# Patient Record
Sex: MALE | Race: WHITE | NOT HISPANIC OR LATINO | Employment: FULL TIME | URBAN - METROPOLITAN AREA
[De-identification: names, ages, dates, MRNs, and addresses within clinical notes are randomized per-mention and may not be internally consistent; named-entity substitution may affect disease eponyms.]

---

## 2020-06-04 ENCOUNTER — TELEPHONE (OUTPATIENT)
Dept: SURGERY | Facility: CLINIC | Age: 60
End: 2020-06-04

## 2020-06-05 ENCOUNTER — OFFICE VISIT (OUTPATIENT)
Dept: SURGERY | Facility: CLINIC | Age: 60
End: 2020-06-05
Payer: COMMERCIAL

## 2020-06-05 VITALS
HEIGHT: 70 IN | WEIGHT: 206.38 LBS | SYSTOLIC BLOOD PRESSURE: 117 MMHG | DIASTOLIC BLOOD PRESSURE: 61 MMHG | BODY MASS INDEX: 29.54 KG/M2 | HEART RATE: 86 BPM

## 2020-06-05 DIAGNOSIS — K64.8 INTERNAL HEMORRHOIDS WITH COMPLICATION: Primary | ICD-10-CM

## 2020-06-05 PROCEDURE — 3008F PR BODY MASS INDEX (BMI) DOCUMENTED: ICD-10-PCS | Mod: CPTII,S$GLB,, | Performed by: COLON & RECTAL SURGERY

## 2020-06-05 PROCEDURE — 99203 OFFICE O/P NEW LOW 30 MIN: CPT | Mod: 25,S$GLB,, | Performed by: COLON & RECTAL SURGERY

## 2020-06-05 PROCEDURE — 3008F BODY MASS INDEX DOCD: CPT | Mod: CPTII,S$GLB,, | Performed by: COLON & RECTAL SURGERY

## 2020-06-05 PROCEDURE — 99203 PR OFFICE/OUTPT VISIT, NEW, LEVL III, 30-44 MIN: ICD-10-PCS | Mod: 25,S$GLB,, | Performed by: COLON & RECTAL SURGERY

## 2020-06-05 PROCEDURE — 46221 LIGATION OF HEMORRHOID(S): CPT | Mod: S$GLB,,, | Performed by: COLON & RECTAL SURGERY

## 2020-06-05 PROCEDURE — 99999 PR PBB SHADOW E&M-EST. PATIENT-LVL III: CPT | Mod: PBBFAC,,, | Performed by: COLON & RECTAL SURGERY

## 2020-06-05 PROCEDURE — 46221 PR HEMORRHOIDECTOMY INTERNAL RUBBER BAND LIGATIONS: ICD-10-PCS | Mod: S$GLB,,, | Performed by: COLON & RECTAL SURGERY

## 2020-06-05 PROCEDURE — 99999 PR PBB SHADOW E&M-EST. PATIENT-LVL III: ICD-10-PCS | Mod: PBBFAC,,, | Performed by: COLON & RECTAL SURGERY

## 2020-06-05 RX ORDER — LISINOPRIL 40 MG/1
TABLET ORAL
COMMUNITY
End: 2020-12-03 | Stop reason: ALTCHOICE

## 2020-06-05 RX ORDER — ATORVASTATIN CALCIUM 20 MG/1
TABLET, FILM COATED ORAL
COMMUNITY
Start: 2020-05-09 | End: 2020-12-03 | Stop reason: SDUPTHER

## 2020-06-05 RX ORDER — AMLODIPINE BESYLATE 5 MG/1
TABLET ORAL
COMMUNITY
Start: 2020-05-13 | End: 2020-12-03 | Stop reason: SDUPTHER

## 2020-06-05 NOTE — PATIENT INSTRUCTIONS
[facility]  DIET RECOMMENDATIONS    Fruits:  Use all fruits and juices liberally; fresh, cooked, dried or canned. Eat fruit raw and with skins when possible. Have at least four servings of fruit daily including a citrus fruit and a stewed dried fruit. Hard seeds of fruits (berries, figs, Grapes, mangoes, tomatoes) etc. may be removed.    Vegetables: Use all vegetables liberally. Green leafy vegetables, such as cabbage, spinach, lettuce, broccoli, and other greens are particularly good.    Potato: As desired. Serve baked frequently and eat the skin. Other starchy foods such as rice, macaroni, etc., may be occasionally substituted. Chew popcorn well and do not eat hard kernels.    Meat, Fish, Poultry: One or two servings daily.    Eggs: One daily if you are not on a low cholesterol diet.    Milk: Include at least one-half pint daily. Whole milk or skimmed may be used.     Cereals: Use whole grain breads and cereals such as bran, bran flakes, grape nut flakes, puffed wheat, oatmeal, Wheaties, etc., as much as possible. Refined breaks and cereals are not restricted; however, they do not contain the bulk necessary for this diet.     Sugars, Sweets: Use very moderately. Depend on fruit as dessert.    Fats: Use butter or margarine as desired. Oil or dressing on salads as desired. Fried foods may be used in moderation. Nuts may be eaten if you chew them well and may be ground or finely chopped for cooking.   Sample Menu                                                                                 Breakfast                          Lunch  Orange juice, 4 ounces                                                Vegetable soup                    Stewed fruit                                                                 Fresh fruit plate with cottage cheese  Shredded wheat                                                           Whole wheat toast  Scrambled eggs                                                            Butter  Whole wheat toast                                                       Coffee or tea  Dinner                                                                         Bedtime  Large glass tomato juice                                             1 glass milk  Roast beef                                                                   stewed fruit  Baked potato with skin  Buttered spinach  Raw vegetable salad  Baked apple with skin   Coffee or tea      [facility]  High Fiber Diet    15 grams of fiber per day is recommended  Fiber cereal = 5 grams (Raisin Bran, Shredded Wheat, Grape Nuts)  Konsyl 1 teaspoon = 6 grams  Metamucil 1 tablespoon = 3 grams  Citrucel 1 tablespoon = 2 grams  Fiber Choice = 3-4 per day    This diet furnishes adequate amounts of all the essential nutrients needed by the body and a very liberal fiber or roughage content. Roughage is indigestible fiber found in fruits, vegetables and whole grain cereals. It provides bulk to the large intestine and, accompanied by an adequate fluid intake, is a stimulant to elimination. Regular eating and elimination habits are vital to good health.     How to Increase Your Fiber Intake    1. Drink at least 4-5 glasses of liquids per day or the fiber can be constipating rather then stimulating to your gut.  2. Boil and bake potatoes in their skin. Eat the skin, too.  3. Include fresh fruits and raw vegetables in your daily diet. Raw fruits and vegetables have more useful fiber than those that have been peeled, cooked, pureed, or otherwise processed.  4. Eat a wide variety of fibrous foods in reasonable amounts. Increase fiber intake slowly especially if you have been on a low-fiber diet.  5. Eat more legumes-peas, beans, soybeans.  6. For snacks, try dried fruit, whole wheat and rye crackers.  7. Avoid instant-cook hot cereals. Use the longer cooking cereals.  8. Use bran whenever possible. Sprinkle it on top of cereal, mix it into  mashed potatoes or hamburger meat, or use it in combination dishes such as meat loaf.   9. Substitute whole grain, whole wheat and bran products for white flour products.  10. Eat slowly and chew your food thoroughly.

## 2020-07-23 ENCOUNTER — TELEPHONE (OUTPATIENT)
Dept: SURGERY | Facility: CLINIC | Age: 60
End: 2020-07-23

## 2020-07-30 ENCOUNTER — OFFICE VISIT (OUTPATIENT)
Dept: SURGERY | Facility: CLINIC | Age: 60
End: 2020-07-30
Payer: COMMERCIAL

## 2020-07-30 VITALS
DIASTOLIC BLOOD PRESSURE: 85 MMHG | BODY MASS INDEX: 29.97 KG/M2 | SYSTOLIC BLOOD PRESSURE: 130 MMHG | HEIGHT: 70 IN | WEIGHT: 209.38 LBS | HEART RATE: 80 BPM

## 2020-07-30 DIAGNOSIS — K64.8 INTERNAL HEMORRHOIDS WITH COMPLICATION: Primary | ICD-10-CM

## 2020-07-30 PROCEDURE — 99999 PR PBB SHADOW E&M-EST. PATIENT-LVL III: CPT | Mod: PBBFAC,,, | Performed by: COLON & RECTAL SURGERY

## 2020-07-30 PROCEDURE — 46221 LIGATION OF HEMORRHOID(S): CPT | Mod: S$GLB,,, | Performed by: COLON & RECTAL SURGERY

## 2020-07-30 PROCEDURE — 99213 OFFICE O/P EST LOW 20 MIN: CPT | Mod: 25,S$GLB,, | Performed by: COLON & RECTAL SURGERY

## 2020-07-30 PROCEDURE — 3008F PR BODY MASS INDEX (BMI) DOCUMENTED: ICD-10-PCS | Mod: CPTII,S$GLB,, | Performed by: COLON & RECTAL SURGERY

## 2020-07-30 PROCEDURE — 99999 PR PBB SHADOW E&M-EST. PATIENT-LVL III: ICD-10-PCS | Mod: PBBFAC,,, | Performed by: COLON & RECTAL SURGERY

## 2020-07-30 PROCEDURE — 99213 PR OFFICE/OUTPT VISIT, EST, LEVL III, 20-29 MIN: ICD-10-PCS | Mod: 25,S$GLB,, | Performed by: COLON & RECTAL SURGERY

## 2020-07-30 PROCEDURE — 46221 PR HEMORRHOIDECTOMY INTERNAL RUBBER BAND LIGATIONS: ICD-10-PCS | Mod: S$GLB,,, | Performed by: COLON & RECTAL SURGERY

## 2020-07-30 PROCEDURE — 3008F BODY MASS INDEX DOCD: CPT | Mod: CPTII,S$GLB,, | Performed by: COLON & RECTAL SURGERY

## 2020-07-30 RX ORDER — ATORVASTATIN CALCIUM 20 MG/1
TABLET, FILM COATED ORAL
COMMUNITY
Start: 2015-05-05 | End: 2020-12-09

## 2020-07-30 RX ORDER — AMLODIPINE BESYLATE 5 MG/1
TABLET ORAL
COMMUNITY
Start: 2015-05-05 | End: 2020-12-03

## 2020-07-30 NOTE — PROGRESS NOTES
See Dr. Hair note from this visit. I have personally taken the history and examined this patient and agree with the resident's note as stated above.

## 2020-07-31 NOTE — PROGRESS NOTES
Colorectal Surgery Clinic Note - Established patient    SUBJECTIVE:     Chief Complaint: follow-up hemorrhoid RBL    History of Present Illness:  Patient is a 60 y.o. male presents in follow-up 6 weeks s/p RBL of LL and RPL internal hemorrhoids.  He states that after the procedure he is about 60% better in terms of prolapse and discomfort.  However, he continues to have frequent bleeding of red blood on the toilet paper.  He has not been taking any fiber supplement.    Review of patient's allergies indicates:  No Known Allergies    History reviewed. No pertinent past medical history.  History reviewed. No pertinent surgical history.  History reviewed. No pertinent family history.  Social History     Tobacco Use    Smoking status: Former Smoker    Smokeless tobacco: Former User   Substance Use Topics    Alcohol use: Yes     Frequency: Monthly or less    Drug use: Not on file          OBJECTIVE:     Vital Signs (Most Recent)  Pulse: 80 (07/30/20 1044)  BP: 130/85 (07/30/20 1044)    Physical Exam   Constitutional: He is oriented to person, place, and time and well-developed, well-nourished, and in no distress.   HENT:   Head: Normocephalic.   Cardiovascular: Normal rate.   Pulmonary/Chest: Effort normal.   Neurological: He is alert and oriented to person, place, and time.   Skin: Skin is warm and dry.     Anorectal:  External exam with prominence of left lateral external hemorrhoids  DELVIN: normal tone, no masses, no blood  Anoscopy: left lateral grade 2 internal hemorrhoid and rubber band ligation performed.    ASSESSMENT/PLAN:     There are no diagnoses linked to this encounter.    Post Rubber Band Ligation    1. Pt gave verbal consent for rubber band ligation, which was performed on the Left Lateral hemorrhoid(s), pt tolerated Well tolerated by patient..    2. Pt verbalized understanding to increase fiber to 25-50gms/day, drink plenty of water and to have 4-5 sitz baths daily, and that in 3-5 days may have minimal  bleeding.    3. Pt will f/u via virtual visit in 6 weeks.    4. Understands to seek immediate treatment for fever, bleeding, pelvic pain, or difficulty urinating.    Orlando Hair MD  Colon and Rectal Surgery Fellow

## 2020-09-11 ENCOUNTER — OFFICE VISIT (OUTPATIENT)
Dept: SURGERY | Facility: CLINIC | Age: 60
End: 2020-09-11
Payer: COMMERCIAL

## 2020-09-11 DIAGNOSIS — K64.8 INTERNAL HEMORRHOIDS WITH COMPLICATION: Primary | ICD-10-CM

## 2020-09-11 PROCEDURE — 99212 OFFICE O/P EST SF 10 MIN: CPT | Mod: 95,,, | Performed by: COLON & RECTAL SURGERY

## 2020-09-11 PROCEDURE — 99212 PR OFFICE/OUTPT VISIT, EST, LEVL II, 10-19 MIN: ICD-10-PCS | Mod: 95,,, | Performed by: COLON & RECTAL SURGERY

## 2020-09-11 NOTE — PROGRESS NOTES
Established Patient - Audio Only Telehealth Visit     The patient location is:  [place]  The chief complaint leading to consultation is:  Hemorrhoids  Visit type: Virtual visit with audio only (telephone)  Total time spent with patient:  10 min       The reason for the audio only service rather than synchronous audio and video virtual visit was related to technical difficulties or patient preference/necessity.     Each patient to whom I provide medical services by telemedicine is:  (1) informed of the relationship between the physician and patient and the respective role of any other health care provider with respect to management of the patient; and (2) notified that they may decline to receive medical services by telemedicine and may withdraw from such care at any time. Patient verbally consented to receive this service via voice-only telephone call.       HPI:  Has been banded twice and although he has noted improvement since the 1st banding has not really seen much improvement since the 2nd banding.  Still has some bleeding and discomfort with bowel movements.  Doing well with taking fiber but not noticing a marked difference.  Some hard stools.     Assessment and plan:  A suggested we try a stool softener and then if no improvement over the next 2-3 weeks he will need to see me back in the office to assess for either ongoing rubber-band ligation or consideration of surgery.                        This service was not originating from a related E/M service provided within the previous 7 days nor will  to an E/M service or procedure within the next 24 hours or my soonest available appointment.  Prevailing standard of care was able to be met in this audio-only visit.

## 2020-09-24 ENCOUNTER — TELEPHONE (OUTPATIENT)
Dept: ORTHOPEDICS | Facility: CLINIC | Age: 60
End: 2020-09-24

## 2020-09-24 DIAGNOSIS — M50.30 DDD (DEGENERATIVE DISC DISEASE), CERVICAL: Primary | ICD-10-CM

## 2020-10-30 ENCOUNTER — TELEPHONE (OUTPATIENT)
Dept: ORTHOPEDICS | Facility: CLINIC | Age: 60
End: 2020-10-30
Payer: COMMERCIAL

## 2020-10-30 NOTE — TELEPHONE ENCOUNTER
----- Message from Leslie Lauren sent at 10/29/2020  5:48 PM CDT -----  Contact: [phone]  Pt calling to speak with someone regarding his upcoming appointment. The pt stated that he already had x-rays of the location the appointment was set up for. Please call the pt regarding the concerns.

## 2020-10-30 NOTE — TELEPHONE ENCOUNTER
Left message for patient advising that if he has an external xray that he can bring to his appointment , then he wont need to do the xray and we can cancel it out.

## 2020-11-04 ENCOUNTER — OFFICE VISIT (OUTPATIENT)
Dept: ORTHOPEDICS | Facility: CLINIC | Age: 60
End: 2020-11-04
Payer: COMMERCIAL

## 2020-11-04 ENCOUNTER — HOSPITAL ENCOUNTER (OUTPATIENT)
Dept: RADIOLOGY | Facility: HOSPITAL | Age: 60
Discharge: HOME | End: 2020-11-04
Attending: PHYSICIAN ASSISTANT
Payer: COMMERCIAL

## 2020-11-04 VITALS — BODY MASS INDEX: 28.7 KG/M2 | WEIGHT: 200 LBS

## 2020-11-04 DIAGNOSIS — M50.30 DDD (DEGENERATIVE DISC DISEASE), CERVICAL: ICD-10-CM

## 2020-11-04 DIAGNOSIS — M54.12 RADICULOPATHY, CERVICAL REGION: ICD-10-CM

## 2020-11-04 DIAGNOSIS — M50.30 DDD (DEGENERATIVE DISC DISEASE), CERVICAL: Primary | ICD-10-CM

## 2020-11-04 DIAGNOSIS — M47.892 OTHER SPONDYLOSIS, CERVICAL REGION: ICD-10-CM

## 2020-11-04 PROCEDURE — 72050 XR CERVICAL SPINE AP LAT WITH FLEX EXTEN: ICD-10-PCS | Mod: 26,,, | Performed by: RADIOLOGY

## 2020-11-04 PROCEDURE — 3008F PR BODY MASS INDEX (BMI) DOCUMENTED: ICD-10-PCS | Mod: CPTII,S$GLB,, | Performed by: ORTHOPAEDIC SURGERY

## 2020-11-04 PROCEDURE — 99204 OFFICE O/P NEW MOD 45 MIN: CPT | Mod: S$GLB,,, | Performed by: ORTHOPAEDIC SURGERY

## 2020-11-04 PROCEDURE — 99204 PR OFFICE/OUTPT VISIT, NEW, LEVL IV, 45-59 MIN: ICD-10-PCS | Mod: S$GLB,,, | Performed by: ORTHOPAEDIC SURGERY

## 2020-11-04 PROCEDURE — 72050 X-RAY EXAM NECK SPINE 4/5VWS: CPT | Mod: 26,,, | Performed by: RADIOLOGY

## 2020-11-04 PROCEDURE — 99999 PR PBB SHADOW E&M-EST. PATIENT-LVL III: ICD-10-PCS | Mod: PBBFAC,,, | Performed by: ORTHOPAEDIC SURGERY

## 2020-11-04 PROCEDURE — 3008F BODY MASS INDEX DOCD: CPT | Mod: CPTII,S$GLB,, | Performed by: ORTHOPAEDIC SURGERY

## 2020-11-04 PROCEDURE — 72050 X-RAY EXAM NECK SPINE 4/5VWS: CPT | Mod: TC

## 2020-11-04 PROCEDURE — 99999 PR PBB SHADOW E&M-EST. PATIENT-LVL III: CPT | Mod: PBBFAC,,, | Performed by: ORTHOPAEDIC SURGERY

## 2020-11-04 NOTE — PROGRESS NOTES
DATE: 11/4/2020  PATIENT: Hipolito Laws    Attending Physician: Gerardo Goldman M.D.    CHIEF COMPLAINT: neck pain    HISTORY:  Hipolito Laws is a 60 y.o. male who is here for initial evaluation of neck and right hand paresthesias.  (Neck - 2, The pain has been present for 16-18 years. The patient describes the pain as sharp and severe. The pain is worse with activity and improved by rest. There is right upper extremity associated numbness and tingling. There is no subjective weakness. Prior treatments have included Naprosyn, chiropracter, but no improvement. No injections and no PT specifically. Patient works full time, electrical substation work. Patient denies tobacco use, occasional alcohol    The Patient denies myelopathic symptoms such as handwriting changes or difficulty with buttons/coins/keys. Denies perineal paresthesias, bowel/bladder dysfunction.    PAST MEDICAL/SURGICAL HISTORY:  History reviewed. No pertinent past medical history.  History reviewed. No pertinent surgical history.    Current Medications:   Current Outpatient Medications:     amLODIPine (NORVASC) 5 MG tablet, , Disp: , Rfl:     amLODIPine (NORVASC) 5 MG tablet, , Disp: , Rfl:     atorvastatin (LIPITOR) 20 MG tablet, , Disp: , Rfl:     atorvastatin (LIPITOR) 20 MG tablet, , Disp: , Rfl:     lisinopriL (PRINIVIL,ZESTRIL) 40 MG tablet, , Disp: , Rfl:     Social History:   Social History     Socioeconomic History    Marital status:      Spouse name: Not on file    Number of children: Not on file    Years of education: Not on file    Highest education level: Not on file   Occupational History    Not on file   Social Needs    Financial resource strain: Not on file    Food insecurity     Worry: Not on file     Inability: Not on file    Transportation needs     Medical: Not on file     Non-medical: Not on file   Tobacco Use    Smoking status: Former Smoker    Smokeless tobacco: Former User   Substance and Sexual  Activity    Alcohol use: Yes     Frequency: Monthly or less    Drug use: Not on file    Sexual activity: Not on file   Lifestyle    Physical activity     Days per week: Not on file     Minutes per session: Not on file    Stress: Not on file   Relationships    Social connections     Talks on phone: Not on file     Gets together: Not on file     Attends Yarsani service: Not on file     Active member of club or organization: Not on file     Attends meetings of clubs or organizations: Not on file     Relationship status: Not on file   Other Topics Concern    Not on file   Social History Narrative    Not on file       REVIEW OF SYSTEMS:  Constitution: Negative. Negative for chills, fever and night sweats.   Cardiovascular: Negative for chest pain and syncope.   Respiratory: Negative for cough and shortness of breath.   Gastrointestinal: See HPI. Negative for nausea/vomiting. Negative for abdominal pain.  Genitourinary: See HPI. Negative for discoloration or dysuria.  Skin: Negative for dry skin, itching and rash.   Hematologic/Lymphatic:  Negative for bleeding/clotting disorders.   Musculoskeletal: Negative for falls and muscle weakness.   Neurological: See HPI.  No history of seizures.  No history of cranial surgery or shunts.  Endocrine: Negative for polydipsia, polyphagia and polyuria.   Allergic/Immunologic: Negative for hives and persistent infections.  Psychiatric/Behavioral: Negative for depression and insomnia.         EXAM:  Wt 90.7 kg (200 lb)   BMI 28.70 kg/m²     General: The patient is a WNWD 60 y.o. male in no apparent distress, the patient is orientatied to person, place and time.  Psych: Normal mood and affect  HEENT: Vision grossly intact, hearing intact to the spoken word.  Lungs: Respirations unlabored.  Gait: Normal station and gait, no difficulty with toe or heel walk.   Skin: Cervical skin negative for rashes, lesions, hairy patches and surgical scars.  Range of motion: Cervical range of  motion is acceptable. There is no tenderness to palpation.  Spinal Balance: Global saggital and coronal spinal balance acceptable, no significant for scoliosis and kyphosis.  Musculoskeletal: No pain with the range of motion of the bilateral shoulders and elbows. Normal bulk and contour of the bilateral hands.  Vascular: Bilateral hands warm and well perfused, Radial pulses 2+ bilaterally.  Neurological: Normal strength and tone in all major motor groups in the bilateral upper and lower extremities. Normal sensation to light touch in the C5-T1 and L2-S1 dermatomes bilaterally.  Deep tendon reflexes symmetric in the bilateral upper and lower extremities.  Negative Inverted Radial Reflex and Lagunas's bilaterally. Negative Babinski bilaterally.     IMAGING:   Today I personally reviewed AP, Lat and Flex/Ex  upright C-spine films that demonstrate C5-6 spondylosis      Body mass index is 28.7 kg/m².  No results found for: HGBA1C    ASSESSMENT/PLAN:  DDD (degenerative disc disease), cervical    Other spondylosis, cervical region  -     MRI Cervical Spine Without Contrast; Future; Expected date: 11/04/2020    Radiculopathy, cervical region  -     MRI Cervical Spine Without Contrast; Future; Expected date: 11/04/2020      No follow-ups on file.    Hipolito PARKS Eusebia is a 60 y.o. male with cervical spondylosis  -MRI  -[facility] MRI

## 2020-11-20 ENCOUNTER — PATIENT MESSAGE (OUTPATIENT)
Dept: ORTHOPEDICS | Facility: CLINIC | Age: 60
End: 2020-11-20

## 2020-11-24 ENCOUNTER — TELEPHONE (OUTPATIENT)
Dept: ORTHOPEDICS | Facility: CLINIC | Age: 60
End: 2020-11-24
Payer: COMMERCIAL

## 2020-11-24 ENCOUNTER — TELEPHONE (OUTPATIENT)
Dept: ORTHOPEDICS | Facility: CLINIC | Age: 60
End: 2020-11-24

## 2020-11-24 NOTE — TELEPHONE ENCOUNTER
Left message for pt letting him know that the peer to peer has been done for his MRI and it approved. Auth # is [id number]. I asked that he call me once his MRI is done and I will get his follow up set up.

## 2020-11-24 NOTE — TELEPHONE ENCOUNTER
Completed peer to peer with Randolph Cody at [facility], [place], cervical MRI has been approved. Prior authorization number [id number] good through January 8, 2021.

## 2020-11-27 ENCOUNTER — TELEPHONE (OUTPATIENT)
Dept: ORTHOPEDICS | Facility: CLINIC | Age: 60
End: 2020-11-27

## 2020-11-27 NOTE — TELEPHONE ENCOUNTER
----- Message from Tia Callaway sent at 11/27/2020 10:03 AM CST -----  Pt would like to receive a call back regarding his mri. Please contact the pt to advise    Contact info [phone]

## 2020-11-27 NOTE — TELEPHONE ENCOUNTER
I returned the patient call he did not answer. Left a message to call me back when he get my message.

## 2020-12-03 ENCOUNTER — IMMUNIZATION (OUTPATIENT)
Dept: PHARMACY | Facility: CLINIC | Age: 60
End: 2020-12-03

## 2020-12-03 ENCOUNTER — LAB VISIT (OUTPATIENT)
Dept: LAB | Facility: HOSPITAL | Age: 60
End: 2020-12-03
Attending: INTERNAL MEDICINE
Payer: COMMERCIAL

## 2020-12-03 ENCOUNTER — PATIENT MESSAGE (OUTPATIENT)
Dept: PHARMACY | Facility: CLINIC | Age: 60
End: 2020-12-03

## 2020-12-03 ENCOUNTER — IMMUNIZATION (OUTPATIENT)
Dept: PHARMACY | Facility: CLINIC | Age: 60
End: 2020-12-03
Payer: COMMERCIAL

## 2020-12-03 ENCOUNTER — OFFICE VISIT (OUTPATIENT)
Dept: INTERNAL MEDICINE | Facility: CLINIC | Age: 60
End: 2020-12-03
Payer: COMMERCIAL

## 2020-12-03 VITALS
BODY MASS INDEX: 30.02 KG/M2 | SYSTOLIC BLOOD PRESSURE: 134 MMHG | DIASTOLIC BLOOD PRESSURE: 98 MMHG | HEART RATE: 86 BPM | HEIGHT: 70 IN | WEIGHT: 209.69 LBS | TEMPERATURE: 99 F | OXYGEN SATURATION: 98 % | RESPIRATION RATE: 18 BRPM

## 2020-12-03 DIAGNOSIS — I10 ESSENTIAL HYPERTENSION, BENIGN: ICD-10-CM

## 2020-12-03 DIAGNOSIS — M15.9 OSTEOARTHRITIS OF MULTIPLE JOINTS, UNSPECIFIED OSTEOARTHRITIS TYPE: ICD-10-CM

## 2020-12-03 DIAGNOSIS — Z12.5 PROSTATE CANCER SCREENING: ICD-10-CM

## 2020-12-03 DIAGNOSIS — Z00.00 ANNUAL PHYSICAL EXAM: ICD-10-CM

## 2020-12-03 DIAGNOSIS — Z12.2 ENCOUNTER FOR SCREENING FOR MALIGNANT NEOPLASM OF RESPIRATORY ORGANS: ICD-10-CM

## 2020-12-03 DIAGNOSIS — Z00.00 ANNUAL PHYSICAL EXAM: Primary | ICD-10-CM

## 2020-12-03 DIAGNOSIS — E78.5 HYPERLIPIDEMIA, UNSPECIFIED HYPERLIPIDEMIA TYPE: ICD-10-CM

## 2020-12-03 DIAGNOSIS — M54.12 CERVICAL RADICULITIS: ICD-10-CM

## 2020-12-03 LAB
ALBUMIN SERPL BCP-MCNC: 3.9 G/DL (ref 3.5–5.2)
ALP SERPL-CCNC: 69 U/L (ref 55–135)
ALT SERPL W/O P-5'-P-CCNC: 19 U/L (ref 10–44)
ANION GAP SERPL CALC-SCNC: 7 MMOL/L (ref 8–16)
AST SERPL-CCNC: 16 U/L (ref 10–40)
BASOPHILS # BLD AUTO: 0.09 K/UL (ref 0–0.2)
BASOPHILS NFR BLD: 0.9 % (ref 0–1.9)
BILIRUB SERPL-MCNC: 0.4 MG/DL (ref 0.1–1)
BUN SERPL-MCNC: 27 MG/DL (ref 6–20)
CALCIUM SERPL-MCNC: 9.6 MG/DL (ref 8.7–10.5)
CHLORIDE SERPL-SCNC: 102 MMOL/L (ref 95–110)
CHOLEST SERPL-MCNC: 161 MG/DL (ref 120–199)
CHOLEST/HDLC SERPL: 3.4 {RATIO} (ref 2–5)
CO2 SERPL-SCNC: 32 MMOL/L (ref 23–29)
COMPLEXED PSA SERPL-MCNC: 0.31 NG/ML (ref 0–4)
CREAT SERPL-MCNC: 1 MG/DL (ref 0.5–1.4)
DIFFERENTIAL METHOD: ABNORMAL
EOSINOPHIL # BLD AUTO: 1.5 K/UL (ref 0–0.5)
EOSINOPHIL NFR BLD: 14.8 % (ref 0–8)
ERYTHROCYTE [DISTWIDTH] IN BLOOD BY AUTOMATED COUNT: 12.5 % (ref 11.5–14.5)
EST. GFR  (AFRICAN AMERICAN): >60 ML/MIN/1.73 M^2
EST. GFR  (NON AFRICAN AMERICAN): >60 ML/MIN/1.73 M^2
ESTIMATED AVG GLUCOSE: 103 MG/DL (ref 68–131)
GLUCOSE SERPL-MCNC: 99 MG/DL (ref 70–110)
HBA1C MFR BLD HPLC: 5.2 % (ref 4–5.6)
HCT VFR BLD AUTO: 47 % (ref 40–54)
HDLC SERPL-MCNC: 47 MG/DL (ref 40–75)
HDLC SERPL: 29.2 % (ref 20–50)
HGB BLD-MCNC: 14.8 G/DL (ref 14–18)
IMM GRANULOCYTES # BLD AUTO: 0.03 K/UL (ref 0–0.04)
IMM GRANULOCYTES NFR BLD AUTO: 0.3 % (ref 0–0.5)
LDLC SERPL CALC-MCNC: 92 MG/DL (ref 63–159)
LYMPHOCYTES # BLD AUTO: 2 K/UL (ref 1–4.8)
LYMPHOCYTES NFR BLD: 19.7 % (ref 18–48)
MCH RBC QN AUTO: 30.5 PG (ref 27–31)
MCHC RBC AUTO-ENTMCNC: 31.5 G/DL (ref 32–36)
MCV RBC AUTO: 97 FL (ref 82–98)
MONOCYTES # BLD AUTO: 0.8 K/UL (ref 0.3–1)
MONOCYTES NFR BLD: 7.9 % (ref 4–15)
NEUTROPHILS # BLD AUTO: 5.6 K/UL (ref 1.8–7.7)
NEUTROPHILS NFR BLD: 56.4 % (ref 38–73)
NONHDLC SERPL-MCNC: 114 MG/DL
NRBC BLD-RTO: 0 /100 WBC
PLATELET # BLD AUTO: 326 K/UL (ref 150–350)
PMV BLD AUTO: 9.8 FL (ref 9.2–12.9)
POTASSIUM SERPL-SCNC: 4.4 MMOL/L (ref 3.5–5.1)
PROT SERPL-MCNC: 7.4 G/DL (ref 6–8.4)
RBC # BLD AUTO: 4.86 M/UL (ref 4.6–6.2)
SODIUM SERPL-SCNC: 141 MMOL/L (ref 136–145)
TRIGL SERPL-MCNC: 110 MG/DL (ref 30–150)
WBC # BLD AUTO: 9.89 K/UL (ref 3.9–12.7)

## 2020-12-03 PROCEDURE — 99386 PR PREVENTIVE VISIT,NEW,40-64: ICD-10-PCS | Mod: S$GLB,,, | Performed by: INTERNAL MEDICINE

## 2020-12-03 PROCEDURE — 3008F PR BODY MASS INDEX (BMI) DOCUMENTED: ICD-10-PCS | Mod: CPTII,S$GLB,, | Performed by: INTERNAL MEDICINE

## 2020-12-03 PROCEDURE — 99999 PR PBB SHADOW E&M-EST. PATIENT-LVL IV: ICD-10-PCS | Mod: PBBFAC,,, | Performed by: INTERNAL MEDICINE

## 2020-12-03 PROCEDURE — 80053 COMPREHEN METABOLIC PANEL: CPT

## 2020-12-03 PROCEDURE — 1125F PR PAIN SEVERITY QUANTIFIED, PAIN PRESENT: ICD-10-PCS | Mod: S$GLB,,, | Performed by: INTERNAL MEDICINE

## 2020-12-03 PROCEDURE — 3008F BODY MASS INDEX DOCD: CPT | Mod: CPTII,S$GLB,, | Performed by: INTERNAL MEDICINE

## 2020-12-03 PROCEDURE — 85025 COMPLETE CBC W/AUTO DIFF WBC: CPT

## 2020-12-03 PROCEDURE — 99213 PR OFFICE/OUTPT VISIT, EST, LEVL III, 20-29 MIN: ICD-10-PCS | Mod: 25,S$GLB,, | Performed by: INTERNAL MEDICINE

## 2020-12-03 PROCEDURE — 99999 PR PBB SHADOW E&M-EST. PATIENT-LVL IV: CPT | Mod: PBBFAC,,, | Performed by: INTERNAL MEDICINE

## 2020-12-03 PROCEDURE — 99386 PREV VISIT NEW AGE 40-64: CPT | Mod: S$GLB,,, | Performed by: INTERNAL MEDICINE

## 2020-12-03 PROCEDURE — 84153 ASSAY OF PSA TOTAL: CPT

## 2020-12-03 PROCEDURE — 83036 HEMOGLOBIN GLYCOSYLATED A1C: CPT

## 2020-12-03 PROCEDURE — 36415 COLL VENOUS BLD VENIPUNCTURE: CPT

## 2020-12-03 PROCEDURE — 80061 LIPID PANEL: CPT

## 2020-12-03 PROCEDURE — 1125F AMNT PAIN NOTED PAIN PRSNT: CPT | Mod: S$GLB,,, | Performed by: INTERNAL MEDICINE

## 2020-12-03 PROCEDURE — 99213 OFFICE O/P EST LOW 20 MIN: CPT | Mod: 25,S$GLB,, | Performed by: INTERNAL MEDICINE

## 2020-12-03 RX ORDER — CYCLOBENZAPRINE HCL 10 MG
TABLET ORAL
COMMUNITY
Start: 2020-10-28 | End: 2020-12-03 | Stop reason: SDUPTHER

## 2020-12-03 RX ORDER — LOSARTAN POTASSIUM 50 MG/1
50 TABLET ORAL DAILY
Qty: 90 TABLET | Refills: 3 | Status: SHIPPED | OUTPATIENT
Start: 2020-12-03 | End: 2021-12-06

## 2020-12-03 RX ORDER — CYCLOBENZAPRINE HCL 10 MG
TABLET ORAL
COMMUNITY
Start: 2020-10-21 | End: 2021-03-01 | Stop reason: SDUPTHER

## 2020-12-03 RX ORDER — DICLOFENAC SODIUM 50 MG/1
50 TABLET, DELAYED RELEASE ORAL 2 TIMES DAILY PRN
Qty: 180 TABLET | Refills: 0 | Status: SHIPPED | OUTPATIENT
Start: 2020-12-03 | End: 2021-03-02

## 2020-12-03 NOTE — PROGRESS NOTES
CHIEF COMPLAINT     Chief Complaint   Patient presents with    Annual Exam       HPI     Hipolito Laws is a 60 y.o. male here today for annual exam    HTN  Did not take meds for the past week.  doesn't check BP at home.  Does not think is hypertension despite being prescribed 2 medications.  RF: NSAID use, high salt diet    Arthritis  Neck, Low back, knees, hips,  Previously taking NSAIDs which were helpful.  Has been out of.  Reports he has been taking over-the-counter ibuprofen and type acetaminophen.  Reports having pain in shoulders wrist elbows knees and hips.  Attributes to career manual labor.  Reports that symptoms have been worse since he has been out of NSAID.    Cervical radiculitis  Being followed by spine clinic at [facility]  Recently had MRI  Reports chronic daily neck pain with radiation of pain into arm.    Personally Reviewed Patient's Medical, surgical, family and social hx. Changes updated in [facility].  Care Team updated in Epic    Review of Systems:  Review of Systems   Constitutional: Negative for activity change and unexpected weight change.   HENT: Negative for hearing loss, rhinorrhea and trouble swallowing.    Eyes: Negative for discharge and visual disturbance.   Respiratory: Negative for chest tightness and wheezing.    Cardiovascular: Negative for chest pain and palpitations.   Gastrointestinal: Negative for blood in stool, constipation, diarrhea and vomiting.   Endocrine: Negative for polydipsia and polyuria.   Genitourinary: Negative for difficulty urinating, hematuria and urgency.   Musculoskeletal: Positive for arthralgias and neck pain. Negative for joint swelling.   Neurological: Negative for weakness and headaches.   Psychiatric/Behavioral: Negative for confusion and dysphoric mood.       Health Maintenance:   Reviewed with patient  Due for the following:  Tdap  Shingrix 1 today    PHYSICAL EXAM     BP (!) 134/98 (BP Location: Left arm, Patient Position: Sitting, BP Method: Large  "(Manual))   Pulse 86   Temp 98.5 °F (36.9 °C) (Oral)   Resp 18   Ht 5' 10" (1.778 m)   Wt 95.1 kg (209 lb 10.5 oz)   SpO2 98%   BMI 30.08 kg/m²     Gen: Well Appearing, NAD how these  HEENT: PERR, EOMI  Neck: FROM, no thyromegaly, no cervical adenopathy  CVD: RRR, no M/R/G  Pulm: Normal work of breathing, CTAB, no wheezing  Abd:  Soft, NT, ND non TTP, no mass  MSK: no LE edema  Neuro: A&Ox3, gait normal, speech normal  Mood; Mood normal, behavior normal, thought process linear       LABS     Labs reviewed; ordered today  ASSESSMENT     1. Annual physical exam  CBC Auto Differential    Comprehensive Metabolic Panel    Hemoglobin A1C    Lipid Panel   2. Essential hypertension, benign  losartan (COZAAR) 50 MG tablet   3. Hyperlipidemia, unspecified hyperlipidemia type  Comprehensive Metabolic Panel   4. Cervical radiculitis     5. Prostate cancer screening  PSA, Screening   6. Encounter for screening for malignant neoplasm of respiratory organs  CT Chest Lung Screening Low Dose   7. Osteoarthritis of multiple joints, unspecified osteoarthritis type  diclofenac (VOLTAREN) 50 MG EC tablet           Plan     Hpiolito Laws is a 60 y.o. male with hypertension, arthritis  1. Annual physical exam  Updated problem list, medical history, care team and discussed HM.     - CBC Auto Differential; Future  - Comprehensive Metabolic Panel; Future  - Hemoglobin A1C; Future  - Lipid Panel; Future    2. Essential hypertension, benign  Informed patient he does have high blood pressure  Will start losartan 50 mg daily since he was not taking medications due to erectile side effects    3. Hyperlipidemia, unspecified hyperlipidemia type  Continue atorvastatin 20  Recheck lipids today  - Comprehensive Metabolic Panel; Future    4. Cervical radiculitis  Follow-up with spine surgery    5. Prostate cancer screening  - PSA, Screening; Future    6. Encounter for screening for malignant neoplasm of respiratory organs  Greater than 30 " pack-year history quit 10 years ago age 60  - CT Chest Lung Screening Low Dose; Future    7. Osteoarthritis  Recommend a 1000 mg of Tylenol every 8 hr as needed  Will give prescription for diclofenac twice daily for pain not relieved with Tylenol  Stressed importance of weight loss and strengthening stabilizer muscles.    Fabio Rosas MD

## 2020-12-04 ENCOUNTER — PATIENT MESSAGE (OUTPATIENT)
Dept: ORTHOPEDICS | Facility: CLINIC | Age: 60
End: 2020-12-04

## 2020-12-07 ENCOUNTER — HOSPITAL ENCOUNTER (OUTPATIENT)
Dept: RADIOLOGY | Facility: HOSPITAL | Age: 60
Discharge: HOME | End: 2020-12-07
Attending: INTERNAL MEDICINE
Payer: COMMERCIAL

## 2020-12-07 DIAGNOSIS — Z12.2 ENCOUNTER FOR SCREENING FOR MALIGNANT NEOPLASM OF RESPIRATORY ORGANS: ICD-10-CM

## 2020-12-07 PROCEDURE — G0297 LDCT FOR LUNG CA SCREEN: HCPCS | Mod: 26,,, | Performed by: RADIOLOGY

## 2020-12-07 PROCEDURE — G0297 LDCT FOR LUNG CA SCREEN: HCPCS | Mod: TC

## 2020-12-07 PROCEDURE — G0297 CT CHEST LUNG SCREENING LOW DOSE: ICD-10-PCS | Mod: 26,,, | Performed by: RADIOLOGY

## 2020-12-09 ENCOUNTER — OFFICE VISIT (OUTPATIENT)
Dept: ORTHOPEDICS | Facility: CLINIC | Age: 60
End: 2020-12-09
Payer: COMMERCIAL

## 2020-12-09 ENCOUNTER — TELEPHONE (OUTPATIENT)
Dept: INTERNAL MEDICINE | Facility: CLINIC | Age: 60
End: 2020-12-09

## 2020-12-09 DIAGNOSIS — I25.10 CORONARY ARTERY CALCIFICATION SEEN ON CT SCAN: Primary | ICD-10-CM

## 2020-12-09 DIAGNOSIS — M54.12 CERVICAL RADICULOPATHY: Primary | ICD-10-CM

## 2020-12-09 PROCEDURE — 99214 PR OFFICE/OUTPT VISIT, EST, LEVL IV, 30-39 MIN: ICD-10-PCS | Mod: S$GLB,,, | Performed by: ORTHOPAEDIC SURGERY

## 2020-12-09 PROCEDURE — 99999 PR PBB SHADOW E&M-EST. PATIENT-LVL II: CPT | Mod: PBBFAC,,, | Performed by: ORTHOPAEDIC SURGERY

## 2020-12-09 PROCEDURE — 99999 PR PBB SHADOW E&M-EST. PATIENT-LVL II: ICD-10-PCS | Mod: PBBFAC,,, | Performed by: ORTHOPAEDIC SURGERY

## 2020-12-09 PROCEDURE — 1125F AMNT PAIN NOTED PAIN PRSNT: CPT | Mod: S$GLB,,, | Performed by: ORTHOPAEDIC SURGERY

## 2020-12-09 PROCEDURE — 1125F PR PAIN SEVERITY QUANTIFIED, PAIN PRESENT: ICD-10-PCS | Mod: S$GLB,,, | Performed by: ORTHOPAEDIC SURGERY

## 2020-12-09 PROCEDURE — 99214 OFFICE O/P EST MOD 30 MIN: CPT | Mod: S$GLB,,, | Performed by: ORTHOPAEDIC SURGERY

## 2020-12-09 RX ORDER — ATORVASTATIN CALCIUM 80 MG/1
80 TABLET, FILM COATED ORAL DAILY
Qty: 90 TABLET | Refills: 3 | Status: SHIPPED | OUTPATIENT
Start: 2020-12-09 | End: 2020-12-09

## 2020-12-09 RX ORDER — ATORVASTATIN CALCIUM 40 MG/1
40 TABLET, FILM COATED ORAL DAILY
Qty: 90 TABLET | Refills: 3 | Status: SHIPPED | OUTPATIENT
Start: 2020-12-09 | End: 2022-01-04 | Stop reason: SDUPTHER

## 2020-12-09 NOTE — PROGRESS NOTES
DATE: 12/9/2020  PATIENT: Hipoltio Laws    Attending Physician: Gerardo Goldman M.D.    CHIEF COMPLAINT: neck pain    HISTORY:  Hipolito Laws is a 60 y.o. male who is here for initial evaluation of neck and right hand paresthesias.  (Neck - 2, The pain has been present for 16-18 years. The patient describes the pain as sharp and severe. The pain is worse with activity and improved by rest. There is right upper extremity associated numbness and tingling. There is no subjective weakness. Prior treatments have included Naprosyn, chiropracter, but no improvement. No injections and no PT specifically. Patient works full time, electrical substation work. Patient denies tobacco use, occasional alcohol    The Patient denies myelopathic symptoms such as handwriting changes or difficulty with buttons/coins/keys. Denies perineal paresthesias, bowel/bladder dysfunction.    Interval history 12/9/20: patient returns for fu of MRI. He continues to have pain in the left jaw and numbness in the right hand and fingers. Denies myelopathic symptoms.   PAST MEDICAL/SURGICAL HISTORY:  History reviewed. No pertinent past medical history.  Past Surgical History:   Procedure Laterality Date    ARTHROSCOPIC REPAIR OF ROTATOR CUFF OF SHOULDER  1992       Current Medications:   Current Outpatient Medications:     atorvastatin (LIPITOR) 40 MG tablet, Take 1 tablet (40 mg total) by mouth once daily., Disp: 90 tablet, Rfl: 3    cyclobenzaprine (FLEXERIL) 10 MG tablet, TK 1 T PO BID FOR 14 DAYS, Disp: , Rfl:     diclofenac (VOLTAREN) 50 MG EC tablet, Take 1 tablet (50 mg total) by mouth 2 (two) times daily as needed., Disp: 180 tablet, Rfl: 0    losartan (COZAAR) 50 MG tablet, Take 1 tablet (50 mg total) by mouth once daily., Disp: 90 tablet, Rfl: 3    Social History:   Social History     Socioeconomic History    Marital status:      Spouse name: Not on file    Number of children: Not on file    Years of education: Not on  file    Highest education level: Not on file   Occupational History    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   Social Needs    Financial resource strain: Not on file    Food insecurity     Worry: Not on file     Inability: Not on file    Transportation needs     Medical: Not on file     Non-medical: Not on file   Tobacco Use    Smoking status: Former Smoker     Packs/day: 1.50     Years: 30.00     Pack years: 45.00     Quit date: 12/3/2011     Years since quittin.0    Smokeless tobacco: Former User   Substance and Sexual Activity    Alcohol use: Yes     Frequency: Monthly or less    Drug use: Not on file    Sexual activity: Yes   Lifestyle    Physical activity     Days per week: Not on file     Minutes per session: Not on file    Stress: Not on file   Relationships    Social connections     Talks on phone: Not on file     Gets together: Not on file     Attends Baptism service: Not on file     Active member of club or organization: Not on file     Attends meetings of clubs or organizations: Not on file     Relationship status: Not on file   Other Topics Concern    Not on file   Social History Narrative    Not on file       REVIEW OF SYSTEMS:  Constitution: Negative. Negative for chills, fever and night sweats.   Cardiovascular: Negative for chest pain and syncope.   Respiratory: Negative for cough and shortness of breath.   Gastrointestinal: See HPI. Negative for nausea/vomiting. Negative for abdominal pain.  Genitourinary: See HPI. Negative for discoloration or dysuria.  Skin: Negative for dry skin, itching and rash.   Hematologic/Lymphatic:  Negative for bleeding/clotting disorders.   Musculoskeletal: Negative for falls and muscle weakness.   Neurological: See HPI.  No history of seizures.  No history of cranial surgery or shunts.  Endocrine: Negative for polydipsia, polyphagia and polyuria.   Allergic/Immunologic: Negative for hives and persistent infections.  Psychiatric/Behavioral: Negative for  depression and insomnia.         EXAM:  There were no vitals taken for this visit.    General: The patient is a WNWD 60 y.o. male in no apparent distress, the patient is orientatied to person, place and time.  Psych: Normal mood and affect  HEENT: Vision grossly intact, hearing intact to the spoken word.  Lungs: Respirations unlabored.  Gait: Normal station and gait, no difficulty with toe or heel walk.   Skin: Cervical skin negative for rashes, lesions, hairy patches and surgical scars.  Range of motion: Cervical range of motion is acceptable. There is no tenderness to palpation.  Spinal Balance: Global saggital and coronal spinal balance acceptable, no significant for scoliosis and kyphosis.  Musculoskeletal: No pain with the range of motion of the bilateral shoulders and elbows. Normal bulk and contour of the bilateral hands.  Vascular: Bilateral hands warm and well perfused, Radial pulses 2+ bilaterally.  Neurological: Normal strength and tone in all major motor groups in the bilateral upper and lower extremities. Normal sensation to light touch in the C5-T1 and L2-S1 dermatomes bilaterally.  Deep tendon reflexes symmetric in the bilateral upper and lower extremities.  Negative Inverted Radial Reflex and Lagunas's bilaterally. Negative Babinski bilaterally.     IMAGING:   Today I personally reviewed AP, Lat and Flex/Ex  upright C-spine films that demonstrate C5-6 spondylosis    MRI with mild stenosis C5-6 from outside facility  There is no height or weight on file to calculate BMI.  Hemoglobin A1C   Date Value Ref Range Status   12/03/2020 5.2 4.0 - 5.6 % Final     Comment:     ADA Screening Guidelines:  5.7-6.4%  Consistent with prediabetes  >or=6.5%  Consistent with diabetes  High levels of fetal hemoglobin interfere with the HbA1C  assay. Heterozygous hemoglobin variants (HbS, HgC, etc)do  not significantly interfere with this assay.   However, presence of multiple variants may affect accuracy.          ASSESSMENT/PLAN:  There are no diagnoses linked to this encounter.  No follow-ups on file.    Hipolito Oroscodeau is a 60 y.o. male with cervical spondylosis and stenosis C5-6  -DIGNA C5-6  -FU after DIGNA  -Continue conservative management.

## 2020-12-09 NOTE — TELEPHONE ENCOUNTER
----- Message from Adelina Winkler sent at 12/9/2020  8:46 AM CST -----  Regarding: clarification  Contact: David Roca [phone]  Pharmacy is calling to clarify an RX.  RX name:  Atorvastatin  What do they need to clarify:  two Rx were sent; 40mg and 80 mg  Comments:

## 2020-12-11 ENCOUNTER — PATIENT MESSAGE (OUTPATIENT)
Dept: PAIN MEDICINE | Facility: OTHER | Age: 60
End: 2020-12-11

## 2020-12-11 ENCOUNTER — TELEPHONE (OUTPATIENT)
Dept: PAIN MEDICINE | Facility: OTHER | Age: 60
End: 2020-12-11

## 2020-12-11 DIAGNOSIS — M54.12 CERVICAL RADICULOPATHY: Primary | ICD-10-CM

## 2020-12-17 ENCOUNTER — HOSPITAL ENCOUNTER (OUTPATIENT)
Facility: OTHER | Age: 60
Discharge: HOME | End: 2020-12-17
Attending: ANESTHESIOLOGY | Admitting: ANESTHESIOLOGY
Payer: COMMERCIAL

## 2020-12-17 VITALS
HEIGHT: 70 IN | OXYGEN SATURATION: 95 % | SYSTOLIC BLOOD PRESSURE: 168 MMHG | DIASTOLIC BLOOD PRESSURE: 94 MMHG | BODY MASS INDEX: 29.92 KG/M2 | HEART RATE: 86 BPM | TEMPERATURE: 98 F | RESPIRATION RATE: 16 BRPM | WEIGHT: 209 LBS

## 2020-12-17 DIAGNOSIS — G89.29 CHRONIC PAIN: ICD-10-CM

## 2020-12-17 DIAGNOSIS — M54.12 CERVICAL RADICULOPATHY: Primary | ICD-10-CM

## 2020-12-17 DIAGNOSIS — M50.30 DDD (DEGENERATIVE DISC DISEASE), CERVICAL: ICD-10-CM

## 2020-12-17 PROCEDURE — 64479 NJX AA&/STRD TFRM EPI C/T 1: CPT | Mod: RT,,, | Performed by: ANESTHESIOLOGY

## 2020-12-17 PROCEDURE — 64479 PR INJECT ANES/STEROID FORAMEN CERV/THORACIC W IMG GUIDE ,1 LEVEL: ICD-10-PCS | Mod: RT,,, | Performed by: ANESTHESIOLOGY

## 2020-12-17 PROCEDURE — 63600175 PHARM REV CODE 636 W HCPCS: Performed by: ANESTHESIOLOGY

## 2020-12-17 PROCEDURE — 64479 NJX AA&/STRD TFRM EPI C/T 1: CPT | Mod: RT | Performed by: ANESTHESIOLOGY

## 2020-12-17 PROCEDURE — 25500020 PHARM REV CODE 255: Performed by: ANESTHESIOLOGY

## 2020-12-17 PROCEDURE — 25000003 PHARM REV CODE 250: Performed by: ANESTHESIOLOGY

## 2020-12-17 RX ORDER — LIDOCAINE HYDROCHLORIDE 10 MG/ML
INJECTION INFILTRATION; PERINEURAL
Status: DISCONTINUED | OUTPATIENT
Start: 2020-12-17 | End: 2020-12-17 | Stop reason: HOSPADM

## 2020-12-17 RX ORDER — DEXAMETHASONE SODIUM PHOSPHATE 100 MG/10ML
INJECTION INTRAMUSCULAR; INTRAVENOUS
Status: DISCONTINUED | OUTPATIENT
Start: 2020-12-17 | End: 2020-12-17 | Stop reason: HOSPADM

## 2020-12-17 RX ORDER — LIDOCAINE HYDROCHLORIDE 10 MG/ML
INJECTION, SOLUTION EPIDURAL; INFILTRATION; INTRACAUDAL; PERINEURAL
Status: DISCONTINUED | OUTPATIENT
Start: 2020-12-17 | End: 2020-12-17 | Stop reason: HOSPADM

## 2020-12-17 RX ORDER — SODIUM CHLORIDE 9 MG/ML
INJECTION, SOLUTION INTRAVENOUS CONTINUOUS
Status: DISCONTINUED | OUTPATIENT
Start: 2020-12-17 | End: 2020-12-17 | Stop reason: HOSPADM

## 2020-12-17 RX ORDER — ALPRAZOLAM 0.5 MG/1
1 TABLET ORAL ONCE
Status: COMPLETED | OUTPATIENT
Start: 2020-12-17 | End: 2020-12-17

## 2020-12-17 RX ADMIN — ALPRAZOLAM 1 MG: 0.5 TABLET ORAL at 09:12

## 2020-12-17 NOTE — DISCHARGE SUMMARY
Discharge Note  Short Stay      SUMMARY     Admit Date: 12/17/2020    Attending Physician: Yunier Lawton      Discharge Physician: Yunier Lawton      Discharge Date: 12/17/2020 10:38 AM    Procedure(s) (LRB):  CERVICAL C5/6 DIGNA TRANSFORAMINAL  DIRECT REFERRAL (Right)    Final Diagnosis: Cervical radiculopathy [M54.12]    Disposition: Home or self care    Patient Instructions:   Current Discharge Medication List      CONTINUE these medications which have NOT CHANGED    Details   atorvastatin (LIPITOR) 40 MG tablet Take 1 tablet (40 mg total) by mouth once daily.  Qty: 90 tablet, Refills: 3    Associated Diagnoses: Coronary artery calcification seen on CT scan      cyclobenzaprine (FLEXERIL) 10 MG tablet TK 1 T PO BID FOR 14 DAYS      diclofenac (VOLTAREN) 50 MG EC tablet Take 1 tablet (50 mg total) by mouth 2 (two) times daily as needed.  Qty: 180 tablet, Refills: 0    Associated Diagnoses: Osteoarthritis of multiple joints, unspecified osteoarthritis type      losartan (COZAAR) 50 MG tablet Take 1 tablet (50 mg total) by mouth once daily.  Qty: 90 tablet, Refills: 3    Comments: .  Associated Diagnoses: Essential hypertension, benign                 Discharge Diagnosis: Cervical radiculopathy [M54.12]  Condition on Discharge: Stable with no complications to procedure   Diet on Discharge: Same as before.  Activity: as per instruction sheet.  Discharge to: Home with a responsible adult.  Follow up: 2-4 weeks       Please call my office or pager at [phone] if experienced any weakness or loss of sensation, fever > 101.5, pain uncontrolled with oral medications, persistent nausea/vomiting/or diarrhea, redness or drainage from the incisions, or any other worrisome concerns. If physician on call was not reached or could not communicate with our office for any reason please go to the nearest emergency department

## 2020-12-17 NOTE — OP NOTE
1. Cervical Transforaminal Epidural Steroid Injection  Time-out taken to identify patient and procedure side prior to starting the procedure.   I attest that I have reviewed the patient's home medications prior to the procedure and no contraindication have been identified. I  re-evaluated the patient after the patient was positioned for the procedure in the procedure room immediately before the procedural time-out. The vital signs are current and represent the current state of the patient which has not significantly changed since the preprocedure assessment.                                                                Date of Service: 12/17/2020    PCP: Fabio Rosas MD    Referring Physician:    PROCEDURE:  Right C5/C6 cervical transforaminal epidural steroid injection under fluoroscopy    REASON FOR PROCEDURE: right Cervical radiculopathy [M54.12].  1. Cervical radiculopathy    2. DDD (degenerative disc disease), cervical    3. Chronic pain      POSTPROCEDURE DIAGNOSIS:   Cervical radiculopathy [M54.12]    1. Cervical radiculopathy    2. DDD (degenerative disc disease), cervical    3. Chronic pain           PHYSICIAN: Yunier Lawton MD  ASSISTANTS:   Lang Vargas MD Pain Fellow      MEDICATIONS INJECTED: Preservative-free dexamethasone 5mg and 0.5ml of Xylocaine-MPF 1%  SEDATION MEDICATIONS: None  ESTIMATED BLOOD LOSS:  None.  COMPLICATION:  None.    TECHNIQUE:   Laying in the left oblique position, the patient was prepped and draped in the usual sterile fashion using ChloraPrep and fenestrated drape.  The area to be injected was determined under fluoroscopic guidance.  A 26-gauge 3.5 inch spinal needed was introduced towards the desired pedicle of all levels as stated above.  When the tip of the needle reached the periosteum it was worked anteriorly along the facet joint line.  Negative pressure applied to make sure no blood is seen in the hub.  Omnipaque was injected and digital subtraction was applied to make  sure that there was no vascular runoff whatsoever.  It was also injected to confirm placement in the appropriate area.  The medication was then injected slowly.  The patient tolerated the procedure well.    PAIN BEFORE THE PROCEDURE:  3-4/10.    PAIN AFTER THE PROCEDURE:  3-4/10.    The patient was monitored after the procedure.  Patient was given post procedure and discharge instructions to follow at home.  We will see the patient back in two weeks or the patient may call to inform of status. The patient was discharged in a stable condition

## 2020-12-17 NOTE — DISCHARGE INSTRUCTIONS

## 2020-12-17 NOTE — H&P
"HPI  Patient presenting for Procedure(s) (LRB):  CERVICAL C5/6 DIGNA DIRECT REFERRAL (N/A)     Patient on Anti-coagulation No    No health changes since previous encounter    History reviewed. No pertinent past medical history.  Past Surgical History:   Procedure Laterality Date    ARTHROSCOPIC REPAIR OF ROTATOR CUFF OF SHOULDER  1992     Review of patient's allergies indicates:  No Known Allergies   Current Facility-Administered Medications   Medication    0.9%  NaCl infusion       PMHx, PSHx, Allergies, Medications reviewed in epic    ROS negative except pain complaints in HPI    OBJECTIVE:    BP (!) 160/91 (BP Location: Right arm, Patient Position: Sitting)   Pulse 91   Temp 98.4 °F (36.9 °C) (Oral)   Resp 19   Ht 5' 10" (1.778 m)   Wt 94.8 kg (209 lb)   SpO2 97%   BMI 29.99 kg/m²     PHYSICAL EXAMINATION:    GENERAL: Well appearing, in no acute distress, alert and oriented x3.  PSYCH:  Mood and affect appropriate.  SKIN: Skin color, texture, turgor normal, no rashes or lesions which will impact the procedure.  CV: RRR with palpation of the radial artery.  PULM: No evidence of respiratory difficulty, symmetric chest rise. NEURO: Cranial nerves grossly intact.    Plan:    Proceed with procedure as planned Procedure(s) (LRB):  CERVICAL C5/6 DIGNA DIRECT REFERRAL (N/A)    Lang Vargas  12/17/2020            "

## 2020-12-18 ENCOUNTER — PATIENT MESSAGE (OUTPATIENT)
Dept: ORTHOPEDICS | Facility: CLINIC | Age: 60
End: 2020-12-18

## 2021-02-03 ENCOUNTER — OFFICE VISIT (OUTPATIENT)
Dept: INTERNAL MEDICINE | Facility: CLINIC | Age: 61
End: 2021-02-03
Payer: COMMERCIAL

## 2021-02-03 VITALS
HEART RATE: 69 BPM | DIASTOLIC BLOOD PRESSURE: 78 MMHG | WEIGHT: 211 LBS | RESPIRATION RATE: 18 BRPM | HEIGHT: 70 IN | BODY MASS INDEX: 30.21 KG/M2 | OXYGEN SATURATION: 99 % | SYSTOLIC BLOOD PRESSURE: 122 MMHG | TEMPERATURE: 98 F

## 2021-02-03 DIAGNOSIS — I25.10 CORONARY ARTERY CALCIFICATION SEEN ON CT SCAN: ICD-10-CM

## 2021-02-03 DIAGNOSIS — E78.5 HYPERLIPIDEMIA, UNSPECIFIED HYPERLIPIDEMIA TYPE: ICD-10-CM

## 2021-02-03 DIAGNOSIS — K42.9 UMBILICAL HERNIA WITHOUT OBSTRUCTION AND WITHOUT GANGRENE: Primary | ICD-10-CM

## 2021-02-03 DIAGNOSIS — I10 HYPERTENSION, ESSENTIAL: ICD-10-CM

## 2021-02-03 PROCEDURE — 3078F PR MOST RECENT DIASTOLIC BLOOD PRESSURE < 80 MM HG: ICD-10-PCS | Mod: CPTII,S$GLB,, | Performed by: PHYSICIAN ASSISTANT

## 2021-02-03 PROCEDURE — 3074F PR MOST RECENT SYSTOLIC BLOOD PRESSURE < 130 MM HG: ICD-10-PCS | Mod: CPTII,S$GLB,, | Performed by: PHYSICIAN ASSISTANT

## 2021-02-03 PROCEDURE — 99999 PR PBB SHADOW E&M-EST. PATIENT-LVL V: CPT | Mod: PBBFAC,,, | Performed by: PHYSICIAN ASSISTANT

## 2021-02-03 PROCEDURE — 3008F PR BODY MASS INDEX (BMI) DOCUMENTED: ICD-10-PCS | Mod: CPTII,S$GLB,, | Performed by: PHYSICIAN ASSISTANT

## 2021-02-03 PROCEDURE — 99214 OFFICE O/P EST MOD 30 MIN: CPT | Mod: S$GLB,,, | Performed by: PHYSICIAN ASSISTANT

## 2021-02-03 PROCEDURE — 99214 PR OFFICE/OUTPT VISIT, EST, LEVL IV, 30-39 MIN: ICD-10-PCS | Mod: S$GLB,,, | Performed by: PHYSICIAN ASSISTANT

## 2021-02-03 PROCEDURE — 99999 PR PBB SHADOW E&M-EST. PATIENT-LVL V: ICD-10-PCS | Mod: PBBFAC,,, | Performed by: PHYSICIAN ASSISTANT

## 2021-02-03 PROCEDURE — 3078F DIAST BP <80 MM HG: CPT | Mod: CPTII,S$GLB,, | Performed by: PHYSICIAN ASSISTANT

## 2021-02-03 PROCEDURE — 3008F BODY MASS INDEX DOCD: CPT | Mod: CPTII,S$GLB,, | Performed by: PHYSICIAN ASSISTANT

## 2021-02-03 PROCEDURE — 1126F PR PAIN SEVERITY QUANTIFIED, NO PAIN PRESENT: ICD-10-PCS | Mod: S$GLB,,, | Performed by: PHYSICIAN ASSISTANT

## 2021-02-03 PROCEDURE — 3074F SYST BP LT 130 MM HG: CPT | Mod: CPTII,S$GLB,, | Performed by: PHYSICIAN ASSISTANT

## 2021-02-03 PROCEDURE — 1126F AMNT PAIN NOTED NONE PRSNT: CPT | Mod: S$GLB,,, | Performed by: PHYSICIAN ASSISTANT

## 2021-02-03 RX ORDER — ASPIRIN 81 MG/1
81 TABLET ORAL DAILY
COMMUNITY
End: 2022-08-01

## 2021-02-23 ENCOUNTER — IMMUNIZATION (OUTPATIENT)
Dept: PHARMACY | Facility: CLINIC | Age: 61
End: 2021-02-23
Payer: COMMERCIAL

## 2021-02-23 ENCOUNTER — HOSPITAL ENCOUNTER (OUTPATIENT)
Dept: RADIOLOGY | Facility: HOSPITAL | Age: 61
Discharge: HOME | End: 2021-02-23
Attending: PHYSICIAN ASSISTANT
Payer: COMMERCIAL

## 2021-02-23 DIAGNOSIS — K42.9 UMBILICAL HERNIA WITHOUT OBSTRUCTION AND WITHOUT GANGRENE: ICD-10-CM

## 2021-02-23 PROCEDURE — 76705 ECHO EXAM OF ABDOMEN: CPT | Mod: TC

## 2021-02-23 PROCEDURE — 76705 ECHO EXAM OF ABDOMEN: CPT | Mod: 26,,, | Performed by: RADIOLOGY

## 2021-02-23 PROCEDURE — 76705 US ABDOMEN LIMITED: ICD-10-PCS | Mod: 26,,, | Performed by: RADIOLOGY

## 2021-03-01 ENCOUNTER — OFFICE VISIT (OUTPATIENT)
Dept: INTERNAL MEDICINE | Facility: CLINIC | Age: 61
End: 2021-03-01
Payer: COMMERCIAL

## 2021-03-01 ENCOUNTER — PATIENT MESSAGE (OUTPATIENT)
Dept: PAIN MEDICINE | Facility: CLINIC | Age: 61
End: 2021-03-01

## 2021-03-01 VITALS
WEIGHT: 206.69 LBS | BODY MASS INDEX: 29.59 KG/M2 | HEIGHT: 70 IN | SYSTOLIC BLOOD PRESSURE: 110 MMHG | DIASTOLIC BLOOD PRESSURE: 70 MMHG | HEART RATE: 80 BPM

## 2021-03-01 DIAGNOSIS — M15.9 OSTEOARTHRITIS OF MULTIPLE JOINTS, UNSPECIFIED OSTEOARTHRITIS TYPE: ICD-10-CM

## 2021-03-01 DIAGNOSIS — Z82.49 FAMILY HISTORY OF PREMATURE CORONARY HEART DISEASE: Primary | ICD-10-CM

## 2021-03-01 DIAGNOSIS — G47.9 SLEEP DISTURBANCE: ICD-10-CM

## 2021-03-01 DIAGNOSIS — E78.5 HYPERLIPIDEMIA, UNSPECIFIED HYPERLIPIDEMIA TYPE: ICD-10-CM

## 2021-03-01 DIAGNOSIS — M54.9 BACK PAIN, UNSPECIFIED BACK LOCATION, UNSPECIFIED BACK PAIN LATERALITY, UNSPECIFIED CHRONICITY: ICD-10-CM

## 2021-03-01 DIAGNOSIS — I10 HYPERTENSION, ESSENTIAL: ICD-10-CM

## 2021-03-01 PROCEDURE — 1126F PR PAIN SEVERITY QUANTIFIED, NO PAIN PRESENT: ICD-10-PCS | Mod: S$GLB,,, | Performed by: PHYSICIAN ASSISTANT

## 2021-03-01 PROCEDURE — 99215 PR OFFICE/OUTPT VISIT, EST, LEVL V, 40-54 MIN: ICD-10-PCS | Mod: S$GLB,,, | Performed by: PHYSICIAN ASSISTANT

## 2021-03-01 PROCEDURE — 1126F AMNT PAIN NOTED NONE PRSNT: CPT | Mod: S$GLB,,, | Performed by: PHYSICIAN ASSISTANT

## 2021-03-01 PROCEDURE — 99999 PR PBB SHADOW E&M-EST. PATIENT-LVL III: ICD-10-PCS | Mod: PBBFAC,,, | Performed by: PHYSICIAN ASSISTANT

## 2021-03-01 PROCEDURE — 3078F DIAST BP <80 MM HG: CPT | Mod: CPTII,S$GLB,, | Performed by: PHYSICIAN ASSISTANT

## 2021-03-01 PROCEDURE — 3008F BODY MASS INDEX DOCD: CPT | Mod: CPTII,S$GLB,, | Performed by: PHYSICIAN ASSISTANT

## 2021-03-01 PROCEDURE — 3074F SYST BP LT 130 MM HG: CPT | Mod: CPTII,S$GLB,, | Performed by: PHYSICIAN ASSISTANT

## 2021-03-01 PROCEDURE — 99215 OFFICE O/P EST HI 40 MIN: CPT | Mod: S$GLB,,, | Performed by: PHYSICIAN ASSISTANT

## 2021-03-01 PROCEDURE — 3078F PR MOST RECENT DIASTOLIC BLOOD PRESSURE < 80 MM HG: ICD-10-PCS | Mod: CPTII,S$GLB,, | Performed by: PHYSICIAN ASSISTANT

## 2021-03-01 PROCEDURE — 99999 PR PBB SHADOW E&M-EST. PATIENT-LVL III: CPT | Mod: PBBFAC,,, | Performed by: PHYSICIAN ASSISTANT

## 2021-03-01 PROCEDURE — 3074F PR MOST RECENT SYSTOLIC BLOOD PRESSURE < 130 MM HG: ICD-10-PCS | Mod: CPTII,S$GLB,, | Performed by: PHYSICIAN ASSISTANT

## 2021-03-01 PROCEDURE — 3008F PR BODY MASS INDEX (BMI) DOCUMENTED: ICD-10-PCS | Mod: CPTII,S$GLB,, | Performed by: PHYSICIAN ASSISTANT

## 2021-03-01 RX ORDER — TRAZODONE HYDROCHLORIDE 50 MG/1
25-50 TABLET ORAL NIGHTLY PRN
Qty: 30 TABLET | Refills: 11 | Status: SHIPPED | OUTPATIENT
Start: 2021-03-01 | End: 2022-01-04 | Stop reason: SDUPTHER

## 2021-03-01 RX ORDER — CYCLOBENZAPRINE HCL 10 MG
TABLET ORAL
Qty: 14 TABLET | Refills: 0 | Status: SHIPPED | OUTPATIENT
Start: 2021-03-01 | End: 2022-01-04 | Stop reason: SDUPTHER

## 2021-03-02 ENCOUNTER — IMMUNIZATION (OUTPATIENT)
Dept: PHARMACY | Facility: CLINIC | Age: 61
End: 2021-03-02
Payer: COMMERCIAL

## 2021-03-02 DIAGNOSIS — Z23 NEED FOR VACCINATION: Primary | ICD-10-CM

## 2021-03-02 RX ORDER — DICLOFENAC SODIUM 50 MG/1
TABLET, DELAYED RELEASE ORAL
Qty: 180 TABLET | Refills: 0 | Status: SHIPPED | OUTPATIENT
Start: 2021-03-02 | End: 2021-11-17 | Stop reason: SDUPTHER

## 2021-03-03 ENCOUNTER — PATIENT MESSAGE (OUTPATIENT)
Dept: INTERNAL MEDICINE | Facility: CLINIC | Age: 61
End: 2021-03-03

## 2021-03-09 ENCOUNTER — OFFICE VISIT (OUTPATIENT)
Dept: CARDIOLOGY | Facility: CLINIC | Age: 61
End: 2021-03-09
Payer: COMMERCIAL

## 2021-03-09 VITALS
DIASTOLIC BLOOD PRESSURE: 57 MMHG | HEART RATE: 92 BPM | WEIGHT: 206.81 LBS | SYSTOLIC BLOOD PRESSURE: 99 MMHG | BODY MASS INDEX: 29.61 KG/M2 | HEIGHT: 70 IN

## 2021-03-09 DIAGNOSIS — E78.5 HYPERLIPIDEMIA, UNSPECIFIED HYPERLIPIDEMIA TYPE: ICD-10-CM

## 2021-03-09 DIAGNOSIS — I10 ESSENTIAL HYPERTENSION, BENIGN: ICD-10-CM

## 2021-03-09 DIAGNOSIS — Z82.49 FAMILY HISTORY OF PREMATURE CORONARY HEART DISEASE: ICD-10-CM

## 2021-03-09 DIAGNOSIS — I25.10 CORONARY ARTERY CALCIFICATION SEEN ON CT SCAN: Primary | ICD-10-CM

## 2021-03-09 PROCEDURE — 1126F PR PAIN SEVERITY QUANTIFIED, NO PAIN PRESENT: ICD-10-PCS | Mod: S$GLB,,, | Performed by: INTERNAL MEDICINE

## 2021-03-09 PROCEDURE — 3078F PR MOST RECENT DIASTOLIC BLOOD PRESSURE < 80 MM HG: ICD-10-PCS | Mod: CPTII,S$GLB,, | Performed by: INTERNAL MEDICINE

## 2021-03-09 PROCEDURE — 99204 OFFICE O/P NEW MOD 45 MIN: CPT | Mod: S$GLB,,, | Performed by: INTERNAL MEDICINE

## 2021-03-09 PROCEDURE — 99999 PR PBB SHADOW E&M-EST. PATIENT-LVL IV: ICD-10-PCS | Mod: PBBFAC,,, | Performed by: INTERNAL MEDICINE

## 2021-03-09 PROCEDURE — 1126F AMNT PAIN NOTED NONE PRSNT: CPT | Mod: S$GLB,,, | Performed by: INTERNAL MEDICINE

## 2021-03-09 PROCEDURE — 3008F PR BODY MASS INDEX (BMI) DOCUMENTED: ICD-10-PCS | Mod: CPTII,S$GLB,, | Performed by: INTERNAL MEDICINE

## 2021-03-09 PROCEDURE — 3074F PR MOST RECENT SYSTOLIC BLOOD PRESSURE < 130 MM HG: ICD-10-PCS | Mod: CPTII,S$GLB,, | Performed by: INTERNAL MEDICINE

## 2021-03-09 PROCEDURE — 3078F DIAST BP <80 MM HG: CPT | Mod: CPTII,S$GLB,, | Performed by: INTERNAL MEDICINE

## 2021-03-09 PROCEDURE — 99999 PR PBB SHADOW E&M-EST. PATIENT-LVL IV: CPT | Mod: PBBFAC,,, | Performed by: INTERNAL MEDICINE

## 2021-03-09 PROCEDURE — 3074F SYST BP LT 130 MM HG: CPT | Mod: CPTII,S$GLB,, | Performed by: INTERNAL MEDICINE

## 2021-03-09 PROCEDURE — 99204 PR OFFICE/OUTPT VISIT, NEW, LEVL IV, 45-59 MIN: ICD-10-PCS | Mod: S$GLB,,, | Performed by: INTERNAL MEDICINE

## 2021-03-09 PROCEDURE — 3008F BODY MASS INDEX DOCD: CPT | Mod: CPTII,S$GLB,, | Performed by: INTERNAL MEDICINE

## 2021-03-19 ENCOUNTER — OFFICE VISIT (OUTPATIENT)
Dept: SURGERY | Facility: CLINIC | Age: 61
End: 2021-03-19
Payer: COMMERCIAL

## 2021-03-19 VITALS
HEART RATE: 89 BPM | TEMPERATURE: 99 F | DIASTOLIC BLOOD PRESSURE: 78 MMHG | HEIGHT: 70 IN | WEIGHT: 208.69 LBS | SYSTOLIC BLOOD PRESSURE: 151 MMHG | BODY MASS INDEX: 29.88 KG/M2

## 2021-03-19 DIAGNOSIS — K42.9 UMBILICAL HERNIA WITHOUT OBSTRUCTION AND WITHOUT GANGRENE: ICD-10-CM

## 2021-03-19 PROCEDURE — 3008F PR BODY MASS INDEX (BMI) DOCUMENTED: ICD-10-PCS | Mod: CPTII,S$GLB,, | Performed by: STUDENT IN AN ORGANIZED HEALTH CARE EDUCATION/TRAINING PROGRAM

## 2021-03-19 PROCEDURE — 99999 PR PBB SHADOW E&M-EST. PATIENT-LVL V: CPT | Mod: PBBFAC,,, | Performed by: STUDENT IN AN ORGANIZED HEALTH CARE EDUCATION/TRAINING PROGRAM

## 2021-03-19 PROCEDURE — 3077F SYST BP >= 140 MM HG: CPT | Mod: CPTII,S$GLB,, | Performed by: STUDENT IN AN ORGANIZED HEALTH CARE EDUCATION/TRAINING PROGRAM

## 2021-03-19 PROCEDURE — 99203 OFFICE O/P NEW LOW 30 MIN: CPT | Mod: S$GLB,,, | Performed by: STUDENT IN AN ORGANIZED HEALTH CARE EDUCATION/TRAINING PROGRAM

## 2021-03-19 PROCEDURE — 1125F AMNT PAIN NOTED PAIN PRSNT: CPT | Mod: S$GLB,,, | Performed by: STUDENT IN AN ORGANIZED HEALTH CARE EDUCATION/TRAINING PROGRAM

## 2021-03-19 PROCEDURE — 1125F PR PAIN SEVERITY QUANTIFIED, PAIN PRESENT: ICD-10-PCS | Mod: S$GLB,,, | Performed by: STUDENT IN AN ORGANIZED HEALTH CARE EDUCATION/TRAINING PROGRAM

## 2021-03-19 PROCEDURE — 99203 PR OFFICE/OUTPT VISIT, NEW, LEVL III, 30-44 MIN: ICD-10-PCS | Mod: S$GLB,,, | Performed by: STUDENT IN AN ORGANIZED HEALTH CARE EDUCATION/TRAINING PROGRAM

## 2021-03-19 PROCEDURE — 3077F PR MOST RECENT SYSTOLIC BLOOD PRESSURE >= 140 MM HG: ICD-10-PCS | Mod: CPTII,S$GLB,, | Performed by: STUDENT IN AN ORGANIZED HEALTH CARE EDUCATION/TRAINING PROGRAM

## 2021-03-19 PROCEDURE — 3008F BODY MASS INDEX DOCD: CPT | Mod: CPTII,S$GLB,, | Performed by: STUDENT IN AN ORGANIZED HEALTH CARE EDUCATION/TRAINING PROGRAM

## 2021-03-19 PROCEDURE — 99999 PR PBB SHADOW E&M-EST. PATIENT-LVL V: ICD-10-PCS | Mod: PBBFAC,,, | Performed by: STUDENT IN AN ORGANIZED HEALTH CARE EDUCATION/TRAINING PROGRAM

## 2021-03-19 PROCEDURE — 3078F PR MOST RECENT DIASTOLIC BLOOD PRESSURE < 80 MM HG: ICD-10-PCS | Mod: CPTII,S$GLB,, | Performed by: STUDENT IN AN ORGANIZED HEALTH CARE EDUCATION/TRAINING PROGRAM

## 2021-03-19 PROCEDURE — 3078F DIAST BP <80 MM HG: CPT | Mod: CPTII,S$GLB,, | Performed by: STUDENT IN AN ORGANIZED HEALTH CARE EDUCATION/TRAINING PROGRAM

## 2021-03-19 RX ORDER — CEFAZOLIN SODIUM 2 G/50ML
2 SOLUTION INTRAVENOUS
Status: CANCELLED | OUTPATIENT
Start: 2021-03-19

## 2021-03-19 RX ORDER — SODIUM CHLORIDE 9 MG/ML
INJECTION, SOLUTION INTRAVENOUS CONTINUOUS
Status: CANCELLED | OUTPATIENT
Start: 2021-03-19

## 2021-03-30 ENCOUNTER — IMMUNIZATION (OUTPATIENT)
Dept: PHARMACY | Facility: CLINIC | Age: 61
End: 2021-03-30
Payer: COMMERCIAL

## 2021-03-30 DIAGNOSIS — Z23 NEED FOR VACCINATION: Primary | ICD-10-CM

## 2021-04-01 ENCOUNTER — TELEPHONE (OUTPATIENT)
Dept: SURGERY | Facility: CLINIC | Age: 61
End: 2021-04-01

## 2021-04-01 ENCOUNTER — ANESTHESIA EVENT (OUTPATIENT)
Dept: SURGERY | Facility: HOSPITAL | Age: 61
End: 2021-04-01
Payer: COMMERCIAL

## 2021-04-02 ENCOUNTER — LAB VISIT (OUTPATIENT)
Dept: INTERNAL MEDICINE | Facility: CLINIC | Age: 61
End: 2021-04-02
Payer: COMMERCIAL

## 2021-04-02 DIAGNOSIS — K42.9 UMBILICAL HERNIA WITHOUT OBSTRUCTION AND WITHOUT GANGRENE: ICD-10-CM

## 2021-04-02 LAB — SARS-COV-2 RNA RESP QL NAA+PROBE: NOT DETECTED

## 2021-04-02 PROCEDURE — U0003 INFECTIOUS AGENT DETECTION BY NUCLEIC ACID (DNA OR RNA); SEVERE ACUTE RESPIRATORY SYNDROME CORONAVIRUS 2 (SARS-COV-2) (CORONAVIRUS DISEASE [COVID-19]), AMPLIFIED PROBE TECHNIQUE, MAKING USE OF HIGH THROUGHPUT TECHNOLOGIES AS DESCRIBED BY CMS-2020-01-R: HCPCS | Performed by: STUDENT IN AN ORGANIZED HEALTH CARE EDUCATION/TRAINING PROGRAM

## 2021-04-02 PROCEDURE — U0005 INFEC AGEN DETEC AMPLI PROBE: HCPCS | Performed by: STUDENT IN AN ORGANIZED HEALTH CARE EDUCATION/TRAINING PROGRAM

## 2021-04-05 ENCOUNTER — ANESTHESIA (OUTPATIENT)
Dept: SURGERY | Facility: HOSPITAL | Age: 61
End: 2021-04-05
Payer: COMMERCIAL

## 2021-04-05 ENCOUNTER — HOSPITAL ENCOUNTER (OUTPATIENT)
Facility: HOSPITAL | Age: 61
Discharge: HOME | End: 2021-04-05
Attending: STUDENT IN AN ORGANIZED HEALTH CARE EDUCATION/TRAINING PROGRAM | Admitting: STUDENT IN AN ORGANIZED HEALTH CARE EDUCATION/TRAINING PROGRAM
Payer: COMMERCIAL

## 2021-04-05 VITALS
WEIGHT: 208.69 LBS | BODY MASS INDEX: 29.88 KG/M2 | TEMPERATURE: 98 F | SYSTOLIC BLOOD PRESSURE: 110 MMHG | RESPIRATION RATE: 9 BRPM | OXYGEN SATURATION: 94 % | HEIGHT: 70 IN | DIASTOLIC BLOOD PRESSURE: 75 MMHG | HEART RATE: 59 BPM

## 2021-04-05 DIAGNOSIS — K42.9 UMBILICAL HERNIA WITHOUT OBSTRUCTION AND WITHOUT GANGRENE: Primary | ICD-10-CM

## 2021-04-05 PROCEDURE — D9220A PRA ANESTHESIA: Mod: ,,, | Performed by: ANESTHESIOLOGY

## 2021-04-05 PROCEDURE — 36000706: Performed by: STUDENT IN AN ORGANIZED HEALTH CARE EDUCATION/TRAINING PROGRAM

## 2021-04-05 PROCEDURE — D9220A PRA ANESTHESIA: ICD-10-PCS | Mod: ,,, | Performed by: STUDENT IN AN ORGANIZED HEALTH CARE EDUCATION/TRAINING PROGRAM

## 2021-04-05 PROCEDURE — 37000008 HC ANESTHESIA 1ST 15 MINUTES: Performed by: STUDENT IN AN ORGANIZED HEALTH CARE EDUCATION/TRAINING PROGRAM

## 2021-04-05 PROCEDURE — D9220A PRA ANESTHESIA: Mod: ,,, | Performed by: STUDENT IN AN ORGANIZED HEALTH CARE EDUCATION/TRAINING PROGRAM

## 2021-04-05 PROCEDURE — 36000707: Performed by: STUDENT IN AN ORGANIZED HEALTH CARE EDUCATION/TRAINING PROGRAM

## 2021-04-05 PROCEDURE — 25000003 PHARM REV CODE 250: Performed by: STUDENT IN AN ORGANIZED HEALTH CARE EDUCATION/TRAINING PROGRAM

## 2021-04-05 PROCEDURE — 63600175 PHARM REV CODE 636 W HCPCS: Performed by: STUDENT IN AN ORGANIZED HEALTH CARE EDUCATION/TRAINING PROGRAM

## 2021-04-05 PROCEDURE — 37000009 HC ANESTHESIA EA ADD 15 MINS: Performed by: STUDENT IN AN ORGANIZED HEALTH CARE EDUCATION/TRAINING PROGRAM

## 2021-04-05 PROCEDURE — 71000015 HC POSTOP RECOV 1ST HR: Performed by: STUDENT IN AN ORGANIZED HEALTH CARE EDUCATION/TRAINING PROGRAM

## 2021-04-05 PROCEDURE — D9220A PRA ANESTHESIA: ICD-10-PCS | Mod: ,,, | Performed by: ANESTHESIOLOGY

## 2021-04-05 PROCEDURE — 49585 PR REPAIR UMBILICAL HERN,5+Y/O,REDUC: ICD-10-PCS | Mod: ,,, | Performed by: STUDENT IN AN ORGANIZED HEALTH CARE EDUCATION/TRAINING PROGRAM

## 2021-04-05 PROCEDURE — 49585 PR REPAIR UMBILICAL HERN,5+Y/O,REDUC: CPT | Mod: ,,, | Performed by: STUDENT IN AN ORGANIZED HEALTH CARE EDUCATION/TRAINING PROGRAM

## 2021-04-05 PROCEDURE — 71000044 HC DOSC ROUTINE RECOVERY FIRST HOUR: Performed by: STUDENT IN AN ORGANIZED HEALTH CARE EDUCATION/TRAINING PROGRAM

## 2021-04-05 RX ORDER — NEOSTIGMINE METHYLSULFATE 0.5 MG/ML
INJECTION, SOLUTION INTRAVENOUS
Status: DISCONTINUED | OUTPATIENT
Start: 2021-04-05 | End: 2021-04-05

## 2021-04-05 RX ORDER — ROCURONIUM BROMIDE 10 MG/ML
INJECTION, SOLUTION INTRAVENOUS
Status: DISCONTINUED | OUTPATIENT
Start: 2021-04-05 | End: 2021-04-05

## 2021-04-05 RX ORDER — BUPIVACAINE HYDROCHLORIDE 5 MG/ML
INJECTION, SOLUTION EPIDURAL; INTRACAUDAL
Status: DISCONTINUED | OUTPATIENT
Start: 2021-04-05 | End: 2021-04-05 | Stop reason: HOSPADM

## 2021-04-05 RX ORDER — KETOROLAC TROMETHAMINE 30 MG/ML
INJECTION, SOLUTION INTRAMUSCULAR; INTRAVENOUS
Status: DISCONTINUED | OUTPATIENT
Start: 2021-04-05 | End: 2021-04-05

## 2021-04-05 RX ORDER — CEFAZOLIN SODIUM 1 G/3ML
2 INJECTION, POWDER, FOR SOLUTION INTRAMUSCULAR; INTRAVENOUS
Status: COMPLETED | OUTPATIENT
Start: 2021-04-05 | End: 2021-04-05

## 2021-04-05 RX ORDER — SODIUM CHLORIDE 9 MG/ML
INJECTION, SOLUTION INTRAVENOUS CONTINUOUS
Status: DISCONTINUED | OUTPATIENT
Start: 2021-04-05 | End: 2021-04-05 | Stop reason: HOSPADM

## 2021-04-05 RX ORDER — PROPOFOL 10 MG/ML
VIAL (ML) INTRAVENOUS
Status: DISCONTINUED | OUTPATIENT
Start: 2021-04-05 | End: 2021-04-05

## 2021-04-05 RX ORDER — ONDANSETRON 2 MG/ML
4 INJECTION INTRAMUSCULAR; INTRAVENOUS ONCE AS NEEDED
Status: DISCONTINUED | OUTPATIENT
Start: 2021-04-05 | End: 2021-04-05 | Stop reason: HOSPADM

## 2021-04-05 RX ORDER — LIDOCAINE HYDROCHLORIDE 10 MG/ML
1 INJECTION, SOLUTION EPIDURAL; INFILTRATION; INTRACAUDAL; PERINEURAL ONCE
Status: COMPLETED | OUTPATIENT
Start: 2021-04-05 | End: 2021-04-05

## 2021-04-05 RX ORDER — LIDOCAINE HYDROCHLORIDE 20 MG/ML
INJECTION, SOLUTION EPIDURAL; INFILTRATION; INTRACAUDAL; PERINEURAL
Status: DISCONTINUED | OUTPATIENT
Start: 2021-04-05 | End: 2021-04-05

## 2021-04-05 RX ORDER — FENTANYL CITRATE 50 UG/ML
INJECTION, SOLUTION INTRAMUSCULAR; INTRAVENOUS
Status: DISCONTINUED | OUTPATIENT
Start: 2021-04-05 | End: 2021-04-05

## 2021-04-05 RX ORDER — HYDROCODONE BITARTRATE AND ACETAMINOPHEN 5; 325 MG/1; MG/1
1 TABLET ORAL ONCE
Status: COMPLETED | OUTPATIENT
Start: 2021-04-05 | End: 2021-04-05

## 2021-04-05 RX ORDER — HYDROCODONE BITARTRATE AND ACETAMINOPHEN 5; 325 MG/1; MG/1
1 TABLET ORAL EVERY 6 HOURS PRN
Qty: 12 TABLET | Refills: 0 | Status: SHIPPED | OUTPATIENT
Start: 2021-04-05 | End: 2021-04-07 | Stop reason: SDUPTHER

## 2021-04-05 RX ORDER — DIPHENHYDRAMINE HYDROCHLORIDE 50 MG/ML
25 INJECTION INTRAMUSCULAR; INTRAVENOUS EVERY 6 HOURS PRN
Status: DISCONTINUED | OUTPATIENT
Start: 2021-04-05 | End: 2021-04-05 | Stop reason: HOSPADM

## 2021-04-05 RX ORDER — HYDROMORPHONE HYDROCHLORIDE 1 MG/ML
0.2 INJECTION, SOLUTION INTRAMUSCULAR; INTRAVENOUS; SUBCUTANEOUS EVERY 5 MIN PRN
Status: DISCONTINUED | OUTPATIENT
Start: 2021-04-05 | End: 2021-04-05 | Stop reason: HOSPADM

## 2021-04-05 RX ORDER — FENTANYL CITRATE 50 UG/ML
25 INJECTION, SOLUTION INTRAMUSCULAR; INTRAVENOUS EVERY 5 MIN PRN
Status: DISCONTINUED | OUTPATIENT
Start: 2021-04-05 | End: 2021-04-05 | Stop reason: HOSPADM

## 2021-04-05 RX ORDER — MIDAZOLAM HYDROCHLORIDE 1 MG/ML
INJECTION, SOLUTION INTRAMUSCULAR; INTRAVENOUS
Status: DISCONTINUED | OUTPATIENT
Start: 2021-04-05 | End: 2021-04-05

## 2021-04-05 RX ORDER — ONDANSETRON 2 MG/ML
INJECTION INTRAMUSCULAR; INTRAVENOUS
Status: DISCONTINUED | OUTPATIENT
Start: 2021-04-05 | End: 2021-04-05

## 2021-04-05 RX ORDER — DEXAMETHASONE SODIUM PHOSPHATE 4 MG/ML
INJECTION, SOLUTION INTRA-ARTICULAR; INTRALESIONAL; INTRAMUSCULAR; INTRAVENOUS; SOFT TISSUE
Status: DISCONTINUED | OUTPATIENT
Start: 2021-04-05 | End: 2021-04-05

## 2021-04-05 RX ADMIN — DEXAMETHASONE SODIUM PHOSPHATE 4 MG: 4 INJECTION INTRA-ARTICULAR; INTRALESIONAL; INTRAMUSCULAR; INTRAVENOUS; SOFT TISSUE at 07:04

## 2021-04-05 RX ADMIN — SODIUM CHLORIDE: 0.9 INJECTION, SOLUTION INTRAVENOUS at 06:04

## 2021-04-05 RX ADMIN — ROCURONIUM BROMIDE 50 MG: 10 INJECTION, SOLUTION INTRAVENOUS at 07:04

## 2021-04-05 RX ADMIN — HYDROCODONE BITARTRATE AND ACETAMINOPHEN 1 TABLET: 5; 325 TABLET ORAL at 08:04

## 2021-04-05 RX ADMIN — LIDOCAINE HYDROCHLORIDE 10 MG: 10 INJECTION, SOLUTION EPIDURAL; INFILTRATION; INTRACAUDAL at 06:04

## 2021-04-05 RX ADMIN — FENTANYL CITRATE 50 MCG: 50 INJECTION INTRAMUSCULAR; INTRAVENOUS at 06:04

## 2021-04-05 RX ADMIN — FENTANYL CITRATE 50 MCG: 50 INJECTION INTRAMUSCULAR; INTRAVENOUS at 07:04

## 2021-04-05 RX ADMIN — CEFAZOLIN 2 G: 330 INJECTION, POWDER, FOR SOLUTION INTRAMUSCULAR; INTRAVENOUS at 07:04

## 2021-04-05 RX ADMIN — GLYCOPYRROLATE 0.6 MCG: 0.2 INJECTION, SOLUTION INTRAMUSCULAR; INTRAVITREAL at 07:04

## 2021-04-05 RX ADMIN — NEOSTIGMINE METHYLSULFATE 5 MG: 0.5 INJECTION INTRAVENOUS at 07:04

## 2021-04-05 RX ADMIN — ONDANSETRON 4 MG: 2 INJECTION INTRAMUSCULAR; INTRAVENOUS at 06:04

## 2021-04-05 RX ADMIN — PROPOFOL 150 MG: 10 INJECTION, EMULSION INTRAVENOUS at 07:04

## 2021-04-05 RX ADMIN — MIDAZOLAM 2 MG: 1 INJECTION INTRAMUSCULAR; INTRAVENOUS at 06:04

## 2021-04-05 RX ADMIN — LIDOCAINE HYDROCHLORIDE 40 MG: 20 INJECTION, SOLUTION EPIDURAL; INFILTRATION; INTRACAUDAL at 07:04

## 2021-04-05 RX ADMIN — KETOROLAC TROMETHAMINE 30 MG: 30 INJECTION, SOLUTION INTRAMUSCULAR; INTRAVENOUS at 07:04

## 2021-04-07 ENCOUNTER — PATIENT MESSAGE (OUTPATIENT)
Dept: SURGERY | Facility: CLINIC | Age: 61
End: 2021-04-07

## 2021-04-07 RX ORDER — HYDROCODONE BITARTRATE AND ACETAMINOPHEN 5; 325 MG/1; MG/1
1 TABLET ORAL EVERY 6 HOURS PRN
Qty: 16 TABLET | Refills: 0 | Status: SHIPPED | OUTPATIENT
Start: 2021-04-07 | End: 2021-09-15

## 2021-04-16 ENCOUNTER — OFFICE VISIT (OUTPATIENT)
Dept: SURGERY | Facility: CLINIC | Age: 61
End: 2021-04-16
Payer: COMMERCIAL

## 2021-04-16 VITALS
TEMPERATURE: 98 F | BODY MASS INDEX: 28.95 KG/M2 | OXYGEN SATURATION: 96 % | HEIGHT: 70 IN | WEIGHT: 202.25 LBS | DIASTOLIC BLOOD PRESSURE: 71 MMHG | HEART RATE: 84 BPM | SYSTOLIC BLOOD PRESSURE: 133 MMHG

## 2021-04-16 DIAGNOSIS — Z09 S/P UMBILICAL HERNIA REPAIR, FOLLOW-UP EXAM: Primary | ICD-10-CM

## 2021-04-16 PROCEDURE — 99024 POSTOP FOLLOW-UP VISIT: CPT | Mod: S$GLB,,, | Performed by: STUDENT IN AN ORGANIZED HEALTH CARE EDUCATION/TRAINING PROGRAM

## 2021-04-16 PROCEDURE — 99999 PR PBB SHADOW E&M-EST. PATIENT-LVL III: CPT | Mod: PBBFAC,,, | Performed by: STUDENT IN AN ORGANIZED HEALTH CARE EDUCATION/TRAINING PROGRAM

## 2021-04-16 PROCEDURE — 3008F PR BODY MASS INDEX (BMI) DOCUMENTED: ICD-10-PCS | Mod: CPTII,S$GLB,, | Performed by: STUDENT IN AN ORGANIZED HEALTH CARE EDUCATION/TRAINING PROGRAM

## 2021-04-16 PROCEDURE — 3008F BODY MASS INDEX DOCD: CPT | Mod: CPTII,S$GLB,, | Performed by: STUDENT IN AN ORGANIZED HEALTH CARE EDUCATION/TRAINING PROGRAM

## 2021-04-16 PROCEDURE — 1125F AMNT PAIN NOTED PAIN PRSNT: CPT | Mod: S$GLB,,, | Performed by: STUDENT IN AN ORGANIZED HEALTH CARE EDUCATION/TRAINING PROGRAM

## 2021-04-16 PROCEDURE — 99024 PR POST-OP FOLLOW-UP VISIT: ICD-10-PCS | Mod: S$GLB,,, | Performed by: STUDENT IN AN ORGANIZED HEALTH CARE EDUCATION/TRAINING PROGRAM

## 2021-04-16 PROCEDURE — 1125F PR PAIN SEVERITY QUANTIFIED, PAIN PRESENT: ICD-10-PCS | Mod: S$GLB,,, | Performed by: STUDENT IN AN ORGANIZED HEALTH CARE EDUCATION/TRAINING PROGRAM

## 2021-04-16 PROCEDURE — 99999 PR PBB SHADOW E&M-EST. PATIENT-LVL III: ICD-10-PCS | Mod: PBBFAC,,, | Performed by: STUDENT IN AN ORGANIZED HEALTH CARE EDUCATION/TRAINING PROGRAM

## 2021-04-30 ENCOUNTER — OFFICE VISIT (OUTPATIENT)
Dept: ORTHOPEDICS | Facility: CLINIC | Age: 61
End: 2021-04-30
Payer: COMMERCIAL

## 2021-04-30 ENCOUNTER — PATIENT MESSAGE (OUTPATIENT)
Dept: ORTHOPEDICS | Facility: CLINIC | Age: 61
End: 2021-04-30

## 2021-04-30 DIAGNOSIS — M48.062 SPINAL STENOSIS OF LUMBAR REGION WITH NEUROGENIC CLAUDICATION: Primary | ICD-10-CM

## 2021-04-30 PROCEDURE — 99999 PR PBB SHADOW E&M-EST. PATIENT-LVL III: CPT | Mod: PBBFAC,,, | Performed by: ORTHOPAEDIC SURGERY

## 2021-04-30 PROCEDURE — 99212 OFFICE O/P EST SF 10 MIN: CPT | Mod: S$GLB,,, | Performed by: ORTHOPAEDIC SURGERY

## 2021-04-30 PROCEDURE — 99999 PR PBB SHADOW E&M-EST. PATIENT-LVL III: ICD-10-PCS | Mod: PBBFAC,,, | Performed by: ORTHOPAEDIC SURGERY

## 2021-04-30 PROCEDURE — 1125F PR PAIN SEVERITY QUANTIFIED, PAIN PRESENT: ICD-10-PCS | Mod: S$GLB,,, | Performed by: ORTHOPAEDIC SURGERY

## 2021-04-30 PROCEDURE — 99212 PR OFFICE/OUTPT VISIT, EST, LEVL II, 10-19 MIN: ICD-10-PCS | Mod: S$GLB,,, | Performed by: ORTHOPAEDIC SURGERY

## 2021-04-30 PROCEDURE — 1125F AMNT PAIN NOTED PAIN PRSNT: CPT | Mod: S$GLB,,, | Performed by: ORTHOPAEDIC SURGERY

## 2021-05-06 ENCOUNTER — PATIENT MESSAGE (OUTPATIENT)
Dept: ORTHOPEDICS | Facility: CLINIC | Age: 61
End: 2021-05-06

## 2021-07-08 ENCOUNTER — PATIENT MESSAGE (OUTPATIENT)
Dept: SURGERY | Facility: CLINIC | Age: 61
End: 2021-07-08

## 2021-07-16 ENCOUNTER — OFFICE VISIT (OUTPATIENT)
Dept: SURGERY | Facility: CLINIC | Age: 61
End: 2021-07-16
Payer: COMMERCIAL

## 2021-07-16 VITALS
HEART RATE: 99 BPM | WEIGHT: 200.63 LBS | HEIGHT: 70 IN | BODY MASS INDEX: 28.72 KG/M2 | SYSTOLIC BLOOD PRESSURE: 150 MMHG | TEMPERATURE: 99 F | OXYGEN SATURATION: 99 % | DIASTOLIC BLOOD PRESSURE: 80 MMHG

## 2021-07-16 DIAGNOSIS — Z09 S/P UMBILICAL HERNIA REPAIR, FOLLOW-UP EXAM: Primary | ICD-10-CM

## 2021-07-16 PROCEDURE — 99212 OFFICE O/P EST SF 10 MIN: CPT | Mod: S$GLB,,, | Performed by: STUDENT IN AN ORGANIZED HEALTH CARE EDUCATION/TRAINING PROGRAM

## 2021-07-16 PROCEDURE — 99212 PR OFFICE/OUTPT VISIT, EST, LEVL II, 10-19 MIN: ICD-10-PCS | Mod: S$GLB,,, | Performed by: STUDENT IN AN ORGANIZED HEALTH CARE EDUCATION/TRAINING PROGRAM

## 2021-07-16 PROCEDURE — 1126F PR PAIN SEVERITY QUANTIFIED, NO PAIN PRESENT: ICD-10-PCS | Mod: S$GLB,,, | Performed by: STUDENT IN AN ORGANIZED HEALTH CARE EDUCATION/TRAINING PROGRAM

## 2021-07-16 PROCEDURE — 3008F PR BODY MASS INDEX (BMI) DOCUMENTED: ICD-10-PCS | Mod: CPTII,S$GLB,, | Performed by: STUDENT IN AN ORGANIZED HEALTH CARE EDUCATION/TRAINING PROGRAM

## 2021-07-16 PROCEDURE — 3008F BODY MASS INDEX DOCD: CPT | Mod: CPTII,S$GLB,, | Performed by: STUDENT IN AN ORGANIZED HEALTH CARE EDUCATION/TRAINING PROGRAM

## 2021-07-16 PROCEDURE — 99999 PR PBB SHADOW E&M-EST. PATIENT-LVL III: ICD-10-PCS | Mod: PBBFAC,,, | Performed by: STUDENT IN AN ORGANIZED HEALTH CARE EDUCATION/TRAINING PROGRAM

## 2021-07-16 PROCEDURE — 1126F AMNT PAIN NOTED NONE PRSNT: CPT | Mod: S$GLB,,, | Performed by: STUDENT IN AN ORGANIZED HEALTH CARE EDUCATION/TRAINING PROGRAM

## 2021-07-16 PROCEDURE — 99999 PR PBB SHADOW E&M-EST. PATIENT-LVL III: CPT | Mod: PBBFAC,,, | Performed by: STUDENT IN AN ORGANIZED HEALTH CARE EDUCATION/TRAINING PROGRAM

## 2021-09-15 ENCOUNTER — OFFICE VISIT (OUTPATIENT)
Dept: URGENT CARE | Facility: CLINIC | Age: 61
End: 2021-09-15
Payer: COMMERCIAL

## 2021-09-15 VITALS
OXYGEN SATURATION: 95 % | SYSTOLIC BLOOD PRESSURE: 150 MMHG | TEMPERATURE: 99 F | RESPIRATION RATE: 17 BRPM | DIASTOLIC BLOOD PRESSURE: 93 MMHG | BODY MASS INDEX: 28.63 KG/M2 | WEIGHT: 200 LBS | HEART RATE: 89 BPM | HEIGHT: 70 IN

## 2021-09-15 DIAGNOSIS — H11.32 SUBCONJUNCTIVAL HEMATOMA, LEFT: Primary | ICD-10-CM

## 2021-09-15 PROCEDURE — 1159F PR MEDICATION LIST DOCUMENTED IN MEDICAL RECORD: ICD-10-PCS | Mod: CPTII,S$GLB,, | Performed by: FAMILY MEDICINE

## 2021-09-15 PROCEDURE — 99214 OFFICE O/P EST MOD 30 MIN: CPT | Mod: S$GLB,,, | Performed by: FAMILY MEDICINE

## 2021-09-15 PROCEDURE — 1160F PR REVIEW ALL MEDS BY PRESCRIBER/CLIN PHARMACIST DOCUMENTED: ICD-10-PCS | Mod: CPTII,S$GLB,, | Performed by: FAMILY MEDICINE

## 2021-09-15 PROCEDURE — 4010F PR ACE/ARB THEARPY RXD/TAKEN: ICD-10-PCS | Mod: CPTII,S$GLB,, | Performed by: FAMILY MEDICINE

## 2021-09-15 PROCEDURE — 3077F PR MOST RECENT SYSTOLIC BLOOD PRESSURE >= 140 MM HG: ICD-10-PCS | Mod: CPTII,S$GLB,, | Performed by: FAMILY MEDICINE

## 2021-09-15 PROCEDURE — 1159F MED LIST DOCD IN RCRD: CPT | Mod: CPTII,S$GLB,, | Performed by: FAMILY MEDICINE

## 2021-09-15 PROCEDURE — 3080F DIAST BP >= 90 MM HG: CPT | Mod: CPTII,S$GLB,, | Performed by: FAMILY MEDICINE

## 2021-09-15 PROCEDURE — 3077F SYST BP >= 140 MM HG: CPT | Mod: CPTII,S$GLB,, | Performed by: FAMILY MEDICINE

## 2021-09-15 PROCEDURE — 3008F BODY MASS INDEX DOCD: CPT | Mod: CPTII,S$GLB,, | Performed by: FAMILY MEDICINE

## 2021-09-15 PROCEDURE — 1160F RVW MEDS BY RX/DR IN RCRD: CPT | Mod: CPTII,S$GLB,, | Performed by: FAMILY MEDICINE

## 2021-09-15 PROCEDURE — 99214 PR OFFICE/OUTPT VISIT, EST, LEVL IV, 30-39 MIN: ICD-10-PCS | Mod: S$GLB,,, | Performed by: FAMILY MEDICINE

## 2021-09-15 PROCEDURE — 4010F ACE/ARB THERAPY RXD/TAKEN: CPT | Mod: CPTII,S$GLB,, | Performed by: FAMILY MEDICINE

## 2021-09-15 PROCEDURE — 3080F PR MOST RECENT DIASTOLIC BLOOD PRESSURE >= 90 MM HG: ICD-10-PCS | Mod: CPTII,S$GLB,, | Performed by: FAMILY MEDICINE

## 2021-09-15 PROCEDURE — 3008F PR BODY MASS INDEX (BMI) DOCUMENTED: ICD-10-PCS | Mod: CPTII,S$GLB,, | Performed by: FAMILY MEDICINE

## 2021-09-15 RX ORDER — POLYMYXIN B SULFATE AND TRIMETHOPRIM 1; 10000 MG/ML; [USP'U]/ML
1 SOLUTION OPHTHALMIC EVERY 4 HOURS
Qty: 10 ML | Refills: 0 | Status: SHIPPED | OUTPATIENT
Start: 2021-09-15 | End: 2022-01-04

## 2021-09-30 ENCOUNTER — TELEPHONE (OUTPATIENT)
Dept: ORTHOPEDICS | Facility: CLINIC | Age: 61
End: 2021-09-30

## 2021-09-30 DIAGNOSIS — M54.12 CERVICAL RADICULITIS: Primary | ICD-10-CM

## 2021-09-30 DIAGNOSIS — M50.30 DDD (DEGENERATIVE DISC DISEASE), CERVICAL: Primary | ICD-10-CM

## 2021-10-01 ENCOUNTER — PATIENT MESSAGE (OUTPATIENT)
Dept: PAIN MEDICINE | Facility: OTHER | Age: 61
End: 2021-10-01

## 2021-10-14 ENCOUNTER — HOSPITAL ENCOUNTER (OUTPATIENT)
Facility: OTHER | Age: 61
Discharge: HOME | End: 2021-10-14
Attending: ANESTHESIOLOGY | Admitting: ANESTHESIOLOGY
Payer: COMMERCIAL

## 2021-10-14 VITALS
OXYGEN SATURATION: 96 % | RESPIRATION RATE: 16 BRPM | HEART RATE: 84 BPM | SYSTOLIC BLOOD PRESSURE: 157 MMHG | DIASTOLIC BLOOD PRESSURE: 81 MMHG | TEMPERATURE: 98 F

## 2021-10-14 DIAGNOSIS — G89.29 CHRONIC PAIN: ICD-10-CM

## 2021-10-14 DIAGNOSIS — G89.29 OTHER CHRONIC PAIN: ICD-10-CM

## 2021-10-14 DIAGNOSIS — M54.12 CERVICAL RADICULITIS: Primary | ICD-10-CM

## 2021-10-14 PROCEDURE — 64479 NJX AA&/STRD TFRM EPI C/T 1: CPT | Mod: RT,,, | Performed by: ANESTHESIOLOGY

## 2021-10-14 PROCEDURE — 25500020 PHARM REV CODE 255: Performed by: ANESTHESIOLOGY

## 2021-10-14 PROCEDURE — 63600175 PHARM REV CODE 636 W HCPCS: Performed by: ANESTHESIOLOGY

## 2021-10-14 PROCEDURE — 64479 NJX AA&/STRD TFRM EPI C/T 1: CPT | Mod: RT | Performed by: ANESTHESIOLOGY

## 2021-10-14 PROCEDURE — 64479 PR INJECT ANES/STEROID FORAMEN CERV/THORACIC W IMG GUIDE ,1 LEVEL: ICD-10-PCS | Mod: RT,,, | Performed by: ANESTHESIOLOGY

## 2021-10-14 PROCEDURE — 25000003 PHARM REV CODE 250: Performed by: ANESTHESIOLOGY

## 2021-10-14 RX ORDER — DEXAMETHASONE SODIUM PHOSPHATE 100 MG/10ML
INJECTION INTRAMUSCULAR; INTRAVENOUS
Status: DISCONTINUED | OUTPATIENT
Start: 2021-10-14 | End: 2021-10-14 | Stop reason: HOSPADM

## 2021-10-14 RX ORDER — LIDOCAINE HYDROCHLORIDE 10 MG/ML
INJECTION, SOLUTION EPIDURAL; INFILTRATION; INTRACAUDAL; PERINEURAL
Status: DISCONTINUED | OUTPATIENT
Start: 2021-10-14 | End: 2021-10-14 | Stop reason: HOSPADM

## 2021-10-14 RX ORDER — LIDOCAINE HYDROCHLORIDE 10 MG/ML
INJECTION INFILTRATION; PERINEURAL
Status: DISCONTINUED | OUTPATIENT
Start: 2021-10-14 | End: 2021-10-14 | Stop reason: HOSPADM

## 2021-10-14 RX ORDER — MIDAZOLAM HYDROCHLORIDE 1 MG/ML
INJECTION INTRAMUSCULAR; INTRAVENOUS
Status: DISCONTINUED | OUTPATIENT
Start: 2021-10-14 | End: 2021-10-14 | Stop reason: HOSPADM

## 2021-10-14 RX ORDER — ALPRAZOLAM 0.5 MG/1
0.5 TABLET ORAL
Status: DISCONTINUED | OUTPATIENT
Start: 2021-10-14 | End: 2021-10-14 | Stop reason: HOSPADM

## 2021-10-14 RX ORDER — FENTANYL CITRATE 50 UG/ML
INJECTION, SOLUTION INTRAMUSCULAR; INTRAVENOUS
Status: DISCONTINUED | OUTPATIENT
Start: 2021-10-14 | End: 2021-10-14 | Stop reason: HOSPADM

## 2021-11-12 ENCOUNTER — CLINICAL SUPPORT (OUTPATIENT)
Dept: URGENT CARE | Facility: CLINIC | Age: 61
End: 2021-11-12
Payer: COMMERCIAL

## 2021-11-12 PROCEDURE — 90471 FLU VACCINE (QUAD) GREATER THAN OR EQUAL TO 3YO PRESERVATIVE FREE IM: ICD-10-PCS | Mod: S$GLB,,, | Performed by: FAMILY MEDICINE

## 2021-11-12 PROCEDURE — 90471 IMMUNIZATION ADMIN: CPT | Mod: S$GLB,,, | Performed by: FAMILY MEDICINE

## 2021-11-12 PROCEDURE — 90686 IIV4 VACC NO PRSV 0.5 ML IM: CPT | Mod: S$GLB,,, | Performed by: FAMILY MEDICINE

## 2021-11-12 PROCEDURE — 90686 FLU VACCINE (QUAD) GREATER THAN OR EQUAL TO 3YO PRESERVATIVE FREE IM: ICD-10-PCS | Mod: S$GLB,,, | Performed by: FAMILY MEDICINE

## 2021-11-17 ENCOUNTER — HOSPITAL ENCOUNTER (OUTPATIENT)
Dept: RADIOLOGY | Facility: HOSPITAL | Age: 61
Discharge: HOME | End: 2021-11-17
Attending: ORTHOPAEDIC SURGERY
Payer: COMMERCIAL

## 2021-11-17 ENCOUNTER — OFFICE VISIT (OUTPATIENT)
Dept: ORTHOPEDICS | Facility: CLINIC | Age: 61
End: 2021-11-17
Payer: COMMERCIAL

## 2021-11-17 ENCOUNTER — PROCEDURE VISIT (OUTPATIENT)
Dept: SURGERY | Facility: CLINIC | Age: 61
End: 2021-11-17
Payer: COMMERCIAL

## 2021-11-17 VITALS
BODY MASS INDEX: 29.67 KG/M2 | TEMPERATURE: 99 F | HEIGHT: 70 IN | SYSTOLIC BLOOD PRESSURE: 161 MMHG | OXYGEN SATURATION: 97 % | WEIGHT: 207.25 LBS | HEART RATE: 95 BPM | DIASTOLIC BLOOD PRESSURE: 89 MMHG

## 2021-11-17 VITALS — WEIGHT: 208 LBS | BODY MASS INDEX: 29.78 KG/M2 | HEIGHT: 70 IN

## 2021-11-17 DIAGNOSIS — M79.5 FOREIGN BODY (FB) IN SOFT TISSUE: Primary | ICD-10-CM

## 2021-11-17 DIAGNOSIS — M50.30 DDD (DEGENERATIVE DISC DISEASE), CERVICAL: ICD-10-CM

## 2021-11-17 DIAGNOSIS — M50.30 DDD (DEGENERATIVE DISC DISEASE), CERVICAL: Primary | ICD-10-CM

## 2021-11-17 DIAGNOSIS — M15.9 OSTEOARTHRITIS OF MULTIPLE JOINTS, UNSPECIFIED OSTEOARTHRITIS TYPE: ICD-10-CM

## 2021-11-17 PROCEDURE — 10120 PR REMOVE FOREIGN BODY SIMPLE: ICD-10-PCS | Mod: S$GLB,,, | Performed by: STUDENT IN AN ORGANIZED HEALTH CARE EDUCATION/TRAINING PROGRAM

## 2021-11-17 PROCEDURE — 72050 XR CERVICAL SPINE AP LAT WITH FLEX EXTEN: ICD-10-PCS | Mod: 26,,, | Performed by: RADIOLOGY

## 2021-11-17 PROCEDURE — 99213 OFFICE O/P EST LOW 20 MIN: CPT | Mod: S$GLB,,, | Performed by: ORTHOPAEDIC SURGERY

## 2021-11-17 PROCEDURE — 4010F PR ACE/ARB THEARPY RXD/TAKEN: ICD-10-PCS | Mod: CPTII,S$GLB,, | Performed by: ORTHOPAEDIC SURGERY

## 2021-11-17 PROCEDURE — 72050 X-RAY EXAM NECK SPINE 4/5VWS: CPT | Mod: TC

## 2021-11-17 PROCEDURE — 10120 INC&RMVL FB SUBQ TISS SMPL: CPT | Mod: S$GLB,,, | Performed by: STUDENT IN AN ORGANIZED HEALTH CARE EDUCATION/TRAINING PROGRAM

## 2021-11-17 PROCEDURE — 99999 PR PBB SHADOW E&M-EST. PATIENT-LVL III: ICD-10-PCS | Mod: PBBFAC,,, | Performed by: ORTHOPAEDIC SURGERY

## 2021-11-17 PROCEDURE — 99213 PR OFFICE/OUTPT VISIT, EST, LEVL III, 20-29 MIN: ICD-10-PCS | Mod: S$GLB,,, | Performed by: ORTHOPAEDIC SURGERY

## 2021-11-17 PROCEDURE — 4010F ACE/ARB THERAPY RXD/TAKEN: CPT | Mod: CPTII,S$GLB,, | Performed by: ORTHOPAEDIC SURGERY

## 2021-11-17 PROCEDURE — 99999 PR PBB SHADOW E&M-EST. PATIENT-LVL III: CPT | Mod: PBBFAC,,, | Performed by: ORTHOPAEDIC SURGERY

## 2021-11-17 PROCEDURE — 72050 X-RAY EXAM NECK SPINE 4/5VWS: CPT | Mod: 26,,, | Performed by: RADIOLOGY

## 2021-11-17 RX ORDER — DICLOFENAC SODIUM 50 MG/1
50 TABLET, DELAYED RELEASE ORAL 2 TIMES DAILY
Qty: 180 TABLET | Refills: 0 | Status: SHIPPED | OUTPATIENT
Start: 2021-11-17 | End: 2022-02-14

## 2021-11-18 ENCOUNTER — TELEPHONE (OUTPATIENT)
Dept: ADMINISTRATIVE | Facility: OTHER | Age: 61
End: 2021-11-18
Payer: COMMERCIAL

## 2021-11-19 ENCOUNTER — PATIENT MESSAGE (OUTPATIENT)
Dept: ORTHOPEDICS | Facility: CLINIC | Age: 61
End: 2021-11-19
Payer: COMMERCIAL

## 2021-11-19 ENCOUNTER — TELEPHONE (OUTPATIENT)
Dept: SURGERY | Facility: CLINIC | Age: 61
End: 2021-11-19
Payer: COMMERCIAL

## 2021-11-19 ENCOUNTER — PATIENT MESSAGE (OUTPATIENT)
Dept: PAIN MEDICINE | Facility: OTHER | Age: 61
End: 2021-11-19
Payer: COMMERCIAL

## 2021-12-10 ENCOUNTER — PATIENT MESSAGE (OUTPATIENT)
Dept: PAIN MEDICINE | Facility: OTHER | Age: 61
End: 2021-12-10
Payer: COMMERCIAL

## 2021-12-13 ENCOUNTER — HOSPITAL ENCOUNTER (OUTPATIENT)
Facility: OTHER | Age: 61
Discharge: HOME | End: 2021-12-13
Attending: ANESTHESIOLOGY | Admitting: ANESTHESIOLOGY
Payer: COMMERCIAL

## 2021-12-13 VITALS
TEMPERATURE: 99 F | DIASTOLIC BLOOD PRESSURE: 79 MMHG | SYSTOLIC BLOOD PRESSURE: 132 MMHG | BODY MASS INDEX: 28.63 KG/M2 | HEIGHT: 70 IN | WEIGHT: 200 LBS | RESPIRATION RATE: 16 BRPM | HEART RATE: 80 BPM | OXYGEN SATURATION: 98 %

## 2021-12-13 DIAGNOSIS — M54.12 CERVICAL RADICULOPATHY: Primary | ICD-10-CM

## 2021-12-13 DIAGNOSIS — G89.29 CHRONIC PAIN: ICD-10-CM

## 2021-12-13 PROCEDURE — 62321 NJX INTERLAMINAR CRV/THRC: CPT | Performed by: ANESTHESIOLOGY

## 2021-12-13 PROCEDURE — 25000003 PHARM REV CODE 250: Performed by: ANESTHESIOLOGY

## 2021-12-13 PROCEDURE — 62321 NJX INTERLAMINAR CRV/THRC: CPT | Mod: ,,, | Performed by: ANESTHESIOLOGY

## 2021-12-13 PROCEDURE — 25500020 PHARM REV CODE 255: Performed by: ANESTHESIOLOGY

## 2021-12-13 PROCEDURE — 25000003 PHARM REV CODE 250: Performed by: STUDENT IN AN ORGANIZED HEALTH CARE EDUCATION/TRAINING PROGRAM

## 2021-12-13 PROCEDURE — 62321 PR INJ CERV/THORAC, W/GUIDANCE: ICD-10-PCS | Mod: ,,, | Performed by: ANESTHESIOLOGY

## 2021-12-13 PROCEDURE — 63600175 PHARM REV CODE 636 W HCPCS: Performed by: ANESTHESIOLOGY

## 2021-12-13 RX ORDER — LIDOCAINE HYDROCHLORIDE 10 MG/ML
INJECTION, SOLUTION EPIDURAL; INFILTRATION; INTRACAUDAL; PERINEURAL
Status: DISCONTINUED | OUTPATIENT
Start: 2021-12-13 | End: 2021-12-13 | Stop reason: HOSPADM

## 2021-12-13 RX ORDER — FENTANYL CITRATE 50 UG/ML
INJECTION, SOLUTION INTRAMUSCULAR; INTRAVENOUS
Status: DISCONTINUED | OUTPATIENT
Start: 2021-12-13 | End: 2021-12-13 | Stop reason: HOSPADM

## 2021-12-13 RX ORDER — MIDAZOLAM HYDROCHLORIDE 1 MG/ML
INJECTION INTRAMUSCULAR; INTRAVENOUS
Status: DISCONTINUED | OUTPATIENT
Start: 2021-12-13 | End: 2021-12-13 | Stop reason: HOSPADM

## 2021-12-13 RX ORDER — DEXAMETHASONE SODIUM PHOSPHATE 10 MG/ML
INJECTION INTRAMUSCULAR; INTRAVENOUS
Status: DISCONTINUED | OUTPATIENT
Start: 2021-12-13 | End: 2021-12-13 | Stop reason: HOSPADM

## 2021-12-13 RX ORDER — SODIUM CHLORIDE 9 MG/ML
INJECTION, SOLUTION INTRAVENOUS CONTINUOUS
Status: DISCONTINUED | OUTPATIENT
Start: 2021-12-13 | End: 2021-12-13 | Stop reason: HOSPADM

## 2021-12-13 RX ADMIN — SODIUM CHLORIDE: 0.9 INJECTION, SOLUTION INTRAVENOUS at 01:12

## 2021-12-20 ENCOUNTER — IMMUNIZATION (OUTPATIENT)
Dept: PHARMACY | Facility: CLINIC | Age: 61
End: 2021-12-20
Payer: COMMERCIAL

## 2021-12-20 DIAGNOSIS — Z23 NEED FOR VACCINATION: Primary | ICD-10-CM

## 2022-01-04 ENCOUNTER — OFFICE VISIT (OUTPATIENT)
Dept: INTERNAL MEDICINE | Facility: CLINIC | Age: 62
End: 2022-01-04
Payer: COMMERCIAL

## 2022-01-04 VITALS
OXYGEN SATURATION: 97 % | HEIGHT: 70 IN | BODY MASS INDEX: 30.22 KG/M2 | HEART RATE: 83 BPM | WEIGHT: 211.06 LBS | SYSTOLIC BLOOD PRESSURE: 128 MMHG | DIASTOLIC BLOOD PRESSURE: 82 MMHG

## 2022-01-04 DIAGNOSIS — G89.29 OTHER CHRONIC PAIN: ICD-10-CM

## 2022-01-04 DIAGNOSIS — Z00.00 ENCOUNTER FOR PREVENTIVE HEALTH EXAMINATION: Primary | ICD-10-CM

## 2022-01-04 DIAGNOSIS — Z12.2 SCREENING FOR LUNG CANCER: ICD-10-CM

## 2022-01-04 DIAGNOSIS — F41.9 ANXIETY AND DEPRESSION: ICD-10-CM

## 2022-01-04 DIAGNOSIS — G47.9 SLEEP DISTURBANCE: ICD-10-CM

## 2022-01-04 DIAGNOSIS — E78.5 HYPERLIPIDEMIA, UNSPECIFIED HYPERLIPIDEMIA TYPE: ICD-10-CM

## 2022-01-04 DIAGNOSIS — M54.9 BACK PAIN, UNSPECIFIED BACK LOCATION, UNSPECIFIED BACK PAIN LATERALITY, UNSPECIFIED CHRONICITY: ICD-10-CM

## 2022-01-04 DIAGNOSIS — I10 ESSENTIAL HYPERTENSION, BENIGN: ICD-10-CM

## 2022-01-04 DIAGNOSIS — I25.10 CORONARY ARTERY CALCIFICATION SEEN ON CT SCAN: ICD-10-CM

## 2022-01-04 DIAGNOSIS — F32.A ANXIETY AND DEPRESSION: ICD-10-CM

## 2022-01-04 PROCEDURE — 3079F PR MOST RECENT DIASTOLIC BLOOD PRESSURE 80-89 MM HG: ICD-10-PCS | Mod: CPTII,S$GLB,, | Performed by: PHYSICIAN ASSISTANT

## 2022-01-04 PROCEDURE — 3008F BODY MASS INDEX DOCD: CPT | Mod: CPTII,S$GLB,, | Performed by: PHYSICIAN ASSISTANT

## 2022-01-04 PROCEDURE — 3074F SYST BP LT 130 MM HG: CPT | Mod: CPTII,S$GLB,, | Performed by: PHYSICIAN ASSISTANT

## 2022-01-04 PROCEDURE — 3074F PR MOST RECENT SYSTOLIC BLOOD PRESSURE < 130 MM HG: ICD-10-PCS | Mod: CPTII,S$GLB,, | Performed by: PHYSICIAN ASSISTANT

## 2022-01-04 PROCEDURE — 3079F DIAST BP 80-89 MM HG: CPT | Mod: CPTII,S$GLB,, | Performed by: PHYSICIAN ASSISTANT

## 2022-01-04 PROCEDURE — 1159F PR MEDICATION LIST DOCUMENTED IN MEDICAL RECORD: ICD-10-PCS | Mod: CPTII,S$GLB,, | Performed by: PHYSICIAN ASSISTANT

## 2022-01-04 PROCEDURE — 1160F RVW MEDS BY RX/DR IN RCRD: CPT | Mod: CPTII,S$GLB,, | Performed by: PHYSICIAN ASSISTANT

## 2022-01-04 PROCEDURE — 1159F MED LIST DOCD IN RCRD: CPT | Mod: CPTII,S$GLB,, | Performed by: PHYSICIAN ASSISTANT

## 2022-01-04 PROCEDURE — 99999 PR PBB SHADOW E&M-EST. PATIENT-LVL IV: ICD-10-PCS | Mod: PBBFAC,,, | Performed by: PHYSICIAN ASSISTANT

## 2022-01-04 PROCEDURE — 99396 PREV VISIT EST AGE 40-64: CPT | Mod: S$GLB,,, | Performed by: PHYSICIAN ASSISTANT

## 2022-01-04 PROCEDURE — 99396 PR PREVENTIVE VISIT,EST,40-64: ICD-10-PCS | Mod: S$GLB,,, | Performed by: PHYSICIAN ASSISTANT

## 2022-01-04 PROCEDURE — 1160F PR REVIEW ALL MEDS BY PRESCRIBER/CLIN PHARMACIST DOCUMENTED: ICD-10-PCS | Mod: CPTII,S$GLB,, | Performed by: PHYSICIAN ASSISTANT

## 2022-01-04 PROCEDURE — 99999 PR PBB SHADOW E&M-EST. PATIENT-LVL IV: CPT | Mod: PBBFAC,,, | Performed by: PHYSICIAN ASSISTANT

## 2022-01-04 PROCEDURE — 3008F PR BODY MASS INDEX (BMI) DOCUMENTED: ICD-10-PCS | Mod: CPTII,S$GLB,, | Performed by: PHYSICIAN ASSISTANT

## 2022-01-04 RX ORDER — CYCLOBENZAPRINE HCL 10 MG
TABLET ORAL
Qty: 14 TABLET | Refills: 0 | Status: SHIPPED | OUTPATIENT
Start: 2022-01-04 | End: 2022-03-28 | Stop reason: ALTCHOICE

## 2022-01-04 RX ORDER — ATORVASTATIN CALCIUM 40 MG/1
40 TABLET, FILM COATED ORAL DAILY
Qty: 90 TABLET | Refills: 3 | Status: SHIPPED | OUTPATIENT
Start: 2022-01-04 | End: 2023-02-07

## 2022-01-04 RX ORDER — DULOXETIN HYDROCHLORIDE 30 MG/1
30 CAPSULE, DELAYED RELEASE ORAL DAILY
Qty: 30 CAPSULE | Refills: 3 | Status: SHIPPED | OUTPATIENT
Start: 2022-01-04 | End: 2022-03-28

## 2022-01-04 RX ORDER — TRAZODONE HYDROCHLORIDE 50 MG/1
25-50 TABLET ORAL NIGHTLY PRN
Qty: 90 TABLET | Refills: 3 | Status: SHIPPED | OUTPATIENT
Start: 2022-01-04 | End: 2022-08-02 | Stop reason: SDUPTHER

## 2022-01-04 NOTE — PROGRESS NOTES
Subjective:       Patient ID: Hipolito Laws is a 61 y.o. male.        Chief Complaint: Annual Exam    Hipolito Laws is an established patient of Fabio Rosas MD here today for follow up visit.     His wife thinks he needs a medication for anxiety/depression/mood he tells me.  He has a harder time doing things he used to be able to do easily, which he finds depressing.  It is difficult to do his job as he gets pain after standing for more than a few hours.  He is ready to retire but can't yet financially and this is all affecting his mood.  He also suffers with chronic neck pain, which has been difficult to control.      HTN - taking losartan 50 mg daily    Arthritis - neck, low back, knees hips, started on regularly scheduled tylenol OTC and rx for diclofenac for intractable pain     Cervical radiculopathy - followed by Dr. Goldman and pain clinic, s/p DIGNA 2021     Lipids - taking lipitor 40 mg daily (inc from 20 --> 40 mg 2020)     H/o 30 pack year smoking hx with last LDCT 2020 f/u 1 year     Atherosclerosis - seen on CT 2020 so aggressive risk factor modification recommended and lipitor inc from 20 --> 40 mg, aspirin 81 mg added     FHx CAD - brother with fatal MI at 50, father with first MI at 40,  at 62, mother with CHF, MGF with MI but unsure what age, had a stress test about 3 years ago outside [facility], saw cardio 3/2021 (Dr. Chi) with f/u in 1 year rec     Sleep difficulty - taking trazodone but only occ, maybe 1/week     Back pain - flares a few times yearly, takes flexeril and that resolves it, currently with some back pain, mild, no numbness/tingling/weakness/loss of bowel or bladder          Review of Systems   Constitutional: Negative for activity change, appetite change, chills, fatigue, fever and unexpected weight change.   HENT: Negative for congestion, hearing loss, rhinorrhea, sore throat and trouble swallowing.    Eyes: Negative for discharge and visual disturbance.    Respiratory: Negative for cough, chest tightness, shortness of breath and wheezing.    Cardiovascular: Negative for chest pain, palpitations and leg swelling.   Gastrointestinal: Negative for abdominal pain, blood in stool, constipation, diarrhea, nausea and vomiting.   Endocrine: Negative for polydipsia and polyuria.   Genitourinary: Negative for difficulty urinating, dysuria, frequency, hematuria and urgency.   Musculoskeletal: Positive for neck pain. Negative for arthralgias, back pain and joint swelling.   Skin: Negative for rash.   Neurological: Negative for dizziness, syncope, weakness and headaches.   Psychiatric/Behavioral: Positive for dysphoric mood. Negative for confusion, sleep disturbance and suicidal ideas. The patient is nervous/anxious.        Objective:      Physical Exam  Vitals and nursing note reviewed.   Constitutional:       Appearance: He is well-developed and well-nourished.   HENT:      Head: Normocephalic.      Right Ear: External ear normal.      Left Ear: External ear normal.      Mouth/Throat:      Mouth: Oropharynx is clear and moist.   Eyes:      Pupils: Pupils are equal, round, and reactive to light.   Cardiovascular:      Rate and Rhythm: Normal rate and regular rhythm.      Heart sounds: Normal heart sounds. No murmur heard.  No friction rub. No gallop.    Pulmonary:      Effort: Pulmonary effort is normal. No respiratory distress.      Breath sounds: Normal breath sounds.   Abdominal:      Palpations: Abdomen is soft.      Tenderness: There is no abdominal tenderness. There is no CVA tenderness.   Musculoskeletal:         General: No edema.   Skin:     General: Skin is warm and dry.   Neurological:      Mental Status: He is alert.   Psychiatric:         Mood and Affect: Mood and affect normal.         Assessment:       1. Encounter for preventive health examination    2. Screening for lung cancer    3. Sleep disturbance    4. Coronary artery calcification seen on CT scan    5.  "Back pain, unspecified back location, unspecified back pain laterality, unspecified chronicity    6. Essential hypertension, benign    7. Hyperlipidemia, unspecified hyperlipidemia type    8. Anxiety and depression    9. Other chronic pain        Plan:       Hipolito was seen today for annual exam.    Diagnoses and all orders for this visit:    Encounter for preventive health examination  -     CBC Auto Differential; Future  -     Comprehensive Metabolic Panel; Future  -     Hemoglobin A1C; Future  -     Lipid Panel; Future  -     TSH; Future  -     PSA, Screening; Future    Screening for lung cancer  -     CT Chest Lung Screening Low Dose; Future    Sleep disturbance  -     traZODone (DESYREL) 50 MG tablet; Take 0.5-1 tablets (25-50 mg total) by mouth nightly as needed for Insomnia.    Coronary artery calcification seen on CT scan  -     atorvastatin (LIPITOR) 40 MG tablet; Take 1 tablet (40 mg total) by mouth once daily.    Back pain, unspecified back location, unspecified back pain laterality, unspecified chronicity  -     cyclobenzaprine (FLEXERIL) 10 MG tablet; TK 1 T PO BID FOR 14 DAYS    Essential hypertension, benign - stable and controlled    Hyperlipidemia, unspecified hyperlipidemia type - check labs as above, continue lipitor    Anxiety and depression  -     START: DULoxetine (CYMBALTA) 30 MG capsule; Take 1 capsule (30 mg total) by mouth once daily.    Other chronic pain  -     START: DULoxetine (CYMBALTA) 30 MG capsule; Take 1 capsule (30 mg total) by mouth once daily.    Will start cymbalta as above and f/u with PCP in 6-8 weeks  Risks/benefits of medication discussed    Pt has been given instructions populated from [employer] database and has verbalized understanding of the after visit summary and information contained wherein.    Follow up with a primary care provider. May go to ER for acute shortness of breath, lightheadedness, fever, or any other emergent complaints or changes in condition.    "This note " "will be shared with the patient"    Future Appointments   Date Time Provider Department Center   1/7/2022  7:00 AM LAB, APPOINTMENT [facility] LAB IM [place] [facility]   1/14/2022  7:15 AM [facility]CT2 500 LB LIMIT [facility]   3/28/2022 11:00 AM Fabio Rosas MD [facility] IM [place]                 "

## 2022-01-04 NOTE — PATIENT INSTRUCTIONS
Patient Education       Duloxetine (doo LOX e teen)   Brand Names: US Cymbalta; Niyahma Sprinkle   Brand Names: Anika AG-Duloxetine; APO-Duloxetine; Auro-Duloxetine; Cymbalta; JAMP-Duloxetine; M-Duloxetine; Mar-Duloxetine; MINT-Duloxetine; MYLAN-Duloxetine [DSC]; NRA-Duloxetine; PMS-Duloxetine; PRIVA-Duloxetine; RAN-Duloxetine; OSMNA-Duloxetine; SANDOZ Duloxetine; TEVA-Duloxetine   Warning   · Drugs like this one have raised the chance of suicidal thoughts or actions in children and young adults. The risk may be greater in people who have had these thoughts or actions in the past. All people who take this drug need to be watched closely. Call the doctor right away if signs like low mood (depression), nervousness, restlessness, grouchiness, panic attacks, or changes in mood or actions are new or worse. Call the doctor right away if any thoughts or actions of suicide occur.    What is this drug used for?   · It is used to treat low mood (depression).  · It is used to treat anxiety.  · It is used to help painful nerve diseases and diabetic nerve problems.  · It is used to ease long-term pain problems.  · It is used to treat fibromyalgia.  · It may be given to you for other reasons. Talk with the doctor.    What do I need to tell my doctor BEFORE I take this drug?   · If you are allergic to this drug; any part of this drug; or any other drugs, foods, or substances. Tell your doctor about the allergy and what signs you had.  · If you have any of these health problems: Kidney disease or liver disease.  · If you are taking thioridazine.  · If you are taking any of these drugs: Ciprofloxacin or fluvoxamine.  · If you are taking any of these drugs: Linezolid or methylene blue.  · If you have taken certain drugs for depression or Parkinson's disease in the last 14 days. This includes isocarboxazid, phenelzine, tranylcypromine, selegiline, or rasagiline. Very high blood pressure may happen.  This is not a list of all drugs or  health problems that interact with this drug.  Tell your doctor and pharmacist about all of your drugs (prescription or OTC, natural products, vitamins) and health problems. You must check to make sure that it is safe for you to take this drug with all of your drugs and health problems. Do not start, stop, or change the dose of any drug without checking with your doctor.  What are some things I need to know or do while I take this drug?   · Tell all of your health care providers that you take this drug. This includes your doctors, nurses, pharmacists, and dentists.  · Avoid driving and doing other tasks or actions that call for you to be alert until you see how this drug affects you.  · To lower the chance of feeling dizzy or passing out, rise slowly if you have been sitting or lying down. Be careful going up and down stairs.  · Low blood pressure, falls, and passing out have happened with this drug. Falls may lead to problems like broken bones and the need to go to the hospital. The chance of falling is raised with older people. Talk with the doctor.  · If you have high blood sugar (diabetes), you will need to watch your blood sugar closely.  · High blood pressure has happened with this drug. Have your blood pressure checked as you have been told by your doctor.  · Talk with your doctor before you use alcohol, marijuana or other forms of cannabis, or prescription or OTC drugs that may slow your actions.  · This drug may raise the chance of bleeding. Sometimes, bleeding can be life-threatening. Talk with the doctor.  · A severe and sometimes deadly problem called serotonin syndrome may happen. The risk may be greater if you also take certain other drugs. Call your doctor right away if you have agitation; change in balance; confusion; hallucinations; fever; fast or abnormal heartbeat; flushing; muscle twitching or stiffness; seizures; shivering or shaking; sweating a lot; severe diarrhea, upset stomach, or throwing  up; or very bad headache.  · Some people may have a higher chance of eye problems with this drug. Your doctor may want you to have an eye exam to see if you have a higher chance of these eye problems. Call your doctor right away if you have eye pain, change in eyesight, or swelling or redness in or around the eye.  · This drug can cause low sodium levels. Very low sodium levels can be life-threatening, leading to seizures, passing out, trouble breathing, or death.  · This drug may affect certain lab tests. Tell all of your health care providers and lab workers that you take this drug.  · If you are 65 or older, use this drug with care. You could have more side effects.  · This drug may affect growth in children and teens in some cases. They may need regular growth checks. Talk with the doctor.  · This drug may cause harm to the unborn baby if you take it while you are pregnant. If you are pregnant or you get pregnant while taking this drug, call your doctor right away.  · Taking this drug in the third trimester of pregnancy may lead to some health problems in the . Talk with the doctor.  · Tell your doctor if you are breast-feeding or plan to breast-feed. This drug passes into breast milk and may harm your baby.    What are some side effects that I need to call my doctor about right away?   WARNING/CAUTION: Even though it may be rare, some people may have very bad and sometimes deadly side effects when taking a drug. Tell your doctor or get medical help right away if you have any of the following signs or symptoms that may be related to a very bad side effect:  · Signs of an allergic reaction, like rash; hives; itching; red, swollen, blistered, or peeling skin with or without fever; wheezing; tightness in the chest or throat; trouble breathing, swallowing, or talking; unusual hoarseness; or swelling of the mouth, face, lips, tongue, or throat.  · Signs of low sodium levels like headache, trouble focusing,  memory problems, feeling confused, weakness, seizures, or change in balance.  · Signs of bleeding like throwing up or coughing up blood; vomit that looks like coffee grounds; blood in the urine; black, red, or tarry stools; bleeding from the gums; abnormal vaginal bleeding; bruises without a cause or that get bigger; or bleeding you cannot stop.  · Signs of high or low blood pressure like very bad headache or dizziness, passing out, or change in eyesight.  · Seizures.  · Trouble passing urine.  · Sex problems have happened with drugs like this one. This includes lowered interest in sex, trouble having an orgasm, ejaculation problems, or trouble getting or keeping an erection. If you have any sex problems or if you have any questions, talk with your doctor.  · Liver problems have happened with this drug. Rarely, this has been deadly. Call your doctor right away if you have signs of liver problems like dark urine, feeling tired, not hungry, upset stomach or stomach pain, light-colored stools, throwing up, or yellow skin or eyes.  · A severe skin reaction (King-Srinivasan syndrome/toxic epidermal necrolysis) may happen. It can cause severe health problems that may not go away, and sometimes death. Get medical help right away if you have signs like red, swollen, blistered, or peeling skin (with or without fever); red or irritated eyes; or sores in your mouth, throat, nose, or eyes.  What are some other side effects of this drug?   All drugs may cause side effects. However, many people have no side effects or only have minor side effects. Call your doctor or get medical help if any of these side effects or any other side effects bother you or do not go away:  · Constipation, diarrhea, stomach pain, upset stomach, throwing up, or feeling less hungry.  · Headache.  · Dry mouth.  · Trouble sleeping.  · Feeling dizzy, sleepy, tired, or weak.  · Sweating a lot.  · Weight loss.  · Nose or throat irritation.  These are not all  of the side effects that may occur. If you have questions about side effects, call your doctor. Call your doctor for medical advice about side effects.  You may report side effects to your national health agency.  You may report side effects to the FDA at 1-596.141.8863. You may also report side effects at https://www.fda.gov/medwatch.  How is this drug best taken?   Use this drug as ordered by your doctor. Read all information given to you. Follow all instructions closely.  All products:   · Keep taking this drug as you have been told by your doctor or other health care provider, even if you feel well.  · Take with or without food.  · Do not stop taking this drug all of a sudden without calling your doctor. You may have a greater risk of side effects. If you need to stop this drug, you will want to slowly stop it as ordered by your doctor.  Capsules:   · Swallow whole. Do not chew, open, or crush.  Sprinkle capsule:   · Swallow whole. Do not chew or crush.  · If you cannot swallow this drug whole, you may sprinkle the contents on applesauce. If you do this, swallow the mixture right away without chewing.  · Those who have feeding tubes may use this drug. Use as you have been told. Flush the feeding tube after this drug is given.  What do I do if I miss a dose?   · Take a missed dose as soon as you think about it.  · If it is close to the time for your next dose, skip the missed dose and go back to your normal time.  · Do not take 2 doses at the same time or extra doses.    How do I store and/or throw out this drug?   · Store at room temperature in a dry place. Do not store in a bathroom.  · Keep all drugs in a safe place. Keep all drugs out of the reach of children and pets.  · Throw away unused or  drugs. Do not flush down a toilet or pour down a drain unless you are told to do so. Check with your pharmacist if you have questions about the best way to throw out drugs. There may be drug take-back programs in  your area.    General drug facts   · If your symptoms or health problems do not get better or if they become worse, call your doctor.  · Do not share your drugs with others and do not take anyone else's drugs.  · Some drugs may have another patient information leaflet. If you have any questions about this drug, please talk with your doctor, nurse, pharmacist, or other health care provider.  · This drug comes with an extra patient fact sheet called a Medication Guide. Read it with care. Read it again each time this drug is refilled. If you have any questions about this drug, please talk with the doctor, pharmacist, or other health care provider.  · If you think there has been an overdose, call your poison control center or get medical care right away. Be ready to tell or show what was taken, how much, and when it happened.    Consumer Information Use and Disclaimer   This generalized information is a limited summary of diagnosis, treatment, and/or medication information. It is not meant to be comprehensive and should be used as a tool to help the user understand and/or assess potential diagnostic and treatment options. It does NOT include all information about conditions, treatments, medications, side effects, or risks that may apply to a specific patient. It is not intended to be medical advice or a substitute for the medical advice, diagnosis, or treatment of a health care provider based on the health care provider's examination and assessment of a patient's specific and unique circumstances. Patients must speak with a health care provider for complete information about their health, medical questions, and treatment options, including any risks or benefits regarding use of medications. This information does not endorse any treatments or medications as safe, effective, or approved for treating a specific patient. Up[employer], Inc[employer] and its affiliates disclaim any warranty or liability relating to this information or the  use thereof. The use of this information is governed by the Terms of Use, available at https://www.[employer].com/en/solutions/lexicomp/about/corwin.  Last Reviewed Date   2021-08-10  Copyright   © 2021 UpTo[employer], Inc[employer] and its affiliates and/or licensors. All rights reserved.

## 2022-01-07 ENCOUNTER — LAB VISIT (OUTPATIENT)
Dept: LAB | Facility: HOSPITAL | Age: 62
End: 2022-01-07
Attending: INTERNAL MEDICINE
Payer: COMMERCIAL

## 2022-01-07 DIAGNOSIS — Z00.00 ENCOUNTER FOR PREVENTIVE HEALTH EXAMINATION: ICD-10-CM

## 2022-01-07 LAB
ALBUMIN SERPL BCP-MCNC: 4.4 G/DL (ref 3.5–5.2)
ALP SERPL-CCNC: 71 U/L (ref 55–135)
ALT SERPL W/O P-5'-P-CCNC: 32 U/L (ref 10–44)
ANION GAP SERPL CALC-SCNC: 9 MMOL/L (ref 8–16)
AST SERPL-CCNC: 19 U/L (ref 10–40)
BASOPHILS # BLD AUTO: 0.07 K/UL (ref 0–0.2)
BASOPHILS NFR BLD: 1 % (ref 0–1.9)
BILIRUB SERPL-MCNC: 0.7 MG/DL (ref 0.1–1)
BUN SERPL-MCNC: 21 MG/DL (ref 6–20)
CALCIUM SERPL-MCNC: 9.9 MG/DL (ref 8.7–10.5)
CHLORIDE SERPL-SCNC: 101 MMOL/L (ref 95–110)
CHOLEST SERPL-MCNC: 167 MG/DL (ref 120–199)
CHOLEST/HDLC SERPL: 2.9 {RATIO} (ref 2–5)
CO2 SERPL-SCNC: 31 MMOL/L (ref 23–29)
COMPLEXED PSA SERPL-MCNC: 0.36 NG/ML (ref 0–4)
CREAT SERPL-MCNC: 0.9 MG/DL (ref 0.5–1.4)
DIFFERENTIAL METHOD: ABNORMAL
EOSINOPHIL # BLD AUTO: 0.3 K/UL (ref 0–0.5)
EOSINOPHIL NFR BLD: 4.5 % (ref 0–8)
ERYTHROCYTE [DISTWIDTH] IN BLOOD BY AUTOMATED COUNT: 11.9 % (ref 11.5–14.5)
EST. GFR  (AFRICAN AMERICAN): >60 ML/MIN/1.73 M^2
EST. GFR  (NON AFRICAN AMERICAN): >60 ML/MIN/1.73 M^2
ESTIMATED AVG GLUCOSE: 105 MG/DL (ref 68–131)
GLUCOSE SERPL-MCNC: 100 MG/DL (ref 70–110)
HBA1C MFR BLD: 5.3 % (ref 4–5.6)
HCT VFR BLD AUTO: 47.2 % (ref 40–54)
HDLC SERPL-MCNC: 57 MG/DL (ref 40–75)
HDLC SERPL: 34.1 % (ref 20–50)
HGB BLD-MCNC: 15.4 G/DL (ref 14–18)
IMM GRANULOCYTES # BLD AUTO: 0.01 K/UL (ref 0–0.04)
IMM GRANULOCYTES NFR BLD AUTO: 0.1 % (ref 0–0.5)
LDLC SERPL CALC-MCNC: 81.6 MG/DL (ref 63–159)
LYMPHOCYTES # BLD AUTO: 2 K/UL (ref 1–4.8)
LYMPHOCYTES NFR BLD: 28.2 % (ref 18–48)
MCH RBC QN AUTO: 31.6 PG (ref 27–31)
MCHC RBC AUTO-ENTMCNC: 32.6 G/DL (ref 32–36)
MCV RBC AUTO: 97 FL (ref 82–98)
MONOCYTES # BLD AUTO: 0.7 K/UL (ref 0.3–1)
MONOCYTES NFR BLD: 9.5 % (ref 4–15)
NEUTROPHILS # BLD AUTO: 4 K/UL (ref 1.8–7.7)
NEUTROPHILS NFR BLD: 56.7 % (ref 38–73)
NONHDLC SERPL-MCNC: 110 MG/DL
NRBC BLD-RTO: 0 /100 WBC
PLATELET # BLD AUTO: 345 K/UL (ref 150–450)
PMV BLD AUTO: 9.2 FL (ref 9.2–12.9)
POTASSIUM SERPL-SCNC: 4.2 MMOL/L (ref 3.5–5.1)
PROT SERPL-MCNC: 8.2 G/DL (ref 6–8.4)
RBC # BLD AUTO: 4.88 M/UL (ref 4.6–6.2)
SODIUM SERPL-SCNC: 141 MMOL/L (ref 136–145)
TRIGL SERPL-MCNC: 142 MG/DL (ref 30–150)
TSH SERPL DL<=0.005 MIU/L-ACNC: 0.49 UIU/ML (ref 0.4–4)
WBC # BLD AUTO: 7.08 K/UL (ref 3.9–12.7)

## 2022-01-07 PROCEDURE — 80061 LIPID PANEL: CPT | Performed by: PHYSICIAN ASSISTANT

## 2022-01-07 PROCEDURE — 84443 ASSAY THYROID STIM HORMONE: CPT | Performed by: PHYSICIAN ASSISTANT

## 2022-01-07 PROCEDURE — 80053 COMPREHEN METABOLIC PANEL: CPT | Performed by: PHYSICIAN ASSISTANT

## 2022-01-07 PROCEDURE — 85025 COMPLETE CBC W/AUTO DIFF WBC: CPT | Performed by: PHYSICIAN ASSISTANT

## 2022-01-07 PROCEDURE — 83036 HEMOGLOBIN GLYCOSYLATED A1C: CPT | Performed by: PHYSICIAN ASSISTANT

## 2022-01-07 PROCEDURE — 36415 COLL VENOUS BLD VENIPUNCTURE: CPT | Performed by: PHYSICIAN ASSISTANT

## 2022-01-07 PROCEDURE — 84153 ASSAY OF PSA TOTAL: CPT | Performed by: PHYSICIAN ASSISTANT

## 2022-01-11 ENCOUNTER — PATIENT MESSAGE (OUTPATIENT)
Dept: INFECTIOUS DISEASES | Facility: CLINIC | Age: 62
End: 2022-01-11
Payer: COMMERCIAL

## 2022-01-12 ENCOUNTER — RESEARCH ENCOUNTER (OUTPATIENT)
Dept: RESEARCH | Facility: HOSPITAL | Age: 62
End: 2022-01-12
Payer: COMMERCIAL

## 2022-01-12 ENCOUNTER — PATIENT MESSAGE (OUTPATIENT)
Dept: INFECTIOUS DISEASES | Facility: CLINIC | Age: 62
End: 2022-01-12
Payer: COMMERCIAL

## 2022-01-12 NOTE — PROGRESS NOTES
Hipolito Laws was called today regarding his participation in (IRB #[id number] PI: Jania).   The Verbal Informed Consent was read and discussed by the consenter. The following was discussed:   Types of specimens to be collected   All medical information released to researchers will be stripped of identifiers and no patient information will be given to anyone outside of this research project.    Participating in a research study is not the same as getting regular medical care and will not improve the patient's health. The purpose of a research study is to gather information.  Being in this study does not interfere with your regular medical care.   The patient does not have to participate in this study. If they do not join, their care at [facility] will not be affected.  The person granting permission was provided adequate time to ask questions regarding the scope and purpose of the study.  Permission was obtained by telephone.   The above statements were read by the person obtaining permission to the person granting permission and witnessed by Duane Shah. The witness information was documented on the verbal consent form as well.  This Verbal Informed Consent process was conducted prior to initiation of any study procedures.

## 2022-01-14 ENCOUNTER — HOSPITAL ENCOUNTER (OUTPATIENT)
Dept: RADIOLOGY | Facility: HOSPITAL | Age: 62
Discharge: HOME | End: 2022-01-14
Attending: PHYSICIAN ASSISTANT
Payer: COMMERCIAL

## 2022-01-14 DIAGNOSIS — Z12.2 SCREENING FOR LUNG CANCER: ICD-10-CM

## 2022-01-14 PROCEDURE — 71271 CT THORAX LUNG CANCER SCR C-: CPT | Mod: TC

## 2022-01-14 PROCEDURE — 71271 CT THORAX LUNG CANCER SCR C-: CPT | Mod: 26,,, | Performed by: RADIOLOGY

## 2022-01-14 PROCEDURE — 71271 CT CHEST LUNG SCREENING LOW DOSE: ICD-10-PCS | Mod: 26,,, | Performed by: RADIOLOGY

## 2022-01-24 ENCOUNTER — PATIENT MESSAGE (OUTPATIENT)
Dept: ORTHOPEDICS | Facility: CLINIC | Age: 62
End: 2022-01-24
Payer: COMMERCIAL

## 2022-01-25 ENCOUNTER — PATIENT MESSAGE (OUTPATIENT)
Dept: ORTHOPEDICS | Facility: CLINIC | Age: 62
End: 2022-01-25
Payer: COMMERCIAL

## 2022-01-26 ENCOUNTER — TELEPHONE (OUTPATIENT)
Dept: ORTHOPEDICS | Facility: CLINIC | Age: 62
End: 2022-01-26
Payer: COMMERCIAL

## 2022-01-26 ENCOUNTER — PATIENT MESSAGE (OUTPATIENT)
Dept: ORTHOPEDICS | Facility: CLINIC | Age: 62
End: 2022-01-26
Payer: COMMERCIAL

## 2022-03-28 ENCOUNTER — HOSPITAL ENCOUNTER (OUTPATIENT)
Dept: RADIOLOGY | Facility: HOSPITAL | Age: 62
Discharge: HOME | End: 2022-03-28
Attending: INTERNAL MEDICINE
Payer: COMMERCIAL

## 2022-03-28 ENCOUNTER — OFFICE VISIT (OUTPATIENT)
Dept: INTERNAL MEDICINE | Facility: CLINIC | Age: 62
End: 2022-03-28
Payer: COMMERCIAL

## 2022-03-28 VITALS
DIASTOLIC BLOOD PRESSURE: 80 MMHG | SYSTOLIC BLOOD PRESSURE: 142 MMHG | HEIGHT: 70 IN | OXYGEN SATURATION: 100 % | BODY MASS INDEX: 28.92 KG/M2 | HEART RATE: 99 BPM | WEIGHT: 202 LBS

## 2022-03-28 DIAGNOSIS — M25.572 CHRONIC PAIN OF LEFT ANKLE: ICD-10-CM

## 2022-03-28 DIAGNOSIS — F32.A ANXIETY AND DEPRESSION: Primary | ICD-10-CM

## 2022-03-28 DIAGNOSIS — G47.00 INSOMNIA, UNSPECIFIED TYPE: ICD-10-CM

## 2022-03-28 DIAGNOSIS — G89.29 CHRONIC PAIN OF LEFT ANKLE: ICD-10-CM

## 2022-03-28 DIAGNOSIS — F41.9 ANXIETY AND DEPRESSION: Primary | ICD-10-CM

## 2022-03-28 DIAGNOSIS — M54.12 CERVICAL RADICULITIS: ICD-10-CM

## 2022-03-28 PROCEDURE — 99214 PR OFFICE/OUTPT VISIT, EST, LEVL IV, 30-39 MIN: ICD-10-PCS | Mod: S$GLB,,, | Performed by: INTERNAL MEDICINE

## 2022-03-28 PROCEDURE — 3008F BODY MASS INDEX DOCD: CPT | Mod: CPTII,S$GLB,, | Performed by: INTERNAL MEDICINE

## 2022-03-28 PROCEDURE — 3077F SYST BP >= 140 MM HG: CPT | Mod: CPTII,S$GLB,, | Performed by: INTERNAL MEDICINE

## 2022-03-28 PROCEDURE — 99214 OFFICE O/P EST MOD 30 MIN: CPT | Mod: S$GLB,,, | Performed by: INTERNAL MEDICINE

## 2022-03-28 PROCEDURE — 3044F PR MOST RECENT HEMOGLOBIN A1C LEVEL <7.0%: ICD-10-PCS | Mod: CPTII,S$GLB,, | Performed by: INTERNAL MEDICINE

## 2022-03-28 PROCEDURE — 1160F PR REVIEW ALL MEDS BY PRESCRIBER/CLIN PHARMACIST DOCUMENTED: ICD-10-PCS | Mod: CPTII,S$GLB,, | Performed by: INTERNAL MEDICINE

## 2022-03-28 PROCEDURE — 99999 PR PBB SHADOW E&M-EST. PATIENT-LVL IV: ICD-10-PCS | Mod: PBBFAC,,, | Performed by: INTERNAL MEDICINE

## 2022-03-28 PROCEDURE — 73610 X-RAY EXAM OF ANKLE: CPT | Mod: TC,LT

## 2022-03-28 PROCEDURE — 3008F PR BODY MASS INDEX (BMI) DOCUMENTED: ICD-10-PCS | Mod: CPTII,S$GLB,, | Performed by: INTERNAL MEDICINE

## 2022-03-28 PROCEDURE — 4010F ACE/ARB THERAPY RXD/TAKEN: CPT | Mod: CPTII,S$GLB,, | Performed by: INTERNAL MEDICINE

## 2022-03-28 PROCEDURE — 73610 XR ANKLE COMPLETE 3 VIEW LEFT: ICD-10-PCS | Mod: 26,LT,, | Performed by: RADIOLOGY

## 2022-03-28 PROCEDURE — 1159F PR MEDICATION LIST DOCUMENTED IN MEDICAL RECORD: ICD-10-PCS | Mod: CPTII,S$GLB,, | Performed by: INTERNAL MEDICINE

## 2022-03-28 PROCEDURE — 4010F PR ACE/ARB THEARPY RXD/TAKEN: ICD-10-PCS | Mod: CPTII,S$GLB,, | Performed by: INTERNAL MEDICINE

## 2022-03-28 PROCEDURE — 3044F HG A1C LEVEL LT 7.0%: CPT | Mod: CPTII,S$GLB,, | Performed by: INTERNAL MEDICINE

## 2022-03-28 PROCEDURE — 3079F PR MOST RECENT DIASTOLIC BLOOD PRESSURE 80-89 MM HG: ICD-10-PCS | Mod: CPTII,S$GLB,, | Performed by: INTERNAL MEDICINE

## 2022-03-28 PROCEDURE — 73610 X-RAY EXAM OF ANKLE: CPT | Mod: 26,LT,, | Performed by: RADIOLOGY

## 2022-03-28 PROCEDURE — 1160F RVW MEDS BY RX/DR IN RCRD: CPT | Mod: CPTII,S$GLB,, | Performed by: INTERNAL MEDICINE

## 2022-03-28 PROCEDURE — 3079F DIAST BP 80-89 MM HG: CPT | Mod: CPTII,S$GLB,, | Performed by: INTERNAL MEDICINE

## 2022-03-28 PROCEDURE — 3077F PR MOST RECENT SYSTOLIC BLOOD PRESSURE >= 140 MM HG: ICD-10-PCS | Mod: CPTII,S$GLB,, | Performed by: INTERNAL MEDICINE

## 2022-03-28 PROCEDURE — 99999 PR PBB SHADOW E&M-EST. PATIENT-LVL IV: CPT | Mod: PBBFAC,,, | Performed by: INTERNAL MEDICINE

## 2022-03-28 PROCEDURE — 1159F MED LIST DOCD IN RCRD: CPT | Mod: CPTII,S$GLB,, | Performed by: INTERNAL MEDICINE

## 2022-03-28 RX ORDER — DULOXETIN HYDROCHLORIDE 60 MG/1
60 CAPSULE, DELAYED RELEASE ORAL DAILY
Qty: 90 CAPSULE | Refills: 3 | Status: SHIPPED | OUTPATIENT
Start: 2022-03-28 | End: 2023-10-17

## 2022-03-28 NOTE — PROGRESS NOTES
"    CHIEF COMPLAINT     Chief Complaint   Patient presents with    Follow-up     Pt did not fast---seen another dr since his appointment was far out.       HPI     Hipolito Laws is a 62 y.o. male hypertension hyperlipidemia, cervical radiculitis here today for  a week follow-up.  Was seen by PA approximately weeks ago start duloxetine 30 mg daily.  Reports that this is help with his mood even a little bit with his neck pain.  Reports he is tolerating the medication.    Having issues with left ankle.  Reports history of multiple fractures with initial 1 approximately 40 years ago.  Reports that when it gets swollen bothersome it is hard for him to ambulate.    Trouble sleeping.  Was tried on trazodone 50 mg unclear if it was helpful.    Personally Reviewed Patient's Medical, surgical, family and social hx. Changes updated in [facility].  Care Team updated in Epic    Review of Systems:  Review of Systems   Musculoskeletal: Positive for arthralgias (Foot ankle pain), back pain and neck pain.   Psychiatric/Behavioral: Positive for sleep disturbance.       Health Maintenance:   Reviewed with patient  Due for the following:      PHYSICAL EXAM     BP (!) 142/80 (BP Location: Right arm)   Pulse 99   Ht 5' 10" (1.778 m)   Wt 91.6 kg (202 lb)   SpO2 100%   BMI 28.98 kg/m²     Gen: Well Appearing, NAD  HEENT: PERR, EOMI  Neck: FROM, no thyromegaly, no cervical adenopathy  CVD: RRR, no M/R/G  Pulm: Normal work of breathing, CTAB, no wheezing  Abd:  Soft, NT, ND non TTP, no mass  MSK: no LE edema  Neuro: A&Ox3, gait normal, speech normal  Mood; Mood normal, behavior normal, thought process linear       LABS     Labs reviewed;   Lab Results   Component Value Date    WBC 7.08 01/07/2022    HGB 15.4 01/07/2022    HCT 47.2 01/07/2022    MCV 97 01/07/2022     01/07/2022         Chemistry        Component Value Date/Time     01/07/2022 0722    K 4.2 01/07/2022 0722     01/07/2022 0722    CO2 31 (H) 01/07/2022 " 0722    BUN 21 (H) 01/07/2022 0722    CREATININE 0.9 01/07/2022 0722     01/07/2022 0722        Component Value Date/Time    CALCIUM 9.9 01/07/2022 0722    ALKPHOS 71 01/07/2022 0722    AST 19 01/07/2022 0722    ALT 32 01/07/2022 0722    BILITOT 0.7 01/07/2022 0722    ESTGFRAFRICA >60.0 01/07/2022 0722    EGFRNONAA >60.0 01/07/2022 0722            ASSESSMENT     1. Anxiety and depression  DULoxetine (CYMBALTA) 60 MG capsule   2. Cervical radiculitis     3. Chronic pain of left ankle  X-Ray Ankle Complete 3 View Left    Ambulatory referral/consult to Orthopedics   4. Insomnia, unspecified type             Plan     Hipolito Laws is a 62 y.o. male with hypertension hyperlipidemia, cervical radiculitis  1. Anxiety and depression  Good therapeutic response to duloxetine.  Think 30 mg dose optimal for mood control.  Going to try increased to 60 mg daily since he reports some improvement some of his cervical radiculopathy symptoms.  - DULoxetine (CYMBALTA) 60 MG capsule; Take 1 capsule (60 mg total) by mouth once daily.  Dispense: 90 capsule; Refill: 3    2. Cervical radiculitis  Continue work with back and spine.  Will try higher dose of duloxetine to see if he gets additional benefit    3. Chronic pain of left ankle  Get x-ray and will refer to foot and ankle  - X-Ray Ankle Complete 3 View Left; Future  - Ambulatory referral/consult to Orthopedics; Future    4. Insomnia, unspecified type  Recommend increasing trazodone to 100 mg daily    Follow-up in 6 month    Fabio Rosas MD

## 2022-05-02 ENCOUNTER — PATIENT MESSAGE (OUTPATIENT)
Dept: INTERNAL MEDICINE | Facility: CLINIC | Age: 62
End: 2022-05-02
Payer: COMMERCIAL

## 2022-05-12 ENCOUNTER — PATIENT OUTREACH (OUTPATIENT)
Dept: ADMINISTRATIVE | Facility: OTHER | Age: 62
End: 2022-05-12
Payer: COMMERCIAL

## 2022-05-12 ENCOUNTER — PATIENT MESSAGE (OUTPATIENT)
Dept: ORTHOPEDICS | Facility: CLINIC | Age: 62
End: 2022-05-12
Payer: COMMERCIAL

## 2022-05-13 ENCOUNTER — OFFICE VISIT (OUTPATIENT)
Dept: ORTHOPEDICS | Facility: CLINIC | Age: 62
End: 2022-05-13
Payer: COMMERCIAL

## 2022-05-13 VITALS — HEIGHT: 70 IN | WEIGHT: 202 LBS | BODY MASS INDEX: 28.92 KG/M2

## 2022-05-13 DIAGNOSIS — G89.29 CHRONIC PAIN OF LEFT ANKLE: ICD-10-CM

## 2022-05-13 DIAGNOSIS — M19.072 ARTHROSIS OF LEFT ANKLE: ICD-10-CM

## 2022-05-13 DIAGNOSIS — M25.572 CHRONIC PAIN OF LEFT ANKLE: ICD-10-CM

## 2022-05-13 PROCEDURE — 99999 PR PBB SHADOW E&M-EST. PATIENT-LVL IV: ICD-10-PCS | Mod: PBBFAC,,, | Performed by: ORTHOPAEDIC SURGERY

## 2022-05-13 PROCEDURE — 99999 PR PBB SHADOW E&M-EST. PATIENT-LVL IV: CPT | Mod: PBBFAC,,, | Performed by: ORTHOPAEDIC SURGERY

## 2022-05-13 PROCEDURE — 3008F BODY MASS INDEX DOCD: CPT | Mod: CPTII,S$GLB,, | Performed by: ORTHOPAEDIC SURGERY

## 2022-05-13 PROCEDURE — 3044F HG A1C LEVEL LT 7.0%: CPT | Mod: CPTII,S$GLB,, | Performed by: ORTHOPAEDIC SURGERY

## 2022-05-13 PROCEDURE — 3008F PR BODY MASS INDEX (BMI) DOCUMENTED: ICD-10-PCS | Mod: CPTII,S$GLB,, | Performed by: ORTHOPAEDIC SURGERY

## 2022-05-13 PROCEDURE — 1159F MED LIST DOCD IN RCRD: CPT | Mod: CPTII,S$GLB,, | Performed by: ORTHOPAEDIC SURGERY

## 2022-05-13 PROCEDURE — 4010F ACE/ARB THERAPY RXD/TAKEN: CPT | Mod: CPTII,S$GLB,, | Performed by: ORTHOPAEDIC SURGERY

## 2022-05-13 PROCEDURE — 1159F PR MEDICATION LIST DOCUMENTED IN MEDICAL RECORD: ICD-10-PCS | Mod: CPTII,S$GLB,, | Performed by: ORTHOPAEDIC SURGERY

## 2022-05-13 PROCEDURE — 4010F PR ACE/ARB THEARPY RXD/TAKEN: ICD-10-PCS | Mod: CPTII,S$GLB,, | Performed by: ORTHOPAEDIC SURGERY

## 2022-05-13 PROCEDURE — 99213 OFFICE O/P EST LOW 20 MIN: CPT | Mod: S$GLB,,, | Performed by: ORTHOPAEDIC SURGERY

## 2022-05-13 PROCEDURE — 3044F PR MOST RECENT HEMOGLOBIN A1C LEVEL <7.0%: ICD-10-PCS | Mod: CPTII,S$GLB,, | Performed by: ORTHOPAEDIC SURGERY

## 2022-05-13 PROCEDURE — 99213 PR OFFICE/OUTPT VISIT, EST, LEVL III, 20-29 MIN: ICD-10-PCS | Mod: S$GLB,,, | Performed by: ORTHOPAEDIC SURGERY

## 2022-05-13 NOTE — PROGRESS NOTES
Subjective:      Patient ID: Hipolito Laws is a 62 y.o. male.    Chief Complaint:  Left ankle pain    HPI:  This is a 62-year-old male who comes in for evaluation for chronic left ankle pain.  He reports when he was 28 years old he sustained a fracture of his left ankle after an inversion injury and around the time of Nichole 17 years ago he re-injured his ankle and reports that he broke it again.  He has not had any surgery on his left ankle and was treated with conservative measures after the 2 injuries that he reports.  He states over the last 6-7 years he has had progressive pain of his left ankle.  He reports that the focus of his pain is on the lateral aspect pointing around the distal fibula, but he also reports by the end of the day his whole ankle is painful.  He does not report significant morning pain or stiffness.  He does not report any instability.    History reviewed. No pertinent past medical history.    Current Outpatient Medications:     aspirin (ECOTRIN) 81 MG EC tablet, Take 81 mg by mouth once daily., Disp: , Rfl:     atorvastatin (LIPITOR) 40 MG tablet, Take 1 tablet (40 mg total) by mouth once daily., Disp: 90 tablet, Rfl: 3    diclofenac (VOLTAREN) 50 MG EC tablet, TAKE 1 TABLET(50 MG) BY MOUTH TWICE DAILY, Disp: 180 tablet, Rfl: 0    DULoxetine (CYMBALTA) 60 MG capsule, Take 1 capsule (60 mg total) by mouth once daily., Disp: 90 capsule, Rfl: 3    losartan (COZAAR) 50 MG tablet, TAKE 1 TABLET(50 MG) BY MOUTH EVERY DAY, Disp: 90 tablet, Rfl: 3    traZODone (DESYREL) 50 MG tablet, Take 0.5-1 tablets (25-50 mg total) by mouth nightly as needed for Insomnia., Disp: 90 tablet, Rfl: 3  Review of patient's allergies indicates:  No Known Allergies    There were no vitals taken for this visit.    ROS:  Negative for chest pain, shortness of breath, fevers, or unexplained weight loss.      Objective:    Ortho Exam       This is a well-developed well-nourished male who walks in with a normal  gait.  On standing inspection he has plantigrade alignment of his feet.  There is no obvious asymmetry between his right and left ankles.  On sitting exam he has active functional motion of his ankle and subtalar joint.  He does report some lateral-sided pain with motion, especially inversion.  He is tender over the anterior distal fibula and the lateral ankle joint space.  He has good function of all of the tendons about his ankle including the peroneal tendons.  There is no instability of the ankle on anterior drawer exam.  There is no medial-sided tenderness at this time.  He is neurovascularly intact.      Assessment:     Imaging:  I reviewed an x-ray that was performed on 03/28/2022.  The x-ray reveals a moderately sized ossicle at the distal tip of the fibula that may represent a chronic avulsion injury.  There are mild degenerative changes.        1. Chronic pain of left ankle  Ambulatory referral/consult to Orthopedics    MRI Ankle Without Contrast Left   2. Arthrosis of left ankle  MRI Ankle Without Contrast Left           Plan:          Recommendation:  I suspect that he may have some pain due to the fragment of bone that is probably representative of previous avulsion injury.  He may also have some underlying arthritis of the ankle.  I would like to obtain an MRI to further evaluate the ankle before making any recommendations.  I discussed the possibility of excising the ossicle but I could not guarantee this is going to completely relieve his pain.

## 2022-05-13 NOTE — PROGRESS NOTES
Requested updates within Care Everywhere.  Patient's chart was reviewed for overdue MALLORY topics.  Health maintenance:updated  Immunizations:reconciled   Legacy:   Media:  Orders placed:  Tasked appts:  Labs Linked:  Upcoming appt:

## 2022-05-19 ENCOUNTER — TELEPHONE (OUTPATIENT)
Dept: ADMINISTRATIVE | Facility: OTHER | Age: 62
End: 2022-05-19
Payer: COMMERCIAL

## 2022-05-19 DIAGNOSIS — M54.12 CERVICAL RADICULOPATHY: Primary | ICD-10-CM

## 2022-05-19 NOTE — PROGRESS NOTES
Spoke to pt over the phone. He had 100% relief from cervical DIGNA on 12/13/21 for 4 months. Now he continues to have neck pain with radiculopathy. Since his last visit with Dr. Goldman on 11/17/21 he has continued home exercises provided by Dr. Goldman. It is the [employer] cervical exercise program. Exercises include walking erectly with neutral head position, supine neutral head position, supine retraction, sitting or standing neck retraction, posture training, forward/backward/sideward isometric strengthening, prone head lifts, supine head lifts, scapular retraction, and neck rotation. Pt completes each exercise twice daily with worsening of pain. Pain is 8/10. Given failure of conservative rx, will repeat cervical DIGNA. Pt will fu 2 weeks after.

## 2022-05-23 DIAGNOSIS — M54.12 CERVICAL RADICULOPATHY: Primary | ICD-10-CM

## 2022-05-27 ENCOUNTER — HOSPITAL ENCOUNTER (OUTPATIENT)
Dept: RADIOLOGY | Facility: HOSPITAL | Age: 62
Discharge: HOME | End: 2022-05-27
Attending: ORTHOPAEDIC SURGERY
Payer: COMMERCIAL

## 2022-05-27 DIAGNOSIS — G89.29 CHRONIC PAIN OF LEFT ANKLE: ICD-10-CM

## 2022-05-27 DIAGNOSIS — M25.572 CHRONIC PAIN OF LEFT ANKLE: ICD-10-CM

## 2022-05-27 DIAGNOSIS — M19.072 ARTHROSIS OF LEFT ANKLE: ICD-10-CM

## 2022-05-27 PROCEDURE — 73721 MRI JNT OF LWR EXTRE W/O DYE: CPT | Mod: 26,LT,, | Performed by: RADIOLOGY

## 2022-05-27 PROCEDURE — 73721 MRI JNT OF LWR EXTRE W/O DYE: CPT | Mod: TC,LT

## 2022-05-27 PROCEDURE — 73721 MRI ANKLE WITHOUT CONTRAST LEFT: ICD-10-PCS | Mod: 26,LT,, | Performed by: RADIOLOGY

## 2022-05-30 ENCOUNTER — PATIENT MESSAGE (OUTPATIENT)
Dept: ORTHOPEDICS | Facility: CLINIC | Age: 62
End: 2022-05-30

## 2022-05-30 ENCOUNTER — TELEPHONE (OUTPATIENT)
Dept: ORTHOPEDICS | Facility: CLINIC | Age: 62
End: 2022-05-30
Payer: COMMERCIAL

## 2022-05-30 ENCOUNTER — OFFICE VISIT (OUTPATIENT)
Dept: ORTHOPEDICS | Facility: CLINIC | Age: 62
End: 2022-05-30
Payer: COMMERCIAL

## 2022-05-30 DIAGNOSIS — M89.9 OSTEOCHONDRAL LESION OF TALAR DOME: ICD-10-CM

## 2022-05-30 DIAGNOSIS — G89.29 CHRONIC PAIN OF LEFT ANKLE: ICD-10-CM

## 2022-05-30 DIAGNOSIS — S82.832K: ICD-10-CM

## 2022-05-30 DIAGNOSIS — M94.9 OSTEOCHONDRAL LESION OF TALAR DOME: ICD-10-CM

## 2022-05-30 DIAGNOSIS — M25.572 CHRONIC PAIN OF LEFT ANKLE: ICD-10-CM

## 2022-05-30 DIAGNOSIS — M19.072 ARTHROSIS OF LEFT ANKLE: Primary | ICD-10-CM

## 2022-05-30 PROCEDURE — 1160F RVW MEDS BY RX/DR IN RCRD: CPT | Mod: CPTII,95,, | Performed by: ORTHOPAEDIC SURGERY

## 2022-05-30 PROCEDURE — 4010F PR ACE/ARB THEARPY RXD/TAKEN: ICD-10-PCS | Mod: CPTII,95,, | Performed by: ORTHOPAEDIC SURGERY

## 2022-05-30 PROCEDURE — 99212 PR OFFICE/OUTPT VISIT, EST, LEVL II, 10-19 MIN: ICD-10-PCS | Mod: 95,,, | Performed by: ORTHOPAEDIC SURGERY

## 2022-05-30 PROCEDURE — 99212 OFFICE O/P EST SF 10 MIN: CPT | Mod: 95,,, | Performed by: ORTHOPAEDIC SURGERY

## 2022-05-30 PROCEDURE — 1159F MED LIST DOCD IN RCRD: CPT | Mod: CPTII,95,, | Performed by: ORTHOPAEDIC SURGERY

## 2022-05-30 PROCEDURE — 3044F HG A1C LEVEL LT 7.0%: CPT | Mod: CPTII,95,, | Performed by: ORTHOPAEDIC SURGERY

## 2022-05-30 PROCEDURE — 3044F PR MOST RECENT HEMOGLOBIN A1C LEVEL <7.0%: ICD-10-PCS | Mod: CPTII,95,, | Performed by: ORTHOPAEDIC SURGERY

## 2022-05-30 PROCEDURE — 4010F ACE/ARB THERAPY RXD/TAKEN: CPT | Mod: CPTII,95,, | Performed by: ORTHOPAEDIC SURGERY

## 2022-05-30 PROCEDURE — 1160F PR REVIEW ALL MEDS BY PRESCRIBER/CLIN PHARMACIST DOCUMENTED: ICD-10-PCS | Mod: CPTII,95,, | Performed by: ORTHOPAEDIC SURGERY

## 2022-05-30 PROCEDURE — 1159F PR MEDICATION LIST DOCUMENTED IN MEDICAL RECORD: ICD-10-PCS | Mod: CPTII,95,, | Performed by: ORTHOPAEDIC SURGERY

## 2022-05-30 NOTE — TELEPHONE ENCOUNTER
I sent pt a message through the portal notifying him that  will be given him a call shortly.          ----- Message from Ester Santillan sent at 5/30/2022 10:33 AM CDT -----  Regarding: advice  Contact: @[phone]  Pt is Returning a call from  [facility], Please call

## 2022-05-30 NOTE — PROGRESS NOTES
Established Patient - Audio Only Telehealth Visit     The patient location is:  Home   The chief complaint leading to consultation is:  MRI results  Visit type: Virtual visit with audio only (telephone)  Total time spent with patient:  10 minutes       The reason for the audio only service rather than synchronous audio and video virtual visit was related to technical difficulties or patient preference/necessity.     Each patient to whom I provide medical services by telemedicine is:  (1) informed of the relationship between the physician and patient and the respective role of any other health care provider with respect to management of the patient; and (2) notified that they may decline to receive medical services by telemedicine and may withdraw from such care at any time. Patient verbally consented to receive this service via voice-only telephone call.       HPI:  This is a 62-year-old male who I saw on 05/13/2022 for chronic left ankle pain.  He reported that over 30 years ago he sustained a fracture of his left ankle after an inversion injury any re-injured his ankle again about 17 years ago.  He reported no previous surgeries on his ankle and was treated with conservative measures.  He reports that over the last 6-7 years he has had some progressive pain mainly in the lateral aspect of his left ankle around the distal fibula.  He reports diffuse ankle pain at the end of the day with associated swelling.  On my initial examination he had functional motion of his ankle and subtalar joint with some reported lateral-sided pain with inversion of the ankle and hindfoot.  He had some tenderness over the distal fibula.  He had normal function and strength of all the tendons about his ankle.  X-rays reveal some mild degenerative changes and a moderately-sized ossicle at the distal tip of the fibula that I thought could be a source of his symptoms.  I ordered an MRI to further evaluate the ankle.    MRI results:  MRI of  the left ankle performed 05/27/2022 reveals a healed fracture of the lateral malleolus with a persistent ossicle just below the fibula.  Some chronic changes of the deltoid ligament were noted along with a small osteochondral lesion of the medial talar dome.  The peroneus brevis is also noted to be flattened suggesting a chronic longitudinal split tear but there is no significant fluid around the tendon.     Assessment and plan:    1. Arthrosis of left ankle     2. 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 Osteochondral lesion of talar dome     4. Chronic pain of left ankle       Recommendation:  I reviewed the films and the MRI and there is a little bit of fluid around the persistent ossicle at the distal tip of the fibula.  There also is some fluid in the ankle joint along the associated small osteochondral lesion.  This gentleman has had chronic symptoms and I think he would be a candidate for arthroscopic evaluation of the left ankle as well as excision of the ossicle from the distal end of the fibula.  He will return for his preoperative H&P consent.                        This service was not originating from a related E/M service provided within the previous 7 days nor will  to an E/M service or procedure within the next 24 hours or my soonest available appointment.  Prevailing standard of care was able to be met in this audio-only visit.

## 2022-05-31 DIAGNOSIS — F41.9 ANXIETY AND DEPRESSION: ICD-10-CM

## 2022-05-31 DIAGNOSIS — F32.A ANXIETY AND DEPRESSION: ICD-10-CM

## 2022-05-31 DIAGNOSIS — G89.29 OTHER CHRONIC PAIN: ICD-10-CM

## 2022-05-31 RX ORDER — DULOXETIN HYDROCHLORIDE 30 MG/1
CAPSULE, DELAYED RELEASE ORAL
Qty: 30 CAPSULE | Refills: 3 | Status: SHIPPED | OUTPATIENT
Start: 2022-05-31 | End: 2022-08-01

## 2022-06-02 ENCOUNTER — HOSPITAL ENCOUNTER (OUTPATIENT)
Facility: OTHER | Age: 62
Discharge: HOME | End: 2022-06-02
Attending: ANESTHESIOLOGY | Admitting: ANESTHESIOLOGY
Payer: COMMERCIAL

## 2022-06-02 VITALS
HEART RATE: 90 BPM | SYSTOLIC BLOOD PRESSURE: 164 MMHG | BODY MASS INDEX: 28.63 KG/M2 | WEIGHT: 200 LBS | OXYGEN SATURATION: 95 % | TEMPERATURE: 98 F | DIASTOLIC BLOOD PRESSURE: 94 MMHG | HEIGHT: 70 IN | RESPIRATION RATE: 12 BRPM

## 2022-06-02 DIAGNOSIS — M50.30 DDD (DEGENERATIVE DISC DISEASE), CERVICAL: Primary | ICD-10-CM

## 2022-06-02 DIAGNOSIS — M54.12 CERVICAL RADICULOPATHY: ICD-10-CM

## 2022-06-02 DIAGNOSIS — G89.29 CHRONIC PAIN: ICD-10-CM

## 2022-06-02 PROCEDURE — 62321 NJX INTERLAMINAR CRV/THRC: CPT | Mod: ,,, | Performed by: ANESTHESIOLOGY

## 2022-06-02 PROCEDURE — 62321 NJX INTERLAMINAR CRV/THRC: CPT | Performed by: ANESTHESIOLOGY

## 2022-06-02 PROCEDURE — 25500020 PHARM REV CODE 255: Performed by: ANESTHESIOLOGY

## 2022-06-02 PROCEDURE — 62321 PR INJ CERV/THORAC, W/GUIDANCE: ICD-10-PCS | Mod: ,,, | Performed by: ANESTHESIOLOGY

## 2022-06-02 PROCEDURE — 63600175 PHARM REV CODE 636 W HCPCS: Performed by: ANESTHESIOLOGY

## 2022-06-02 PROCEDURE — 25000003 PHARM REV CODE 250: Performed by: ANESTHESIOLOGY

## 2022-06-02 RX ORDER — LIDOCAINE HYDROCHLORIDE 10 MG/ML
INJECTION, SOLUTION EPIDURAL; INFILTRATION; INTRACAUDAL; PERINEURAL
Status: DISCONTINUED | OUTPATIENT
Start: 2022-06-02 | End: 2022-06-02 | Stop reason: HOSPADM

## 2022-06-02 RX ORDER — SODIUM CHLORIDE 9 MG/ML
500 INJECTION, SOLUTION INTRAVENOUS CONTINUOUS
Status: DISCONTINUED | OUTPATIENT
Start: 2022-06-02 | End: 2022-06-02 | Stop reason: HOSPADM

## 2022-06-02 RX ORDER — LIDOCAINE HYDROCHLORIDE 20 MG/ML
INJECTION, SOLUTION INFILTRATION; PERINEURAL
Status: DISCONTINUED | OUTPATIENT
Start: 2022-06-02 | End: 2022-06-02 | Stop reason: HOSPADM

## 2022-06-02 RX ORDER — DEXAMETHASONE SODIUM PHOSPHATE 10 MG/ML
INJECTION INTRAMUSCULAR; INTRAVENOUS
Status: DISCONTINUED | OUTPATIENT
Start: 2022-06-02 | End: 2022-06-02 | Stop reason: HOSPADM

## 2022-06-02 NOTE — DISCHARGE SUMMARY
Discharge Note  Short Stay      SUMMARY     Admit Date: 6/2/2022    Attending Physician: Yunier Lawton      Discharge Physician: Yunier Lawton      Discharge Date: 6/2/2022 11:42 AM    Procedure(s) (LRB):  INJECTION, STEROID, EPIDURAL, CERVICAL C7/T1 CONTRAST DIRECT REF (N/A)    Final Diagnosis: Cervical radiculopathy [M54.12]    Disposition: Home or self care    Patient Instructions:   Current Discharge Medication List      CONTINUE these medications which have NOT CHANGED    Details   aspirin (ECOTRIN) 81 MG EC tablet Take 81 mg by mouth once daily.      atorvastatin (LIPITOR) 40 MG tablet Take 1 tablet (40 mg total) by mouth once daily.  Qty: 90 tablet, Refills: 3    Associated Diagnoses: Coronary artery calcification seen on CT scan      diclofenac (VOLTAREN) 50 MG EC tablet TAKE 1 TABLET(50 MG) BY MOUTH TWICE DAILY  Qty: 180 tablet, Refills: 0    Associated Diagnoses: Osteoarthritis of multiple joints, unspecified osteoarthritis type      !! DULoxetine (CYMBALTA) 30 MG capsule TAKE 1 CAPSULE(30 MG) BY MOUTH EVERY DAY  Qty: 30 capsule, Refills: 3    Associated Diagnoses: Anxiety and depression; Other chronic pain      !! DULoxetine (CYMBALTA) 60 MG capsule Take 1 capsule (60 mg total) by mouth once daily.  Qty: 90 capsule, Refills: 3    Associated Diagnoses: Anxiety and depression      losartan (COZAAR) 50 MG tablet TAKE 1 TABLET(50 MG) BY MOUTH EVERY DAY  Qty: 90 tablet, Refills: 3    Associated Diagnoses: Essential hypertension, benign      traZODone (DESYREL) 50 MG tablet Take 0.5-1 tablets (25-50 mg total) by mouth nightly as needed for Insomnia.  Qty: 90 tablet, Refills: 3    Associated Diagnoses: Sleep disturbance       !! - Potential duplicate medications found. Please discuss with provider.              Discharge Diagnosis: Cervical radiculopathy [M54.12]  Condition on Discharge: Stable with no complications to procedure   Diet on Discharge: Same as before.  Activity: as per instruction sheet.  Discharge to:  Home with a responsible adult.  Follow up: 2-4 weeks       Please call my office or pager at [phone] if experienced any weakness or loss of sensation, fever > 101.5, pain uncontrolled with oral medications, persistent nausea/vomiting/or diarrhea, redness or drainage from the incisions, or any other worrisome concerns. If physician on call was not reached or could not communicate with our office for any reason please go to the nearest emergency department

## 2022-06-02 NOTE — DISCHARGE INSTRUCTIONS

## 2022-06-02 NOTE — OP NOTE
Cervical Interlaminar Epidural Steroid Injection under Fluoroscopic Guidance    The procedure, risks, benefits, and options were discussed with the patient. There are no contraindications to the procedure. The patent expressed understanding and agreed to the procedure. Informed written consent was obtained prior to the start of the procedure and can be found in the patient's chart.     PATIENT NAME: Hipolito Laws   MRN: [id number]     DATE OF PROCEDURE: 06/02/2022    PROCEDURE: Cervical Interlaminar Epidural Steroid Injection C7/T1 under Fluoroscopic Guidance    PRE-OP DIAGNOSIS: Cervical radiculopathy [M54.12] Cervical radiculopathy [M54.12]    POST-OP DIAGNOSIS: Same    PHYSICIAN: Yunier Lawton MD     ASSISTANTS: None    MEDICATIONS INJECTED: Preservative-free Decadron 10mg with 1cc of Lidocaine 1% MPF and preservative free normal saline    LOCAL ANESTHETIC INJECTED: Xylocaine 2%     SEDATION:   Versed 2mg and Fentanyl 25mcg                                                                                                                                                                                     Conscious sedation ordered by [email or web address]. Patient re-evaluation prior to administration of conscious sedation. No changes noted in patient's status from initial evaluation. The patient's vital signs were monitored by RN and patient remained hemodynamically stable throughout the procedure.    Event Time In   Sedation Start 1139   Sedation End 1141       ESTIMATED BLOOD LOSS: None    COMPLICATIONS: None    TECHNIQUE: Time-out was performed to identify the patient and procedure to be performed. With the patient laying in a prone position, the surgical area was prepped and draped in the usual sterile fashion using ChloraPrep and a fenestrated drape. The level was determined under fluoroscopy guidance. Skin anesthesia was achieved by injecting Lidocaine 2% over the injection site.  The interlaminar space was then approached with a  20 gauge, 3.5 inch Tuohy needle that was introduced under fluoroscopic guidance with AP, lateral and/or contralateral oblique imaging. Once the Ligamentum flavum was encountered loss of resistance to saline was used to enter the epidural space. With positive loss of resistance and negative aspiration for CSF or Blood, contrast dye  Omnipaque (240mg/mL) was injected to confirm placement and there was no vascular runoff. Then 2 mL of the medication mixture listed above was then injected slowly. Displacement of the radio opaque contrast after injection of the medication confirmed that the medication went into the area of the epidural space. The needles were removed, and bleeding was nil. A sterile dressing was applied. No specimens collected. The patient tolerated the procedure well.       The patient was monitored after the procedure in the recovery area. They were given post-procedure and discharge instructions to follow at home. The patient was discharged in a stable condition.      Yunier Lawton MD

## 2022-06-02 NOTE — H&P
HPI  Patient presenting for Procedure(s) (LRB):  Cervical Interlaminar Epidural Steroid Injection C7/T1 under Fluoroscopic Guidance     Patient on Anti-coagulation No     No health changes since previous encounter     History reviewed. No pertinent past medical history.    Past Surgical History:   Procedure Laterality Date    ARTHROSCOPIC REPAIR OF ROTATOR CUFF OF SHOULDER  1992    EPIDURAL STEROID INJECTION N/A 12/13/2021    Procedure: INJECTION, STEROID, EPIDURAL, CERVICAL DIRECT REF/ NEED CONSENT;  Surgeon: Yunier Lawton MD;  Location: [facility] PAIN MGT;  Service: Pain Management;  Laterality: N/A;    EPIDURAL STEROID INJECTION INTO CERVICAL SPINE Right 12/17/2020    Procedure: CERVICAL C5/6 DIGNA TRANSFORAMINAL  DIRECT REFERRAL;  Surgeon: Yunier Lawton MD;  Location: [facility] PAIN [facility]GT;  Service: Pain Management;  Laterality: Right;  NEEDS CONSENT    TRANSFORAMINAL EPIDURAL INJECTION OF STEROID Right 10/14/2021    Procedure: INJECTION, STEROID, EPIDURAL, TRANSFORAMINAL APPROACH, C5-C6 DIRECT REF/NEED CONSNET;  Surgeon: Yunier Lawton MD;  Location: [facility] PAIN MGT;  Service: Pain Management;  Laterality: Right;    UMBILICAL HERNIA REPAIR N/A 4/5/2021    Procedure: REPAIR, HERNIA, UMBILICAL, AGE 5 YEARS OR OLDER,;  Surgeon: Tim Reese MD;  Location: [facility] OR [address] [place] [address];  Service: General;  Laterality: N/A;       Review of patient's allergies indicates:  No Known Allergies       Current Facility-Administered Medications   Medication    ALPRAZolam tablet 0.5 mg    dexamethasone injection    fentaNYL injection    iohexoL (OMNIPAQUE 300) injection    LIDOcaine (PF) 10 mg/ml (1%) injection    LIDOcaine HCL 10 mg/ml (1%) injection    midazolam (VERSED) 1 mg/mL injection         PMHx, PSHx, Allergies, Medications reviewed in epic     ROS negative except pain complaints in HPI     OBJECTIVE:     /68 (BP Location: Right arm, Patient Position: Lying)   Pulse 78   Temp 98.2 °F (36.8 °C) (Oral)   Resp 16   SpO2  98%      PHYSICAL EXAMINATION:     GENERAL: Well appearing, in no acute distress, alert and oriented x3.  PSYCH:  Mood and affect appropriate.  SKIN: Skin color, texture, turgor normal, no rashes or lesions which will impact the procedure.  CV: RRR with palpation of the radial artery.  PULM: No evidence of respiratory difficulty, symmetric chest rise. Clear to auscultation.  NEURO: Cranial nerves grossly intact.     Plan:     Proceed with procedure as planned Procedure(s) (LRB):  Cervical Interlaminar Epidural Steroid Injection C7/T1 under Fluoroscopic Guidance

## 2022-06-14 ENCOUNTER — PATIENT MESSAGE (OUTPATIENT)
Dept: ORTHOPEDICS | Facility: CLINIC | Age: 62
End: 2022-06-14
Payer: COMMERCIAL

## 2022-06-17 ENCOUNTER — TELEPHONE (OUTPATIENT)
Dept: INTERNAL MEDICINE | Facility: CLINIC | Age: 62
End: 2022-06-17
Payer: COMMERCIAL

## 2022-06-17 ENCOUNTER — PATIENT MESSAGE (OUTPATIENT)
Dept: ADMINISTRATIVE | Facility: HOSPITAL | Age: 62
End: 2022-06-17
Payer: COMMERCIAL

## 2022-07-25 ENCOUNTER — PATIENT MESSAGE (OUTPATIENT)
Dept: ORTHOPEDICS | Facility: CLINIC | Age: 62
End: 2022-07-25
Payer: COMMERCIAL

## 2022-07-26 ENCOUNTER — PATIENT MESSAGE (OUTPATIENT)
Dept: ADMINISTRATIVE | Facility: OTHER | Age: 62
End: 2022-07-26
Payer: COMMERCIAL

## 2022-07-26 DIAGNOSIS — M19.072 ARTHROSIS OF LEFT ANKLE: Primary | ICD-10-CM

## 2022-07-26 DIAGNOSIS — S82.832K: ICD-10-CM

## 2022-07-26 DIAGNOSIS — M89.9 OSTEOCHONDRAL LESION OF TALAR DOME: ICD-10-CM

## 2022-07-26 DIAGNOSIS — M94.9 OSTEOCHONDRAL LESION OF TALAR DOME: ICD-10-CM

## 2022-07-26 RX ORDER — CEFAZOLIN SODIUM 2 G/50ML
2 SOLUTION INTRAVENOUS
Status: CANCELLED | OUTPATIENT
Start: 2022-07-26

## 2022-07-29 ENCOUNTER — ANESTHESIA EVENT (OUTPATIENT)
Dept: SURGERY | Facility: HOSPITAL | Age: 62
End: 2022-07-29
Payer: COMMERCIAL

## 2022-07-29 NOTE — ANESTHESIA PREPROCEDURE EVALUATION
07/29/2022  Hipolito Laws is a 62 y.o., male.    Chart review complete. Patient's medical history reviewed.  OK to proceed at [facility].  Nanda Hernandez MD      Pre-op Assessment    I have reviewed the Patient Summary Reports.     I have reviewed the Nursing Notes.    I have reviewed the Medications.     Review of Systems    Patient Active Problem List   Diagnosis    Essential hypertension, benign    HLD (hyperlipidemia)    Cervical radiculitis    Coronary artery calcification seen on CT scan    Chronic pain    Umbilical hernia without obstruction and without gangrene         Physical Exam  General: Well nourished    Airway:  Mallampati: III / II  Mouth Opening: Normal  TM Distance: Normal  Tongue: Normal  Neck ROM: Normal ROM        Anesthesia Plan  Type of Anesthesia, risks & benefits discussed:    Anesthesia Type: Gen ETT, Gen Supraglottic Airway, Gen Natural Airway, MAC, Regional  Intra-op Monitoring Plan: Standard ASA Monitors  Induction:  IV  Informed Consent: Informed consent signed with the Patient and all parties understand the risks and agree with anesthesia plan.  All questions answered.   ASA Score: 2    Ready For Surgery From Anesthesia Perspective.     .

## 2022-08-01 ENCOUNTER — OFFICE VISIT (OUTPATIENT)
Dept: ORTHOPEDICS | Facility: CLINIC | Age: 62
End: 2022-08-01
Payer: COMMERCIAL

## 2022-08-01 VITALS
BODY MASS INDEX: 27.92 KG/M2 | DIASTOLIC BLOOD PRESSURE: 91 MMHG | HEART RATE: 88 BPM | SYSTOLIC BLOOD PRESSURE: 152 MMHG | WEIGHT: 195 LBS | HEIGHT: 70 IN

## 2022-08-01 DIAGNOSIS — M94.9 OSTEOCHONDRAL LESION OF TALAR DOME: ICD-10-CM

## 2022-08-01 DIAGNOSIS — M89.9 OSTEOCHONDRAL LESION OF TALAR DOME: ICD-10-CM

## 2022-08-01 DIAGNOSIS — M19.072 ARTHROSIS OF LEFT ANKLE: Primary | ICD-10-CM

## 2022-08-01 PROCEDURE — 3044F PR MOST RECENT HEMOGLOBIN A1C LEVEL <7.0%: ICD-10-PCS | Mod: CPTII,S$GLB,, | Performed by: PHYSICIAN ASSISTANT

## 2022-08-01 PROCEDURE — 4010F PR ACE/ARB THEARPY RXD/TAKEN: ICD-10-PCS | Mod: CPTII,S$GLB,, | Performed by: PHYSICIAN ASSISTANT

## 2022-08-01 PROCEDURE — 1159F MED LIST DOCD IN RCRD: CPT | Mod: CPTII,S$GLB,, | Performed by: PHYSICIAN ASSISTANT

## 2022-08-01 PROCEDURE — 1159F PR MEDICATION LIST DOCUMENTED IN MEDICAL RECORD: ICD-10-PCS | Mod: CPTII,S$GLB,, | Performed by: PHYSICIAN ASSISTANT

## 2022-08-01 PROCEDURE — 99999 PR PBB SHADOW E&M-EST. PATIENT-LVL III: ICD-10-PCS | Mod: PBBFAC,,, | Performed by: PHYSICIAN ASSISTANT

## 2022-08-01 PROCEDURE — 3077F PR MOST RECENT SYSTOLIC BLOOD PRESSURE >= 140 MM HG: ICD-10-PCS | Mod: CPTII,S$GLB,, | Performed by: PHYSICIAN ASSISTANT

## 2022-08-01 PROCEDURE — 3008F BODY MASS INDEX DOCD: CPT | Mod: CPTII,S$GLB,, | Performed by: PHYSICIAN ASSISTANT

## 2022-08-01 PROCEDURE — 99499 NO LOS: ICD-10-PCS | Mod: S$GLB,,, | Performed by: PHYSICIAN ASSISTANT

## 2022-08-01 PROCEDURE — 99499 UNLISTED E&M SERVICE: CPT | Mod: S$GLB,,, | Performed by: PHYSICIAN ASSISTANT

## 2022-08-01 PROCEDURE — 3044F HG A1C LEVEL LT 7.0%: CPT | Mod: CPTII,S$GLB,, | Performed by: PHYSICIAN ASSISTANT

## 2022-08-01 PROCEDURE — 3080F PR MOST RECENT DIASTOLIC BLOOD PRESSURE >= 90 MM HG: ICD-10-PCS | Mod: CPTII,S$GLB,, | Performed by: PHYSICIAN ASSISTANT

## 2022-08-01 PROCEDURE — 4010F ACE/ARB THERAPY RXD/TAKEN: CPT | Mod: CPTII,S$GLB,, | Performed by: PHYSICIAN ASSISTANT

## 2022-08-01 PROCEDURE — 3008F PR BODY MASS INDEX (BMI) DOCUMENTED: ICD-10-PCS | Mod: CPTII,S$GLB,, | Performed by: PHYSICIAN ASSISTANT

## 2022-08-01 PROCEDURE — 99999 PR PBB SHADOW E&M-EST. PATIENT-LVL III: CPT | Mod: PBBFAC,,, | Performed by: PHYSICIAN ASSISTANT

## 2022-08-01 PROCEDURE — 3077F SYST BP >= 140 MM HG: CPT | Mod: CPTII,S$GLB,, | Performed by: PHYSICIAN ASSISTANT

## 2022-08-01 PROCEDURE — 3080F DIAST BP >= 90 MM HG: CPT | Mod: CPTII,S$GLB,, | Performed by: PHYSICIAN ASSISTANT

## 2022-08-01 NOTE — PROGRESS NOTES
Hipolito is a 62 y.o. male here today for a pre-operative visit in preparation for a   ARTHROSCOPY, ANKLE, WITH DEBRIDEMENT Left Choice        EXCISION, LESION, FIBULA Nonunion avulsion fragment distal fibula Left     to be performed by Hilda  On 08/04/22.  he was last seen and treated in the clinic on 05/20/22 .  he has since seen their primary care for optimization of the chronic health concerns.  There has been no significant change in their past medical or orthopedic status since the previous visit.  No fever, chills, malaise or unexplained weight change.     Allergies, medications, past medical history and past surgical history were reviewed with the patient.     Seen by myself and    Physical examination was performed.     Consents were signed.   Patient has walker and will bring with them the day of surgery. They have been counseled on proper use of the assistive device.     -Discussed COVID19 with the patient, they are aware of our current policies and procedures, were given the option of delaying surgery, and they elect to proceed. Covid testing to be done 48 hours prior to surgery.      Pre, monica, and post operative procedure and expectations were discussed.  Questions were answered. The patient has been educated and is ready to proceed with surgery.  Approximately 30 minutes was spent discussing surgical outcomes, plans, procedures, pre, monica, and post operative expectations and care. The risks, benefits and alternatives to surgery were discussed with the patient at great length.  These include bleeding, infection, vessel/nerve damage, pain, numbness, tingling, complex regional pain syndrome, hardware/surgical failure, need for further surgery, malunion, nonunion, DVT, PE, arthritis and death. He also understands that the risks of surgery may be greater for some patients due to health or lifestyle issues, such as a current condition or a history of heart disease, obesity, clotting disorders,  recurrent infections, smoking, sedentary lifestyle, or noncompliance with medications, therapy, or followup. The degree of the increased risk is hard to estimate with any degree of precision.  Patient states an understanding and wishes to proceed with surgery.   All questions were answered.  No guarantees were implied or stated.  Informed consent was obtained  The patient will contact us if the have any questions, concerns, and changes in their medical condition prior to surgery.

## 2022-08-02 ENCOUNTER — PATIENT MESSAGE (OUTPATIENT)
Dept: ORTHOPEDICS | Facility: CLINIC | Age: 62
End: 2022-08-02
Payer: COMMERCIAL

## 2022-08-02 RX ORDER — GABAPENTIN 100 MG/1
100 CAPSULE ORAL 3 TIMES DAILY
Qty: 30 CAPSULE | Refills: 0 | Status: SHIPPED | OUTPATIENT
Start: 2022-08-02 | End: 2022-09-08

## 2022-08-02 RX ORDER — ACETAMINOPHEN 500 MG
1000 TABLET ORAL EVERY 8 HOURS
Qty: 180 TABLET | Refills: 0 | Status: SHIPPED | OUTPATIENT
Start: 2022-08-02 | End: 2022-09-03

## 2022-08-02 RX ORDER — CELECOXIB 100 MG/1
100 CAPSULE ORAL 2 TIMES DAILY
Qty: 60 CAPSULE | Refills: 0 | Status: SHIPPED | OUTPATIENT
Start: 2022-08-02 | End: 2022-09-03

## 2022-08-02 RX ORDER — TRAMADOL HYDROCHLORIDE 50 MG/1
50 TABLET ORAL
Qty: 42 TABLET | Refills: 0 | Status: SHIPPED | OUTPATIENT
Start: 2022-08-02 | End: 2022-08-14

## 2022-08-02 NOTE — H&P (VIEW-ONLY)
Subjective:      Patient ID: Hipolito Laws is a 62 y.o. male.    Chief Complaint: No chief complaint on file.    Hipolito is a 62 y.o. male here today for a pre-operative visit in preparation for a   ARTHROSCOPY, ANKLE, WITH DEBRIDEMENT Left Choice        EXCISION, LESION, FIBULA Nonunion avulsion fragment distal fibula Left     to be performed by   On 08/04/22.  he was last seen and treated in the clinic on 05/20/22 .  he has since seen their primary care for optimization of the chronic health concerns.  There has been no significant change in their past medical or orthopedic status since the previous visit.  No fever, chills, malaise or unexplained weight change.     History reviewed. No pertinent past medical history.  Past Surgical History:   Procedure Laterality Date    ARTHROSCOPIC REPAIR OF ROTATOR CUFF OF SHOULDER  1992    EPIDURAL STEROID INJECTION N/A 12/13/2021    Procedure: INJECTION, STEROID, EPIDURAL, CERVICAL DIRECT REF/ NEED CONSENT;  Surgeon: Yunier Lawton MD;  Location: [facility] PAIN MGT;  Service: Pain Management;  Laterality: N/A;    EPIDURAL STEROID INJECTION N/A 6/2/2022    Procedure: INJECTION, STEROID, EPIDURAL, CERVICAL C7/T1 CONTRAST DIRECT REF;  Surgeon: Yunier Lawton MD;  Location: [facility] PAIN MGT;  Service: Pain Management;  Laterality: N/A;    EPIDURAL STEROID INJECTION INTO CERVICAL SPINE Right 12/17/2020    Procedure: CERVICAL C5/6 DIGNA TRANSFORAMINAL  DIRECT REFERRAL;  Surgeon: Yunier Lawton MD;  Location: [facility] PAIN [facility]GT;  Service: Pain Management;  Laterality: Right;  NEEDS CONSENT    TRANSFORAMINAL EPIDURAL INJECTION OF STEROID Right 10/14/2021    Procedure: INJECTION, STEROID, EPIDURAL, TRANSFORAMINAL APPROACH, C5-C6 DIRECT REF/NEED CONSNET;  Surgeon: Yunier Lawton MD;  Location: [facility] PAIN MGT;  Service: Pain Management;  Laterality: Right;    UMBILICAL HERNIA REPAIR N/A 4/5/2021    Procedure: REPAIR, HERNIA, UMBILICAL, AGE 5 YEARS OR OLDER,;  Surgeon: Tim Reese MD;  " Location: [facility] OR [address] [place] [address];  Service: General;  Laterality: N/A;     Social History     Occupational History    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 90 tablet 3    DULoxetine (CYMBALTA) 60 MG capsule Take 1 capsule (60 mg total) by mouth once daily. 90 capsule 3    losartan (COZAAR) 50 MG tablet TAKE 1 TABLET(50 MG) BY MOUTH EVERY DAY 90 tablet 3    traZODone (DESYREL) 50 MG tablet Take 0.5-1 tablets (25-50 mg total) by mouth nightly as needed for Insomnia. 90 tablet 3     No current facility-administered medications on file prior to visit.     Review of patient's allergies indicates:  No Known Allergies      Objective:      Vitals:    08/01/22 1035   BP: (!) 152/91   BP Location: Left arm   Patient Position: Sitting   BP Method: Large (Automatic)   Pulse: 88   Weight: 88.5 kg (195 lb)   Height: 5' 10" (1.778 m)     Ortho Exam     Gen: WD, WN in NAD   HEENT: NC/AT   Heart: RR   Resp: unlabored breathing   Skin: intact, no lesions pertinent to the surgery site    Assessment:       1. Arthrosis of left ankle    2. Osteochondral lesion of talar dome          Plan:       Surgery per       "

## 2022-08-02 NOTE — H&P
Subjective:      Patient ID: Hipolito Laws is a 62 y.o. male.    Chief Complaint: No chief complaint on file.    Hipolito is a 62 y.o. male here today for a pre-operative visit in preparation for a   ARTHROSCOPY, ANKLE, WITH DEBRIDEMENT Left Choice        EXCISION, LESION, FIBULA Nonunion avulsion fragment distal fibula Left     to be performed by   On 08/04/22.  he was last seen and treated in the clinic on 05/20/22 .  he has since seen their primary care for optimization of the chronic health concerns.  There has been no significant change in their past medical or orthopedic status since the previous visit.  No fever, chills, malaise or unexplained weight change.     History reviewed. No pertinent past medical history.  Past Surgical History:   Procedure Laterality Date    ARTHROSCOPIC REPAIR OF ROTATOR CUFF OF SHOULDER  1992    EPIDURAL STEROID INJECTION N/A 12/13/2021    Procedure: INJECTION, STEROID, EPIDURAL, CERVICAL DIRECT REF/ NEED CONSENT;  Surgeon: Yunier Lawton MD;  Location: [facility] PAIN MGT;  Service: Pain Management;  Laterality: N/A;    EPIDURAL STEROID INJECTION N/A 6/2/2022    Procedure: INJECTION, STEROID, EPIDURAL, CERVICAL C7/T1 CONTRAST DIRECT REF;  Surgeon: Yunier Lawton MD;  Location: [facility] PAIN MGT;  Service: Pain Management;  Laterality: N/A;    EPIDURAL STEROID INJECTION INTO CERVICAL SPINE Right 12/17/2020    Procedure: CERVICAL C5/6 DIGNA TRANSFORAMINAL  DIRECT REFERRAL;  Surgeon: Yunier Lwaton MD;  Location: [facility] PAIN [facility]GT;  Service: Pain Management;  Laterality: Right;  NEEDS CONSENT    TRANSFORAMINAL EPIDURAL INJECTION OF STEROID Right 10/14/2021    Procedure: INJECTION, STEROID, EPIDURAL, TRANSFORAMINAL APPROACH, C5-C6 DIRECT REF/NEED CONSNET;  Surgeon: Yunier Lawton MD;  Location: [facility] PAIN MGT;  Service: Pain Management;  Laterality: Right;    UMBILICAL HERNIA REPAIR N/A 4/5/2021    Procedure: REPAIR, HERNIA, UMBILICAL, AGE 5 YEARS OR OLDER,;  Surgeon: Tim Reese MD;  " Location: [facility] OR [address] [place] [address];  Service: General;  Laterality: N/A;     Social History     Occupational History    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 90 tablet 3    DULoxetine (CYMBALTA) 60 MG capsule Take 1 capsule (60 mg total) by mouth once daily. 90 capsule 3    losartan (COZAAR) 50 MG tablet TAKE 1 TABLET(50 MG) BY MOUTH EVERY DAY 90 tablet 3    traZODone (DESYREL) 50 MG tablet Take 0.5-1 tablets (25-50 mg total) by mouth nightly as needed for Insomnia. 90 tablet 3     No current facility-administered medications on file prior to visit.     Review of patient's allergies indicates:  No Known Allergies      Objective:      Vitals:    08/01/22 1035   BP: (!) 152/91   BP Location: Left arm   Patient Position: Sitting   BP Method: Large (Automatic)   Pulse: 88   Weight: 88.5 kg (195 lb)   Height: 5' 10" (1.778 m)     Ortho Exam     Gen: WD, WN in NAD   HEENT: NC/AT   Heart: RR   Resp: unlabored breathing   Skin: intact, no lesions pertinent to the surgery site    Assessment:       1. Arthrosis of left ankle    2. Osteochondral lesion of talar dome          Plan:       Surgery per       "

## 2022-08-03 ENCOUNTER — TELEPHONE (OUTPATIENT)
Dept: ORTHOPEDICS | Facility: CLINIC | Age: 62
End: 2022-08-03
Payer: COMMERCIAL

## 2022-08-03 NOTE — TELEPHONE ENCOUNTER
I spoke with pt,confirmed surgery arrival time of 8:30am [facility],nothing to eat or drink after midnight. Pt,verbalized understanding.

## 2022-08-04 ENCOUNTER — ANESTHESIA (OUTPATIENT)
Dept: SURGERY | Facility: HOSPITAL | Age: 62
End: 2022-08-04
Payer: COMMERCIAL

## 2022-08-04 ENCOUNTER — HOSPITAL ENCOUNTER (OUTPATIENT)
Facility: HOSPITAL | Age: 62
Discharge: HOME | End: 2022-08-04
Attending: ORTHOPAEDIC SURGERY | Admitting: ORTHOPAEDIC SURGERY
Payer: COMMERCIAL

## 2022-08-04 VITALS
BODY MASS INDEX: 27.92 KG/M2 | HEIGHT: 70 IN | HEART RATE: 63 BPM | RESPIRATION RATE: 18 BRPM | OXYGEN SATURATION: 99 % | DIASTOLIC BLOOD PRESSURE: 76 MMHG | SYSTOLIC BLOOD PRESSURE: 132 MMHG | WEIGHT: 195 LBS | TEMPERATURE: 98 F

## 2022-08-04 DIAGNOSIS — M94.9 OSTEOCHONDRAL LESION OF TALAR DOME: ICD-10-CM

## 2022-08-04 DIAGNOSIS — S82.832K: ICD-10-CM

## 2022-08-04 DIAGNOSIS — M89.9 OSTEOCHONDRAL LESION OF TALAR DOME: ICD-10-CM

## 2022-08-04 DIAGNOSIS — M19.072 ARTHROSIS OF LEFT ANKLE: Primary | ICD-10-CM

## 2022-08-04 PROCEDURE — D9220A PRA ANESTHESIA: ICD-10-PCS | Mod: ANES,,, | Performed by: ANESTHESIOLOGY

## 2022-08-04 PROCEDURE — 27641 PARTIAL REMOVAL OF FIBULA: CPT | Mod: LT,,, | Performed by: ORTHOPAEDIC SURGERY

## 2022-08-04 PROCEDURE — 27201423 OPTIME MED/SURG SUP & DEVICES STERILE SUPPLY: Performed by: ORTHOPAEDIC SURGERY

## 2022-08-04 PROCEDURE — 36000708 HC OR TIME LEV III 1ST 15 MIN: Performed by: ORTHOPAEDIC SURGERY

## 2022-08-04 PROCEDURE — 63600175 PHARM REV CODE 636 W HCPCS: Performed by: ORTHOPAEDIC SURGERY

## 2022-08-04 PROCEDURE — D9220A PRA ANESTHESIA: Mod: CRNA,,, | Performed by: NURSE ANESTHETIST, CERTIFIED REGISTERED

## 2022-08-04 PROCEDURE — 88311 DECALCIFY TISSUE: CPT | Performed by: PATHOLOGY

## 2022-08-04 PROCEDURE — 71000015 HC POSTOP RECOV 1ST HR: Performed by: ORTHOPAEDIC SURGERY

## 2022-08-04 PROCEDURE — 37000008 HC ANESTHESIA 1ST 15 MINUTES: Performed by: ORTHOPAEDIC SURGERY

## 2022-08-04 PROCEDURE — 37000009 HC ANESTHESIA EA ADD 15 MINS: Performed by: ORTHOPAEDIC SURGERY

## 2022-08-04 PROCEDURE — 29898 ANKLE ARTHROSCOPY/SURGERY: CPT | Mod: 51,LT,, | Performed by: ORTHOPAEDIC SURGERY

## 2022-08-04 PROCEDURE — 64450 NJX AA&/STRD OTHER PN/BRANCH: CPT | Mod: 59,LT,, | Performed by: ANESTHESIOLOGY

## 2022-08-04 PROCEDURE — 64447 PR NERVE BLOCK INJ, ANES/STEROID, FEMORAL, INCL IMAG GUIDANCE: ICD-10-PCS | Mod: 59,LT,, | Performed by: ANESTHESIOLOGY

## 2022-08-04 PROCEDURE — 76942 ECHO GUIDE FOR BIOPSY: CPT | Mod: 26,,, | Performed by: ANESTHESIOLOGY

## 2022-08-04 PROCEDURE — 64450 PR NERVE BLOCK INJ, ANES/STEROID, OTHER PERIPHERAL: ICD-10-PCS | Mod: 59,LT,, | Performed by: ANESTHESIOLOGY

## 2022-08-04 PROCEDURE — 64447 NJX AA&/STRD FEMORAL NRV IMG: CPT | Performed by: ANESTHESIOLOGY

## 2022-08-04 PROCEDURE — D9220A PRA ANESTHESIA: Mod: ANES,,, | Performed by: ANESTHESIOLOGY

## 2022-08-04 PROCEDURE — 36000709 HC OR TIME LEV III EA ADD 15 MIN: Performed by: ORTHOPAEDIC SURGERY

## 2022-08-04 PROCEDURE — 94761 N-INVAS EAR/PLS OXIMETRY MLT: CPT

## 2022-08-04 PROCEDURE — 63600175 PHARM REV CODE 636 W HCPCS: Performed by: NURSE ANESTHETIST, CERTIFIED REGISTERED

## 2022-08-04 PROCEDURE — 76942 ECHO GUIDE FOR BIOPSY: CPT | Performed by: ANESTHESIOLOGY

## 2022-08-04 PROCEDURE — 88305 TISSUE EXAM BY PATHOLOGIST: CPT | Performed by: PATHOLOGY

## 2022-08-04 PROCEDURE — 99900035 HC TECH TIME PER 15 MIN (STAT)

## 2022-08-04 PROCEDURE — 88305 TISSUE EXAM BY PATHOLOGIST: ICD-10-PCS | Mod: 26,,, | Performed by: PATHOLOGY

## 2022-08-04 PROCEDURE — 76942 PR U/S GUIDANCE FOR NEEDLE GUIDANCE: ICD-10-PCS | Mod: 26,,, | Performed by: ANESTHESIOLOGY

## 2022-08-04 PROCEDURE — 63600175 PHARM REV CODE 636 W HCPCS: Performed by: ANESTHESIOLOGY

## 2022-08-04 PROCEDURE — 88311 DECALCIFY TISSUE: CPT | Mod: 26,,, | Performed by: PATHOLOGY

## 2022-08-04 PROCEDURE — 25000003 PHARM REV CODE 250: Performed by: ORTHOPAEDIC SURGERY

## 2022-08-04 PROCEDURE — 25000003 PHARM REV CODE 250: Performed by: NURSE ANESTHETIST, CERTIFIED REGISTERED

## 2022-08-04 PROCEDURE — 29898 PR ANKLE SCOPE,EXTENS DEBRIDEMNT: ICD-10-PCS | Mod: 51,LT,, | Performed by: ORTHOPAEDIC SURGERY

## 2022-08-04 PROCEDURE — 88305 TISSUE EXAM BY PATHOLOGIST: CPT | Mod: 26,,, | Performed by: PATHOLOGY

## 2022-08-04 PROCEDURE — 88311 PR  DECALCIFY TISSUE: ICD-10-PCS | Mod: 26,,, | Performed by: PATHOLOGY

## 2022-08-04 PROCEDURE — 63600175 PHARM REV CODE 636 W HCPCS: Performed by: SURGERY

## 2022-08-04 PROCEDURE — 25000003 PHARM REV CODE 250: Performed by: SURGERY

## 2022-08-04 PROCEDURE — 27641 PR PARTIAL REMOVAL OF FIBULA: ICD-10-PCS | Mod: LT,,, | Performed by: ORTHOPAEDIC SURGERY

## 2022-08-04 PROCEDURE — D9220A PRA ANESTHESIA: ICD-10-PCS | Mod: CRNA,,, | Performed by: NURSE ANESTHETIST, CERTIFIED REGISTERED

## 2022-08-04 PROCEDURE — 64447 NJX AA&/STRD FEMORAL NRV IMG: CPT | Mod: 59,LT,, | Performed by: ANESTHESIOLOGY

## 2022-08-04 PROCEDURE — 71000033 HC RECOVERY, INTIAL HOUR: Performed by: ORTHOPAEDIC SURGERY

## 2022-08-04 RX ORDER — ONDANSETRON 4 MG/1
8 TABLET, ORALLY DISINTEGRATING ORAL EVERY 8 HOURS PRN
Status: DISCONTINUED | OUTPATIENT
Start: 2022-08-04 | End: 2022-08-04 | Stop reason: HOSPADM

## 2022-08-04 RX ORDER — SODIUM CHLORIDE 9 MG/ML
INJECTION, SOLUTION INTRAVENOUS CONTINUOUS
Status: DISCONTINUED | OUTPATIENT
Start: 2022-08-04 | End: 2022-08-04 | Stop reason: HOSPADM

## 2022-08-04 RX ORDER — FENTANYL CITRATE 50 UG/ML
INJECTION, SOLUTION INTRAMUSCULAR; INTRAVENOUS
Status: DISCONTINUED | OUTPATIENT
Start: 2022-08-04 | End: 2022-08-04

## 2022-08-04 RX ORDER — OXYCODONE HYDROCHLORIDE 5 MG/1
5 TABLET ORAL
Status: DISCONTINUED | OUTPATIENT
Start: 2022-08-04 | End: 2022-08-04 | Stop reason: HOSPADM

## 2022-08-04 RX ORDER — FENTANYL CITRATE 50 UG/ML
25 INJECTION, SOLUTION INTRAMUSCULAR; INTRAVENOUS EVERY 5 MIN PRN
Status: DISCONTINUED | OUTPATIENT
Start: 2022-08-04 | End: 2022-08-04 | Stop reason: HOSPADM

## 2022-08-04 RX ORDER — HALOPERIDOL 5 MG/ML
0.5 INJECTION INTRAMUSCULAR EVERY 10 MIN PRN
Status: DISCONTINUED | OUTPATIENT
Start: 2022-08-04 | End: 2022-08-04 | Stop reason: HOSPADM

## 2022-08-04 RX ORDER — SODIUM CHLORIDE 0.9 % (FLUSH) 0.9 %
10 SYRINGE (ML) INJECTION
Status: DISCONTINUED | OUTPATIENT
Start: 2022-08-04 | End: 2022-08-04 | Stop reason: HOSPADM

## 2022-08-04 RX ORDER — ROPIVACAINE HYDROCHLORIDE 5 MG/ML
INJECTION, SOLUTION EPIDURAL; INFILTRATION; PERINEURAL
Status: COMPLETED | OUTPATIENT
Start: 2022-08-04 | End: 2022-08-04

## 2022-08-04 RX ORDER — PROPOFOL 10 MG/ML
VIAL (ML) INTRAVENOUS CONTINUOUS PRN
Status: DISCONTINUED | OUTPATIENT
Start: 2022-08-04 | End: 2022-08-04

## 2022-08-04 RX ORDER — OXYCODONE HYDROCHLORIDE 5 MG/1
5 TABLET ORAL EVERY 4 HOURS PRN
Status: DISCONTINUED | OUTPATIENT
Start: 2022-08-04 | End: 2022-08-04 | Stop reason: HOSPADM

## 2022-08-04 RX ORDER — ACETAMINOPHEN 325 MG/1
650 TABLET ORAL EVERY 4 HOURS PRN
Status: DISCONTINUED | OUTPATIENT
Start: 2022-08-04 | End: 2022-08-04 | Stop reason: HOSPADM

## 2022-08-04 RX ORDER — MIDAZOLAM HYDROCHLORIDE 1 MG/ML
.5-4 INJECTION INTRAMUSCULAR; INTRAVENOUS
Status: DISCONTINUED | OUTPATIENT
Start: 2022-08-04 | End: 2022-08-04 | Stop reason: HOSPADM

## 2022-08-04 RX ORDER — MIDAZOLAM HYDROCHLORIDE 1 MG/ML
INJECTION, SOLUTION INTRAMUSCULAR; INTRAVENOUS
Status: DISCONTINUED | OUTPATIENT
Start: 2022-08-04 | End: 2022-08-04

## 2022-08-04 RX ORDER — MUPIROCIN 20 MG/G
OINTMENT TOPICAL 2 TIMES DAILY
Status: DISCONTINUED | OUTPATIENT
Start: 2022-08-04 | End: 2022-08-04 | Stop reason: HOSPADM

## 2022-08-04 RX ORDER — LIDOCAINE HYDROCHLORIDE 10 MG/ML
1 INJECTION, SOLUTION EPIDURAL; INFILTRATION; INTRACAUDAL; PERINEURAL ONCE
Status: DISCONTINUED | OUTPATIENT
Start: 2022-08-04 | End: 2022-08-04 | Stop reason: HOSPADM

## 2022-08-04 RX ORDER — KETAMINE HCL IN 0.9 % NACL 50 MG/5 ML
SYRINGE (ML) INTRAVENOUS
Status: DISCONTINUED | OUTPATIENT
Start: 2022-08-04 | End: 2022-08-04

## 2022-08-04 RX ORDER — CEFAZOLIN SODIUM 1 G/3ML
2 INJECTION, POWDER, FOR SOLUTION INTRAMUSCULAR; INTRAVENOUS
Status: COMPLETED | OUTPATIENT
Start: 2022-08-04 | End: 2022-08-04

## 2022-08-04 RX ORDER — FENTANYL CITRATE 50 UG/ML
25-200 INJECTION, SOLUTION INTRAMUSCULAR; INTRAVENOUS
Status: DISCONTINUED | OUTPATIENT
Start: 2022-08-04 | End: 2022-08-04 | Stop reason: HOSPADM

## 2022-08-04 RX ORDER — LIDOCAINE HYDROCHLORIDE 20 MG/ML
INJECTION INTRAVENOUS
Status: DISCONTINUED | OUTPATIENT
Start: 2022-08-04 | End: 2022-08-04

## 2022-08-04 RX ORDER — CELECOXIB 200 MG/1
400 CAPSULE ORAL ONCE
Status: COMPLETED | OUTPATIENT
Start: 2022-08-04 | End: 2022-08-04

## 2022-08-04 RX ORDER — ACETAMINOPHEN 500 MG
1000 TABLET ORAL
Status: COMPLETED | OUTPATIENT
Start: 2022-08-04 | End: 2022-08-04

## 2022-08-04 RX ORDER — TRIAMCINOLONE ACETONIDE 40 MG/ML
INJECTION, SUSPENSION INTRA-ARTICULAR; INTRAMUSCULAR
Status: DISCONTINUED | OUTPATIENT
Start: 2022-08-04 | End: 2022-08-04 | Stop reason: HOSPADM

## 2022-08-04 RX ORDER — OXYCODONE HYDROCHLORIDE 5 MG/1
10 TABLET ORAL EVERY 4 HOURS PRN
Status: DISCONTINUED | OUTPATIENT
Start: 2022-08-04 | End: 2022-08-04 | Stop reason: HOSPADM

## 2022-08-04 RX ORDER — METOCLOPRAMIDE 10 MG/1
10 TABLET ORAL EVERY 6 HOURS PRN
Status: DISCONTINUED | OUTPATIENT
Start: 2022-08-04 | End: 2022-08-04 | Stop reason: HOSPADM

## 2022-08-04 RX ADMIN — ROPIVACAINE HYDROCHLORIDE 15 ML: 5 INJECTION, SOLUTION EPIDURAL; INFILTRATION; PERINEURAL at 10:08

## 2022-08-04 RX ADMIN — PROPOFOL 100 MCG/KG/MIN: 10 INJECTION, EMULSION INTRAVENOUS at 11:08

## 2022-08-04 RX ADMIN — ACETAMINOPHEN 1000 MG: 500 TABLET ORAL at 09:08

## 2022-08-04 RX ADMIN — ROPIVACAINE HYDROCHLORIDE 30 ML: 5 INJECTION, SOLUTION EPIDURAL; INFILTRATION; PERINEURAL at 10:08

## 2022-08-04 RX ADMIN — CEFAZOLIN 2 G: 330 INJECTION, POWDER, FOR SOLUTION INTRAMUSCULAR; INTRAVENOUS at 11:08

## 2022-08-04 RX ADMIN — FENTANYL CITRATE 100 MCG: 50 INJECTION INTRAMUSCULAR; INTRAVENOUS at 10:08

## 2022-08-04 RX ADMIN — MIDAZOLAM HYDROCHLORIDE 2 MG: 1 INJECTION, SOLUTION INTRAMUSCULAR; INTRAVENOUS at 11:08

## 2022-08-04 RX ADMIN — SODIUM CHLORIDE: 0.9 INJECTION, SOLUTION INTRAVENOUS at 09:08

## 2022-08-04 RX ADMIN — FENTANYL CITRATE 50 MCG: 50 INJECTION, SOLUTION INTRAMUSCULAR; INTRAVENOUS at 11:08

## 2022-08-04 RX ADMIN — CELECOXIB 400 MG: 200 CAPSULE ORAL at 09:08

## 2022-08-04 RX ADMIN — MIDAZOLAM HYDROCHLORIDE 2 MG: 1 INJECTION, SOLUTION INTRAMUSCULAR; INTRAVENOUS at 10:08

## 2022-08-04 RX ADMIN — Medication 20 MG: at 11:08

## 2022-08-04 RX ADMIN — LIDOCAINE HYDROCHLORIDE 75 MG: 20 INJECTION, SOLUTION INTRAVENOUS at 11:08

## 2022-08-04 NOTE — ANESTHESIA POSTPROCEDURE EVALUATION
Anesthesia Post Evaluation    Patient: Hipolito Laws    Procedure(s) Performed: Procedure(s) (LRB):  ARTHROSCOPY, ANKLE, WITH DEBRIDEMENT (Left)  EXCISION, LESION, FIBULA Nonunion avulsion fragment distal fibula (Left)    Final Anesthesia Type: general      Patient location during evaluation: PACU  Patient participation: Yes- Able to Participate  Level of consciousness: awake and alert and oriented  Post-procedure vital signs: reviewed and stable  Pain management: adequate  Airway patency: patent    PONV status at discharge: No PONV  Anesthetic complications: no      Cardiovascular status: stable  Respiratory status: unassisted  Hydration status: euvolemic  Follow-up not needed.          Vitals Value Taken Time   /76 08/04/22 1400   Temp 36.7 °C (98 °F) 08/04/22 1400   Pulse 63 08/04/22 1400   Resp 18 08/04/22 1400   SpO2 99 % 08/04/22 1400         Event Time   Out of Recovery 14:00:00         Pain/Elias Score: Pain Rating Prior to Med Admin: 0 (8/4/2022  2:12 PM)  Elias Score: 9 (8/4/2022  2:12 PM)

## 2022-08-04 NOTE — OP NOTE
Operative report  Date of surgery:  08/04/2022    Preop diagnosis:  1. Left ankle arthrosis with possible osteochondral injury   2. Nonunion avulsion fracture distal left fibula    Postop diagnosis:  1. 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 Nonunion avulsion fracture left distal fibula    Procedure:  1. Left ankle arthroscopy with extensive debridement  2. Excision bony nonunion fragments  left distal fibula    Anesthesia:  Regional    Surgeon:  Dr. Elias  Assistant:      Estimated blood loss:  None  Complications:  None  Specimens:  Avulsion fragments from distal fibula was sent to pathology    Procedure in detail:  After regional anesthesia was administered in the preop holding area, the patient was brought to the operating room placed on the operating table in a supine position with a leg flores under the left knee.  The left leg was prepped and draped in a sterile fashion.  The left ankle was distracted using a noninvasive distraction device.  An anteromedial portal was established using a hypodermic needle just medial to the anterior tibial tendon at the level of the joint line.  An 11 blade was used to make a small skin incision.  A hemostat was used to bluntly dissect down to the joint capsule.  The blunt trocar and cannula were introduced into the joint.  The camera was introduced and there was noted to be some inflamed synovial tissue on the anterolateral aspect of the joint in addition to what appeared to be a fibrous plica type band extending from the lateral joint capsule across the front of the talus.  An anterolateral portal was established under direct visualization and is shaver was introduced to debride the synovium as well as to remove the fibrous band.  I then proceeded to inspect the rest of the joint.  There was noted to be a partial-thickness chondral lesion on the lateral shoulder of the talus which I debrided with a shaver.  The lateral gutter had significant  arthrofibrosis and this was removed with a shaver as well.  A chondral lesion was also noted on the anteromedial dome of the talus which was mention on MRI.  The lesion measured about 3 mm and I used the shaver to remove any unstable cartilage.  There was a small area of bare bone but I did not elect to perform any type of microfracture procedure the medial gutter was debrided as well.  The remainder of the joint and the joint surfaces were intact without any significant degenerative changes.  The joint was lavaged with remaining fluid.  The portal incisions were closed with 3-0 nylon.  The distraction device was removed and the leg was taken off of the knee flores.  A sterile bar drape was placed underneath the leg.  A sterile tourniquet was placed around the calf.  The left leg was exsanguinated with an Esmarch bandage and the tourniquet was elevated to 250 mmHg.  A small curved incision was made around the distal fibula anteriorly.  Incision was carried through the skin subcutaneous tissue down to the periosteum overlying the distal fibula.  I sharply elevated the periosteum and a small portion of the joint capsule to expose the distal avulsion fragment of bone.  The bone was identified was noted to be in 2 pieces.  I excised the bone and sent to pathology for inspection.  There was noted to be no other abnormalities in the operative field.  Once the excision was completed the wound was well irrigated.  The joint capsule and periosteum was closed with 3-0 Vicryl suture.  The skin incision was closed with 3-0 nylon suture.  A sterile compressive dressing was placed and the patient was returned the postop holding area in stable condition.

## 2022-08-04 NOTE — ANESTHESIA PROCEDURE NOTES
Pop    Patient location during procedure: pre-op   Block not for primary anesthetic.  Reason for block: at surgeon's request and post-op pain management   Post-op Pain Location: left ankle   Start time: 8/4/2022 10:15 AM  Timeout: 8/4/2022 10:14 AM   End time: 8/4/2022 10:20 AM    Staffing  Authorizing Provider: Osmani Arellano MD  Performing Provider: Osmani Arellano MD    Preanesthetic Checklist  Completed: patient identified, IV checked, site marked, risks and benefits discussed, surgical consent, monitors and equipment checked, pre-op evaluation and timeout performed  Peripheral Block  Patient position: supine  Prep: ChloraPrep  Patient monitoring: heart rate, cardiac monitor, continuous pulse ox, continuous capnometry and frequent blood pressure checks  Block type: popliteal  Laterality: left  Injection technique: single shot  Needle  Needle type: Stimuplex   Needle gauge: 21 G  Needle length: 4 in  Needle localization: anatomical landmarks and ultrasound guidance   -ultrasound image captured on disc.  Assessment  Injection assessment: negative aspiration, negative parasthesia and local visualized surrounding nerve  Paresthesia pain: none  Heart rate change: no  Slow fractionated injection: yes  Pain Tolerance: comfortable throughout block and no complaints  Medications:    Medications: ropivacaine (NAROPIN) injection 0.5% - Perineural   30 mL - 8/4/2022 10:18:00 AM    Additional Notes  VSS.  [facility] RN monitoring vitals throughout procedure.  Patient tolerated procedure well.

## 2022-08-04 NOTE — TRANSFER OF CARE
"Anesthesia Transfer of Care Note    Patient: Hipolito Laws    Procedure(s) Performed: Procedure(s) (LRB):  ARTHROSCOPY, ANKLE, WITH DEBRIDEMENT (Left)  EXCISION, LESION, FIBULA Nonunion avulsion fragment distal fibula (Left)    Patient location: PACU    Anesthesia Type: general    Transport from OR: Transported from OR on room air with adequate spontaneous ventilation    Post pain: adequate analgesia    Post assessment: no apparent anesthetic complications and tolerated procedure well    Post vital signs: stable    Level of consciousness: awake    Nausea/Vomiting: no nausea/vomiting    Complications: none    Transfer of care protocol was followed      Last vitals:   Visit Vitals  /74 (BP Location: Right arm, Patient Position: Lying)   Pulse 83   Temp 36.7 °C (98.1 °F) (Oral)   Resp 16   Ht 5' 10" (1.778 m)   Wt 88.5 kg (195 lb)   SpO2 100%   BMI 27.98 kg/m²     "

## 2022-08-04 NOTE — PLAN OF CARE
Patient is AAO and VSS.  Tolerating PO and states pain is tolerable.  Dressing CDI.  Patient states they are ready for d/c.  IV removed.  Catheter tip intact.  Caregiver at bedside.  Discharge instructions reviewed and copy given to the patient and caregiver.  Questions answered.  Both verbalized understanding.  Medication delivered to bedside. no DME needed. Patient has walker and scooter at home.  Patient wheeled to car by RN/PCT.

## 2022-08-04 NOTE — BRIEF OP NOTE
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 [S82.832K]    Procedure(s) (LRB):  ARTHROSCOPY, ANKLE, WITH DEBRIDEMENT (Left)  EXCISION, LESION, FIBULA Nonunion avulsion fragment distal fibula (Left)    Anesthesia: Choice    Operative Findings: See op note    Estimated Blood Loss: * No values recorded between 8/4/2022 11:56 AM and 8/4/2022  1:15 PM *         Specimens:   Specimen (24h ago, onward)             Start     Ordered    08/04/22 1253  Specimen to Pathology, Surgery Orthopedics  Once        Comments: Pre-op Diagnosis: Arthrosis of left ankle [M19.072]Osteochondral lesion of talar dome [M89.9, M94.9]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 ALEKSANDR RAY    Release to patient Immediate        08/04/22 1258                  Discharge Note    OUTCOME: Patient tolerated treatment/procedure well without complication and is now ready for discharge.    DISPOSITION: Home or Self Care    FINAL DIAGNOSIS:  Arthrosis of left ankle    FOLLOWUP: In clinic       DISCHARGE INSTRUCTIONS:    Discharge Procedure Orders   Diet  general     Sponge bath only until clinic visit     Keep surgical extremity elevated     Ice to affected area     No driving, operating heavy equipment or signing legal documents while taking pain medication     Leave dressing on - Keep it clean, dry, and intact until clinic visit     Call MD for:  temperature >100.4     Call MD for:  persistent nausea and vomiting     Call MD for:  severe uncontrolled pain     Call MD for:  difficulty breathing, headache or visual disturbances     Call MD for:  redness, tenderness, or signs of infection (pain, swelling, redness, odor or green/yellow discharge around incision site)     Call MD for:  hives     Call MD for:  persistent dizziness or light-headedness     Call MD for:  extreme fatigue     Shower on day dressing removed (No bath)     Weight bearing restrictions (specify)   Order Comments: Toe touch weight bearing to Left lower extremity

## 2022-08-04 NOTE — ANESTHESIA PROCEDURE NOTES
[facility]    Patient location during procedure: pre-op   Block not for primary anesthetic.  Reason for block: at surgeon's request and post-op pain management   Post-op Pain Location: left ankle   Start time: 8/4/2022 10:15 AM  Timeout: 8/4/2022 10:14 AM   End time: 8/4/2022 10:20 AM    Staffing  Authorizing Provider: Osmani Arellano MD  Performing Provider: Osmani Arellano MD    Preanesthetic Checklist  Completed: patient identified, IV checked, site marked, risks and benefits discussed, surgical consent, monitors and equipment checked, pre-op evaluation and timeout performed  Peripheral Block  Patient position: supine  Prep: ChloraPrep and site prepped and draped  Patient monitoring: heart rate, cardiac monitor, continuous pulse ox, continuous capnometry and frequent blood pressure checks  Block type: adductor canal  Laterality: left  Injection technique: single shot  Needle  Needle type: Stimuplex   Needle gauge: 21 G  Needle length: 4 in  Needle localization: anatomical landmarks and ultrasound guidance   -ultrasound image captured on disc.  Assessment  Injection assessment: negative aspiration, negative parasthesia and local visualized surrounding nerve  Paresthesia pain: none  Heart rate change: no  Slow fractionated injection: yes  Pain Tolerance: comfortable throughout block and no complaints  Medications:    Medications: ropivacaine (NAROPIN) injection 0.5% - Perineural   15 mL - 8/4/2022 10:19:00 AM    Additional Notes  VSS.  [facility] RN monitoring vitals throughout procedure.  Patient tolerated procedure well.

## 2022-08-11 LAB
FINAL PATHOLOGIC DIAGNOSIS: NORMAL
Lab: NORMAL

## 2022-08-12 ENCOUNTER — TELEPHONE (OUTPATIENT)
Dept: ORTHOPEDICS | Facility: CLINIC | Age: 62
End: 2022-08-12
Payer: COMMERCIAL

## 2022-08-12 NOTE — TELEPHONE ENCOUNTER
ATTEMPTED TO CONTACT Mario with [employer] she was out of the office I will call back on Monday morning.      ----- Message from Tremontana Chevalier sent at 8/12/2022  1:56 PM CDT -----  Regarding: pt advice  Contact: mario @ [phone] ext [phone]  Mario with [employer] clling to see if proc/surg with Dr. Elias on 08/04 went as expected. Pls call Mario @ [phone] ext [phone].

## 2022-08-19 ENCOUNTER — TELEPHONE (OUTPATIENT)
Dept: ORTHOPEDICS | Facility: CLINIC | Age: 62
End: 2022-08-19
Payer: COMMERCIAL

## 2022-08-19 NOTE — TELEPHONE ENCOUNTER
I spoke with Larissa confirmed sx date of 8/4/22.          ----- Message from Tremontana Chevalier sent at 8/19/2022 10:47 AM CDT -----  Regarding: pt advice  Contact: Larissa @ [phone]   Larissa with [employer] calling to verify if surgery for ankle went as planned. Pls call Larissa @ [phone], ext. [phone], pls reference the case [id number] and the surgery date and type.

## 2022-08-23 ENCOUNTER — OFFICE VISIT (OUTPATIENT)
Dept: ORTHOPEDICS | Facility: CLINIC | Age: 62
End: 2022-08-23
Payer: COMMERCIAL

## 2022-08-23 DIAGNOSIS — M19.072 ARTHROSIS OF LEFT ANKLE: ICD-10-CM

## 2022-08-23 DIAGNOSIS — G89.18 POST-OPERATIVE PAIN: Primary | ICD-10-CM

## 2022-08-23 PROCEDURE — 99024 POSTOP FOLLOW-UP VISIT: CPT | Mod: S$GLB,,, | Performed by: PHYSICIAN ASSISTANT

## 2022-08-23 PROCEDURE — 4010F PR ACE/ARB THEARPY RXD/TAKEN: ICD-10-PCS | Mod: CPTII,S$GLB,, | Performed by: PHYSICIAN ASSISTANT

## 2022-08-23 PROCEDURE — 4010F ACE/ARB THERAPY RXD/TAKEN: CPT | Mod: CPTII,S$GLB,, | Performed by: PHYSICIAN ASSISTANT

## 2022-08-23 PROCEDURE — 99999 PR PBB SHADOW E&M-EST. PATIENT-LVL I: CPT | Mod: PBBFAC,,, | Performed by: PHYSICIAN ASSISTANT

## 2022-08-23 PROCEDURE — 99024 PR POST-OP FOLLOW-UP VISIT: ICD-10-PCS | Mod: S$GLB,,, | Performed by: PHYSICIAN ASSISTANT

## 2022-08-23 PROCEDURE — 3044F HG A1C LEVEL LT 7.0%: CPT | Mod: CPTII,S$GLB,, | Performed by: PHYSICIAN ASSISTANT

## 2022-08-23 PROCEDURE — 3044F PR MOST RECENT HEMOGLOBIN A1C LEVEL <7.0%: ICD-10-PCS | Mod: CPTII,S$GLB,, | Performed by: PHYSICIAN ASSISTANT

## 2022-08-23 PROCEDURE — 99999 PR PBB SHADOW E&M-EST. PATIENT-LVL I: ICD-10-PCS | Mod: PBBFAC,,, | Performed by: PHYSICIAN ASSISTANT

## 2022-08-23 RX ORDER — HYDROCODONE BITARTRATE AND ACETAMINOPHEN 5; 325 MG/1; MG/1
1 TABLET ORAL
Qty: 42 TABLET | Refills: 0 | Status: SHIPPED | OUTPATIENT
Start: 2022-08-23 | End: 2022-08-31 | Stop reason: SDUPTHER

## 2022-08-23 RX ORDER — METHOCARBAMOL 500 MG/1
500 TABLET, FILM COATED ORAL 3 TIMES DAILY
Qty: 42 TABLET | Refills: 0 | Status: SHIPPED | OUTPATIENT
Start: 2022-08-23 | End: 2022-09-06

## 2022-08-24 DIAGNOSIS — I10 ESSENTIAL HYPERTENSION, BENIGN: ICD-10-CM

## 2022-08-24 RX ORDER — LOSARTAN POTASSIUM 50 MG/1
TABLET ORAL
Qty: 90 TABLET | Refills: 1 | Status: SHIPPED | OUTPATIENT
Start: 2022-08-24 | End: 2023-01-04 | Stop reason: SDUPTHER

## 2022-08-24 NOTE — TELEPHONE ENCOUNTER
No new care gaps identified.  [facility] Embedded Care Gaps. Reference number: [id number]. 8/24/2022   8:06:19 AM CDT

## 2022-08-24 NOTE — TELEPHONE ENCOUNTER
Refill Decision Note   Hipolito PhoenixEusebia  is requesting a refill authorization.  Brief Assessment and Rationale for Refill:  Approve     Medication Therapy Plan:       Medication Reconciliation Completed: No   Comments:     No Care Gaps recommended.     Note composed:8:27 AM 08/24/2022

## 2022-08-26 NOTE — PROGRESS NOTES
22: :   1. Left ankle arthroscopy with extensive debridement  2. Excision bony nonunion fragments  left distal fibula    Mr. Laws is here today for a post-operative visit    Interval History:  he reports that he is doing not so great.   he is at home . he is not participating in PT/OT.   Pain is not controlled.  he is  taking pain medication.  Reports needs something stronger.   he denies fever, chills, and sweats .       Physical exam:    Patient arrives to exam room: walked into clinic unassisted unaccompanied .  Patient is alone   Dressing taken down.  Incision is clean, dry and intact.  Sutures removed without difficulty.   Healing well no signs of breakdown or infection.    RADS: All pertinent images were reviewed by myself:   none done today    Assessment:  Post-op visit ( 2 weeks)    Plan:  Current care, treatment plan, precautions, activity level/ modifications, limitations, rehabilitation exercises and proposed future treatment were discussed with the patient. We discussed the need to monitor for changes in symptoms and condition and report them to the physician.  Discussed importance of compliance with all appointments and follow up examinations.     WOUND CARE ORDERS  - The patient was advised to keep the incision clean and dry for the next 24 hours after which he may wash the area with antibacterial soap in the shower. Will not submerge until the incision is completely healed  -Patient was advised to monitor wound closely and multiple times daily for any problems. Call clinic immediately or report to ED for immediate medical attention for any complications including reopening of wound, drainage, purulence, redness, streaking, odor, pain out of proportion, fever, chills, etc.       ACTIVITY:   - light  -range of motion as tolerated    - WBAT      -PT/OT, [facility] , Patient is responsible to establish and continue care      PAIN MEDICATION:   - Multimodal pain control  - Pain  medication: refill was not needed  - Pain medication refill policy provided to patient for review, yes.    - Patient was informed a multi-modal approach is used to treat their pain. With the goal to get off of narcotic pain medication and discontinue as soon as possible.   - ice and elevation to reduce pain and swelling      FOLLOW UP:   - Patient will follow up in the clinic in 4 weeks. With        If there are any questions prior to scheduled follow up, the patient was instructed to contact the office

## 2022-08-31 ENCOUNTER — PATIENT MESSAGE (OUTPATIENT)
Dept: ORTHOPEDICS | Facility: CLINIC | Age: 62
End: 2022-08-31

## 2022-08-31 ENCOUNTER — TELEPHONE (OUTPATIENT)
Dept: ORTHOPEDICS | Facility: CLINIC | Age: 62
End: 2022-08-31
Payer: COMMERCIAL

## 2022-08-31 ENCOUNTER — OFFICE VISIT (OUTPATIENT)
Dept: ORTHOPEDICS | Facility: CLINIC | Age: 62
End: 2022-08-31
Payer: COMMERCIAL

## 2022-08-31 VITALS
DIASTOLIC BLOOD PRESSURE: 98 MMHG | HEART RATE: 101 BPM | WEIGHT: 195.13 LBS | HEIGHT: 70 IN | BODY MASS INDEX: 27.94 KG/M2 | SYSTOLIC BLOOD PRESSURE: 161 MMHG | TEMPERATURE: 98 F | RESPIRATION RATE: 18 BRPM

## 2022-08-31 DIAGNOSIS — G89.18 POST-OPERATIVE PAIN: ICD-10-CM

## 2022-08-31 LAB
GRAM STN SPEC: NORMAL
GRAM STN SPEC: NORMAL

## 2022-08-31 PROCEDURE — 4010F ACE/ARB THERAPY RXD/TAKEN: CPT | Mod: CPTII,S$GLB,, | Performed by: PHYSICIAN ASSISTANT

## 2022-08-31 PROCEDURE — 3080F DIAST BP >= 90 MM HG: CPT | Mod: CPTII,S$GLB,, | Performed by: PHYSICIAN ASSISTANT

## 2022-08-31 PROCEDURE — 87102 FUNGUS ISOLATION CULTURE: CPT | Performed by: PHYSICIAN ASSISTANT

## 2022-08-31 PROCEDURE — 3044F HG A1C LEVEL LT 7.0%: CPT | Mod: CPTII,S$GLB,, | Performed by: PHYSICIAN ASSISTANT

## 2022-08-31 PROCEDURE — 1159F PR MEDICATION LIST DOCUMENTED IN MEDICAL RECORD: ICD-10-PCS | Mod: CPTII,S$GLB,, | Performed by: PHYSICIAN ASSISTANT

## 2022-08-31 PROCEDURE — 1159F MED LIST DOCD IN RCRD: CPT | Mod: CPTII,S$GLB,, | Performed by: PHYSICIAN ASSISTANT

## 2022-08-31 PROCEDURE — 3008F PR BODY MASS INDEX (BMI) DOCUMENTED: ICD-10-PCS | Mod: CPTII,S$GLB,, | Performed by: PHYSICIAN ASSISTANT

## 2022-08-31 PROCEDURE — 99999 PR PBB SHADOW E&M-EST. PATIENT-LVL III: ICD-10-PCS | Mod: PBBFAC,,, | Performed by: PHYSICIAN ASSISTANT

## 2022-08-31 PROCEDURE — 3077F SYST BP >= 140 MM HG: CPT | Mod: CPTII,S$GLB,, | Performed by: PHYSICIAN ASSISTANT

## 2022-08-31 PROCEDURE — 3077F PR MOST RECENT SYSTOLIC BLOOD PRESSURE >= 140 MM HG: ICD-10-PCS | Mod: CPTII,S$GLB,, | Performed by: PHYSICIAN ASSISTANT

## 2022-08-31 PROCEDURE — 87075 CULTR BACTERIA EXCEPT BLOOD: CPT | Performed by: PHYSICIAN ASSISTANT

## 2022-08-31 PROCEDURE — 99024 PR POST-OP FOLLOW-UP VISIT: ICD-10-PCS | Mod: S$GLB,,, | Performed by: PHYSICIAN ASSISTANT

## 2022-08-31 PROCEDURE — 99024 POSTOP FOLLOW-UP VISIT: CPT | Mod: S$GLB,,, | Performed by: PHYSICIAN ASSISTANT

## 2022-08-31 PROCEDURE — 3044F PR MOST RECENT HEMOGLOBIN A1C LEVEL <7.0%: ICD-10-PCS | Mod: CPTII,S$GLB,, | Performed by: PHYSICIAN ASSISTANT

## 2022-08-31 PROCEDURE — 3080F PR MOST RECENT DIASTOLIC BLOOD PRESSURE >= 90 MM HG: ICD-10-PCS | Mod: CPTII,S$GLB,, | Performed by: PHYSICIAN ASSISTANT

## 2022-08-31 PROCEDURE — 87070 CULTURE OTHR SPECIMN AEROBIC: CPT | Performed by: PHYSICIAN ASSISTANT

## 2022-08-31 PROCEDURE — 4010F PR ACE/ARB THEARPY RXD/TAKEN: ICD-10-PCS | Mod: CPTII,S$GLB,, | Performed by: PHYSICIAN ASSISTANT

## 2022-08-31 PROCEDURE — 87206 SMEAR FLUORESCENT/ACID STAI: CPT | Performed by: PHYSICIAN ASSISTANT

## 2022-08-31 PROCEDURE — 99999 PR PBB SHADOW E&M-EST. PATIENT-LVL III: CPT | Mod: PBBFAC,,, | Performed by: PHYSICIAN ASSISTANT

## 2022-08-31 PROCEDURE — 87205 SMEAR GRAM STAIN: CPT | Performed by: PHYSICIAN ASSISTANT

## 2022-08-31 PROCEDURE — 3008F BODY MASS INDEX DOCD: CPT | Mod: CPTII,S$GLB,, | Performed by: PHYSICIAN ASSISTANT

## 2022-08-31 PROCEDURE — 87116 MYCOBACTERIA CULTURE: CPT | Performed by: PHYSICIAN ASSISTANT

## 2022-08-31 RX ORDER — HYDROCODONE BITARTRATE AND ACETAMINOPHEN 5; 325 MG/1; MG/1
1 TABLET ORAL
Qty: 42 TABLET | Refills: 0 | Status: SHIPPED | OUTPATIENT
Start: 2022-08-31 | End: 2022-09-08 | Stop reason: SDUPTHER

## 2022-08-31 RX ORDER — SULFAMETHOXAZOLE AND TRIMETHOPRIM 800; 160 MG/1; MG/1
1 TABLET ORAL 2 TIMES DAILY
Qty: 20 TABLET | Refills: 0 | Status: SHIPPED | OUTPATIENT
Start: 2022-08-31 | End: 2022-09-08 | Stop reason: SDUPTHER

## 2022-08-31 NOTE — TELEPHONE ENCOUNTER
Attempted to contact pt with appoinment date and time of 9/12 for 10:45 no answer. lvm            ----- Message from Madhuri Olsen MA sent at 8/31/2022 11:52 AM CDT -----  Please schedule pt for one week/left ankle wound; per  RT. Please contact pt. Thank you

## 2022-09-02 NOTE — PROGRESS NOTES
22: :   1. Left ankle arthroscopy with extensive debridement  2. Excision bony nonunion fragments  left distal fibula    Mr. Laws is here today for a post-operative visit    Interval History:  he reports that he is doing not so great. Noticed opening of his wound  he is at home . he is not participating in PT/OT.   Pain is not controlled.  he is taking pain medication.     he denies fever, chills, and sweats .       Physical exam:    Patient arrives to exam room: walked into clinic unassisted unaccompanied .  Patient is alone   Tunneling noted to proximal incision. Seen by myself and Soledad with wound care.             RADS: All pertinent images were reviewed by myself:   none done today    Assessment:  Post-op visit ( 3 weeks)    Plan:  Current care, treatment plan, precautions, activity level/ modifications, limitations, rehabilitation exercises and proposed future treatment were discussed with the patient. We discussed the need to monitor for changes in symptoms and condition and report them to the physician.  Discussed importance of compliance with all appointments and follow up examinations.     WOUND CARE ORDERS  - paint once daily with betadine  - Edema control      ACTIVITY:   - light  -will immobilize x 1 week.       -PT/OT, [facility] , Patient is responsible to establish and continue care      PAIN MEDICATION:   - Multimodal pain control  - Pain medication: refill was not needed  - Pain medication refill policy provided to patient for review, yes.    - Patient was informed a multi-modal approach is used to treat their pain. With the goal to get off of narcotic pain medication and discontinue as soon as possible.   - ice and elevation to reduce pain and swelling    OTHER:   Bactrim.   Culture sent    FOLLOW UP:   - Patient will follow up in the clinic in 1 weeks. With        If there are any questions prior to scheduled follow up, the patient was instructed to contact the  office

## 2022-09-03 LAB — BACTERIA SPEC AEROBE CULT: NORMAL

## 2022-09-06 LAB — BACTERIA SPEC ANAEROBE CULT: NORMAL

## 2022-09-08 ENCOUNTER — OFFICE VISIT (OUTPATIENT)
Dept: ORTHOPEDICS | Facility: CLINIC | Age: 62
End: 2022-09-08
Payer: COMMERCIAL

## 2022-09-08 VITALS
BODY MASS INDEX: 27.94 KG/M2 | DIASTOLIC BLOOD PRESSURE: 88 MMHG | HEART RATE: 102 BPM | TEMPERATURE: 98 F | RESPIRATION RATE: 18 BRPM | HEIGHT: 70 IN | WEIGHT: 195.13 LBS | SYSTOLIC BLOOD PRESSURE: 133 MMHG

## 2022-09-08 DIAGNOSIS — M19.072 ARTHROSIS OF LEFT ANKLE: Primary | ICD-10-CM

## 2022-09-08 DIAGNOSIS — G89.18 POST-OPERATIVE PAIN: ICD-10-CM

## 2022-09-08 DIAGNOSIS — M94.9 OSTEOCHONDRAL LESION OF TALAR DOME: ICD-10-CM

## 2022-09-08 DIAGNOSIS — M89.9 OSTEOCHONDRAL LESION OF TALAR DOME: ICD-10-CM

## 2022-09-08 PROCEDURE — 3044F HG A1C LEVEL LT 7.0%: CPT | Mod: CPTII,S$GLB,, | Performed by: PHYSICIAN ASSISTANT

## 2022-09-08 PROCEDURE — 3075F SYST BP GE 130 - 139MM HG: CPT | Mod: CPTII,S$GLB,, | Performed by: PHYSICIAN ASSISTANT

## 2022-09-08 PROCEDURE — 3075F PR MOST RECENT SYSTOLIC BLOOD PRESS GE 130-139MM HG: ICD-10-PCS | Mod: CPTII,S$GLB,, | Performed by: PHYSICIAN ASSISTANT

## 2022-09-08 PROCEDURE — 99999 PR PBB SHADOW E&M-EST. PATIENT-LVL III: ICD-10-PCS | Mod: PBBFAC,,, | Performed by: PHYSICIAN ASSISTANT

## 2022-09-08 PROCEDURE — 4010F PR ACE/ARB THEARPY RXD/TAKEN: ICD-10-PCS | Mod: CPTII,S$GLB,, | Performed by: PHYSICIAN ASSISTANT

## 2022-09-08 PROCEDURE — 3079F DIAST BP 80-89 MM HG: CPT | Mod: CPTII,S$GLB,, | Performed by: PHYSICIAN ASSISTANT

## 2022-09-08 PROCEDURE — 99999 PR PBB SHADOW E&M-EST. PATIENT-LVL III: CPT | Mod: PBBFAC,,, | Performed by: PHYSICIAN ASSISTANT

## 2022-09-08 PROCEDURE — 3008F BODY MASS INDEX DOCD: CPT | Mod: CPTII,S$GLB,, | Performed by: PHYSICIAN ASSISTANT

## 2022-09-08 PROCEDURE — 99024 POSTOP FOLLOW-UP VISIT: CPT | Mod: S$GLB,,, | Performed by: PHYSICIAN ASSISTANT

## 2022-09-08 PROCEDURE — 99024 PR POST-OP FOLLOW-UP VISIT: ICD-10-PCS | Mod: S$GLB,,, | Performed by: PHYSICIAN ASSISTANT

## 2022-09-08 PROCEDURE — 3008F PR BODY MASS INDEX (BMI) DOCUMENTED: ICD-10-PCS | Mod: CPTII,S$GLB,, | Performed by: PHYSICIAN ASSISTANT

## 2022-09-08 PROCEDURE — 3079F PR MOST RECENT DIASTOLIC BLOOD PRESSURE 80-89 MM HG: ICD-10-PCS | Mod: CPTII,S$GLB,, | Performed by: PHYSICIAN ASSISTANT

## 2022-09-08 PROCEDURE — 4010F ACE/ARB THERAPY RXD/TAKEN: CPT | Mod: CPTII,S$GLB,, | Performed by: PHYSICIAN ASSISTANT

## 2022-09-08 PROCEDURE — 3044F PR MOST RECENT HEMOGLOBIN A1C LEVEL <7.0%: ICD-10-PCS | Mod: CPTII,S$GLB,, | Performed by: PHYSICIAN ASSISTANT

## 2022-09-08 RX ORDER — HYDROCODONE BITARTRATE AND ACETAMINOPHEN 5; 325 MG/1; MG/1
1 TABLET ORAL
Qty: 42 TABLET | Refills: 0 | Status: SHIPPED | OUTPATIENT
Start: 2022-09-08 | End: 2022-09-20 | Stop reason: SDUPTHER

## 2022-09-08 RX ORDER — METHOCARBAMOL 500 MG/1
500 TABLET, FILM COATED ORAL 4 TIMES DAILY
Qty: 40 TABLET | Refills: 0 | Status: SHIPPED | OUTPATIENT
Start: 2022-09-08 | End: 2022-09-18

## 2022-09-08 RX ORDER — GABAPENTIN 100 MG/1
100 CAPSULE ORAL 3 TIMES DAILY
Qty: 90 CAPSULE | Refills: 0 | Status: SHIPPED | OUTPATIENT
Start: 2022-09-08 | End: 2022-09-20 | Stop reason: SDUPTHER

## 2022-09-08 RX ORDER — SULFAMETHOXAZOLE AND TRIMETHOPRIM 800; 160 MG/1; MG/1
1 TABLET ORAL 2 TIMES DAILY
Qty: 20 TABLET | Refills: 0 | Status: SHIPPED | OUTPATIENT
Start: 2022-09-08 | End: 2022-09-18

## 2022-09-09 ENCOUNTER — PATIENT MESSAGE (OUTPATIENT)
Dept: ORTHOPEDICS | Facility: CLINIC | Age: 62
End: 2022-09-09
Payer: COMMERCIAL

## 2022-09-09 NOTE — PROGRESS NOTES
22: :   1. Left ankle arthroscopy with extensive debridement  2. Excision bony nonunion fragments  left distal fibula    Mr. Laws is here today for a post-operative visit    Interval History:  he reports that he is doing not so great. Noticed continued opening of his wound. He has some serious fluid   he is at home . he is not participating in PT/OT.   He has been keeping compression and it elevated with some reduction in swelling, but with continued wound breakdown.    Pain is not controlled.  he is taking pain medication.   Refill needed  he denies fever, chills, and sweats .     Cx: NGTD    Physical exam:    Patient arrives to exam room: walked into clinic with knee scooter .  Patient is alone   .           RADS: All pertinent images were reviewed by myself:   none done today    Assessment:  Post-op visit ( 4 weeks)    Plan:  discussed with .   Current care, treatment plan, precautions, activity level/ modifications, limitations, rehabilitation exercises and proposed future treatment were discussed with the patient. We discussed the need to monitor for changes in symptoms and condition and report them to the physician.  Discussed importance of compliance with all appointments and follow up examinations.     WOUND CARE ORDERS  - paint once daily with betadine  - Edema control      ACTIVITY:   - light  -will immobilize x 1 week.         PAIN MEDICATION:   - Multimodal pain control  - Pain medication: refill was needed, added gabapentin.   - Pain medication refill policy provided to patient for review, yes.    - Patient was informed a multi-modal approach is used to treat their pain. With the goal to get off of narcotic pain medication and discontinue as soon as possible.   - ice and elevation to reduce pain and swelling    OTHER:   Bactrim.     FOLLOW UP:   - Patient will follow up in the clinic in 1 weeks. With        If there are any questions prior to scheduled follow  up, the patient was instructed to contact the office

## 2022-09-12 ENCOUNTER — OFFICE VISIT (OUTPATIENT)
Dept: ORTHOPEDICS | Facility: CLINIC | Age: 62
End: 2022-09-12
Payer: COMMERCIAL

## 2022-09-12 DIAGNOSIS — T81.31XD DEHISCENCE OF OPERATIVE WOUND, SUBSEQUENT ENCOUNTER: Primary | ICD-10-CM

## 2022-09-12 PROCEDURE — 3044F PR MOST RECENT HEMOGLOBIN A1C LEVEL <7.0%: ICD-10-PCS | Mod: CPTII,95,, | Performed by: ORTHOPAEDIC SURGERY

## 2022-09-12 PROCEDURE — 1160F PR REVIEW ALL MEDS BY PRESCRIBER/CLIN PHARMACIST DOCUMENTED: ICD-10-PCS | Mod: CPTII,95,, | Performed by: ORTHOPAEDIC SURGERY

## 2022-09-12 PROCEDURE — 1159F PR MEDICATION LIST DOCUMENTED IN MEDICAL RECORD: ICD-10-PCS | Mod: CPTII,95,, | Performed by: ORTHOPAEDIC SURGERY

## 2022-09-12 PROCEDURE — 1160F RVW MEDS BY RX/DR IN RCRD: CPT | Mod: CPTII,95,, | Performed by: ORTHOPAEDIC SURGERY

## 2022-09-12 PROCEDURE — 99024 POSTOP FOLLOW-UP VISIT: CPT | Mod: 95,,, | Performed by: ORTHOPAEDIC SURGERY

## 2022-09-12 PROCEDURE — 3044F HG A1C LEVEL LT 7.0%: CPT | Mod: CPTII,95,, | Performed by: ORTHOPAEDIC SURGERY

## 2022-09-12 PROCEDURE — 1159F MED LIST DOCD IN RCRD: CPT | Mod: CPTII,95,, | Performed by: ORTHOPAEDIC SURGERY

## 2022-09-12 PROCEDURE — 99024 PR POST-OP FOLLOW-UP VISIT: ICD-10-PCS | Mod: 95,,, | Performed by: ORTHOPAEDIC SURGERY

## 2022-09-12 PROCEDURE — 4010F ACE/ARB THERAPY RXD/TAKEN: CPT | Mod: CPTII,95,, | Performed by: ORTHOPAEDIC SURGERY

## 2022-09-12 PROCEDURE — 4010F PR ACE/ARB THEARPY RXD/TAKEN: ICD-10-PCS | Mod: CPTII,95,, | Performed by: ORTHOPAEDIC SURGERY

## 2022-09-12 NOTE — PROGRESS NOTES
Established Patient - Audio Only Telehealth Visit     The patient location is:  [place]  The chief complaint leading to consultation is:  Postop follow-up and wound left ankle  Visit type: Virtual visit with audio only (telephone)  Total time spent with patient:  5 minutes       The reason for the audio only service rather than synchronous audio and video virtual visit was related to technical difficulties or patient preference/necessity.     Each patient to whom I provide medical services by telemedicine is:  (1) informed of the relationship between the physician and patient and the respective role of any other health care provider with respect to management of the patient; and (2) notified that they may decline to receive medical services by telemedicine and may withdraw from such care at any time. Patient verbally consented to receive this service via voice-only telephone call.       HPI:  This is a 62-year-old male who is about 6 weeks postop from left ankle arthroscopy as well as excision of a chronic avulsion fracture of the distal fibula.  Postoperatively he has developed a wound dehiscence and was seen by the PA in clinic last week.  Photographs were obtained which revealed continued wound which does not appear infected but is not improving.  He is currently on oral antibiotics and does not report any purulent drainage.  He has some serous drainage his pain is controlled.  Based on his wound I would recommend return to the operating room for debridement and closure to decrease the risk of infection.     Assessment and plan:    1. Dehiscence of operative wound, subsequent encounter, left ankle          Recommendation:  We had discussion about returning to the operating room for debridement and closure of the wound.  All he would like to proceed with surgical closure so I will put in a case request and have him scheduled for surgery on Thursday 9/14.  I will do his preoperative H&P and consent on the day  of surgery as he lives in [place].                          This service was not originating from a related E/M service provided within the previous 7 days nor will  to an E/M service or procedure within the next 24 hours or my soonest available appointment.  Prevailing standard of care was able to be met in this audio-only visit.

## 2022-09-13 ENCOUNTER — TELEPHONE (OUTPATIENT)
Dept: ORTHOPEDICS | Facility: CLINIC | Age: 62
End: 2022-09-13
Payer: COMMERCIAL

## 2022-09-13 ENCOUNTER — ANESTHESIA EVENT (OUTPATIENT)
Dept: SURGERY | Facility: HOSPITAL | Age: 62
End: 2022-09-13
Payer: COMMERCIAL

## 2022-09-13 PROBLEM — T81.31XA RUPTURE OF OPERATION WOUND: Status: ACTIVE | Noted: 2022-09-13

## 2022-09-13 NOTE — TELEPHONE ENCOUNTER
I spoke with pt,confirmed arrival time of 11:3am [facility],nothing to eat or drink after midnight. Pt,verbalized understanding.

## 2022-09-14 ENCOUNTER — HOSPITAL ENCOUNTER (OUTPATIENT)
Facility: HOSPITAL | Age: 62
Discharge: HOME | End: 2022-09-14
Attending: ORTHOPAEDIC SURGERY | Admitting: ORTHOPAEDIC SURGERY
Payer: COMMERCIAL

## 2022-09-14 ENCOUNTER — ANESTHESIA (OUTPATIENT)
Dept: SURGERY | Facility: HOSPITAL | Age: 62
End: 2022-09-14
Payer: COMMERCIAL

## 2022-09-14 ENCOUNTER — TELEPHONE (OUTPATIENT)
Dept: ORTHOPEDICS | Facility: CLINIC | Age: 62
End: 2022-09-14
Payer: COMMERCIAL

## 2022-09-14 VITALS
HEART RATE: 76 BPM | TEMPERATURE: 98 F | RESPIRATION RATE: 18 BRPM | HEIGHT: 70 IN | SYSTOLIC BLOOD PRESSURE: 130 MMHG | WEIGHT: 195 LBS | OXYGEN SATURATION: 98 % | BODY MASS INDEX: 27.92 KG/M2 | DIASTOLIC BLOOD PRESSURE: 62 MMHG

## 2022-09-14 DIAGNOSIS — T81.31XD DEHISCENCE OF OPERATIVE WOUND, SUBSEQUENT ENCOUNTER: Primary | ICD-10-CM

## 2022-09-14 LAB
GRAM STN SPEC: NORMAL
GRAM STN SPEC: NORMAL

## 2022-09-14 PROCEDURE — 63600175 PHARM REV CODE 636 W HCPCS: Performed by: ORTHOPAEDIC SURGERY

## 2022-09-14 PROCEDURE — 87206 SMEAR FLUORESCENT/ACID STAI: CPT | Performed by: ORTHOPAEDIC SURGERY

## 2022-09-14 PROCEDURE — 64447 NJX AA&/STRD FEMORAL NRV IMG: CPT | Mod: 59,LT,, | Performed by: STUDENT IN AN ORGANIZED HEALTH CARE EDUCATION/TRAINING PROGRAM

## 2022-09-14 PROCEDURE — 37000008 HC ANESTHESIA 1ST 15 MINUTES: Performed by: ORTHOPAEDIC SURGERY

## 2022-09-14 PROCEDURE — 12020 PR CLOSURE SUPERF WND DEHIS SIMPLE: ICD-10-PCS | Mod: 58,,, | Performed by: ORTHOPAEDIC SURGERY

## 2022-09-14 PROCEDURE — 71000033 HC RECOVERY, INTIAL HOUR: Performed by: ORTHOPAEDIC SURGERY

## 2022-09-14 PROCEDURE — 94761 N-INVAS EAR/PLS OXIMETRY MLT: CPT

## 2022-09-14 PROCEDURE — D9220A PRA ANESTHESIA: ICD-10-PCS | Mod: ANES,,, | Performed by: STUDENT IN AN ORGANIZED HEALTH CARE EDUCATION/TRAINING PROGRAM

## 2022-09-14 PROCEDURE — 76942 PR U/S GUIDANCE FOR NEEDLE GUIDANCE: ICD-10-PCS | Mod: 26,,, | Performed by: STUDENT IN AN ORGANIZED HEALTH CARE EDUCATION/TRAINING PROGRAM

## 2022-09-14 PROCEDURE — 63600175 PHARM REV CODE 636 W HCPCS: Performed by: STUDENT IN AN ORGANIZED HEALTH CARE EDUCATION/TRAINING PROGRAM

## 2022-09-14 PROCEDURE — D9220A PRA ANESTHESIA: Mod: ANES,,, | Performed by: STUDENT IN AN ORGANIZED HEALTH CARE EDUCATION/TRAINING PROGRAM

## 2022-09-14 PROCEDURE — 12020 TX SUPFC WND DEHSN SMPL CLSR: CPT | Mod: 58,,, | Performed by: ORTHOPAEDIC SURGERY

## 2022-09-14 PROCEDURE — 76942 ECHO GUIDE FOR BIOPSY: CPT | Performed by: STUDENT IN AN ORGANIZED HEALTH CARE EDUCATION/TRAINING PROGRAM

## 2022-09-14 PROCEDURE — 87116 MYCOBACTERIA CULTURE: CPT | Performed by: ORTHOPAEDIC SURGERY

## 2022-09-14 PROCEDURE — 64450 NJX AA&/STRD OTHER PN/BRANCH: CPT | Mod: 59,LT,, | Performed by: STUDENT IN AN ORGANIZED HEALTH CARE EDUCATION/TRAINING PROGRAM

## 2022-09-14 PROCEDURE — 25000003 PHARM REV CODE 250: Performed by: NURSE ANESTHETIST, CERTIFIED REGISTERED

## 2022-09-14 PROCEDURE — D9220A PRA ANESTHESIA: ICD-10-PCS | Mod: CRNA,,, | Performed by: NURSE ANESTHETIST, CERTIFIED REGISTERED

## 2022-09-14 PROCEDURE — 64447 PR NERVE BLOCK INJ, ANES/STEROID, FEMORAL, INCL IMAG GUIDANCE: ICD-10-PCS | Mod: 59,LT,, | Performed by: STUDENT IN AN ORGANIZED HEALTH CARE EDUCATION/TRAINING PROGRAM

## 2022-09-14 PROCEDURE — 76942 ECHO GUIDE FOR BIOPSY: CPT | Mod: 26,,, | Performed by: STUDENT IN AN ORGANIZED HEALTH CARE EDUCATION/TRAINING PROGRAM

## 2022-09-14 PROCEDURE — D9220A PRA ANESTHESIA: Mod: CRNA,,, | Performed by: NURSE ANESTHETIST, CERTIFIED REGISTERED

## 2022-09-14 PROCEDURE — 36000707: Performed by: ORTHOPAEDIC SURGERY

## 2022-09-14 PROCEDURE — 87205 SMEAR GRAM STAIN: CPT | Performed by: ORTHOPAEDIC SURGERY

## 2022-09-14 PROCEDURE — 87075 CULTR BACTERIA EXCEPT BLOOD: CPT | Performed by: ORTHOPAEDIC SURGERY

## 2022-09-14 PROCEDURE — 36000706: Performed by: ORTHOPAEDIC SURGERY

## 2022-09-14 PROCEDURE — 99900035 HC TECH TIME PER 15 MIN (STAT)

## 2022-09-14 PROCEDURE — 63600175 PHARM REV CODE 636 W HCPCS

## 2022-09-14 PROCEDURE — 27200750 HC INSULATED NEEDLE/ STIMUPLEX: Performed by: STUDENT IN AN ORGANIZED HEALTH CARE EDUCATION/TRAINING PROGRAM

## 2022-09-14 PROCEDURE — 87070 CULTURE OTHR SPECIMN AEROBIC: CPT | Performed by: ORTHOPAEDIC SURGERY

## 2022-09-14 PROCEDURE — 87102 FUNGUS ISOLATION CULTURE: CPT | Performed by: ORTHOPAEDIC SURGERY

## 2022-09-14 PROCEDURE — 64450 PR NERVE BLOCK INJ, ANES/STEROID, OTHER PERIPHERAL: ICD-10-PCS | Mod: 59,LT,, | Performed by: STUDENT IN AN ORGANIZED HEALTH CARE EDUCATION/TRAINING PROGRAM

## 2022-09-14 PROCEDURE — 71000015 HC POSTOP RECOV 1ST HR: Performed by: ORTHOPAEDIC SURGERY

## 2022-09-14 PROCEDURE — 37000009 HC ANESTHESIA EA ADD 15 MINS: Performed by: ORTHOPAEDIC SURGERY

## 2022-09-14 PROCEDURE — 64447 NJX AA&/STRD FEMORAL NRV IMG: CPT | Performed by: STUDENT IN AN ORGANIZED HEALTH CARE EDUCATION/TRAINING PROGRAM

## 2022-09-14 PROCEDURE — 27201423 OPTIME MED/SURG SUP & DEVICES STERILE SUPPLY: Performed by: ORTHOPAEDIC SURGERY

## 2022-09-14 PROCEDURE — 63600175 PHARM REV CODE 636 W HCPCS: Performed by: NURSE ANESTHETIST, CERTIFIED REGISTERED

## 2022-09-14 PROCEDURE — 25000003 PHARM REV CODE 250: Performed by: STUDENT IN AN ORGANIZED HEALTH CARE EDUCATION/TRAINING PROGRAM

## 2022-09-14 RX ORDER — ACETAMINOPHEN 500 MG
1000 TABLET ORAL ONCE
Status: COMPLETED | OUTPATIENT
Start: 2022-09-14 | End: 2022-09-14

## 2022-09-14 RX ORDER — ONDANSETRON 2 MG/ML
INJECTION INTRAMUSCULAR; INTRAVENOUS
Status: DISCONTINUED | OUTPATIENT
Start: 2022-09-14 | End: 2022-09-14

## 2022-09-14 RX ORDER — ROPIVACAINE HYDROCHLORIDE 5 MG/ML
INJECTION, SOLUTION EPIDURAL; INFILTRATION; PERINEURAL
Status: COMPLETED
Start: 2022-09-14 | End: 2022-09-14

## 2022-09-14 RX ORDER — OXYCODONE HYDROCHLORIDE 5 MG/1
5 TABLET ORAL
Status: DISCONTINUED | OUTPATIENT
Start: 2022-09-14 | End: 2022-09-14 | Stop reason: HOSPADM

## 2022-09-14 RX ORDER — NAPROXEN SODIUM 220 MG/1
375 TABLET, FILM COATED ORAL DAILY
COMMUNITY

## 2022-09-14 RX ORDER — LIDOCAINE HCL/PF 100 MG/5ML
SYRINGE (ML) INTRAVENOUS
Status: DISCONTINUED | OUTPATIENT
Start: 2022-09-14 | End: 2022-09-14

## 2022-09-14 RX ORDER — FENTANYL CITRATE 50 UG/ML
INJECTION, SOLUTION INTRAMUSCULAR; INTRAVENOUS
Status: COMPLETED
Start: 2022-09-14 | End: 2022-09-14

## 2022-09-14 RX ORDER — CHOLECALCIFEROL (VITAMIN D3) 25 MCG
1000 TABLET ORAL DAILY
COMMUNITY

## 2022-09-14 RX ORDER — ROPIVACAINE HYDROCHLORIDE 5 MG/ML
INJECTION, SOLUTION EPIDURAL; INFILTRATION; PERINEURAL
Status: COMPLETED | OUTPATIENT
Start: 2022-09-14 | End: 2022-09-14

## 2022-09-14 RX ORDER — VANCOMYCIN HYDROCHLORIDE 1 G/20ML
INJECTION, POWDER, LYOPHILIZED, FOR SOLUTION INTRAVENOUS
Status: DISCONTINUED | OUTPATIENT
Start: 2022-09-14 | End: 2022-09-14 | Stop reason: HOSPADM

## 2022-09-14 RX ORDER — FENTANYL CITRATE 50 UG/ML
100 INJECTION, SOLUTION INTRAMUSCULAR; INTRAVENOUS EVERY 5 MIN PRN
Status: COMPLETED | OUTPATIENT
Start: 2022-09-14 | End: 2022-09-14

## 2022-09-14 RX ORDER — FENTANYL CITRATE 50 UG/ML
25 INJECTION, SOLUTION INTRAMUSCULAR; INTRAVENOUS EVERY 5 MIN PRN
Status: DISCONTINUED | OUTPATIENT
Start: 2022-09-14 | End: 2022-09-14 | Stop reason: HOSPADM

## 2022-09-14 RX ORDER — CEFAZOLIN SODIUM 1 G/3ML
INJECTION, POWDER, FOR SOLUTION INTRAMUSCULAR; INTRAVENOUS
Status: DISCONTINUED | OUTPATIENT
Start: 2022-09-14 | End: 2022-09-14

## 2022-09-14 RX ORDER — HYDROMORPHONE HYDROCHLORIDE 1 MG/ML
0.2 INJECTION, SOLUTION INTRAMUSCULAR; INTRAVENOUS; SUBCUTANEOUS EVERY 5 MIN PRN
Status: DISCONTINUED | OUTPATIENT
Start: 2022-09-14 | End: 2022-09-14 | Stop reason: HOSPADM

## 2022-09-14 RX ORDER — PROPOFOL 10 MG/ML
VIAL (ML) INTRAVENOUS CONTINUOUS PRN
Status: DISCONTINUED | OUTPATIENT
Start: 2022-09-14 | End: 2022-09-14

## 2022-09-14 RX ORDER — KETAMINE HCL IN 0.9 % NACL 50 MG/5 ML
SYRINGE (ML) INTRAVENOUS
Status: DISCONTINUED | OUTPATIENT
Start: 2022-09-14 | End: 2022-09-14

## 2022-09-14 RX ORDER — SULFAMETHOXAZOLE AND TRIMETHOPRIM 800; 160 MG/1; MG/1
2 TABLET ORAL DAILY
Qty: 14 TABLET | Refills: 0 | Status: SHIPPED | OUTPATIENT
Start: 2022-09-14 | End: 2022-09-21

## 2022-09-14 RX ORDER — MIDAZOLAM HYDROCHLORIDE 1 MG/ML
2 INJECTION INTRAMUSCULAR; INTRAVENOUS
Status: DISPENSED | OUTPATIENT
Start: 2022-09-14

## 2022-09-14 RX ORDER — ONDANSETRON 2 MG/ML
4 INJECTION INTRAMUSCULAR; INTRAVENOUS DAILY PRN
Status: DISCONTINUED | OUTPATIENT
Start: 2022-09-14 | End: 2022-09-14 | Stop reason: HOSPADM

## 2022-09-14 RX ORDER — CELECOXIB 200 MG/1
400 CAPSULE ORAL ONCE
Status: COMPLETED | OUTPATIENT
Start: 2022-09-14 | End: 2022-09-14

## 2022-09-14 RX ADMIN — MIDAZOLAM HYDROCHLORIDE 2 MG: 1 INJECTION, SOLUTION INTRAMUSCULAR; INTRAVENOUS at 12:09

## 2022-09-14 RX ADMIN — FENTANYL CITRATE 50 MCG: 50 INJECTION, SOLUTION INTRAMUSCULAR; INTRAVENOUS at 12:09

## 2022-09-14 RX ADMIN — PROPOFOL 100 MCG/KG/MIN: 10 INJECTION, EMULSION INTRAVENOUS at 12:09

## 2022-09-14 RX ADMIN — ROPIVACAINE HYDROCHLORIDE 30 ML: 5 INJECTION EPIDURAL; INFILTRATION; PERINEURAL at 12:09

## 2022-09-14 RX ADMIN — CEFAZOLIN 2 G: 330 INJECTION, POWDER, FOR SOLUTION INTRAMUSCULAR; INTRAVENOUS at 01:09

## 2022-09-14 RX ADMIN — CELECOXIB 400 MG: 200 CAPSULE ORAL at 11:09

## 2022-09-14 RX ADMIN — SODIUM CHLORIDE: 9 INJECTION, SOLUTION INTRAVENOUS at 11:09

## 2022-09-14 RX ADMIN — ROPIVACAINE HYDROCHLORIDE 15 ML: 5 INJECTION, SOLUTION EPIDURAL; INFILTRATION; PERINEURAL at 12:09

## 2022-09-14 RX ADMIN — Medication 15 MG: at 12:09

## 2022-09-14 RX ADMIN — ONDANSETRON 4 MG: 2 INJECTION INTRAMUSCULAR; INTRAVENOUS at 12:09

## 2022-09-14 RX ADMIN — LIDOCAINE HYDROCHLORIDE 100 MG: 20 INJECTION, SOLUTION INTRAVENOUS at 12:09

## 2022-09-14 RX ADMIN — ACETAMINOPHEN 1000 MG: 500 TABLET ORAL at 11:09

## 2022-09-14 NOTE — OP NOTE
Operative report  Date of surgery:  09/14/2022    Preop diagnosis:  Wound dehiscence left ankle    Postop diagnosis: Same    Procedure:  I and D and closure of wound left ankle, 3 cm, left ankle and Prevena wound VAC placement    Anesthesia:  Regional block    Surgeon: Dr. Elias  Assistant: Dr. Brennan    Complications:  None  Estimated blood loss: None  Specimens:  Culture swab sent to microbiology from left ankle wound    Indication for procedure:  This is a 62-year-old male who I operated on about 6 weeks ago.  He underwent an left ankle arthroscopy and debridement as well as an excision of a chronic avulsion fragment of the distal fibula.  Subsequent to surgery the lateral ankle wound dehisced and has not shown any signs of healing.  It was elected taken to the operating room this time for debridement and closure of the wound.      Procedure in detail:  After regional anesthesia was administered in the preop holding area, the patient was brought to the operating room placed on the operating table in a supine position.  Patient's left leg was prepped draped in a sterile fashion.  A culture swab was used to probe the wound and it was noted to go deep into the lateral ankle joint.  No purulent drainage was noted.  A 15 blade was used to remove than the necrotic skin edges back to bleeding skin.  A curette and rongeur was used to remove any necrotic debris within the room.  Once the debridement was completed the wound was well irrigated with sterile vancomycin solution.  I was able to close the deep layer over the defect in the joint capsule with 2-0 Vicryl suture.  The subcutaneous layer was closed with 3-0 Vicryl suture.  The skin incision was closed with 3-0 nylon.  The wound VAC was then placed and the wound VAC was turned on and a good seal was obtained.  An Ace wrap was placed over the wound VAC dressing and the patient was returned the postop holding area in stable condition.

## 2022-09-14 NOTE — ANESTHESIA PROCEDURE NOTES
Adductor SS    Patient location during procedure: pre-op   Block not for primary anesthetic.  Reason for block: at surgeon's request and post-op pain management   Post-op Pain Location: LLE   Start time: 9/14/2022 12:26 PM  Timeout: 9/14/2022 12:25 PM   End time: 9/14/2022 12:30 PM    Staffing  Authorizing Provider: Josh Noland MD  Performing Provider: Josh Noland MD    Preanesthetic Checklist  Completed: patient identified, IV checked, site marked, risks and benefits discussed, surgical consent, monitors and equipment checked, pre-op evaluation and timeout performed  Peripheral Block  Patient position: supine  Prep: ChloraPrep  Patient monitoring: heart rate, cardiac monitor, continuous pulse ox, continuous capnometry and frequent blood pressure checks  Block type: adductor canal  Laterality: left  Injection technique: single shot  Needle  Needle type: Stimuplex   Needle gauge: 21 G  Needle length: 4 in  Needle localization: anatomical landmarks and ultrasound guidance   -ultrasound image captured on disc.  Assessment  Injection assessment: negative aspiration, negative parasthesia and local visualized surrounding nerve  Paresthesia pain: none  Heart rate change: no  Slow fractionated injection: yes    Medications:    Medications: ropivacaine (NAROPIN) injection 0.5% - Perineural   15 mL - 9/14/2022 12:29:00 PM    Additional Notes  VSS.  [facility] RN monitoring vitals throughout procedure.  Patient tolerated procedure well.  15cc 0.5% ropi given

## 2022-09-14 NOTE — INTERVAL H&P NOTE
The patient has been examined and the H&P has been reviewed:    I concur with the findings and no changes have occurred since H&P was written.    Surgery risks, benefits and alternative options discussed and understood by patient/family.          Active Hospital Problems    Diagnosis  POA    *Rupture of operation wound [T81.31XA]  Yes      Resolved Hospital Problems   No resolved problems to display.

## 2022-09-14 NOTE — PROGRESS NOTES
"Patient states he has "left over antibiotics " from the last time he was on  them earlier this month. Patient states he will be going back to [place] in a couple of days and that he has enough antibiotic with him to take until he can get back home and  his new script for antibiotics.  "

## 2022-09-14 NOTE — ANESTHESIA POSTPROCEDURE EVALUATION
Anesthesia Post Evaluation    Patient: Hipolito Laws    Procedure(s) Performed: Procedure(s) (LRB):  CLOSURE, WOUND left ankle (Left)    Final Anesthesia Type: general      Patient location during evaluation: PACU  Patient participation: Yes- Able to Participate  Level of consciousness: awake and alert  Post-procedure vital signs: reviewed and stable  Pain management: adequate  Airway patency: patent  ANAHY mitigation strategies: Multimodal analgesia  PONV status at discharge: No PONV  Anesthetic complications: no      Cardiovascular status: blood pressure returned to baseline and hemodynamically stable  Respiratory status: unassisted  Hydration status: euvolemic  Follow-up not needed.          Vitals Value Taken Time   /68 09/14/22 1331   Temp 36.5 °C (97.7 °F) 09/14/22 1326   Pulse 80 09/14/22 1339   Resp 17 09/14/22 1339   SpO2 98 % 09/14/22 1339   Vitals shown include unvalidated device data.      No case tracking events are documented in the log.      Pain/Elias Score: Pain Rating Prior to Med Admin: 5 (9/14/2022 12:21 PM)  Elias Score: 10 (9/14/2022  1:30 PM)

## 2022-09-14 NOTE — TELEPHONE ENCOUNTER
----- Message from Choco Brennan MD sent at 9/14/2022  1:34 PM CDT -----  Hi,    Can we get him follow up on Monday with Dr. Elias or Brooke for his prevena removal?      Thank you,  Choco

## 2022-09-14 NOTE — BRIEF OP NOTE
[place] - Surgery ([facility])  Brief Operative Note    Surgery Date: 9/14/2022     Surgeon(s) and Role:     * Aleksandr Elias MD - Primary    Assisting Surgeon: None    Pre-op Diagnosis:  Dehiscence of operative wound, subsequent encounter [T81.31XD]    Post-op Diagnosis:  Post-Op Diagnosis Codes:     * Dehiscence of operative wound, subsequent encounter [T81.31XD]    Procedure(s) (LRB):  CLOSURE, WOUND left ankle (Left)    Anesthesia: Choice    Operative Findings: See full op note    Estimated Blood Loss: Minimal         Specimens:   Specimen (24h ago, onward)      None              Discharge Note    OUTCOME: Patient tolerated treatment/procedure well without complication and is now ready for discharge.    DISPOSITION: Home or Self Care    FINAL DIAGNOSIS:  Rupture of operation wound    FOLLOWUP: In clinic    DISCHARGE INSTRUCTIONS:    Discharge Procedure Orders   Notify your health care provider if you experience any of the following:  temperature >100.4     Notify your health care provider if you experience any of the following:  persistent nausea and vomiting or diarrhea     Notify your health care provider if you experience any of the following:  severe uncontrolled pain     Notify your health care provider if you experience any of the following:  redness, tenderness, or signs of infection (pain, swelling, redness, odor or green/yellow discharge around incision site)     Notify your health care provider if you experience any of the following:  difficulty breathing or increased cough     Notify your health care provider if you experience any of the following:  severe persistent headache     Notify your health care provider if you experience any of the following:  worsening rash     Notify your health care provider if you experience any of the following:  persistent dizziness, light-headedness, or visual disturbances     Notify your health care provider if you experience any of the following:  increased  confusion or weakness     Leave dressing on - Keep it clean, dry, and intact until clinic visit     Activity as tolerated

## 2022-09-14 NOTE — PATIENT INSTRUCTIONS
You may bear weight as tolerated on the operative leg. Leave you plastic dressings on for 5 days, we will remove them in clinic. Keep the power supply plugged in when not traveling as it has a battery life of only 2-3 hours. The prevena should sit low on the incision, if it loses suction or sends error messages call us to assist with troubleshooting.

## 2022-09-14 NOTE — PLAN OF CARE
Vital signs stable. Afebrile. Alert, oriented and following commands. Denies pain/nausea. Dressing remains CDI. Wound vac in place and to suction. Tolerating PO intake. POC reviewed and understanding verbalized.

## 2022-09-14 NOTE — ANESTHESIA PROCEDURE NOTES
Popliteal SS    Patient location during procedure: pre-op   Block not for primary anesthetic.  Reason for block: at surgeon's request and post-op pain management   Post-op Pain Location: LLE   Start time: 9/14/2022 12:26 PM  Timeout: 9/14/2022 12:25 PM   End time: 9/14/2022 12:29 PM    Staffing  Authorizing Provider: Josh Noland MD  Performing Provider: Josh Noland MD    Preanesthetic Checklist  Completed: patient identified, IV checked, site marked, risks and benefits discussed, surgical consent, monitors and equipment checked, pre-op evaluation and timeout performed  Peripheral Block  Patient position: supine  Prep: ChloraPrep  Patient monitoring: heart rate, cardiac monitor, continuous pulse ox, continuous capnometry and frequent blood pressure checks  Block type: popliteal  Laterality: left  Injection technique: single shot  Needle  Needle type: Stimuplex   Needle gauge: 21 G  Needle length: 4 in  Needle localization: anatomical landmarks and ultrasound guidance   -ultrasound image captured on disc.  Assessment  Injection assessment: negative aspiration, negative parasthesia and local visualized surrounding nerve  Paresthesia pain: none  Heart rate change: no  Slow fractionated injection: yes    Medications:    Medications: ropivacaine (NAROPIN) injection 0.5% - Perineural   30 mL - 9/14/2022 12:27:00 PM    Additional Notes  VSS.  [facility] RN monitoring vitals throughout procedure.  Patient tolerated procedure well.  30cc 0.5% ropi given

## 2022-09-14 NOTE — ADDENDUM NOTE
Addendum  created 09/14/22 1507 by Danica Euceda, CRNA    Intraprocedure Meds edited, Orders acknowledged in Narrator

## 2022-09-14 NOTE — TRANSFER OF CARE
"Anesthesia Transfer of Care Note    Patient: Hipolito Laws    Procedure(s) Performed: Procedure(s) (LRB):  CLOSURE, WOUND left ankle (Left)    Patient location: PACU    Anesthesia Type: general    Transport from OR: Transported from OR on room air with adequate spontaneous ventilation    Post pain: adequate analgesia    Post assessment: no apparent anesthetic complications    Post vital signs: stable    Level of consciousness: awake    Nausea/Vomiting: no nausea/vomiting    Complications: none    Transfer of care protocol was followed      Last vitals:   Visit Vitals  BP 93/69 (BP Location: Right arm, Patient Position: Lying)   Pulse 79   Temp 36.5 °C (97.7 °F) (Temporal)   Resp 16   Ht 5' 10" (1.778 m)   Wt 88.5 kg (195 lb)   SpO2 96%   BMI 27.98 kg/m²     "

## 2022-09-14 NOTE — TELEPHONE ENCOUNTER
Called and Informed patient of appointment on 9/19/22 @ 8:45 am.  Patient states verbal understanding and has no further questions.

## 2022-09-14 NOTE — PLAN OF CARE
Pre op Checklist complete. Pt resting in bed with call light in reach.  Belongings at bedside and will placed them in a locker. Pt has scab noted to left leg and took aspirin 81mg 9/13, md notified. Pt still needs anes consent.

## 2022-09-15 ENCOUNTER — PATIENT MESSAGE (OUTPATIENT)
Dept: ORTHOPEDICS | Facility: CLINIC | Age: 62
End: 2022-09-15
Payer: COMMERCIAL

## 2022-09-17 LAB — BACTERIA SPEC AEROBE CULT: NO GROWTH

## 2022-09-19 ENCOUNTER — PATIENT MESSAGE (OUTPATIENT)
Dept: ORTHOPEDICS | Facility: CLINIC | Age: 62
End: 2022-09-19

## 2022-09-19 ENCOUNTER — OFFICE VISIT (OUTPATIENT)
Dept: ORTHOPEDICS | Facility: CLINIC | Age: 62
End: 2022-09-19
Payer: COMMERCIAL

## 2022-09-19 VITALS — TEMPERATURE: 99 F

## 2022-09-19 DIAGNOSIS — G89.18 POST-OPERATIVE PAIN: ICD-10-CM

## 2022-09-19 DIAGNOSIS — T81.31XD DEHISCENCE OF OPERATIVE WOUND, SUBSEQUENT ENCOUNTER: Primary | ICD-10-CM

## 2022-09-19 PROCEDURE — 1159F PR MEDICATION LIST DOCUMENTED IN MEDICAL RECORD: ICD-10-PCS | Mod: CPTII,S$GLB,, | Performed by: PHYSICIAN ASSISTANT

## 2022-09-19 PROCEDURE — 99024 POSTOP FOLLOW-UP VISIT: CPT | Mod: S$GLB,,, | Performed by: PHYSICIAN ASSISTANT

## 2022-09-19 PROCEDURE — 4010F PR ACE/ARB THEARPY RXD/TAKEN: ICD-10-PCS | Mod: CPTII,S$GLB,, | Performed by: PHYSICIAN ASSISTANT

## 2022-09-19 PROCEDURE — 99024 PR POST-OP FOLLOW-UP VISIT: ICD-10-PCS | Mod: S$GLB,,, | Performed by: PHYSICIAN ASSISTANT

## 2022-09-19 PROCEDURE — 99999 PR PBB SHADOW E&M-EST. PATIENT-LVL III: ICD-10-PCS | Mod: PBBFAC,,, | Performed by: PHYSICIAN ASSISTANT

## 2022-09-19 PROCEDURE — 99999 PR PBB SHADOW E&M-EST. PATIENT-LVL III: CPT | Mod: PBBFAC,,, | Performed by: PHYSICIAN ASSISTANT

## 2022-09-19 PROCEDURE — 1159F MED LIST DOCD IN RCRD: CPT | Mod: CPTII,S$GLB,, | Performed by: PHYSICIAN ASSISTANT

## 2022-09-19 PROCEDURE — 3044F HG A1C LEVEL LT 7.0%: CPT | Mod: CPTII,S$GLB,, | Performed by: PHYSICIAN ASSISTANT

## 2022-09-19 PROCEDURE — 4010F ACE/ARB THERAPY RXD/TAKEN: CPT | Mod: CPTII,S$GLB,, | Performed by: PHYSICIAN ASSISTANT

## 2022-09-19 PROCEDURE — 3044F PR MOST RECENT HEMOGLOBIN A1C LEVEL <7.0%: ICD-10-PCS | Mod: CPTII,S$GLB,, | Performed by: PHYSICIAN ASSISTANT

## 2022-09-19 NOTE — PROGRESS NOTES
22: :   1. Left ankle arthroscopy with extensive debridement  2. Excision bony nonunion fragments  left distal fibula  22: Wound breakdown.   22::    I and D and closure of wound left ankle, 3 cm, left ankle and Prevena wound VAC placement    Micro: NGTD: antibiotics Bactrim     Mr. Laws is here today for a post-operative visit    Interval History:  he reports that he is doing better.   he is at home . he is not participating in PT/OT.   He has been keeping compression and it elevated with some reduction in swelling.  Has kept activity to a minimum.     Pain is not controlled.  he is taking pain medication.   Refill needed  he denies fever, chills, and sweats .     Physical exam:    Patient arrives to exam room: walked into clinic with knee scooter .  Patient is unaccompanied. Previna wound vac removed.  Incision c/d/I with sutures in place.     RADS: All pertinent images were reviewed by myself:   none done today    Assessment:  Post-op visit ( 1 weeks)    Plan:  discussed with .   Current care, treatment plan, precautions, activity level/ modifications, limitations, rehabilitation exercises and proposed future treatment were discussed with the patient. We discussed the need to monitor for changes in symptoms and condition and report them to the physician.  Discussed importance of compliance with all appointments and follow up examinations.     WOUND CARE ORDERS  - dry dressing, will change every 2-3 days.   - Edema control      ACTIVITY:   - light        PAIN MEDICATION:   - Multimodal pain control  - Pain medication: refill was needed, added gabapentin.   - Pain medication refill policy provided to patient for review, yes.    - Patient was informed a multi-modal approach is used to treat their pain. With the goal to get off of narcotic pain medication and discontinue as soon as possible.   - ice and elevation to reduce pain and swelling    OTHER:   Bactrim.  May adjust pending culture     FOLLOW UP:   - Patient will follow up in the clinic in 2 weeks. With        If there are any questions prior to scheduled follow up, the patient was instructed to contact the office

## 2022-09-20 RX ORDER — METHOCARBAMOL 500 MG/1
500 TABLET, FILM COATED ORAL 3 TIMES DAILY
Qty: 42 TABLET | Refills: 0 | Status: SHIPPED | OUTPATIENT
Start: 2022-09-20 | End: 2022-10-04

## 2022-09-20 RX ORDER — GABAPENTIN 100 MG/1
100 CAPSULE ORAL 3 TIMES DAILY
Qty: 90 CAPSULE | Refills: 0 | Status: SHIPPED | OUTPATIENT
Start: 2022-09-20 | End: 2022-11-14

## 2022-09-20 RX ORDER — HYDROCODONE BITARTRATE AND ACETAMINOPHEN 5; 325 MG/1; MG/1
1 TABLET ORAL
Qty: 42 TABLET | Refills: 0 | Status: SHIPPED | OUTPATIENT
Start: 2022-09-20 | End: 2022-10-17 | Stop reason: SDUPTHER

## 2022-09-22 LAB — BACTERIA SPEC ANAEROBE CULT: NORMAL

## 2022-09-26 LAB — FUNGUS SPEC CULT: NORMAL

## 2022-09-29 ENCOUNTER — OFFICE VISIT (OUTPATIENT)
Dept: INTERNAL MEDICINE | Facility: CLINIC | Age: 62
End: 2022-09-29
Payer: COMMERCIAL

## 2022-09-29 ENCOUNTER — IMMUNIZATION (OUTPATIENT)
Dept: INTERNAL MEDICINE | Facility: CLINIC | Age: 62
End: 2022-09-29
Payer: COMMERCIAL

## 2022-09-29 ENCOUNTER — LAB VISIT (OUTPATIENT)
Dept: LAB | Facility: HOSPITAL | Age: 62
End: 2022-09-29
Attending: INTERNAL MEDICINE
Payer: COMMERCIAL

## 2022-09-29 VITALS
HEART RATE: 86 BPM | SYSTOLIC BLOOD PRESSURE: 154 MMHG | WEIGHT: 200 LBS | DIASTOLIC BLOOD PRESSURE: 92 MMHG | BODY MASS INDEX: 28.63 KG/M2 | OXYGEN SATURATION: 100 % | HEIGHT: 70 IN

## 2022-09-29 DIAGNOSIS — I10 ESSENTIAL HYPERTENSION, BENIGN: ICD-10-CM

## 2022-09-29 DIAGNOSIS — Z00.00 LABORATORY EXAM ORDERED AS PART OF ROUTINE GENERAL MEDICAL EXAMINATION: ICD-10-CM

## 2022-09-29 DIAGNOSIS — Z98.890 HISTORY OF ANKLE SURGERY: ICD-10-CM

## 2022-09-29 DIAGNOSIS — I10 ESSENTIAL HYPERTENSION, BENIGN: Primary | ICD-10-CM

## 2022-09-29 DIAGNOSIS — E78.5 HYPERLIPIDEMIA, UNSPECIFIED HYPERLIPIDEMIA TYPE: ICD-10-CM

## 2022-09-29 LAB
ALBUMIN SERPL BCP-MCNC: 4.5 G/DL (ref 3.5–5.2)
ALP SERPL-CCNC: 65 U/L (ref 55–135)
ALT SERPL W/O P-5'-P-CCNC: 25 U/L (ref 10–44)
ANION GAP SERPL CALC-SCNC: 9 MMOL/L (ref 8–16)
AST SERPL-CCNC: 18 U/L (ref 10–40)
BILIRUB SERPL-MCNC: 0.6 MG/DL (ref 0.1–1)
BUN SERPL-MCNC: 21 MG/DL (ref 6–20)
CALCIUM SERPL-MCNC: 10 MG/DL (ref 8.7–10.5)
CHLORIDE SERPL-SCNC: 98 MMOL/L (ref 95–110)
CO2 SERPL-SCNC: 29 MMOL/L (ref 23–29)
CREAT SERPL-MCNC: 0.8 MG/DL (ref 0.5–1.4)
EST. GFR  (NO RACE VARIABLE): >60 ML/MIN/1.73 M^2
GLUCOSE SERPL-MCNC: 97 MG/DL (ref 70–110)
POTASSIUM SERPL-SCNC: 4.6 MMOL/L (ref 3.5–5.1)
PROT SERPL-MCNC: 7.8 G/DL (ref 6–8.4)
SODIUM SERPL-SCNC: 136 MMOL/L (ref 136–145)

## 2022-09-29 PROCEDURE — 4010F ACE/ARB THERAPY RXD/TAKEN: CPT | Mod: CPTII,S$GLB,, | Performed by: INTERNAL MEDICINE

## 2022-09-29 PROCEDURE — 3008F BODY MASS INDEX DOCD: CPT | Mod: CPTII,S$GLB,, | Performed by: INTERNAL MEDICINE

## 2022-09-29 PROCEDURE — 3044F HG A1C LEVEL LT 7.0%: CPT | Mod: CPTII,S$GLB,, | Performed by: INTERNAL MEDICINE

## 2022-09-29 PROCEDURE — 4010F PR ACE/ARB THEARPY RXD/TAKEN: ICD-10-PCS | Mod: CPTII,S$GLB,, | Performed by: INTERNAL MEDICINE

## 2022-09-29 PROCEDURE — 36415 COLL VENOUS BLD VENIPUNCTURE: CPT | Performed by: INTERNAL MEDICINE

## 2022-09-29 PROCEDURE — 99214 OFFICE O/P EST MOD 30 MIN: CPT | Mod: 25,S$GLB,, | Performed by: INTERNAL MEDICINE

## 2022-09-29 PROCEDURE — 99214 PR OFFICE/OUTPT VISIT, EST, LEVL IV, 30-39 MIN: ICD-10-PCS | Mod: 25,S$GLB,, | Performed by: INTERNAL MEDICINE

## 2022-09-29 PROCEDURE — 99999 PR PBB SHADOW E&M-EST. PATIENT-LVL IV: ICD-10-PCS | Mod: PBBFAC,,, | Performed by: INTERNAL MEDICINE

## 2022-09-29 PROCEDURE — 3077F PR MOST RECENT SYSTOLIC BLOOD PRESSURE >= 140 MM HG: ICD-10-PCS | Mod: CPTII,S$GLB,, | Performed by: INTERNAL MEDICINE

## 2022-09-29 PROCEDURE — 1159F MED LIST DOCD IN RCRD: CPT | Mod: CPTII,S$GLB,, | Performed by: INTERNAL MEDICINE

## 2022-09-29 PROCEDURE — 99999 PR PBB SHADOW E&M-EST. PATIENT-LVL IV: CPT | Mod: PBBFAC,,, | Performed by: INTERNAL MEDICINE

## 2022-09-29 PROCEDURE — 3080F DIAST BP >= 90 MM HG: CPT | Mod: CPTII,S$GLB,, | Performed by: INTERNAL MEDICINE

## 2022-09-29 PROCEDURE — 3008F PR BODY MASS INDEX (BMI) DOCUMENTED: ICD-10-PCS | Mod: CPTII,S$GLB,, | Performed by: INTERNAL MEDICINE

## 2022-09-29 PROCEDURE — 90686 FLU VACCINE (QUAD) GREATER THAN OR EQUAL TO 3YO PRESERVATIVE FREE IM: ICD-10-PCS | Mod: S$GLB,,, | Performed by: INTERNAL MEDICINE

## 2022-09-29 PROCEDURE — 1160F PR REVIEW ALL MEDS BY PRESCRIBER/CLIN PHARMACIST DOCUMENTED: ICD-10-PCS | Mod: CPTII,S$GLB,, | Performed by: INTERNAL MEDICINE

## 2022-09-29 PROCEDURE — 1159F PR MEDICATION LIST DOCUMENTED IN MEDICAL RECORD: ICD-10-PCS | Mod: CPTII,S$GLB,, | Performed by: INTERNAL MEDICINE

## 2022-09-29 PROCEDURE — 80053 COMPREHEN METABOLIC PANEL: CPT | Performed by: INTERNAL MEDICINE

## 2022-09-29 PROCEDURE — 90471 IMMUNIZATION ADMIN: CPT | Mod: S$GLB,,, | Performed by: INTERNAL MEDICINE

## 2022-09-29 PROCEDURE — 1160F RVW MEDS BY RX/DR IN RCRD: CPT | Mod: CPTII,S$GLB,, | Performed by: INTERNAL MEDICINE

## 2022-09-29 PROCEDURE — 3077F SYST BP >= 140 MM HG: CPT | Mod: CPTII,S$GLB,, | Performed by: INTERNAL MEDICINE

## 2022-09-29 PROCEDURE — 90471 FLU VACCINE (QUAD) GREATER THAN OR EQUAL TO 3YO PRESERVATIVE FREE IM: ICD-10-PCS | Mod: S$GLB,,, | Performed by: INTERNAL MEDICINE

## 2022-09-29 PROCEDURE — 3080F PR MOST RECENT DIASTOLIC BLOOD PRESSURE >= 90 MM HG: ICD-10-PCS | Mod: CPTII,S$GLB,, | Performed by: INTERNAL MEDICINE

## 2022-09-29 PROCEDURE — 90686 IIV4 VACC NO PRSV 0.5 ML IM: CPT | Mod: S$GLB,,, | Performed by: INTERNAL MEDICINE

## 2022-09-29 PROCEDURE — 3044F PR MOST RECENT HEMOGLOBIN A1C LEVEL <7.0%: ICD-10-PCS | Mod: CPTII,S$GLB,, | Performed by: INTERNAL MEDICINE

## 2022-09-29 RX ORDER — AMOXICILLIN 500 MG
CAPSULE ORAL DAILY
COMMUNITY

## 2022-09-29 NOTE — PROGRESS NOTES
"    CHIEF COMPLAINT     Chief Complaint   Patient presents with    Follow-up       HPI     Hipolito Laws is a 62 y.o. male HTN HLDhere today for     S/p ankle surgery for avulsion fracture with nonunion complicated by dehiscence.  Had 2nd procedure in September.  Has been out of commission for about 2 months      Personally Reviewed Patient's Medical, surgical, family and social hx. Changes updated in [facility].  Care Team updated in Epic    Review of Systems:  Review of Systems    Health Maintenance:   Reviewed with patient  Due for the following:      PHYSICAL EXAM     BP (!) 154/92 (BP Location: Right arm, Patient Position: Sitting, BP Method: Medium (Manual))   Pulse 86   Ht 5' 10" (1.778 m)   Wt 90.7 kg (200 lb)   SpO2 100%   BMI 28.70 kg/m²     Gen: Well Appearing, NAD  HEENT: PERR, EOMI  Neck: FROM, no thyromegaly, no cervical adenopathy  CVD: RRR, no M/R/G  Pulm: Normal work of breathing, CTAB, no wheezing  MSK: no LE edema  Neuro: A&Ox3, gait normal, speech normal  Mood; Mood normal, behavior normal, thought process linear       LABS     Labs reviewed; Notable for    ASSESSMENT     1. Essential hypertension, benign  Comprehensive Metabolic Panel      2. Hyperlipidemia, unspecified hyperlipidemia type        3. History of ankle surgery                Plan     Hipolito Laws is a 62 y.o. male with  1. Essential hypertension, benign  - Comprehensive Metabolic Panel; Future    2. Hyperlipidemia, unspecified hyperlipidemia type    3. History of ankle surgery      Fabio Rosas MD        "

## 2022-10-03 ENCOUNTER — OFFICE VISIT (OUTPATIENT)
Dept: ORTHOPEDICS | Facility: CLINIC | Age: 62
End: 2022-10-03
Payer: COMMERCIAL

## 2022-10-03 VITALS — HEIGHT: 70 IN | WEIGHT: 199.94 LBS | BODY MASS INDEX: 28.62 KG/M2

## 2022-10-03 DIAGNOSIS — Z09 FOLLOW-UP EXAMINATION AFTER ORTHOPEDIC SURGERY: Primary | ICD-10-CM

## 2022-10-03 DIAGNOSIS — M19.072 ARTHROSIS OF LEFT ANKLE: ICD-10-CM

## 2022-10-03 PROCEDURE — 3008F BODY MASS INDEX DOCD: CPT | Mod: CPTII,S$GLB,, | Performed by: ORTHOPAEDIC SURGERY

## 2022-10-03 PROCEDURE — 4010F PR ACE/ARB THEARPY RXD/TAKEN: ICD-10-PCS | Mod: CPTII,S$GLB,, | Performed by: ORTHOPAEDIC SURGERY

## 2022-10-03 PROCEDURE — 99999 PR PBB SHADOW E&M-EST. PATIENT-LVL III: CPT | Mod: PBBFAC,,, | Performed by: ORTHOPAEDIC SURGERY

## 2022-10-03 PROCEDURE — 99999 PR PBB SHADOW E&M-EST. PATIENT-LVL III: ICD-10-PCS | Mod: PBBFAC,,, | Performed by: ORTHOPAEDIC SURGERY

## 2022-10-03 PROCEDURE — 4010F ACE/ARB THERAPY RXD/TAKEN: CPT | Mod: CPTII,S$GLB,, | Performed by: ORTHOPAEDIC SURGERY

## 2022-10-03 PROCEDURE — 1159F MED LIST DOCD IN RCRD: CPT | Mod: CPTII,S$GLB,, | Performed by: ORTHOPAEDIC SURGERY

## 2022-10-03 PROCEDURE — 99024 POSTOP FOLLOW-UP VISIT: CPT | Mod: S$GLB,,, | Performed by: ORTHOPAEDIC SURGERY

## 2022-10-03 PROCEDURE — 3044F HG A1C LEVEL LT 7.0%: CPT | Mod: CPTII,S$GLB,, | Performed by: ORTHOPAEDIC SURGERY

## 2022-10-03 PROCEDURE — 3008F PR BODY MASS INDEX (BMI) DOCUMENTED: ICD-10-PCS | Mod: CPTII,S$GLB,, | Performed by: ORTHOPAEDIC SURGERY

## 2022-10-03 PROCEDURE — 99024 PR POST-OP FOLLOW-UP VISIT: ICD-10-PCS | Mod: S$GLB,,, | Performed by: ORTHOPAEDIC SURGERY

## 2022-10-03 PROCEDURE — 1159F PR MEDICATION LIST DOCUMENTED IN MEDICAL RECORD: ICD-10-PCS | Mod: CPTII,S$GLB,, | Performed by: ORTHOPAEDIC SURGERY

## 2022-10-03 PROCEDURE — 3044F PR MOST RECENT HEMOGLOBIN A1C LEVEL <7.0%: ICD-10-PCS | Mod: CPTII,S$GLB,, | Performed by: ORTHOPAEDIC SURGERY

## 2022-10-03 NOTE — PROGRESS NOTES
Hipolito Laws  Returns today.  He is almost 3 weeks postop from I and D of a nonhealing wound on his left ankle.  He returns today and reports that he is doing well.  He has been very cautious about his activities.    Examination:  The wound on his left ankle is well healed.  There is no erythema and minimal swelling.  I removed his sutures today without difficulty.  There is no drainage from the stitch sites.    Impression:  1. Follow-up examination after orthopedic surgery        2. Arthrosis of left ankle          Recommendation:  He is wound is now completely healed.  All of the cultures from his I and D were negative.  He can begin to progress his low-impact activity as tolerated.  I told him he can start doing gentle range of motion.  He is going to start physical therapy.  He should continue no work for the next 4 weeks.      Follow-up in 4 weeks

## 2022-10-03 NOTE — LETTER
October 3, 2022    Hipolito Laws  [address]  [place] [address]             [facility] [address]  [address]  [facility] [address]  Phone: [phone] To May concern,  Continue no work for at least 4 more weeks.  He will be a re-evaluated back in clinic at that time.  If there are any questions or concerns please contact me.    Sincerely,     Aleksandr Elias MD

## 2022-10-04 NOTE — ADDENDUM NOTE
Addendum  created 10/04/22 1035 by Josh Noland MD    Clinical Note Signed, Diagnosis association updated, Intraprocedure Blocks edited, Intraprocedure Event edited, Intraprocedure Staff edited

## 2022-10-10 ENCOUNTER — CLINICAL SUPPORT (OUTPATIENT)
Dept: REHABILITATION | Facility: HOSPITAL | Age: 62
End: 2022-10-10
Payer: COMMERCIAL

## 2022-10-10 DIAGNOSIS — M19.072 ARTHROSIS OF LEFT ANKLE: ICD-10-CM

## 2022-10-10 DIAGNOSIS — G89.18 POST-OPERATIVE PAIN: ICD-10-CM

## 2022-10-10 DIAGNOSIS — Z74.09 DECREASED FUNCTIONAL MOBILITY AND ENDURANCE: ICD-10-CM

## 2022-10-10 PROCEDURE — 97161 PT EVAL LOW COMPLEX 20 MIN: CPT | Mod: PN

## 2022-10-10 PROCEDURE — 97110 THERAPEUTIC EXERCISES: CPT | Mod: PN

## 2022-10-10 NOTE — PLAN OF CARE
[facility]   Physical Therapy Initial Evaluation     Date: 10/10/2022   Name: Hipolito Laws  Clinic Number: [id number]    Therapy Diagnosis:   Encounter Diagnoses   Name Primary?    Post-operative pain     Arthrosis of left ankle     Decreased functional mobility and endurance      Physician: Brooke Alvares, LAURYN    Physician Orders: PT Eval and Treat    Medical Diagnosis from Referral: Post -op left ankle arthrosis   Evaluation Date: 10/10/2022  Authorization Period Expiration: 12/31/2022  Plan of Care Expiration: 12/31/2022  Visit # / Visits authorized: 1/ 1   FOTO: 1/3    Precautions: Standard     Time In: 7:45 am   Time Out: 8:45 am   Total Appointment Time (timed & untimed codes): 60  minutes      SUBJECTIVE     Date of onset: 8/4/2022     History of current condition - Hipolito reports: Chronic left ankle pain - original injury was 30 years ago - re-injured ankle about 17 years ago.  He surgeries on his ankle, conservative treatment.  Stated that in past 6-7 years he had progressive pain mainly in the lateral aspect of his left ankle around the distal fibula.  He had an MRI that revealed an osteochondral lesion of the talar dome with arthrosis of left ankle.  Hipolito underwent left ankle arthroscopy with debridement on 8/4/2022.  Patient returned to [facility] on 8/31/2022 with dehiscence of ankle incision.  Patient had wound care and returned to OR on 9/14 for debridement and closure of this wound.  He has been home, NWB on his LLE every since his original surgery on 8/4/2022.  He reports that he just started walking about a week ago following his last visit with Dr. Elias.  He is very fearful/concerned about the incision re-opening and stated he has been very cautious with his activity.    Hipolito was referred to PT to address ROM, gait and general strengthening of LLE.     Falls: reports no falls.     Prior Therapy: No prior history with therapy   Social History: patient lives with his  wife - he is currently staying at his camp here in [place] while he recovers; also has a home in [place], [place]; stated that his wife goes back and forth. [place] home is elevated 10ft - has about 10 steps to enter;   Occupation:  - job requires walking, lifting, climbing ladders, carry heavy equipment; he is currently on STD for another 3 weeks.   Prior Level of Function: Independent, reports that he has never been an exerciser, but very active at his work and at home   Current Level of Function: Ambulating without use of AD; He is independent with all ADL's; driving; takes steps - 2 feet per step up/down. Cautious with his activity as he has been NWB since 8/4/2022.     Pain:  Current 2/10, worst 6/10, best 2/10   Location: left  lateral and dorsum of ankle; also has general mm tightness pain in calf and distal achilles, notes numbness on medial side of ankle to foot - just distal to toes ever since his first surgery.   Description: Dull, Grabbing, Tight, and Numb  Aggravating Factors: Standing and Walking  Easing Factors: pain medication, rest, and elevation    Patients goals: To return to work, full function in LLE/foot/ankle      Medical History:   Past Medical History:   Diagnosis Date    Hypertension        Surgical History:   Hipolito Laws  has a past surgical history that includes Arthroscopic repair of rotator cuff of shoulder (1992); Epidural steroid injection into cervical spine (Right, 12/17/2020); Umbilical hernia repair (N/A, 4/5/2021); Transforaminal epidural injection of steroid (Right, 10/14/2021); Epidural steroid injection (N/A, 12/13/2021); Epidural steroid injection (N/A, 6/2/2022); Arthroscopy of ankle with debridement (Left, 8/4/2022); Surgical removal of lesion of fibula (Left, 8/4/2022); and Closure of wound (Left, 9/14/2022).    Medications:   Hipolito has a current medication list which includes the following prescription(s): ascorbic acid (vitamin c), aspirin, atorvastatin,  "duloxetine, gabapentin, hydrocodone-acetaminophen, losartan, multivitamin, fish oil-omega-3 fatty acids, trazodone, vitamin d, and vitamin e, and the following Facility-Administered Medications: midazolam.    Allergies:   Review of patient's allergies indicates:  No Known Allergies       OBJECTIVE        Observation: Hipolito ambulated into clinic; antalgic gait pattern, loss of heel to toe pattern; Hipolito is alert/oriented x 4; He is in no acute distress;     Posture: slight forward head, level shoulder and hips; adequate lordosis in lumbar spine;     LE AROM:   Hips: WFL in all planes  Knees: WFL in all planes  Ankles:   Right: WFL in all planes   Left: Dorsiflexion: +1; PF 50; Inversion: 15  Eversion: < 5         Lower Extremity Strength   Right LE Left LE   Knee extension: 5/5 4/5   Knee flexion: 5/5 4/5   Hip flexion: 5/5 5/5   Hip Internal Rotation:  5/5    4/5      Hip External Rotation: 5/5    4/5      Hip extension:  4+/5  4-/5   Hip abduction: 5/5 4/5   Hip adduction: 5/5 4/5   Ankle dorsiflexion: 5/5 4-/5   Ankle plantarflexion: 5/5 3-/5       SLS L:  Firm 12 sec,  Foam 6 sec  Toe Tap test: 10 reps on 6" step with unaffecting leg in 15 seconds  30 sec chair rise: 6 reps with arms crossed    Palpation: minimal tenderness to palpation at left ankle - lateral aspect around incision; dorsum of foot; achilles at calcaneous and into calf mm     Sensation: intact to light touch LLE     PT Evaluation Completed? Yes  Discussed Plan of Care with patient: Yes       Limitation/Restriction for FOTO Ankle  Survey    Therapist reviewed FOTO scores for Hipolito Laws on 10/10/2022.   FOTO documents entered into EPIC - see Media section.    Limitation Score: 54 %         TREATMENT     Total Treatment time (time-based codes) separate from Evaluation: 20 minutes      Hipolito received the treatments listed below:      therapeutic exercises to develop strength and ROM for 18 minutes including:  Recumbent bike x 7 mins   Achilles " stretch on wedge - at home use rolled towel to elevate toes   Ankle - 4 way with theraband   SLR, Hip abd, SAQ  Step-ups - front and lateral   SLS     PATIENT EDUCATION AND HOME EXERCISES     Education provided:   - ankle ROM  Heel to toe gait pattern to normalize gait     Written Home Exercises Provided: yes. Exercises were reviewed and Hipolito was able to demonstrate them prior to the end of the session.  Hipolito demonstrated good  understanding of the education provided. See MEDIA under Patient Instructions for exercises provided during therapy sessions.    ASSESSMENT     Hipolito is a 62 y.o. male referred to outpatient Physical Therapy with a medical diagnosis of left ankle arthrosis, removal of osteochondral lesion on talar dome; patient then developed wound dehiscence 2 weeks following surgery. Wound care for several weeks then surgical debridement and closure of wound.  Patient has been NWB on his LLE since his first surgery 8/4/2022.  He is now ambulating without use of any AD, he is FWB on his LLE, Patient presents with gait/balance dysfunction, decreased left ankle ROM, decreased LLE strength as result of his prolonged NWB status.      Patient prognosis is Good.   Patient will benefit from skilled outpatient Physical Therapy to address the deficits stated above and in the chart below, provide patient /family education, and to maximize patientt's level of independence.     Plan of care discussed with patient: Yes  Patient's spiritual, cultural and educational needs considered and patient is agreeable to the plan of care and goals as stated below:     Anticipated Barriers for therapy: none     Medical Necessity is demonstrated by the following  History  Co-morbidities and personal factors that may impact the plan of care Co-morbidities:   HTN    Personal Factors:   no deficits     low   Examination  Body Structures and Functions, activity limitations and participation restrictions that may impact the plan of care  Body Regions:   lower extremities    Body Systems:    gross symmetry  ROM  strength  gross coordinated movement  balance  gait  transfers    Participation Restrictions:   none    Activity limitations:   Learning and applying knowledge  no deficits    General Tasks and Commands  no deficits    Communication  no deficits    Mobility  lifting and carrying objects  walking    Self care  no deficits    Domestic Life  shopping  cooking  doing house work (cleaning house, washing dishes, laundry)    Interactions/Relationships  no deficits    Life Areas  no deficits    Community and Social Life  community life  recreation and leisure         moderate   Clinical Presentation stable and uncomplicated low   Decision Making/ Complexity Score: low     Goals:  Short Term Goals: 3 weeks   Pain no more than 2/10 in left ankle with increased gait and general activity level   Able to demonstrate improved ankle ROM to WFL in all planes for return to improved function  Able to demonstrate heel to toe pattern on left for normal gait   Compliant with HEP     Long Term Goals: 6 weeks   0/10 pain in left ankle with daily activities   Able to walk x 1 mile without pain or compensation   Able to go up/down steps with one foot per step and no railing   Able to carry 30 lbs x 25ft without pain/compensation   FOTO limitation score to 37%  Independent with HEP     PLAN   Plan of care Certification: 10/10/2022 to 12/31/2022.    Outpatient Physical Therapy 2 times weekly for 6 weeks to include the following interventions: Gait Training, Manual Therapy, Neuromuscular Re-ed, Patient Education, Therapeutic Activities, and Therapeutic Exercise.     Sejal Mancini, PT      I CERTIFY THE NEED FOR THESE SERVICES FURNISHED UNDER THIS PLAN OF TREATMENT AND WHILE UNDER MY CARE   Physician's comments:     Physician's Signature: ___________________________________________________

## 2022-10-14 ENCOUNTER — CLINICAL SUPPORT (OUTPATIENT)
Dept: REHABILITATION | Facility: HOSPITAL | Age: 62
End: 2022-10-14
Payer: COMMERCIAL

## 2022-10-14 DIAGNOSIS — Z74.09 DECREASED FUNCTIONAL MOBILITY AND ENDURANCE: Primary | ICD-10-CM

## 2022-10-14 PROCEDURE — 97110 THERAPEUTIC EXERCISES: CPT | Mod: PN,CQ

## 2022-10-14 NOTE — PROGRESS NOTES
"[facility]   Physical Therapy Treatment Note     Name: Hipolito Laws  Clinic Number: [id number]    Therapy Diagnosis:   Encounter Diagnosis   Name Primary?    Decreased functional mobility and endurance Yes     Physician: Brooke Alvares PA-C    Visit Date: 10/14/2022    Physician Orders: PT Eval and Treat    Medical Diagnosis from Referral: Post -op left ankle arthrosis   Evaluation Date: 10/10/2022  Authorization Period Expiration: 12/31/2022  Plan of Care Expiration: 12/31/2022  Visit # / Visits authorized: 2 / 12   FOTO: 1/3    PTA Visit #: 1/5     Time In: 8:05 AM  Time Out: 8:50 AM  Total Billable Time: 40 minutes    SUBJECTIVE     Pt reports: I am walking better, now able to go up stairs normally.  He was compliant with home exercise program.  Response to previous treatment: N/A  Functional change: N/A    Pain: 3-4/10  Location: left ankle    OBJECTIVE     Objective Measures updated at progress report unless specified.     Treatment     Hipolito received the treatments listed below:      therapeutic exercises to develop strength, endurance, ROM, and flexibility for 40 minutes including:  Recumbent Bike level 4 x 12 min  Toe Taps on 6" step x 2 min  FSU on 6" step x 10  LSU on 6" step x 10  Standing Hip Flex/Abd/Ext x 10  Heel Cord stretch on wedge 3 x 30 sec  4 Way ankle x 15 w/ red TB  Bridges x 10  Ball Squeezes x 2 min  SLR x 10  S/L Hip Abd x 10  SAQ on small gray roll x 2 min      manual therapy techniques:  were applied to the:  for  minutes, including:      neuromuscular re-education activities to improve:  for  minutes. The following activities were included:      therapeutic activities to improve functional performance for   minutes, including:      gait training to improve functional mobility and safety for   minutes, including:      direct contact modalities after being cleared for contraindications:     supervised modalities after being cleared for contradictions:     hot " pack for  minutes to .    cold pack for  minutes to .        Patient Education and Home Exercises     Home Exercises Provided and Patient Education Provided     Education provided:       Written Home Exercises Provided: yes. Exercises were reviewed and Hipolito was able to demonstrate them prior to the end of the session.  Hipolito demonstrated good  understanding of the education provided. See EMR under Patient Instructions for exercises provided during therapy sessions    ASSESSMENT     Patient did well with exercises; attempted to do wobble board, but had some increased discomfort, stopped at that time    Hipolito Is progressing well towards his goals.   Pt prognosis is Good.     Pt will continue to benefit from skilled outpatient physical therapy to address the deficits listed in the problem list box on initial evaluation, provide pt/family education and to maximize pt's level of independence in the home and community environment.     Pt's spiritual, cultural and educational needs considered and pt agreeable to plan of care and goals.     Anticipated barriers to physical therapy: None    Goals:   Short Term Goals: 3 weeks   Pain no more than 2/10 in left ankle with increased gait and general activity level   Able to demonstrate improved ankle ROM to WFL in all planes for return to improved function  Able to demonstrate heel to toe pattern on left for normal gait   Compliant with HEP      Long Term Goals: 6 weeks   0/10 pain in left ankle with daily activities   Able to walk x 1 mile without pain or compensation   Able to go up/down steps with one foot per step and no railing   Able to carry 30 lbs x 25ft without pain/compensation   FOTO limitation score to 37%  Independent with HEP     PLAN     Continue per POC and progress with strengthening/mobility exercises as patient tolerates.    Jonathan Favre, PTA

## 2022-10-17 ENCOUNTER — PATIENT MESSAGE (OUTPATIENT)
Dept: ORTHOPEDICS | Facility: CLINIC | Age: 62
End: 2022-10-17
Payer: COMMERCIAL

## 2022-10-17 DIAGNOSIS — G89.18 POST-OPERATIVE PAIN: ICD-10-CM

## 2022-10-17 RX ORDER — HYDROCODONE BITARTRATE AND ACETAMINOPHEN 5; 325 MG/1; MG/1
1 TABLET ORAL EVERY 8 HOURS PRN
Qty: 21 TABLET | Refills: 0 | Status: SHIPPED | OUTPATIENT
Start: 2022-10-17 | End: 2022-10-19 | Stop reason: SDUPTHER

## 2022-10-18 ENCOUNTER — CLINICAL SUPPORT (OUTPATIENT)
Dept: REHABILITATION | Facility: HOSPITAL | Age: 62
End: 2022-10-18
Payer: COMMERCIAL

## 2022-10-18 DIAGNOSIS — Z74.09 DECREASED FUNCTIONAL MOBILITY AND ENDURANCE: Primary | ICD-10-CM

## 2022-10-18 LAB — FUNGUS SPEC CULT: NORMAL

## 2022-10-18 PROCEDURE — 97110 THERAPEUTIC EXERCISES: CPT | Mod: PN,CQ

## 2022-10-18 NOTE — PROGRESS NOTES
"[facility]   Physical Therapy Treatment Note     Name: Hipolito Laws  Clinic Number: [id number]    Therapy Diagnosis:   Encounter Diagnosis   Name Primary?    Decreased functional mobility and endurance Yes     Physician: Brooke Alvares PA-C    Visit Date: 10/18/2022    Physician Orders: PT Eval and Treat    Medical Diagnosis from Referral: Post -op left ankle arthrosis   Evaluation Date: 10/10/2022  Authorization Period Expiration: 12/31/2022  Plan of Care Expiration: 12/31/2022  Visit # / Visits authorized: 3 / 12   FOTO: 1/3    PTA Visit #: 2/5     Time In: 8:00 AM  Time Out: 8:45 AM  Total Billable Time: 40 minutes    SUBJECTIVE     Pt reports: I am feeling pretty good. It is still draining from the incision, just not as much as it was.  He was compliant with home exercise program.  Response to previous treatment: N/A  Functional change: N/A    Pain: 3/10  Location: left ankle    OBJECTIVE     Objective Measures updated at progress report unless specified.     Treatment     Hipolito received the treatments listed below:      therapeutic exercises to develop strength, endurance, ROM, and flexibility for 40 minutes including:  Recumbent Bike level 4 x 12 min  Toe Taps on 6" step x 2 min  FSU on 6" step x 10  LSU on 6" step x 10  Standing Hip Flex/Abd/Ext x 10  Heel Cord stretch on wedge 3 x 30 sec  4 Way ankle x 15 w/ red TB  Bridges x 10  Ball Squeezes x 2 min  SLR x 10  S/L Hip Abd x 10  SAQ on small gray roll x 2 min      manual therapy techniques:  were applied to the:  for  minutes, including:      neuromuscular re-education activities to improve:  for  minutes. The following activities were included:      therapeutic activities to improve functional performance for   minutes, including:      gait training to improve functional mobility and safety for   minutes, including:      direct contact modalities after being cleared for contraindications:     supervised modalities after being " cleared for contradictions:     hot pack for  minutes to .    cold pack for  minutes to .        Patient Education and Home Exercises     Home Exercises Provided and Patient Education Provided     Education provided:       Written Home Exercises Provided: yes. Exercises were reviewed and Hipolito was able to demonstrate them prior to the end of the session.  Hipolito demonstrated good  understanding of the education provided. See EMR under Patient Instructions for exercises provided during therapy sessions    ASSESSMENT     Patient did well with exercises; no increased symptoms noted.    Hipolito Is progressing well towards his goals.   Pt prognosis is Good.     Pt will continue to benefit from skilled outpatient physical therapy to address the deficits listed in the problem list box on initial evaluation, provide pt/family education and to maximize pt's level of independence in the home and community environment.     Pt's spiritual, cultural and educational needs considered and pt agreeable to plan of care and goals.     Anticipated barriers to physical therapy: None    Goals:   Short Term Goals: 3 weeks   Pain no more than 2/10 in left ankle with increased gait and general activity level   Able to demonstrate improved ankle ROM to WFL in all planes for return to improved function  Able to demonstrate heel to toe pattern on left for normal gait   Compliant with HEP      Long Term Goals: 6 weeks   0/10 pain in left ankle with daily activities   Able to walk x 1 mile without pain or compensation   Able to go up/down steps with one foot per step and no railing   Able to carry 30 lbs x 25ft without pain/compensation   FOTO limitation score to 37%  Independent with HEP     PLAN     Continue per POC and progress with strengthening/mobility exercises as patient tolerates.    Jonathan Favre, PTA

## 2022-10-19 LAB
ACID FAST MOD KINY STN SPEC: NORMAL
MYCOBACTERIUM SPEC QL CULT: NORMAL

## 2022-10-20 ENCOUNTER — PATIENT MESSAGE (OUTPATIENT)
Dept: ORTHOPEDICS | Facility: CLINIC | Age: 62
End: 2022-10-20
Payer: COMMERCIAL

## 2022-10-21 ENCOUNTER — CLINICAL SUPPORT (OUTPATIENT)
Dept: REHABILITATION | Facility: HOSPITAL | Age: 62
End: 2022-10-21
Payer: COMMERCIAL

## 2022-10-21 DIAGNOSIS — Z74.09 DECREASED FUNCTIONAL MOBILITY AND ENDURANCE: Primary | ICD-10-CM

## 2022-10-21 DIAGNOSIS — M25.572 ACUTE LEFT ANKLE PAIN: ICD-10-CM

## 2022-10-21 PROCEDURE — 97110 THERAPEUTIC EXERCISES: CPT | Mod: PN

## 2022-10-21 NOTE — PROGRESS NOTES
"[facility]   Physical Therapy Treatment Note     Name: Hipolito PARKS Eusebia  [facility] Number: [id number]    Therapy Diagnosis:   Encounter Diagnoses   Name Primary?    Decreased functional mobility and endurance Yes    Acute left ankle pain      Physician: Brooke Alvares PA-C    Visit Date: 10/21/2022    Physician Orders: PT Eval and Treat    Medical Diagnosis from Referral: Post -op left ankle arthrosis   Evaluation Date: 10/10/2022  Authorization Period Expiration: 12/31/2022  Plan of Care Expiration: 12/31/2022  Visit # / Visits authorized: 3 / 12   FOTO: 1/3    PTA Visit #: 0/5     Time In: 7:45 AM  Time Out: 9:00  AM  Total Billable Time: 40 minutes    SUBJECTIVE     Pt reports: Yesterday, I started having all kinds of pain in my ankle, I was doing well first part of the week, really increasing my activity level and then yesterday my ankle really started hurting. I don't know what is going on. Stated that the butterflys he has on his ankle are the original ones from earlier this week, reports drainage is less than the size of a nickel.   He was compliant with home exercise program.  Response to previous treatment: felt good following his last treatment - but then developed sharp pain yesterday.   Functional change: painful left ankle, stiffness     Pain: 5-6/10  Location: left ankle    OBJECTIVE     Objective Measures updated at progress report unless specified.     Left lateral ankle - fluid pocket noted around malleoli - took steristrip(butterflys) off and palpated lateral ankle tissue - able to express a moderate amount of serous fluid from the small "pinhole" size opening in incision; No sign of infection noted, no warmth to tissue, no redness, Expressed all fluid from tissues; Advised patient not to steristrip the wound as all that was left was a small opening; let wound drain and close naturally; Instructed patient to monitor and if anything changed in the appearance of his lateral " "ankle, or he started to run a fever, go to the ER.    Patient has a follow-up visit with Dr. Freedman on October 31st.     Treatment     Hipolito received the treatments listed below:      therapeutic exercises to develop strength, endurance, ROM, and flexibility for 40 minutes including:  Recumbent Bike level 4 x 12 min  Toe Taps on 6" step x 2 min  FSU on 6" step x 10  LSU on 6" step x 10  Standing Hip Flex/Abd/Ext x 10  Heel Cord stretch on wedge 3 x 30 sec  4 Way ankle x 15 w/ green TB  Bridges x 10  Ball Squeezes x 2 min  SLR  2x10  - 1.5#   S/L Hip Abd  2 x 10 1.5#   SAQ on small gray roll x 2 min    Treadmill - 3 mins at .7mph.       manual therapy techniques:  were applied to the:  for  minutes, including:      neuromuscular re-education activities to improve:  for  minutes. The following activities were included:      therapeutic activities to improve functional performance for   minutes, including:      gait training to improve functional mobility and safety for   minutes, including:          Patient Education and Home Exercises     Home Exercises Provided and Patient Education Provided     Education provided: monitor wound/drainage;       Written Home Exercises Provided: yes. Exercises were reviewed and Hipolito was able to demonstrate them prior to the end of the session.  Hipolito demonstrated good  understanding of the education provided. See EMR under Patient Instructions for exercises provided during therapy sessions    ASSESSMENT     Patient still getting some anterior ankle pain with standing activities; Needs to work on strengthening ankle at home, increase walking; Instructed patient to watch wound on ankle - hopefully not steri-stripping now, will allow drainage to naturally dry up and wound will close on itself.     Hipolito Is progressing well towards his goals.   Pt prognosis is Good.     Pt will continue to benefit from skilled outpatient physical therapy to address the deficits listed in the problem list " box on initial evaluation, provide pt/family education and to maximize pt's level of independence in the home and community environment.     Pt's spiritual, cultural and educational needs considered and pt agreeable to plan of care and goals.     Anticipated barriers to physical therapy: None    Goals:   Short Term Goals: 3 weeks   Pain no more than 2/10 in left ankle with increased gait and general activity level   Able to demonstrate improved ankle ROM to WFL in all planes for return to improved function  Able to demonstrate heel to toe pattern on left for normal gait   Compliant with HEP      Long Term Goals: 6 weeks   0/10 pain in left ankle with daily activities   Able to walk x 1 mile without pain or compensation   Able to go up/down steps with one foot per step and no railing   Able to carry 30 lbs x 25ft without pain/compensation   FOTO limitation score to 37%  Independent with HEP     PLAN     Continue per POC and progress with strengthening/mobility exercises as patient tolerates.    Sejal Mancini, PT

## 2022-10-25 ENCOUNTER — CLINICAL SUPPORT (OUTPATIENT)
Dept: REHABILITATION | Facility: HOSPITAL | Age: 62
End: 2022-10-25
Payer: COMMERCIAL

## 2022-10-25 DIAGNOSIS — M25.572 ACUTE LEFT ANKLE PAIN: ICD-10-CM

## 2022-10-25 DIAGNOSIS — Z74.09 DECREASED FUNCTIONAL MOBILITY AND ENDURANCE: Primary | ICD-10-CM

## 2022-10-25 PROCEDURE — 97110 THERAPEUTIC EXERCISES: CPT | Mod: PN,CQ

## 2022-10-25 NOTE — PROGRESS NOTES
"[facility]   Physical Therapy Treatment Note     Name: Hipolito PARKS Eusebia  Clinic Number: [id number]    Therapy Diagnosis:   Encounter Diagnoses   Name Primary?    Decreased functional mobility and endurance Yes    Acute left ankle pain      Physician: Brooke Alvares PA-C    Visit Date: 10/25/2022    Physician Orders: PT Eval and Treat    Medical Diagnosis from Referral: Post -op left ankle arthrosis   Evaluation Date: 10/10/2022  Authorization Period Expiration: 12/31/2022  Plan of Care Expiration: 12/31/2022  Visit # / Visits authorized: 5 / 12   FOTO: 1/3    PTA Visit #: 1/5     Time In: 8:00 AM  Time Out: 8:55 AM  Total Billable Time: 40 minutes    SUBJECTIVE     Pt reports: My ankle is feeling better.   He was compliant with home exercise program.  Response to previous treatment: felt good following his last treatment - but then developed sharp pain yesterday.   Functional change: painful left ankle, stiffness     Pain: 4-5/10  Location: left ankle    OBJECTIVE     Objective Measures updated at progress report unless specified.       Treatment     Hipolito received the treatments listed below:      therapeutic exercises to develop strength, endurance, ROM, and flexibility for 40 minutes including:  Recumbent Bike level 5 x 15 min  Toe Taps on 6" step x 2 min  FSU on 6" step x 10  LSU on 6" step x 10  Standing Hip Flex/Abd/Ext x 10  Heel Cord stretch on wedge 3 x 30 sec  4 Way ankle x 15 w/ green TB  Bridges x 10  Ball Squeezes x 2 min  SLR  2x10  - 1.5#   S/L Hip Abd  2 x 10 1.5#   SAQ on small gray roll x 2 min 1.5#    Treadmill - 3 mins at .7 mph       manual therapy techniques:  were applied to the:  for  minutes, including:      neuromuscular re-education activities to improve:  for  minutes. The following activities were included:      therapeutic activities to improve functional performance for   minutes, including:      gait training to improve functional mobility and safety for   " minutes, including:          Patient Education and Home Exercises     Home Exercises Provided and Patient Education Provided     Education provided: monitor wound/drainage;       Written Home Exercises Provided: yes. Exercises were reviewed and Hipolito was able to demonstrate them prior to the end of the session.  Hipolito demonstrated good  understanding of the education provided. See EMR under Patient Instructions for exercises provided during therapy sessions    ASSESSMENT     Patient still getting some anterior ankle pain with standing activities; Needs to work on strengthening ankle at home, increase walking; Wound looks good; no drainage present.     Hipolito Is progressing well towards his goals.   Pt prognosis is Good.     Pt will continue to benefit from skilled outpatient physical therapy to address the deficits listed in the problem list box on initial evaluation, provide pt/family education and to maximize pt's level of independence in the home and community environment.     Pt's spiritual, cultural and educational needs considered and pt agreeable to plan of care and goals.     Anticipated barriers to physical therapy: None    Goals:   Short Term Goals: 3 weeks   Pain no more than 2/10 in left ankle with increased gait and general activity level   Able to demonstrate improved ankle ROM to WFL in all planes for return to improved function  Able to demonstrate heel to toe pattern on left for normal gait   Compliant with HEP      Long Term Goals: 6 weeks   0/10 pain in left ankle with daily activities   Able to walk x 1 mile without pain or compensation   Able to go up/down steps with one foot per step and no railing   Able to carry 30 lbs x 25ft without pain/compensation   FOTO limitation score to 37%  Independent with HEP     PLAN     Continue per POC and progress with strengthening/mobility exercises as patient tolerates.    Jonathan Favre, PTA

## 2022-10-26 ENCOUNTER — CLINICAL SUPPORT (OUTPATIENT)
Dept: REHABILITATION | Facility: HOSPITAL | Age: 62
End: 2022-10-26
Payer: COMMERCIAL

## 2022-10-26 DIAGNOSIS — M25.572 ACUTE LEFT ANKLE PAIN: ICD-10-CM

## 2022-10-26 DIAGNOSIS — Z74.09 DECREASED FUNCTIONAL MOBILITY AND ENDURANCE: Primary | ICD-10-CM

## 2022-10-26 PROCEDURE — 97110 THERAPEUTIC EXERCISES: CPT | Mod: PN

## 2022-10-26 PROCEDURE — 97140 MANUAL THERAPY 1/> REGIONS: CPT | Mod: PN

## 2022-10-28 ENCOUNTER — CLINICAL SUPPORT (OUTPATIENT)
Dept: REHABILITATION | Facility: HOSPITAL | Age: 62
End: 2022-10-28
Payer: COMMERCIAL

## 2022-10-28 DIAGNOSIS — M25.572 ACUTE LEFT ANKLE PAIN: ICD-10-CM

## 2022-10-28 DIAGNOSIS — Z74.09 DECREASED FUNCTIONAL MOBILITY AND ENDURANCE: Primary | ICD-10-CM

## 2022-10-28 PROCEDURE — 97110 THERAPEUTIC EXERCISES: CPT | Mod: PN,CQ

## 2022-10-28 NOTE — PROGRESS NOTES
"[facility]   Physical Therapy Treatment Note     Name: Hipolito Laws  Clinic Number: [id number]    Therapy Diagnosis:   Encounter Diagnoses   Name Primary?    Decreased functional mobility and endurance Yes    Acute left ankle pain      Physician: Brooke Alvares PA-C    Visit Date: 10/28/2022    Physician Orders: PT Eval and Treat    Medical Diagnosis from Referral: Post -op left ankle arthrosis   Evaluation Date: 10/10/2022  Authorization Period Expiration: 12/31/2022  Plan of Care Expiration: 12/31/2022  Visit # / Visits authorized: 7 / 12   FOTO: 1/3    PTA Visit #: 2/5     Time In: 8:00 AM  Time Out: 9:00 AM  Total Billable Time:  40  minutes    SUBJECTIVE     Pt reports: I am having more pain on the outside of my ankle.   He was compliant with home exercise program.  Response to previous treatment: improvement in LLE mobility, less drainage in ankle    Functional change: still hesitant to increase his activity level, stated that he just lacks motivation to exercise, but is trying his best.     Pain: 5/10  Location: left ankle    OBJECTIVE     Objective Measures updated at progress report unless specified.       Treatment     Hipolito received the treatments listed below:      therapeutic exercises to develop strength, endurance, ROM, and flexibility for 40 minutes including:  Recumbent Bike level 5 x 15 min  Toe Taps on 6" step x 2 min  FSU  and down on 6" step 2 x 10(Down on 4" step)  LSU on 6" step 2 x 10  Standing Hip Flex/Abd/Ext x 10  Heel Cord stretch on wedge 3 x 30 sec  4 Way ankle x 15 w/ green TB  Bridges x 15   Ball Squeezes x 2 min  SLR  2x10  - 1.5#   S/L Hip Abd  2 x 10 1.5#   Quantum:   H/S curls: 40# 10 x 2  SAQ's : 40# 10 x 2   Eccentric left achilles strengthening     Treadmill - 3 mins at .7 mph       manual therapy techniques:  were applied to the: left ankle for  10 minutes, including:  Tibial/talus mobilization - Anterior to posterior for improvement in DF; " subtalar mobilization for inversion/eversion;       neuromuscular re-education activities to improve:  for  minutes. The following activities were included:      therapeutic activities to improve functional performance for   minutes, including:      gait training to improve functional mobility and safety for   minutes, including:    FOTO - 46% to 49% max function (10/26/2022)      Patient Education and Home Exercises     Home Exercises Provided and Patient Education Provided     Education provided:   Work on gait pattern - increase in heel to toe motion with push-off on forefoot to engage calf mm, gluteal mm         Written Home Exercises Provided: yes. Exercises were reviewed and Hipolito was able to demonstrate them prior to the end of the session.  Hipolito demonstrated good  understanding of the education provided. See EMR under Patient Instructions for exercises provided during therapy sessions    ASSESSMENT     Patient still getting some anterior ankle pain with standing activities; Needs to work on strengthening ankle and lower extremity  at home, increase walking; Wound looks good; no drainage present.     Hipolito Is progressing well towards his goals.   Pt prognosis is Good.     Pt will continue to benefit from skilled outpatient physical therapy to address the deficits listed in the problem list box on initial evaluation, provide pt/family education and to maximize pt's level of independence in the home and community environment.     Pt's spiritual, cultural and educational needs considered and pt agreeable to plan of care and goals.     Anticipated barriers to physical therapy: None    Goals:   Short Term Goals: 3 weeks   Pain no more than 2/10 in left ankle with increased gait and general activity level   Able to demonstrate improved ankle ROM to WFL in all planes for return to improved function  Able to demonstrate heel to toe pattern on left for normal gait   Compliant with HEP      Long Term Goals: 6 weeks    0/10 pain in left ankle with daily activities   Able to walk x 1 mile without pain or compensation   Able to go up/down steps with one foot per step and no railing   Able to carry 30 lbs x 25ft without pain/compensation   FOTO limitation score to 37%  Independent with HEP     PLAN     Continue per POC and progress with strengthening/mobility exercises as patient tolerates.    Jonathan Favre, PTA

## 2022-10-31 ENCOUNTER — OFFICE VISIT (OUTPATIENT)
Dept: ORTHOPEDICS | Facility: CLINIC | Age: 62
End: 2022-10-31
Payer: COMMERCIAL

## 2022-10-31 VITALS — HEIGHT: 70 IN | BODY MASS INDEX: 28.63 KG/M2 | WEIGHT: 200 LBS

## 2022-10-31 DIAGNOSIS — Z09 FOLLOW-UP EXAMINATION AFTER ORTHOPEDIC SURGERY: Primary | ICD-10-CM

## 2022-10-31 DIAGNOSIS — M96.842 POSTOPERATIVE SEROMA OF MUSCULOSKELETAL STRUCTURE AFTER MUSCULOSKELETAL PROCEDURE: ICD-10-CM

## 2022-10-31 DIAGNOSIS — M19.072 ARTHROSIS OF LEFT ANKLE: ICD-10-CM

## 2022-10-31 PROCEDURE — 99999 PR PBB SHADOW E&M-EST. PATIENT-LVL III: CPT | Mod: PBBFAC,,, | Performed by: ORTHOPAEDIC SURGERY

## 2022-10-31 PROCEDURE — 4010F ACE/ARB THERAPY RXD/TAKEN: CPT | Mod: CPTII,S$GLB,, | Performed by: ORTHOPAEDIC SURGERY

## 2022-10-31 PROCEDURE — 1159F PR MEDICATION LIST DOCUMENTED IN MEDICAL RECORD: ICD-10-PCS | Mod: CPTII,S$GLB,, | Performed by: ORTHOPAEDIC SURGERY

## 2022-10-31 PROCEDURE — 4010F PR ACE/ARB THEARPY RXD/TAKEN: ICD-10-PCS | Mod: CPTII,S$GLB,, | Performed by: ORTHOPAEDIC SURGERY

## 2022-10-31 PROCEDURE — 1160F PR REVIEW ALL MEDS BY PRESCRIBER/CLIN PHARMACIST DOCUMENTED: ICD-10-PCS | Mod: CPTII,S$GLB,, | Performed by: ORTHOPAEDIC SURGERY

## 2022-10-31 PROCEDURE — 99024 PR POST-OP FOLLOW-UP VISIT: ICD-10-PCS | Mod: S$GLB,,, | Performed by: ORTHOPAEDIC SURGERY

## 2022-10-31 PROCEDURE — 1160F RVW MEDS BY RX/DR IN RCRD: CPT | Mod: CPTII,S$GLB,, | Performed by: ORTHOPAEDIC SURGERY

## 2022-10-31 PROCEDURE — 3044F HG A1C LEVEL LT 7.0%: CPT | Mod: CPTII,S$GLB,, | Performed by: ORTHOPAEDIC SURGERY

## 2022-10-31 PROCEDURE — 3044F PR MOST RECENT HEMOGLOBIN A1C LEVEL <7.0%: ICD-10-PCS | Mod: CPTII,S$GLB,, | Performed by: ORTHOPAEDIC SURGERY

## 2022-10-31 PROCEDURE — 99024 POSTOP FOLLOW-UP VISIT: CPT | Mod: S$GLB,,, | Performed by: ORTHOPAEDIC SURGERY

## 2022-10-31 PROCEDURE — 1159F MED LIST DOCD IN RCRD: CPT | Mod: CPTII,S$GLB,, | Performed by: ORTHOPAEDIC SURGERY

## 2022-10-31 PROCEDURE — 99999 PR PBB SHADOW E&M-EST. PATIENT-LVL III: ICD-10-PCS | Mod: PBBFAC,,, | Performed by: ORTHOPAEDIC SURGERY

## 2022-10-31 RX ORDER — SULFAMETHOXAZOLE AND TRIMETHOPRIM 800; 160 MG/1; MG/1
1 TABLET ORAL 2 TIMES DAILY
Qty: 20 TABLET | Refills: 0 | Status: SHIPPED | OUTPATIENT
Start: 2022-10-31 | End: 2022-11-10

## 2022-10-31 NOTE — PROGRESS NOTES
Hipolito Laws  Returns today for follow-up.  He is now six weeks postop from closure of wound dehiscence from his left ankle.  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  Subsequent to his original surgery is wound dehisced with some continued drainage so I return to come back to surgery on 09/14/2022 and performed a wound closure and took cultures.  Cultures did not grow any bacteria.  I saw him on 10/03/2022 about three weeks after his last surgery and at that time his wound was well healed I removed his sutures.  He subsequently started physical therapy and the day he started therapy he developed some more drainage.  He has been going to therapy he feels that it is helping with his function but he continues to have serous drainage which he reports recently became more blood-tinged.  He does not report any fevers.  He does report continued pain and dysfunction of his ankle.  He was noted to have moderate arthritis of his ankle at the time of surgery.    Examination:  The incision is essentially well-healed and there is a small pinhole at the distal aspect and I was able to express a significant amount of serous blood tinged drainage.  There were some small flecks of debris but no purulent drainage.  He continues with decreased motion of the ankle and tenderness.    Impression:  1. Follow-up examination after orthopedic surgery  sulfamethoxazole-trimethoprim 800-160mg (BACTRIM DS) 800-160 mg Tab      2. Arthrosis of left ankle        3. Postoperative seroma of musculoskeletal structure after musculoskeletal procedure  sulfamethoxazole-trimethoprim 800-160mg (BACTRIM DS) 800-160 mg Tab        Recommendation:  I believe the fluid is a buildup of joint fluid from the defect left behind when we removed the fibula nonunion fragment.  I would like him to immobilize the ankle at this time and I am going to have him hold  therapy for about two weeks to see if this will seal.  I am also going to put him on Bactrim prophylactically.      Follow-up in two weeks

## 2022-11-01 ENCOUNTER — DOCUMENTATION ONLY (OUTPATIENT)
Dept: REHABILITATION | Facility: HOSPITAL | Age: 62
End: 2022-11-01
Payer: COMMERCIAL

## 2022-11-01 NOTE — PT/OT/SLP PROGRESS
Hipolito returned to MD on 10/31/2022.  He continued to have some drainage from his left ankle, wound did not appear infected, but continued to drain with debris left from surgery; also having some continued ankle pain.     Patient was placed in a boot - WBAT; but MD wanted him to hold off on therapy for a couple of weeks.      Will resume therapy when cleared by MD.     Sejal Mancini, PT   11/1/2022

## 2022-11-02 ENCOUNTER — PATIENT MESSAGE (OUTPATIENT)
Dept: ORTHOPEDICS | Facility: CLINIC | Age: 62
End: 2022-11-02
Payer: COMMERCIAL

## 2022-11-02 LAB
ACID FAST MOD KINY STN SPEC: NORMAL
MYCOBACTERIUM SPEC QL CULT: NORMAL

## 2022-11-14 ENCOUNTER — PATIENT MESSAGE (OUTPATIENT)
Dept: ORTHOPEDICS | Facility: CLINIC | Age: 62
End: 2022-11-14
Payer: COMMERCIAL

## 2022-11-14 ENCOUNTER — OFFICE VISIT (OUTPATIENT)
Dept: ORTHOPEDICS | Facility: CLINIC | Age: 62
End: 2022-11-14
Payer: COMMERCIAL

## 2022-11-14 VITALS — HEIGHT: 70 IN | WEIGHT: 199.94 LBS | BODY MASS INDEX: 28.62 KG/M2

## 2022-11-14 DIAGNOSIS — M54.12 CERVICAL RADICULITIS: Primary | ICD-10-CM

## 2022-11-14 DIAGNOSIS — T81.31XD DEHISCENCE OF OPERATIVE WOUND, SUBSEQUENT ENCOUNTER: ICD-10-CM

## 2022-11-14 DIAGNOSIS — Z09 FOLLOW-UP EXAMINATION AFTER ORTHOPEDIC SURGERY: Primary | ICD-10-CM

## 2022-11-14 DIAGNOSIS — M19.072 ARTHROSIS OF LEFT ANKLE: ICD-10-CM

## 2022-11-14 PROCEDURE — 1160F RVW MEDS BY RX/DR IN RCRD: CPT | Mod: CPTII,S$GLB,, | Performed by: ORTHOPAEDIC SURGERY

## 2022-11-14 PROCEDURE — 99024 POSTOP FOLLOW-UP VISIT: CPT | Mod: S$GLB,,, | Performed by: ORTHOPAEDIC SURGERY

## 2022-11-14 PROCEDURE — 3008F BODY MASS INDEX DOCD: CPT | Mod: CPTII,S$GLB,, | Performed by: ORTHOPAEDIC SURGERY

## 2022-11-14 PROCEDURE — 99999 PR PBB SHADOW E&M-EST. PATIENT-LVL III: ICD-10-PCS | Mod: PBBFAC,,, | Performed by: ORTHOPAEDIC SURGERY

## 2022-11-14 PROCEDURE — 99024 PR POST-OP FOLLOW-UP VISIT: ICD-10-PCS | Mod: S$GLB,,, | Performed by: ORTHOPAEDIC SURGERY

## 2022-11-14 PROCEDURE — 99999 PR PBB SHADOW E&M-EST. PATIENT-LVL III: CPT | Mod: PBBFAC,,, | Performed by: ORTHOPAEDIC SURGERY

## 2022-11-14 PROCEDURE — 1159F PR MEDICATION LIST DOCUMENTED IN MEDICAL RECORD: ICD-10-PCS | Mod: CPTII,S$GLB,, | Performed by: ORTHOPAEDIC SURGERY

## 2022-11-14 PROCEDURE — 4010F ACE/ARB THERAPY RXD/TAKEN: CPT | Mod: CPTII,S$GLB,, | Performed by: ORTHOPAEDIC SURGERY

## 2022-11-14 PROCEDURE — 4010F PR ACE/ARB THEARPY RXD/TAKEN: ICD-10-PCS | Mod: CPTII,S$GLB,, | Performed by: ORTHOPAEDIC SURGERY

## 2022-11-14 PROCEDURE — 1160F PR REVIEW ALL MEDS BY PRESCRIBER/CLIN PHARMACIST DOCUMENTED: ICD-10-PCS | Mod: CPTII,S$GLB,, | Performed by: ORTHOPAEDIC SURGERY

## 2022-11-14 PROCEDURE — 1159F MED LIST DOCD IN RCRD: CPT | Mod: CPTII,S$GLB,, | Performed by: ORTHOPAEDIC SURGERY

## 2022-11-14 PROCEDURE — 3044F HG A1C LEVEL LT 7.0%: CPT | Mod: CPTII,S$GLB,, | Performed by: ORTHOPAEDIC SURGERY

## 2022-11-14 PROCEDURE — 3044F PR MOST RECENT HEMOGLOBIN A1C LEVEL <7.0%: ICD-10-PCS | Mod: CPTII,S$GLB,, | Performed by: ORTHOPAEDIC SURGERY

## 2022-11-14 PROCEDURE — 3008F PR BODY MASS INDEX (BMI) DOCUMENTED: ICD-10-PCS | Mod: CPTII,S$GLB,, | Performed by: ORTHOPAEDIC SURGERY

## 2022-11-14 NOTE — PROGRESS NOTES
Hipolito Laws  Returns today for follow-up.  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  On his last visit he was still having some serous drainage so I had him go into a fracture boot which he states has helped significantly with his pain in his overall drainage has slowed down.  He is still, however, having some slight serous drainage.    Examination: There is no swelling or erythema about his left ankle.  There does remain a small pinhole at the distal aspect of the incision and I was only able to express a small amount of serous drainage.  It appears to be much improved since his last visit.      Impression:  1. Follow-up examination after orthopedic surgery        2. Dehiscence of operative wound, subsequent encounter, left ankle        3. Arthrosis of left ankle          Recommendation:  I am going to have him continue with the fracture boot and keep the pin hole cover to monitor for drainage.  Today I took a silver nitrate to the pin hole to try to stimulate some healing.      Follow-up in four weeks

## 2022-11-14 NOTE — PROGRESS NOTES
Spoke with pt virtually. He had a cervical DIGNA on 6/2/22 with 100% relief for 5 months. He now continues to have neck pain with radiculopathy. Since his check in with me on 5/19/22 he has continued home exercises provided by Dr. Goldman. It is the [employer] cervical exercise program. Exercises include walking erectly with neutral head position, supine neutral head position, supine retraction, sitting or standing neck retraction, posture training, forward/backward/sideward isometric strengthening, prone head lifts, supine head lifts, scapular retraction, and neck rotation. Pt completes each exercise twice daily with worsening of pain. Pain is 8/10. Given failure of conservative rx, will repeat cervical DIGNA. Pt will fu 2 weeks after.

## 2022-11-15 ENCOUNTER — PATIENT MESSAGE (OUTPATIENT)
Dept: PAIN MEDICINE | Facility: OTHER | Age: 62
End: 2022-11-15
Payer: COMMERCIAL

## 2022-11-15 DIAGNOSIS — M54.12 CERVICAL RADICULITIS: Primary | ICD-10-CM

## 2022-11-22 ENCOUNTER — PATIENT MESSAGE (OUTPATIENT)
Dept: ORTHOPEDICS | Facility: CLINIC | Age: 62
End: 2022-11-22
Payer: COMMERCIAL

## 2022-11-22 DIAGNOSIS — M19.072 ARTHROSIS OF LEFT ANKLE: ICD-10-CM

## 2022-11-22 DIAGNOSIS — T81.31XD DEHISCENCE OF OPERATIVE WOUND, SUBSEQUENT ENCOUNTER: Primary | ICD-10-CM

## 2022-12-01 ENCOUNTER — PATIENT MESSAGE (OUTPATIENT)
Dept: ORTHOPEDICS | Facility: CLINIC | Age: 62
End: 2022-12-01
Payer: COMMERCIAL

## 2022-12-02 ENCOUNTER — TELEPHONE (OUTPATIENT)
Dept: ORTHOPEDICS | Facility: CLINIC | Age: 62
End: 2022-12-02
Payer: COMMERCIAL

## 2022-12-02 NOTE — TELEPHONE ENCOUNTER
I spoke with pt regarding his open wound from surgery, I notified the patient that  is out of the office until Monday,he stated that he will give us a call on Monday if anything changes. Pt,has no other questions.

## 2022-12-05 ENCOUNTER — OFFICE VISIT (OUTPATIENT)
Dept: WOUND CARE | Facility: CLINIC | Age: 62
End: 2022-12-05
Payer: COMMERCIAL

## 2022-12-05 VITALS
HEIGHT: 70 IN | WEIGHT: 207.69 LBS | HEART RATE: 101 BPM | BODY MASS INDEX: 29.73 KG/M2 | SYSTOLIC BLOOD PRESSURE: 181 MMHG | TEMPERATURE: 98 F | DIASTOLIC BLOOD PRESSURE: 75 MMHG

## 2022-12-05 DIAGNOSIS — T81.31XD DEHISCENCE OF OPERATIVE WOUND, SUBSEQUENT ENCOUNTER: Primary | ICD-10-CM

## 2022-12-05 DIAGNOSIS — I10 ESSENTIAL HYPERTENSION, BENIGN: ICD-10-CM

## 2022-12-05 DIAGNOSIS — M19.072 ARTHROSIS OF LEFT ANKLE: ICD-10-CM

## 2022-12-05 PROCEDURE — 3008F BODY MASS INDEX DOCD: CPT | Mod: CPTII,S$GLB,,

## 2022-12-05 PROCEDURE — 3077F PR MOST RECENT SYSTOLIC BLOOD PRESSURE >= 140 MM HG: ICD-10-PCS | Mod: CPTII,S$GLB,,

## 2022-12-05 PROCEDURE — 1159F MED LIST DOCD IN RCRD: CPT | Mod: CPTII,S$GLB,,

## 2022-12-05 PROCEDURE — 99999 PR PBB SHADOW E&M-EST. PATIENT-LVL IV: CPT | Mod: PBBFAC,,,

## 2022-12-05 PROCEDURE — 11042 DBRDMT SUBQ TIS 1ST 20SQCM/<: CPT | Mod: S$GLB,,,

## 2022-12-05 PROCEDURE — 99999 PR PBB SHADOW E&M-EST. PATIENT-LVL IV: ICD-10-PCS | Mod: PBBFAC,,,

## 2022-12-05 PROCEDURE — 3044F HG A1C LEVEL LT 7.0%: CPT | Mod: CPTII,S$GLB,,

## 2022-12-05 PROCEDURE — 3044F PR MOST RECENT HEMOGLOBIN A1C LEVEL <7.0%: ICD-10-PCS | Mod: CPTII,S$GLB,,

## 2022-12-05 PROCEDURE — 11042 DEBRIDEMENT: ICD-10-PCS | Mod: S$GLB,,,

## 2022-12-05 PROCEDURE — 4010F PR ACE/ARB THEARPY RXD/TAKEN: ICD-10-PCS | Mod: CPTII,S$GLB,,

## 2022-12-05 PROCEDURE — 3008F PR BODY MASS INDEX (BMI) DOCUMENTED: ICD-10-PCS | Mod: CPTII,S$GLB,,

## 2022-12-05 PROCEDURE — 99214 PR OFFICE/OUTPT VISIT, EST, LEVL IV, 30-39 MIN: ICD-10-PCS | Mod: 25,S$GLB,,

## 2022-12-05 PROCEDURE — 3078F PR MOST RECENT DIASTOLIC BLOOD PRESSURE < 80 MM HG: ICD-10-PCS | Mod: CPTII,S$GLB,,

## 2022-12-05 PROCEDURE — 99214 OFFICE O/P EST MOD 30 MIN: CPT | Mod: 25,S$GLB,,

## 2022-12-05 PROCEDURE — 3078F DIAST BP <80 MM HG: CPT | Mod: CPTII,S$GLB,,

## 2022-12-05 PROCEDURE — 1159F PR MEDICATION LIST DOCUMENTED IN MEDICAL RECORD: ICD-10-PCS | Mod: CPTII,S$GLB,,

## 2022-12-05 PROCEDURE — 3077F SYST BP >= 140 MM HG: CPT | Mod: CPTII,S$GLB,,

## 2022-12-05 PROCEDURE — 4010F ACE/ARB THERAPY RXD/TAKEN: CPT | Mod: CPTII,S$GLB,,

## 2022-12-05 RX ORDER — CEPHALEXIN 500 MG/1
500 CAPSULE ORAL EVERY 12 HOURS
Qty: 20 CAPSULE | Refills: 0 | Status: SHIPPED | OUTPATIENT
Start: 2022-12-05 | End: 2022-12-15

## 2022-12-05 NOTE — PROGRESS NOTES
Subjective:       Patient ID: Hipolito Laws is a 62 y.o. male.    Chief Complaint: Wound Consult    Patient presents for an evaluation of a left ankle wound dehiscence. Patient s/p wound closure on 9/14/22 and arthroscopy of left ankle with debridement on 8/4/22. He reports using a wound vac, silver nitrate, and plain gauze. Most recently, patient has been leaving his wound open to air and covering with his sock. Pt reports a small amount of serous drainage.  Denies fever, chills, erythema, warmth, purulent drainage, or pain.      Review of Systems   Constitutional:  Negative for activity change, chills, diaphoresis, fatigue and fever.   Respiratory:  Negative for apnea, chest tightness and shortness of breath.    Cardiovascular:  Negative for chest pain, palpitations and leg swelling.   Musculoskeletal:  Positive for joint swelling and neck pain. Negative for gait problem.   Skin:  Positive for wound. Negative for pallor and rash.   Neurological:  Negative for syncope and weakness.   Psychiatric/Behavioral:  Negative for agitation. The patient is not nervous/anxious.    All other systems reviewed and are negative.    Objective:      Physical Exam  Vitals reviewed.   Constitutional:       General: He is not in acute distress.     Appearance: Normal appearance.   Cardiovascular:      Rate and Rhythm: Normal rate and regular rhythm.      Pulses: Normal pulses.   Pulmonary:      Effort: No respiratory distress.   Musculoskeletal:         General: No swelling.      Left ankle: Swelling present. No tenderness.   Skin:     General: Skin is warm and dry.      Capillary Refill: Capillary refill takes less than 2 seconds.      Findings: Erythema and wound present.          Neurological:      General: No focal deficit present.      Mental Status: He is alert and oriented to person, place, and time.   Psychiatric:         Mood and Affect: Mood normal.         Behavior: Behavior normal.         Thought Content: Thought  content normal.         Judgment: Judgment normal.       Assessment:       1. Dehiscence of operative wound, subsequent encounter    2. Essential hypertension, benign           Incision/Site 08/04/22 1208 Left Malleolus/Ankle (Active)   08/04/22 1208   Present Prior to Hospital Arrival?:    Side: Left   Location: Malleolus/Ankle   Orientation:    Incision Type:    Closure Method:    Additional Comments:    Removal Indication and Assessment:    Wound Outcome:    Removal Indications:    Wound Image    12/05/22 1541   Dressing Appearance Open to air 12/05/22 1541   Drainage Amount None 12/05/22 1541   Red (%), Wound Tissue Color 100 % 12/05/22 1541   Periwound Area Dry;Redness;Edematous 12/05/22 1541   Wound Edges Callused;Rolled/closed 12/05/22 1541   Wound Length (cm) 0.6 cm 12/05/22 1541   Wound Width (cm) 0.4 cm 12/05/22 1541   Wound Depth (cm) 0.9 cm 12/05/22 1541   Wound Volume (cm^3) 0.216 cm^3 12/05/22 1541   Wound Surface Area (cm^2) 0.24 cm^2 12/05/22 1541   Undermining (depth (cm)/location) 0.8 cm/ 12 o'clock- 12 o'clock 12/05/22 1541   Care Cleansed with:;Wound cleanser 12/05/22 1541   Dressing Applied;Calcium alginate;Silicone;Island/border 12/05/22 1541   Packing packed with;hydrofiber/alginate 12/05/22 1541       Hipolito was seen in the clinic room and placed in the supine position on the treatment table.  The wound was noted to be open to air. The ankle was cleansed with wound cleanser and dried thoroughly. No odor or warmth noted from patient's baseline. Erythema noted- inflammatory process vs cellulitis. Placed topical 4% Lidocaine Hydrochloride to wound bed for 15 minutes. Sharp debridement with 5 mm curette to remove non-viable tissue. Pt tolerated procedure well.    Plan of Care: Packed wound with Aquacel rope and covered with a mepilex border dressing.      Plan:       Orders Placed This Encounter   Procedures    Debridement     This order was created via procedure documentation     Left ankle  dressed as detailed above.  Patient was instructed to not get the dressings wet and to use cast covers for showering.  Should the dressing become wet, he is to remove it, place a moist dressing over the wound, cover with gauze and roll gauze and use ace wraps for compression and to secure bandages.  He should then notify this office as soon as possible to have a new dressing applied.    Discussed erythema to ankle. Prophylactically prescribed keflex. Discussed side effects of medication. Instructed patient to take medication as prescribed.       Discussed healing process. Importance of keeping wound moist and to keep wound covered- not leaving wound open to air.     Patient currently living in [place]. Pt would like to follow there. Scheduling number given to patient.    Written and verbal instructions given to patient  RTC in 1 week if unable to schedule an appointment in [place].      Melissa Pickens PA-C           65  minutes of total time spent on the encounter, which includes face to face time and non-face to face time preparing to see the patient (eg, review of tests), Obtaining and/or reviewing separately obtained history, Documenting clinical information in the electronic or other health record, Independently interpreting results (not separately reported) and communicating results to the patient/family/caregiver, or Care coordination (not separately reported).

## 2022-12-05 NOTE — PROCEDURES
"Debridement    Date/Time: 12/5/2022 3:00 PM  Performed by: Melissa Pickens PA-C  Authorized by: Melissa Pickens PA-C     Time out: Immediately prior to procedure a "time out" was called to verify the correct patient, procedure, equipment, support staff and site/side marked as required.    Consent Done?:  Yes (Written)  Local anesthesia used?: Yes    Local anesthetic:  Topical anesthetic (Topical 4% Lidocaine Hydrochloride)    Wound Details:    Location:  Left ankle    Type of Debridement:  Excisional       Length (cm):  0.6       Area (sq cm):  0.24       Width (cm):  0.4       Percent Debrided (%):  100       Depth (cm):  0.9       Total Area Debrided (sq cm):  0.24    Depth of debridement:  Subcutaneous tissue    Tissue debrided:  Subcutaneous    Devitalized tissue debrided:  Biofilm, Exudate, Fibrin and Slough    Instruments:  Curette    Bleeding:  Minimal  Hemostasis Achieved: Yes    Method Used:  Pressure  Patient tolerance:  Patient tolerated the procedure well with no immediate complications  "

## 2022-12-11 ENCOUNTER — HOSPITAL ENCOUNTER (OUTPATIENT)
Dept: RADIOLOGY | Facility: HOSPITAL | Age: 62
Discharge: HOME | End: 2022-12-11
Attending: ORTHOPAEDIC SURGERY
Payer: COMMERCIAL

## 2022-12-11 DIAGNOSIS — M19.072 ARTHROSIS OF LEFT ANKLE: ICD-10-CM

## 2022-12-11 DIAGNOSIS — T81.31XD DEHISCENCE OF OPERATIVE WOUND, SUBSEQUENT ENCOUNTER: ICD-10-CM

## 2022-12-11 PROCEDURE — 73721 MRI JNT OF LWR EXTRE W/O DYE: CPT | Mod: TC,LT

## 2022-12-11 PROCEDURE — 73721 MRI JNT OF LWR EXTRE W/O DYE: CPT | Mod: 26,LT,, | Performed by: RADIOLOGY

## 2022-12-11 PROCEDURE — 73721 MRI ANKLE WITHOUT CONTRAST LEFT: ICD-10-PCS | Mod: 26,LT,, | Performed by: RADIOLOGY

## 2022-12-13 ENCOUNTER — OFFICE VISIT (OUTPATIENT)
Dept: WOUND CARE | Facility: CLINIC | Age: 62
End: 2022-12-13
Payer: COMMERCIAL

## 2022-12-13 ENCOUNTER — OFFICE VISIT (OUTPATIENT)
Dept: ORTHOPEDICS | Facility: CLINIC | Age: 62
End: 2022-12-13
Payer: COMMERCIAL

## 2022-12-13 VITALS
TEMPERATURE: 98 F | SYSTOLIC BLOOD PRESSURE: 139 MMHG | OXYGEN SATURATION: 98 % | BODY MASS INDEX: 30.11 KG/M2 | WEIGHT: 210.31 LBS | HEIGHT: 70 IN | HEART RATE: 110 BPM | DIASTOLIC BLOOD PRESSURE: 71 MMHG

## 2022-12-13 VITALS — HEIGHT: 70 IN | WEIGHT: 210.31 LBS | BODY MASS INDEX: 30.11 KG/M2

## 2022-12-13 DIAGNOSIS — S81.802A NON-HEALING WOUND OF LEFT LOWER EXTREMITY: Primary | ICD-10-CM

## 2022-12-13 DIAGNOSIS — T81.31XD DEHISCENCE OF OPERATIVE WOUND, SUBSEQUENT ENCOUNTER: Primary | ICD-10-CM

## 2022-12-13 DIAGNOSIS — S81.802D NON-HEALING WOUND OF LOWER EXTREMITY, LEFT, SUBSEQUENT ENCOUNTER: Primary | ICD-10-CM

## 2022-12-13 DIAGNOSIS — M19.072 ARTHROSIS OF LEFT ANKLE: ICD-10-CM

## 2022-12-13 PROCEDURE — 4010F PR ACE/ARB THEARPY RXD/TAKEN: ICD-10-PCS | Mod: CPTII,S$GLB,, | Performed by: SURGERY

## 2022-12-13 PROCEDURE — 99999 PR PBB SHADOW E&M-EST. PATIENT-LVL IV: CPT | Mod: PBBFAC,,, | Performed by: SURGERY

## 2022-12-13 PROCEDURE — 99999 PR PBB SHADOW E&M-EST. PATIENT-LVL III: ICD-10-PCS | Mod: PBBFAC,,, | Performed by: ORTHOPAEDIC SURGERY

## 2022-12-13 PROCEDURE — 4010F ACE/ARB THERAPY RXD/TAKEN: CPT | Mod: CPTII,S$GLB,, | Performed by: ORTHOPAEDIC SURGERY

## 2022-12-13 PROCEDURE — 99999 PR PBB SHADOW E&M-EST. PATIENT-LVL III: CPT | Mod: PBBFAC,,, | Performed by: ORTHOPAEDIC SURGERY

## 2022-12-13 PROCEDURE — 3044F PR MOST RECENT HEMOGLOBIN A1C LEVEL <7.0%: ICD-10-PCS | Mod: CPTII,S$GLB,, | Performed by: ORTHOPAEDIC SURGERY

## 2022-12-13 PROCEDURE — 3044F PR MOST RECENT HEMOGLOBIN A1C LEVEL <7.0%: ICD-10-PCS | Mod: CPTII,S$GLB,, | Performed by: SURGERY

## 2022-12-13 PROCEDURE — 3075F PR MOST RECENT SYSTOLIC BLOOD PRESS GE 130-139MM HG: ICD-10-PCS | Mod: CPTII,S$GLB,, | Performed by: SURGERY

## 2022-12-13 PROCEDURE — 87102 FUNGUS ISOLATION CULTURE: CPT

## 2022-12-13 PROCEDURE — 10140 I&D HMTMA SEROMA/FLUID COLLJ: CPT | Mod: S$GLB,,, | Performed by: SURGERY

## 2022-12-13 PROCEDURE — 99202 OFFICE O/P NEW SF 15 MIN: CPT | Mod: 25,S$GLB,, | Performed by: SURGERY

## 2022-12-13 PROCEDURE — 87070 CULTURE OTHR SPECIMN AEROBIC: CPT

## 2022-12-13 PROCEDURE — 3044F HG A1C LEVEL LT 7.0%: CPT | Mod: CPTII,S$GLB,, | Performed by: SURGERY

## 2022-12-13 PROCEDURE — 3044F HG A1C LEVEL LT 7.0%: CPT | Mod: CPTII,S$GLB,, | Performed by: ORTHOPAEDIC SURGERY

## 2022-12-13 PROCEDURE — 3075F SYST BP GE 130 - 139MM HG: CPT | Mod: CPTII,S$GLB,, | Performed by: SURGERY

## 2022-12-13 PROCEDURE — 99024 PR POST-OP FOLLOW-UP VISIT: ICD-10-PCS | Mod: S$GLB,,, | Performed by: ORTHOPAEDIC SURGERY

## 2022-12-13 PROCEDURE — 4010F ACE/ARB THERAPY RXD/TAKEN: CPT | Mod: CPTII,S$GLB,, | Performed by: SURGERY

## 2022-12-13 PROCEDURE — 3078F PR MOST RECENT DIASTOLIC BLOOD PRESSURE < 80 MM HG: ICD-10-PCS | Mod: CPTII,S$GLB,, | Performed by: SURGERY

## 2022-12-13 PROCEDURE — 4010F PR ACE/ARB THEARPY RXD/TAKEN: ICD-10-PCS | Mod: CPTII,S$GLB,, | Performed by: ORTHOPAEDIC SURGERY

## 2022-12-13 PROCEDURE — 87206 SMEAR FLUORESCENT/ACID STAI: CPT

## 2022-12-13 PROCEDURE — 3008F BODY MASS INDEX DOCD: CPT | Mod: CPTII,S$GLB,, | Performed by: ORTHOPAEDIC SURGERY

## 2022-12-13 PROCEDURE — 3008F PR BODY MASS INDEX (BMI) DOCUMENTED: ICD-10-PCS | Mod: CPTII,S$GLB,, | Performed by: SURGERY

## 2022-12-13 PROCEDURE — 87116 MYCOBACTERIA CULTURE: CPT

## 2022-12-13 PROCEDURE — 3078F DIAST BP <80 MM HG: CPT | Mod: CPTII,S$GLB,, | Performed by: SURGERY

## 2022-12-13 PROCEDURE — 10140 PR DRAINAGE OF HEMATOMA/FLUID: ICD-10-PCS | Mod: S$GLB,,, | Performed by: SURGERY

## 2022-12-13 PROCEDURE — 99202 PR OFFICE/OUTPT VISIT, NEW, LEVL II, 15-29 MIN: ICD-10-PCS | Mod: 25,S$GLB,, | Performed by: SURGERY

## 2022-12-13 PROCEDURE — 99024 POSTOP FOLLOW-UP VISIT: CPT | Mod: S$GLB,,, | Performed by: ORTHOPAEDIC SURGERY

## 2022-12-13 PROCEDURE — 3008F BODY MASS INDEX DOCD: CPT | Mod: CPTII,S$GLB,, | Performed by: SURGERY

## 2022-12-13 PROCEDURE — 99999 PR PBB SHADOW E&M-EST. PATIENT-LVL IV: ICD-10-PCS | Mod: PBBFAC,,, | Performed by: SURGERY

## 2022-12-13 PROCEDURE — 1159F PR MEDICATION LIST DOCUMENTED IN MEDICAL RECORD: ICD-10-PCS | Mod: CPTII,S$GLB,, | Performed by: ORTHOPAEDIC SURGERY

## 2022-12-13 PROCEDURE — 87075 CULTR BACTERIA EXCEPT BLOOD: CPT

## 2022-12-13 PROCEDURE — 1159F MED LIST DOCD IN RCRD: CPT | Mod: CPTII,S$GLB,, | Performed by: ORTHOPAEDIC SURGERY

## 2022-12-13 PROCEDURE — 3008F PR BODY MASS INDEX (BMI) DOCUMENTED: ICD-10-PCS | Mod: CPTII,S$GLB,, | Performed by: ORTHOPAEDIC SURGERY

## 2022-12-13 NOTE — PROGRESS NOTES
Hipolito Laws  Returns today for follow-up.  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  He has been going to wound care for the last couple of weeks.  Returns today and reports that he just came from wound care and Dr. Jurado did a bit more of an extensive debridement of his wound and placed some packing.  Also took cultures.  He is to return to Wound Care on Friday.  He does not report any fevers.    Imaging:  I had ordered an MRI which was done on 12/11/2022 of the left ankle which does not show any fluid collections or evidence of osteomyelitis.  There are some continued changes of the ankle joint which was noted during his debridement for medial and lateral osteochondral lesions.  There is also some continued edema around the distal fibula where we excised the fragment and would be expected four months post surgery.    Examination:  I did not remove his dressing as he had just had it placed this morning.    Impression:  1. Dehiscence of operative wound, subsequent encounter        2. Arthrosis of left ankle              Recommendation:  At this point all we can do is wait for the wound to heal.  We will keep an eye on the cultures and prescribe any antibiotics if necessary.  He can not work on his foot at this time.  He will likely need to be out of work for at least another six weeks.    Follow-up in two weeks

## 2022-12-13 NOTE — PROGRESS NOTES
Patient has a wound onleft lateral ankle  On exploring the wound, there is an area of fluid under the wound  It goes about 1-2 cm deep and extends both medially and laterally    Needs I and D    Procedure note  1 % lidocaine used  Area prepped w provodine    Incision of 1 cm made extending the area  Wound cultures obtained  Area packed     Surgeon- S Money  Blood loss-2 cc  Location left lateral malleolar area

## 2022-12-13 NOTE — LETTER
December 13, 2022    Hipolito Laws  [address]  [place] [address]             [facility] [address]  [address]  [facility] [address]  Phone: [phone] To whom it May concern,   Mr. Laws continues to have an open wound about his left ankle that has been slow to heal.  He is currently being treated in wound care with debridement and wound packing.  He should continue to remain out of work for at least the next six weeks.  He will be following up in wound care and with me in two weeks.  If there are any questions please notify me.    Sincerely       Aleksandr Elias MD

## 2022-12-15 ENCOUNTER — PATIENT MESSAGE (OUTPATIENT)
Dept: ORTHOPEDICS | Facility: CLINIC | Age: 62
End: 2022-12-15
Payer: COMMERCIAL

## 2022-12-15 LAB — BACTERIA SPEC AEROBE CULT: NORMAL

## 2022-12-16 ENCOUNTER — OFFICE VISIT (OUTPATIENT)
Dept: WOUND CARE | Facility: CLINIC | Age: 62
End: 2022-12-16
Payer: COMMERCIAL

## 2022-12-16 VITALS
HEIGHT: 70 IN | BODY MASS INDEX: 29.45 KG/M2 | RESPIRATION RATE: 18 BRPM | HEART RATE: 94 BPM | WEIGHT: 205.69 LBS | SYSTOLIC BLOOD PRESSURE: 164 MMHG | DIASTOLIC BLOOD PRESSURE: 90 MMHG | OXYGEN SATURATION: 94 %

## 2022-12-16 DIAGNOSIS — M19.072 ARTHROSIS OF LEFT ANKLE: ICD-10-CM

## 2022-12-16 DIAGNOSIS — G89.29 CHRONIC PAIN OF LEFT ANKLE: Primary | ICD-10-CM

## 2022-12-16 DIAGNOSIS — M25.572 CHRONIC PAIN OF LEFT ANKLE: Primary | ICD-10-CM

## 2022-12-16 DIAGNOSIS — T81.31XD DEHISCENCE OF OPERATIVE WOUND, SUBSEQUENT ENCOUNTER: Primary | ICD-10-CM

## 2022-12-16 PROCEDURE — 99999 PR PBB SHADOW E&M-EST. PATIENT-LVL III: ICD-10-PCS | Mod: PBBFAC,,, | Performed by: SURGERY

## 2022-12-16 PROCEDURE — 3077F PR MOST RECENT SYSTOLIC BLOOD PRESSURE >= 140 MM HG: ICD-10-PCS | Mod: CPTII,S$GLB,, | Performed by: SURGERY

## 2022-12-16 PROCEDURE — 4010F ACE/ARB THERAPY RXD/TAKEN: CPT | Mod: CPTII,S$GLB,, | Performed by: SURGERY

## 2022-12-16 PROCEDURE — 4010F PR ACE/ARB THEARPY RXD/TAKEN: ICD-10-PCS | Mod: CPTII,S$GLB,, | Performed by: SURGERY

## 2022-12-16 PROCEDURE — 3044F PR MOST RECENT HEMOGLOBIN A1C LEVEL <7.0%: ICD-10-PCS | Mod: CPTII,S$GLB,, | Performed by: SURGERY

## 2022-12-16 PROCEDURE — 99999 PR PBB SHADOW E&M-EST. PATIENT-LVL III: CPT | Mod: PBBFAC,,, | Performed by: SURGERY

## 2022-12-16 PROCEDURE — 3044F HG A1C LEVEL LT 7.0%: CPT | Mod: CPTII,S$GLB,, | Performed by: SURGERY

## 2022-12-16 PROCEDURE — 3008F BODY MASS INDEX DOCD: CPT | Mod: CPTII,S$GLB,, | Performed by: SURGERY

## 2022-12-16 PROCEDURE — 3080F DIAST BP >= 90 MM HG: CPT | Mod: CPTII,S$GLB,, | Performed by: SURGERY

## 2022-12-16 PROCEDURE — 1159F PR MEDICATION LIST DOCUMENTED IN MEDICAL RECORD: ICD-10-PCS | Mod: CPTII,S$GLB,, | Performed by: SURGERY

## 2022-12-16 PROCEDURE — 1159F MED LIST DOCD IN RCRD: CPT | Mod: CPTII,S$GLB,, | Performed by: SURGERY

## 2022-12-16 PROCEDURE — 3080F PR MOST RECENT DIASTOLIC BLOOD PRESSURE >= 90 MM HG: ICD-10-PCS | Mod: CPTII,S$GLB,, | Performed by: SURGERY

## 2022-12-16 PROCEDURE — 3077F SYST BP >= 140 MM HG: CPT | Mod: CPTII,S$GLB,, | Performed by: SURGERY

## 2022-12-16 PROCEDURE — 99024 POSTOP FOLLOW-UP VISIT: CPT | Mod: S$GLB,,, | Performed by: SURGERY

## 2022-12-16 PROCEDURE — 3008F PR BODY MASS INDEX (BMI) DOCUMENTED: ICD-10-PCS | Mod: CPTII,S$GLB,, | Performed by: SURGERY

## 2022-12-16 PROCEDURE — 99024 PR POST-OP FOLLOW-UP VISIT: ICD-10-PCS | Mod: S$GLB,,, | Performed by: SURGERY

## 2022-12-16 RX ORDER — HYDROCODONE BITARTRATE AND ACETAMINOPHEN 5; 325 MG/1; MG/1
1 TABLET ORAL EVERY 6 HOURS PRN
Qty: 28 TABLET | Refills: 0 | Status: SHIPPED | OUTPATIENT
Start: 2022-12-16 | End: 2023-03-17

## 2022-12-19 LAB — BACTERIA SPEC ANAEROBE CULT: NORMAL

## 2022-12-21 ENCOUNTER — OFFICE VISIT (OUTPATIENT)
Dept: WOUND CARE | Facility: CLINIC | Age: 62
End: 2022-12-21
Payer: COMMERCIAL

## 2022-12-21 ENCOUNTER — PATIENT MESSAGE (OUTPATIENT)
Dept: WOUND CARE | Facility: CLINIC | Age: 62
End: 2022-12-21

## 2022-12-21 VITALS
TEMPERATURE: 98 F | HEIGHT: 70 IN | BODY MASS INDEX: 29.45 KG/M2 | DIASTOLIC BLOOD PRESSURE: 85 MMHG | WEIGHT: 205.69 LBS | HEART RATE: 112 BPM | SYSTOLIC BLOOD PRESSURE: 162 MMHG

## 2022-12-21 DIAGNOSIS — M19.072 ARTHROSIS OF LEFT ANKLE: ICD-10-CM

## 2022-12-21 DIAGNOSIS — T81.31XD DEHISCENCE OF OPERATIVE WOUND, SUBSEQUENT ENCOUNTER: Primary | ICD-10-CM

## 2022-12-21 DIAGNOSIS — I10 ESSENTIAL HYPERTENSION, BENIGN: ICD-10-CM

## 2022-12-21 PROCEDURE — 3044F PR MOST RECENT HEMOGLOBIN A1C LEVEL <7.0%: ICD-10-PCS | Mod: CPTII,S$GLB,,

## 2022-12-21 PROCEDURE — 99214 OFFICE O/P EST MOD 30 MIN: CPT | Mod: 25,S$GLB,,

## 2022-12-21 PROCEDURE — 1159F PR MEDICATION LIST DOCUMENTED IN MEDICAL RECORD: ICD-10-PCS | Mod: CPTII,S$GLB,,

## 2022-12-21 PROCEDURE — 3077F SYST BP >= 140 MM HG: CPT | Mod: CPTII,S$GLB,,

## 2022-12-21 PROCEDURE — 99999 PR PBB SHADOW E&M-EST. PATIENT-LVL IV: ICD-10-PCS | Mod: PBBFAC,,,

## 2022-12-21 PROCEDURE — 3044F HG A1C LEVEL LT 7.0%: CPT | Mod: CPTII,S$GLB,,

## 2022-12-21 PROCEDURE — 4010F ACE/ARB THERAPY RXD/TAKEN: CPT | Mod: CPTII,S$GLB,,

## 2022-12-21 PROCEDURE — 4010F PR ACE/ARB THEARPY RXD/TAKEN: ICD-10-PCS | Mod: CPTII,S$GLB,,

## 2022-12-21 PROCEDURE — 99214 PR OFFICE/OUTPT VISIT, EST, LEVL IV, 30-39 MIN: ICD-10-PCS | Mod: 25,S$GLB,,

## 2022-12-21 PROCEDURE — 11042 DEBRIDEMENT: ICD-10-PCS | Mod: S$GLB,,,

## 2022-12-21 PROCEDURE — 3079F DIAST BP 80-89 MM HG: CPT | Mod: CPTII,S$GLB,,

## 2022-12-21 PROCEDURE — 3077F PR MOST RECENT SYSTOLIC BLOOD PRESSURE >= 140 MM HG: ICD-10-PCS | Mod: CPTII,S$GLB,,

## 2022-12-21 PROCEDURE — 3008F BODY MASS INDEX DOCD: CPT | Mod: CPTII,S$GLB,,

## 2022-12-21 PROCEDURE — 11042 DBRDMT SUBQ TIS 1ST 20SQCM/<: CPT | Mod: S$GLB,,,

## 2022-12-21 PROCEDURE — 1159F MED LIST DOCD IN RCRD: CPT | Mod: CPTII,S$GLB,,

## 2022-12-21 PROCEDURE — 3008F PR BODY MASS INDEX (BMI) DOCUMENTED: ICD-10-PCS | Mod: CPTII,S$GLB,,

## 2022-12-21 PROCEDURE — 3079F PR MOST RECENT DIASTOLIC BLOOD PRESSURE 80-89 MM HG: ICD-10-PCS | Mod: CPTII,S$GLB,,

## 2022-12-21 PROCEDURE — 99999 PR PBB SHADOW E&M-EST. PATIENT-LVL IV: CPT | Mod: PBBFAC,,,

## 2022-12-21 NOTE — PROCEDURES
"Debridement    Date/Time: 12/21/2022 9:30 AM  Performed by: Melissa Pickens PA-C  Authorized by: Melissa Pickens PA-C     Time out: Immediately prior to procedure a "time out" was called to verify the correct patient, procedure, equipment, support staff and site/side marked as required.    Consent Done?:  Yes (Written)  Local anesthesia used?: Yes    Local anesthetic:  Topical anesthetic (Topical 4% Lidocaine Hydrochloride)    Wound Details:    Location:  Left ankle    Type of Debridement:  Excisional       Length (cm):  3       Area (sq cm):  5.7       Width (cm):  1.9       Percent Debrided (%):  15       Depth (cm):  1.3       Total Area Debrided (sq cm):  0.86    Depth of debridement:  Subcutaneous tissue    Tissue debrided:  Subcutaneous    Instruments:  Forceps and Scissors    Bleeding:  Minimal  Hemostasis Achieved: Yes    Method Used:  Pressure  Patient tolerance:  Patient tolerated the procedure well with no immediate complications  "

## 2022-12-21 NOTE — PROGRESS NOTES
Subjective:       Patient ID: Hipolito Laws is a 62 y.o. male.    Chief Complaint: Wound Check    Patient presents for a reevaluation of a left ankle wound dehiscence. Patient s/p wound closure on 9/14/22 and arthroscopy of left ankle with debridement on 8/4/22. Dr. Jurado cultured the wound- no growth at this time. Patient reports that his fracture boot has been rubbing on his wound and causing pain. Pt messaged his orthopedist for pain medication. He was prescribed Norco 5-325 mg. Denies fever, chills, erythema, warmth, or purulent drainage.      Review of Systems   Constitutional:  Negative for activity change, chills, diaphoresis, fatigue and fever.   Respiratory:  Negative for apnea, chest tightness and shortness of breath.    Cardiovascular:  Negative for chest pain, palpitations and leg swelling.   Musculoskeletal:  Positive for joint swelling and neck pain. Negative for gait problem.   Skin:  Positive for wound. Negative for pallor and rash.   Neurological:  Negative for syncope and weakness.   Psychiatric/Behavioral:  Negative for agitation. The patient is not nervous/anxious.    All other systems reviewed and are negative.    Objective:      Physical Exam  Vitals reviewed.   Constitutional:       General: He is not in acute distress.     Appearance: Normal appearance.   Cardiovascular:      Rate and Rhythm: Normal rate and regular rhythm.      Pulses: Normal pulses.   Pulmonary:      Effort: No respiratory distress.   Musculoskeletal:         General: No swelling.      Left ankle: Swelling present. No tenderness.   Skin:     General: Skin is warm and dry.      Capillary Refill: Capillary refill takes less than 2 seconds.      Findings: Wound present. No erythema.          Neurological:      General: No focal deficit present.      Mental Status: He is alert and oriented to person, place, and time.   Psychiatric:         Mood and Affect: Mood normal.         Behavior: Behavior normal.         Thought  Content: Thought content normal.         Judgment: Judgment normal.       Assessment:       1. Dehiscence of operative wound, subsequent encounter    2. Essential hypertension, benign    3. Arthrosis of left ankle             Incision/Site 09/14/22 1312 Left Malleolus/Ankle (Active)   09/14/22 1312   Present Prior to Hospital Arrival?:    Side: Left   Location: Malleolus/Ankle   Orientation:    Incision Type:    Closure Method:    Additional Comments:    Removal Indication and Assessment:    Wound Outcome:    Removal Indications:    Wound Image     12/21/22 0923   Dressing Appearance Dry;Intact;Clean;Moist drainage 12/21/22 0923   Drainage Amount Moderate 12/21/22 0923   Drainage Characteristics/Odor Bleeding controlled 12/21/22 0923   Red (%), Wound Tissue Color 100 % 12/21/22 0923   Periwound Area Intact;Pink 12/21/22 0923   Wound Length (cm) 3 cm 12/21/22 0923   Wound Width (cm) 1.9 cm 12/21/22 0923   Wound Depth (cm) 1.3 cm 12/21/22 0923   Wound Volume (cm^3) 7.41 cm^3 12/21/22 0923   Wound Surface Area (cm^2) 5.7 cm^2 12/21/22 0923   Care Cleansed with:;Wound cleanser 12/21/22 0923   Dressing Applied;Collagen;Calcium alginate;Island/border 12/21/22 0923       Hipolito was seen in the clinic room and placed in the supine position on the treatment table.  The wound was noted to be open to air. The ankle was cleansed with wound cleanser and dried thoroughly. No erythema, odor, or warmth noted from patient's baseline. Placed topical 4% Lidocaine Hydrochloride to wound bed for 15 minutes. Sharp debridement with scissors and forceps to remove non-viable tissue. Pt tolerated procedure well. Wound does not probe to bone.    Plan of Care: Endoform to wound bed with a calcium alginate border dressing to cover.      Plan:       Orders Placed This Encounter   Procedures    Debridement     This order was created via procedure documentation    Ambulatory referral/consult to Plastic Surgery     Standing Status:   Future      Standing Expiration Date:   1/21/2024     Referral Priority:   Routine     Referral Type:   Consultation     Referral Reason:   Specialty Services Required     Requested Specialty:   Plastic Surgery     Number of Visits Requested:   1     Left ankle dressed as detailed above.  Patient was instructed to not get the dressings wet and to use cast covers for showering.  Should the dressing become wet, he is to remove it, place a moist dressing over the wound, cover with gauze and roll gauze and use ace wraps to secure bandages.  He should then notify this office as soon as possible to have a new dressing applied.    Messaged Dr. Elias. Cleared patient to not wear fracture boot. Recommended referral to plastic surgery for a surgical flap.  Referral placed to plastic surgery.    Discussed increase in measurements. Instructed patient to discontinue wearing the fracture boot. Instructed patient to elevate legs whenever sedentary and to avoid trauma to the area.     Continue pain medication as prescribed by Dr. Elias as needed.    Will follow cultures and prescribe any antibiotics if needed.    Written and verbal instructions given to patient  RTC in 1 week      Melissa Pickens PA-C

## 2022-12-28 ENCOUNTER — OFFICE VISIT (OUTPATIENT)
Dept: ORTHOPEDICS | Facility: CLINIC | Age: 62
End: 2022-12-28
Payer: COMMERCIAL

## 2022-12-28 ENCOUNTER — OFFICE VISIT (OUTPATIENT)
Dept: WOUND CARE | Facility: CLINIC | Age: 62
End: 2022-12-28
Payer: COMMERCIAL

## 2022-12-28 VITALS
TEMPERATURE: 98 F | BODY MASS INDEX: 29.45 KG/M2 | SYSTOLIC BLOOD PRESSURE: 156 MMHG | HEIGHT: 70 IN | WEIGHT: 205 LBS | OXYGEN SATURATION: 94 % | WEIGHT: 205.69 LBS | HEART RATE: 98 BPM | DIASTOLIC BLOOD PRESSURE: 85 MMHG | BODY MASS INDEX: 29.35 KG/M2 | HEIGHT: 70 IN

## 2022-12-28 DIAGNOSIS — T81.31XD DEHISCENCE OF OPERATIVE WOUND, SUBSEQUENT ENCOUNTER: Primary | ICD-10-CM

## 2022-12-28 DIAGNOSIS — M19.072 ARTHROSIS OF LEFT ANKLE: ICD-10-CM

## 2022-12-28 DIAGNOSIS — T81.31XD DEHISCENCE OF OPERATIVE WOUND, SUBSEQUENT ENCOUNTER: ICD-10-CM

## 2022-12-28 DIAGNOSIS — I10 ESSENTIAL HYPERTENSION, BENIGN: ICD-10-CM

## 2022-12-28 DIAGNOSIS — M19.072 ARTHROSIS OF LEFT ANKLE: Primary | ICD-10-CM

## 2022-12-28 PROCEDURE — 99024 POSTOP FOLLOW-UP VISIT: CPT | Mod: S$GLB,,, | Performed by: ORTHOPAEDIC SURGERY

## 2022-12-28 PROCEDURE — 99999 PR PBB SHADOW E&M-EST. PATIENT-LVL III: ICD-10-PCS | Mod: PBBFAC,,, | Performed by: ORTHOPAEDIC SURGERY

## 2022-12-28 PROCEDURE — 99999 PR PBB SHADOW E&M-EST. PATIENT-LVL III: CPT | Mod: PBBFAC,,, | Performed by: ORTHOPAEDIC SURGERY

## 2022-12-28 PROCEDURE — 3077F PR MOST RECENT SYSTOLIC BLOOD PRESSURE >= 140 MM HG: ICD-10-PCS | Mod: CPTII,S$GLB,,

## 2022-12-28 PROCEDURE — 3044F HG A1C LEVEL LT 7.0%: CPT | Mod: CPTII,S$GLB,, | Performed by: ORTHOPAEDIC SURGERY

## 2022-12-28 PROCEDURE — 3008F PR BODY MASS INDEX (BMI) DOCUMENTED: ICD-10-PCS | Mod: CPTII,S$GLB,,

## 2022-12-28 PROCEDURE — 1159F PR MEDICATION LIST DOCUMENTED IN MEDICAL RECORD: ICD-10-PCS | Mod: CPTII,S$GLB,,

## 2022-12-28 PROCEDURE — 99024 PR POST-OP FOLLOW-UP VISIT: ICD-10-PCS | Mod: S$GLB,,, | Performed by: ORTHOPAEDIC SURGERY

## 2022-12-28 PROCEDURE — 3079F DIAST BP 80-89 MM HG: CPT | Mod: CPTII,S$GLB,,

## 2022-12-28 PROCEDURE — 3008F BODY MASS INDEX DOCD: CPT | Mod: CPTII,S$GLB,,

## 2022-12-28 PROCEDURE — 99214 OFFICE O/P EST MOD 30 MIN: CPT | Mod: S$GLB,,,

## 2022-12-28 PROCEDURE — 99214 PR OFFICE/OUTPT VISIT, EST, LEVL IV, 30-39 MIN: ICD-10-PCS | Mod: S$GLB,,,

## 2022-12-28 PROCEDURE — 4010F ACE/ARB THERAPY RXD/TAKEN: CPT | Mod: CPTII,S$GLB,,

## 2022-12-28 PROCEDURE — 3044F PR MOST RECENT HEMOGLOBIN A1C LEVEL <7.0%: ICD-10-PCS | Mod: CPTII,S$GLB,, | Performed by: ORTHOPAEDIC SURGERY

## 2022-12-28 PROCEDURE — 4010F PR ACE/ARB THEARPY RXD/TAKEN: ICD-10-PCS | Mod: CPTII,S$GLB,, | Performed by: ORTHOPAEDIC SURGERY

## 2022-12-28 PROCEDURE — 1160F PR REVIEW ALL MEDS BY PRESCRIBER/CLIN PHARMACIST DOCUMENTED: ICD-10-PCS | Mod: CPTII,S$GLB,, | Performed by: ORTHOPAEDIC SURGERY

## 2022-12-28 PROCEDURE — 99999 PR PBB SHADOW E&M-EST. PATIENT-LVL IV: ICD-10-PCS | Mod: PBBFAC,,,

## 2022-12-28 PROCEDURE — 3077F SYST BP >= 140 MM HG: CPT | Mod: CPTII,S$GLB,,

## 2022-12-28 PROCEDURE — 3044F PR MOST RECENT HEMOGLOBIN A1C LEVEL <7.0%: ICD-10-PCS | Mod: CPTII,S$GLB,,

## 2022-12-28 PROCEDURE — 3079F PR MOST RECENT DIASTOLIC BLOOD PRESSURE 80-89 MM HG: ICD-10-PCS | Mod: CPTII,S$GLB,,

## 2022-12-28 PROCEDURE — 3008F PR BODY MASS INDEX (BMI) DOCUMENTED: ICD-10-PCS | Mod: CPTII,S$GLB,, | Performed by: ORTHOPAEDIC SURGERY

## 2022-12-28 PROCEDURE — 1160F RVW MEDS BY RX/DR IN RCRD: CPT | Mod: CPTII,S$GLB,, | Performed by: ORTHOPAEDIC SURGERY

## 2022-12-28 PROCEDURE — 4010F PR ACE/ARB THEARPY RXD/TAKEN: ICD-10-PCS | Mod: CPTII,S$GLB,,

## 2022-12-28 PROCEDURE — 1159F MED LIST DOCD IN RCRD: CPT | Mod: CPTII,S$GLB,, | Performed by: ORTHOPAEDIC SURGERY

## 2022-12-28 PROCEDURE — 3044F HG A1C LEVEL LT 7.0%: CPT | Mod: CPTII,S$GLB,,

## 2022-12-28 PROCEDURE — 99999 PR PBB SHADOW E&M-EST. PATIENT-LVL IV: CPT | Mod: PBBFAC,,,

## 2022-12-28 PROCEDURE — 1159F MED LIST DOCD IN RCRD: CPT | Mod: CPTII,S$GLB,,

## 2022-12-28 PROCEDURE — 1159F PR MEDICATION LIST DOCUMENTED IN MEDICAL RECORD: ICD-10-PCS | Mod: CPTII,S$GLB,, | Performed by: ORTHOPAEDIC SURGERY

## 2022-12-28 PROCEDURE — 3008F BODY MASS INDEX DOCD: CPT | Mod: CPTII,S$GLB,, | Performed by: ORTHOPAEDIC SURGERY

## 2022-12-28 PROCEDURE — 4010F ACE/ARB THERAPY RXD/TAKEN: CPT | Mod: CPTII,S$GLB,, | Performed by: ORTHOPAEDIC SURGERY

## 2022-12-28 NOTE — PROGRESS NOTES
Subjective:       Patient ID: Hipolito Laws is a 62 y.o. male.    Chief Complaint: Wound Check    Patient presents for a reevaluation of a left ankle wound dehiscence. Patient s/p wound closure on 9/14/22 and arthroscopy of left ankle with debridement on 8/4/22. Dr. Jurado cultured the wound- no growth at this time. Patient reports taking Norco 5-325 mg- prescribed by his orthopedic surgeon. Denies fever, chills, erythema, warmth, or purulent drainage.      Review of Systems   Constitutional:  Negative for activity change, chills, diaphoresis, fatigue and fever.   Respiratory:  Negative for apnea, chest tightness and shortness of breath.    Cardiovascular:  Negative for chest pain, palpitations and leg swelling.   Musculoskeletal:  Positive for joint swelling and neck pain. Negative for gait problem.   Skin:  Positive for wound. Negative for pallor and rash.   Neurological:  Negative for syncope and weakness.   Psychiatric/Behavioral:  Negative for agitation. The patient is not nervous/anxious.    All other systems reviewed and are negative.    Objective:      Physical Exam  Vitals reviewed.   Constitutional:       General: He is not in acute distress.     Appearance: Normal appearance.   Cardiovascular:      Rate and Rhythm: Normal rate and regular rhythm.      Pulses: Normal pulses.   Pulmonary:      Effort: No respiratory distress.   Musculoskeletal:         General: No swelling.      Left ankle: Swelling present. No tenderness.   Skin:     General: Skin is warm and dry.      Capillary Refill: Capillary refill takes less than 2 seconds.      Findings: Wound present. No erythema.          Neurological:      General: No focal deficit present.      Mental Status: He is alert and oriented to person, place, and time.   Psychiatric:         Mood and Affect: Mood normal.         Behavior: Behavior normal.         Thought Content: Thought content normal.         Judgment: Judgment normal.       Assessment:       1.  Dehiscence of operative wound, subsequent encounter    2. Essential hypertension, benign    3. Arthrosis of left ankle             Incision/Site 09/14/22 1312 Left Malleolus/Ankle (Active)   09/14/22 1312   Present Prior to Hospital Arrival?:    Side: Left   Location: Malleolus/Ankle   Orientation:    Incision Type:    Closure Method:    Additional Comments:    Removal Indication and Assessment:    Wound Outcome:    Removal Indications:    Wound Image    12/28/22 1200   Dressing Appearance Dry;Intact;Clean;Moist drainage 12/28/22 1200   Drainage Amount Small 12/28/22 1200   Drainage Characteristics/Odor Bleeding controlled;Tan;Yellow 12/28/22 1200   Red (%), Wound Tissue Color 100 % 12/28/22 1200   Periwound Area Intact;Edematous;[place] 12/28/22 1200   Wound Length (cm) 2.2 cm 12/28/22 1200   Wound Width (cm) 0.9 cm 12/28/22 1200   Wound Depth (cm) 0.8 cm 12/28/22 1200   Wound Volume (cm^3) 1.584 cm^3 12/28/22 1200   Wound Surface Area (cm^2) 1.98 cm^2 12/28/22 1200   Undermining (depth (cm)/location) 0.5 cm/ 7-8 o'clock 12/28/22 1200   Care Cleansed with:;Soap and water 12/28/22 1200   Dressing Applied;Collagen;Island/border 12/28/22 1200   Periwound Care Skin barrier film applied 12/28/22 1200       Hipolito was seen in the clinic room and placed in the supine position on the treatment table.  The wound was noted to be open to air. The ankle was cleansed with wound cleanser and dried thoroughly. No erythema, odor, or warmth noted from patient's baseline.  Wound does not probe to bone.    Plan of Care: Endoform to wound bed with a calcium alginate border dressing to cover.      Plan:     Left ankle dressed as detailed above.  Patient was instructed to not get the dressings wet and to use cast covers for showering.  Should the dressing become wet, he is to remove it, place a moist dressing over the wound, cover with gauze and roll gauze and use ace wraps to secure bandages.  He should then notify this office as soon as  possible to have a new dressing applied.    Appointment scheduled for 1/24/23 with plastic surgery for a consult for a surgical flap. Discussed possibly not needing the appointment due to the improvement in wound measurements. Will reevaluate at the next appointment.     Instructed patient to elevate legs whenever sedentary and to avoid trauma to the area.     Continue pain medication as prescribed by Dr. Elias as needed.    Will follow cultures and prescribe any antibiotics if needed.    Written and verbal instructions given to patient  RTC in 1 week      Melissa Pickens PA-C

## 2022-12-28 NOTE — PROGRESS NOTES
Hipolito Laws  Returns today for follow-up.  It has been about two weeks since I have seen him.  He has been going to wound care in the vascular surgery clinic.  He has an appointment after my appointment for wound care.  He reports minimal pain about his ankle but he states that his neck is really bothering him.  He states that was told by his pain management doctor that he could not get another corticosteroid injection is neck until his wound is healed.  Cultures performed on 12/13/2020 to have not grown any bacteria    Examination:  I removed the dressing as well as the collagen packing that was placed.  Based on the pictures I saw does appear that the wound is a bit improved and Mr. Laws thinks it looks better.  There is no purulent drainage and no significant necrotic tissue noted.  I took clinical pictures which are in the media tab    Impression:  1. Arthrosis of left ankle        2. Dehiscence of operative wound, subsequent encounter            Recommendation:  I would recommend continued wound care.  Hopefully he will continue to make good progress and will not need a plastics evaluation but a plastics appointment has been made and scheduled in the next few weeks.  I prescribed a Medrol Dosepak for him to use to see if this will give him any relief of his neck pain.    I will see him when he returns for his wound care appointment

## 2023-01-04 ENCOUNTER — OFFICE VISIT (OUTPATIENT)
Dept: WOUND CARE | Facility: CLINIC | Age: 63
End: 2023-01-04
Payer: COMMERCIAL

## 2023-01-04 VITALS
WEIGHT: 206.38 LBS | OXYGEN SATURATION: 96 % | HEIGHT: 70 IN | HEART RATE: 105 BPM | BODY MASS INDEX: 29.54 KG/M2 | SYSTOLIC BLOOD PRESSURE: 143 MMHG | DIASTOLIC BLOOD PRESSURE: 76 MMHG | TEMPERATURE: 98 F

## 2023-01-04 DIAGNOSIS — I10 ESSENTIAL HYPERTENSION, BENIGN: ICD-10-CM

## 2023-01-04 DIAGNOSIS — T81.31XD DEHISCENCE OF OPERATIVE WOUND, SUBSEQUENT ENCOUNTER: Primary | ICD-10-CM

## 2023-01-04 DIAGNOSIS — M19.072 ARTHROSIS OF LEFT ANKLE: ICD-10-CM

## 2023-01-04 PROCEDURE — 3008F PR BODY MASS INDEX (BMI) DOCUMENTED: ICD-10-PCS | Mod: CPTII,S$GLB,,

## 2023-01-04 PROCEDURE — 99999 PR PBB SHADOW E&M-EST. PATIENT-LVL IV: ICD-10-PCS | Mod: PBBFAC,,,

## 2023-01-04 PROCEDURE — 99214 PR OFFICE/OUTPT VISIT, EST, LEVL IV, 30-39 MIN: ICD-10-PCS | Mod: 25,S$GLB,,

## 2023-01-04 PROCEDURE — 99214 OFFICE O/P EST MOD 30 MIN: CPT | Mod: 25,S$GLB,,

## 2023-01-04 PROCEDURE — 99999 PR PBB SHADOW E&M-EST. PATIENT-LVL IV: CPT | Mod: PBBFAC,,,

## 2023-01-04 PROCEDURE — 11042 DBRDMT SUBQ TIS 1ST 20SQCM/<: CPT | Mod: S$GLB,,,

## 2023-01-04 PROCEDURE — 3078F DIAST BP <80 MM HG: CPT | Mod: CPTII,S$GLB,,

## 2023-01-04 PROCEDURE — 3008F BODY MASS INDEX DOCD: CPT | Mod: CPTII,S$GLB,,

## 2023-01-04 PROCEDURE — 11042 DEBRIDEMENT: ICD-10-PCS | Mod: S$GLB,,,

## 2023-01-04 PROCEDURE — 1159F PR MEDICATION LIST DOCUMENTED IN MEDICAL RECORD: ICD-10-PCS | Mod: CPTII,S$GLB,,

## 2023-01-04 PROCEDURE — 3078F PR MOST RECENT DIASTOLIC BLOOD PRESSURE < 80 MM HG: ICD-10-PCS | Mod: CPTII,S$GLB,,

## 2023-01-04 PROCEDURE — 3077F PR MOST RECENT SYSTOLIC BLOOD PRESSURE >= 140 MM HG: ICD-10-PCS | Mod: CPTII,S$GLB,,

## 2023-01-04 PROCEDURE — 1159F MED LIST DOCD IN RCRD: CPT | Mod: CPTII,S$GLB,,

## 2023-01-04 PROCEDURE — 3077F SYST BP >= 140 MM HG: CPT | Mod: CPTII,S$GLB,,

## 2023-01-04 NOTE — PROCEDURES
Debridement    Date/Time: 1/4/2023 10:00 AM  Performed by: Melissa Pickens PA-C  Authorized by: Melissa Pickens PA-C     Consent Done?:  Yes (Written)  Local anesthesia used?: Yes    Local anesthetic:  Topical anesthetic (Topical 4% Lidocaine Hydrochloride)    Wound Details:    Location:  Left ankle    Type of Debridement:  Excisional       Length (cm):  2       Area (sq cm):  1.6       Width (cm):  0.8       Percent Debrided (%):  100       Depth (cm):  0.7       Total Area Debrided (sq cm):  1.6    Depth of debridement:  Subcutaneous tissue    Tissue debrided:  Subcutaneous    Devitalized tissue debrided:  Biofilm, Exudate, Fibrin and Slough    Instruments:  Curette, Forceps and Scissors    Bleeding:  Minimal  Hemostasis Achieved: Yes    Method Used:  Pressure  Patient tolerance:  Patient tolerated the procedure well with no immediate complications

## 2023-01-04 NOTE — PROGRESS NOTES
Subjective:       Patient ID: Hipolito Laws is a 62 y.o. male.    Chief Complaint: Wound Check    Patient presents for a reevaluation of a left ankle wound dehiscence. Patient s/p wound closure on 9/14/22 and arthroscopy of left ankle with debridement on 8/4/22. Dr. Jurado cultured the wound- no growth at this time. Patient reports taking Norco 5-325 mg- prescribed by his orthopedic surgeon. Denies fever, chills, erythema, warmth, or purulent drainage.      Review of Systems   Constitutional:  Negative for activity change, chills, diaphoresis, fatigue and fever.   Respiratory:  Negative for apnea, chest tightness and shortness of breath.    Cardiovascular:  Negative for chest pain, palpitations and leg swelling.   Musculoskeletal:  Positive for joint swelling and neck pain. Negative for gait problem.   Skin:  Positive for wound. Negative for pallor and rash.   Neurological:  Negative for syncope and weakness.   Psychiatric/Behavioral:  Negative for agitation. The patient is not nervous/anxious.    All other systems reviewed and are negative.    Objective:      Physical Exam  Vitals reviewed.   Constitutional:       General: He is not in acute distress.     Appearance: Normal appearance.   Cardiovascular:      Rate and Rhythm: Normal rate and regular rhythm.      Pulses: Normal pulses.   Pulmonary:      Effort: No respiratory distress.   Musculoskeletal:         General: No swelling.      Left ankle: Swelling present. No tenderness.   Skin:     General: Skin is warm and dry.      Capillary Refill: Capillary refill takes less than 2 seconds.      Findings: Wound present. No erythema.          Neurological:      General: No focal deficit present.      Mental Status: He is alert and oriented to person, place, and time.   Psychiatric:         Mood and Affect: Mood normal.         Behavior: Behavior normal.         Thought Content: Thought content normal.         Judgment: Judgment normal.       Assessment:       1.  Dehiscence of operative wound, subsequent encounter    2. Essential hypertension, benign    3. Arthrosis of left ankle             Incision/Site 09/14/22 1312 Left Malleolus/Ankle (Active)   09/14/22 1312   Present Prior to Hospital Arrival?:    Side: Left   Location: Malleolus/Ankle   Orientation:    Incision Type:    Closure Method:    Additional Comments:    Removal Indication and Assessment:    Wound Outcome:    Removal Indications:    Wound Image     01/04/23 1114   Dressing Appearance Dry;Intact;Clean;Moist drainage 01/04/23 1114   Drainage Amount Small 01/04/23 1114   Drainage Characteristics/Odor Serous;Tan 01/04/23 1114   Red (%), Wound Tissue Color 50 % 01/04/23 1114   Yellow (%), Wound Tissue Color 50 % 01/04/23 1114   Periwound Area Intact;Edematous;Pink 01/04/23 1114   Wound Length (cm) 2 cm 01/04/23 1114   Wound Width (cm) 0.8 cm 01/04/23 1114   Wound Depth (cm) 0.7 cm 01/04/23 1114   Wound Volume (cm^3) 1.12 cm^3 01/04/23 1114   Wound Surface Area (cm^2) 1.6 cm^2 01/04/23 1114   Undermining (depth (cm)/location) 0.6 cm/ 7 o'clock 01/04/23 1114   Care Cleansed with:;Wound cleanser 01/04/23 1114   Dressing Applied;Calcium alginate;[place]/border;Other (comment) 01/04/23 1114   Packing packed with;hydrofiber/alginate 01/04/23 1114   Periwound Care Skin barrier film applied 01/04/23 1114       Hipolito was seen in the clinic room and placed in the supine position on the treatment table.  The wound was noted to be open to air. The ankle was cleansed with wound cleanser and dried thoroughly. No erythema, odor, or warmth noted from patient's baseline.  Wound does not probe to bone.    Plan of Care: Packed wound bed with calcium alginate, covered with hydrofera blue, and a border dressing.       Plan:     Left ankle dressed as detailed above.  Patient was instructed to not get the dressings wet and to use cast covers for showering.  Should the dressing become wet, he is to remove it, place a moist dressing over  the wound, cover with gauze and roll gauze and use ace wraps to secure bandages.  He should then notify this office as soon as possible to have a new dressing applied.    Appointment scheduled for 1/24/23 with plastic surgery for a consult for a surgical flap. Discussed possibly not needing the appointment due to the improvement in wound measurements. Will reevaluate at the next appointment.     Instructed patient to elevate legs whenever sedentary and to avoid trauma to the area.     Continue pain medication as prescribed by Dr. Elias as needed.        Written and verbal instructions given to patient  RTC in 1 week      Melissa Pickens PA-C

## 2023-01-05 ENCOUNTER — PATIENT MESSAGE (OUTPATIENT)
Dept: INTERNAL MEDICINE | Facility: CLINIC | Age: 63
End: 2023-01-05
Payer: COMMERCIAL

## 2023-01-05 DIAGNOSIS — Z12.2 ENCOUNTER FOR SCREENING FOR LUNG CANCER: Primary | ICD-10-CM

## 2023-01-10 ENCOUNTER — OFFICE VISIT (OUTPATIENT)
Dept: WOUND CARE | Facility: CLINIC | Age: 63
End: 2023-01-10
Payer: COMMERCIAL

## 2023-01-10 ENCOUNTER — HOSPITAL ENCOUNTER (OUTPATIENT)
Dept: RADIOLOGY | Facility: HOSPITAL | Age: 63
Discharge: HOME | End: 2023-01-10
Attending: INTERNAL MEDICINE
Payer: COMMERCIAL

## 2023-01-10 VITALS
WEIGHT: 205.5 LBS | DIASTOLIC BLOOD PRESSURE: 94 MMHG | SYSTOLIC BLOOD PRESSURE: 159 MMHG | BODY MASS INDEX: 29.42 KG/M2 | TEMPERATURE: 99 F | HEART RATE: 94 BPM | HEIGHT: 70 IN

## 2023-01-10 DIAGNOSIS — Z12.2 ENCOUNTER FOR SCREENING FOR LUNG CANCER: ICD-10-CM

## 2023-01-10 DIAGNOSIS — I10 ESSENTIAL HYPERTENSION, BENIGN: ICD-10-CM

## 2023-01-10 DIAGNOSIS — T81.31XD DEHISCENCE OF OPERATIVE WOUND, SUBSEQUENT ENCOUNTER: Primary | ICD-10-CM

## 2023-01-10 DIAGNOSIS — M19.072 ARTHROSIS OF LEFT ANKLE: ICD-10-CM

## 2023-01-10 PROCEDURE — 71271 CT THORAX LUNG CANCER SCR C-: CPT | Mod: 26,,, | Performed by: RADIOLOGY

## 2023-01-10 PROCEDURE — 1159F MED LIST DOCD IN RCRD: CPT | Mod: CPTII,S$GLB,,

## 2023-01-10 PROCEDURE — 3077F SYST BP >= 140 MM HG: CPT | Mod: CPTII,S$GLB,,

## 2023-01-10 PROCEDURE — 71271 CT THORAX LUNG CANCER SCR C-: CPT | Mod: TC

## 2023-01-10 PROCEDURE — 1159F PR MEDICATION LIST DOCUMENTED IN MEDICAL RECORD: ICD-10-PCS | Mod: CPTII,S$GLB,,

## 2023-01-10 PROCEDURE — 99999 PR PBB SHADOW E&M-EST. PATIENT-LVL III: CPT | Mod: PBBFAC,,,

## 2023-01-10 PROCEDURE — 4010F PR ACE/ARB THEARPY RXD/TAKEN: ICD-10-PCS | Mod: CPTII,S$GLB,,

## 2023-01-10 PROCEDURE — 99999 PR PBB SHADOW E&M-EST. PATIENT-LVL III: ICD-10-PCS | Mod: PBBFAC,,,

## 2023-01-10 PROCEDURE — 3080F DIAST BP >= 90 MM HG: CPT | Mod: CPTII,S$GLB,,

## 2023-01-10 PROCEDURE — 99214 OFFICE O/P EST MOD 30 MIN: CPT | Mod: S$GLB,,,

## 2023-01-10 PROCEDURE — 3008F BODY MASS INDEX DOCD: CPT | Mod: CPTII,S$GLB,,

## 2023-01-10 PROCEDURE — 3077F PR MOST RECENT SYSTOLIC BLOOD PRESSURE >= 140 MM HG: ICD-10-PCS | Mod: CPTII,S$GLB,,

## 2023-01-10 PROCEDURE — 3080F PR MOST RECENT DIASTOLIC BLOOD PRESSURE >= 90 MM HG: ICD-10-PCS | Mod: CPTII,S$GLB,,

## 2023-01-10 PROCEDURE — 4010F ACE/ARB THERAPY RXD/TAKEN: CPT | Mod: CPTII,S$GLB,,

## 2023-01-10 PROCEDURE — 71271 CT CHEST LUNG SCREENING LOW DOSE: ICD-10-PCS | Mod: 26,,, | Performed by: RADIOLOGY

## 2023-01-10 PROCEDURE — 3008F PR BODY MASS INDEX (BMI) DOCUMENTED: ICD-10-PCS | Mod: CPTII,S$GLB,,

## 2023-01-10 PROCEDURE — 99214 PR OFFICE/OUTPT VISIT, EST, LEVL IV, 30-39 MIN: ICD-10-PCS | Mod: S$GLB,,,

## 2023-01-10 NOTE — PROGRESS NOTES
Subjective:       Patient ID: Hipolito Laws is a 62 y.o. male.    Chief Complaint: Wound Check    Patient presents for a reevaluation of a left ankle wound dehiscence. Patient s/p wound closure on 9/14/22 and arthroscopy of left ankle with debridement on 8/4/22. Dr. Jurado cultured the wound- no growth at this time. Patient reports taking Norco 5-325 mg- prescribed by his orthopedic surgeon. He states that he had some trauma this past weekend. He reports stepping on a dog toy the wrong way and hurting his ankle. He states he felt the wound pull. Denies fever, chills, erythema, warmth, or purulent drainage.      Review of Systems   Constitutional:  Negative for activity change, chills, diaphoresis, fatigue and fever.   Respiratory:  Negative for apnea, chest tightness and shortness of breath.    Cardiovascular:  Negative for chest pain, palpitations and leg swelling.   Musculoskeletal:  Positive for joint swelling and neck pain. Negative for gait problem.   Skin:  Positive for wound. Negative for pallor and rash.   Neurological:  Negative for syncope and weakness.   Psychiatric/Behavioral:  Negative for agitation. The patient is not nervous/anxious.    All other systems reviewed and are negative.    Objective:      Physical Exam  Vitals reviewed.   Constitutional:       General: He is not in acute distress.     Appearance: Normal appearance.   Cardiovascular:      Rate and Rhythm: Normal rate and regular rhythm.      Pulses: Normal pulses.   Pulmonary:      Effort: No respiratory distress.   Musculoskeletal:         General: No swelling.      Left ankle: Swelling present. No tenderness.   Skin:     General: Skin is warm and dry.      Capillary Refill: Capillary refill takes less than 2 seconds.      Findings: Wound present. No erythema.          Neurological:      General: No focal deficit present.      Mental Status: He is alert and oriented to person, place, and time.   Psychiatric:         Mood and Affect: Mood  normal.         Behavior: Behavior normal.         Thought Content: Thought content normal.         Judgment: Judgment normal.       Assessment:       1. Dehiscence of operative wound, subsequent encounter    2. Essential hypertension, benign    3. Arthrosis of left ankle             Incision/Site 09/14/22 1312 Left Malleolus/Ankle (Active)   09/14/22 1312   Present Prior to Hospital Arrival?:    Side: Left   Location: Malleolus/Ankle   Orientation:    Incision Type:    Closure Method:    Additional Comments:    Removal Indication and Assessment:    Wound Outcome:    Removal Indications:    Wound Image   01/10/23 0900   Dressing Appearance Dry;Intact;Clean;Moist drainage 01/10/23 0900   Drainage Amount Small 01/10/23 0900   Drainage Characteristics/Odor Bleeding controlled 01/10/23 0900   Red (%), Wound Tissue Color 100 % 01/10/23 0900   Periwound Area Intact;Pink;Edematous 01/10/23 0900   Wound Length (cm) 1.6 cm 01/10/23 0900   Wound Width (cm) 0.2 cm 01/10/23 0900   Wound Depth (cm) 0.4 cm 01/10/23 0900   Wound Volume (cm^3) 0.128 cm^3 01/10/23 0900   Wound Surface Area (cm^2) 0.32 cm^2 01/10/23 0900   Care Cleansed with:;Wound cleanser 01/10/23 0900   Dressing Applied;Island/border;Silicone;Collagen 01/10/23 0900   Periwound Care Skin barrier film applied 01/10/23 0900       Hipolito was seen in the clinic room and placed in the supine position on the treatment table.  The wound was noted to be open to air. The ankle was cleansed with wound cleanser and dried thoroughly. No erythema, odor, or warmth noted from patient's baseline.  Wound does not probe to bone.    Plan of Care: Packed wound bed with collagen covered with a mepilex border dressing.      Plan:     Left ankle dressed as detailed above.  Patient was instructed to not get the dressings wet and to use cast covers for showering.  Should the dressing become wet, he is to remove it, place a moist dressing over the wound, cover with gauze and roll gauze and  use ace wraps to secure bandages.  He should then notify this office as soon as possible to have a new dressing applied.    Appointment scheduled for 1/24/23 with plastic surgery for a consult for a surgical flap. Discussed possibly not needing the appointment due to the improvement in wound measurements. Will reevaluate at the next appointment.     Instructed patient to elevate legs whenever sedentary and to avoid trauma to the area.     Continue pain medication as prescribed by Dr. Elias as needed.        Written and verbal instructions given to patient  RTC in 1 week      Melissa Pickens PA-C

## 2023-01-11 ENCOUNTER — PATIENT MESSAGE (OUTPATIENT)
Dept: CARDIOLOGY | Facility: CLINIC | Age: 63
End: 2023-01-11
Payer: COMMERCIAL

## 2023-01-11 LAB — FUNGUS SPEC CULT: NORMAL

## 2023-01-18 ENCOUNTER — OFFICE VISIT (OUTPATIENT)
Dept: WOUND CARE | Facility: CLINIC | Age: 63
End: 2023-01-18
Payer: COMMERCIAL

## 2023-01-18 VITALS
HEART RATE: 99 BPM | HEIGHT: 70 IN | WEIGHT: 205 LBS | TEMPERATURE: 98 F | BODY MASS INDEX: 29.35 KG/M2 | SYSTOLIC BLOOD PRESSURE: 145 MMHG | DIASTOLIC BLOOD PRESSURE: 97 MMHG

## 2023-01-18 DIAGNOSIS — I10 ESSENTIAL HYPERTENSION, BENIGN: ICD-10-CM

## 2023-01-18 DIAGNOSIS — T81.31XD DEHISCENCE OF OPERATIVE WOUND, SUBSEQUENT ENCOUNTER: Primary | ICD-10-CM

## 2023-01-18 DIAGNOSIS — M19.072 ARTHROSIS OF LEFT ANKLE: ICD-10-CM

## 2023-01-18 PROCEDURE — 11042 DBRDMT SUBQ TIS 1ST 20SQCM/<: CPT | Mod: S$GLB,,,

## 2023-01-18 PROCEDURE — 1159F PR MEDICATION LIST DOCUMENTED IN MEDICAL RECORD: ICD-10-PCS | Mod: CPTII,S$GLB,,

## 2023-01-18 PROCEDURE — 4010F ACE/ARB THERAPY RXD/TAKEN: CPT | Mod: CPTII,S$GLB,,

## 2023-01-18 PROCEDURE — 3077F SYST BP >= 140 MM HG: CPT | Mod: CPTII,S$GLB,,

## 2023-01-18 PROCEDURE — 99999 PR PBB SHADOW E&M-EST. PATIENT-LVL IV: ICD-10-PCS | Mod: PBBFAC,,,

## 2023-01-18 PROCEDURE — 3008F PR BODY MASS INDEX (BMI) DOCUMENTED: ICD-10-PCS | Mod: CPTII,S$GLB,,

## 2023-01-18 PROCEDURE — 99214 OFFICE O/P EST MOD 30 MIN: CPT | Mod: 25,S$GLB,,

## 2023-01-18 PROCEDURE — 11042 DEBRIDEMENT: ICD-10-PCS | Mod: S$GLB,,,

## 2023-01-18 PROCEDURE — 3080F DIAST BP >= 90 MM HG: CPT | Mod: CPTII,S$GLB,,

## 2023-01-18 PROCEDURE — 3080F PR MOST RECENT DIASTOLIC BLOOD PRESSURE >= 90 MM HG: ICD-10-PCS | Mod: CPTII,S$GLB,,

## 2023-01-18 PROCEDURE — 1159F MED LIST DOCD IN RCRD: CPT | Mod: CPTII,S$GLB,,

## 2023-01-18 PROCEDURE — 3008F BODY MASS INDEX DOCD: CPT | Mod: CPTII,S$GLB,,

## 2023-01-18 PROCEDURE — 3077F PR MOST RECENT SYSTOLIC BLOOD PRESSURE >= 140 MM HG: ICD-10-PCS | Mod: CPTII,S$GLB,,

## 2023-01-18 PROCEDURE — 99999 PR PBB SHADOW E&M-EST. PATIENT-LVL IV: CPT | Mod: PBBFAC,,,

## 2023-01-18 PROCEDURE — 4010F PR ACE/ARB THEARPY RXD/TAKEN: ICD-10-PCS | Mod: CPTII,S$GLB,,

## 2023-01-18 PROCEDURE — 99214 PR OFFICE/OUTPT VISIT, EST, LEVL IV, 30-39 MIN: ICD-10-PCS | Mod: 25,S$GLB,,

## 2023-01-18 NOTE — PROCEDURES
"Debridement    Date/Time: 1/18/2023 10:00 AM  Performed by: Melissa Pickens PA-C  Authorized by: Melissa Pickens PA-C     Time out: Immediately prior to procedure a "time out" was called to verify the correct patient, procedure, equipment, support staff and site/side marked as required.    Consent Done?:  Yes (Written)  Local anesthesia used?: Yes    Local anesthetic:  Topical anesthetic (Topical 4% Lidocaine Hydrochloride)    Wound Details:    Location:  Left leg    Type of Debridement:  Excisional       Length (cm):  0.9       Area (sq cm):  0.18       Width (cm):  0.2       Percent Debrided (%):  100       Depth (cm):  0.1       Total Area Debrided (sq cm):  0.18    Depth of debridement:  Subcutaneous tissue    Tissue debrided:  Subcutaneous    Devitalized tissue debrided:  Biofilm, Exudate, Fibrin and Slough    Instruments:  Curette    Bleeding:  Minimal  Hemostasis Achieved: Yes    Method Used:  Pressure  Patient tolerance:  Patient tolerated the procedure well with no immediate complications  "

## 2023-01-18 NOTE — PROGRESS NOTES
Subjective:       Patient ID: Hipolito Laws is a 62 y.o. male.    Chief Complaint: Wound Check    Patient presents for a reevaluation of a left ankle wound dehiscence. Patient s/p wound closure on 9/14/22 and arthroscopy of left ankle with debridement on 8/4/22. Dr. Jurado cultured the wound- no growth at this time. Patient reports taking Norco 5-325 mg- prescribed by his orthopedic surgeon. Denies fever, chills, erythema, warmth, or purulent drainage.      Review of Systems   Constitutional:  Negative for activity change, chills, diaphoresis, fatigue and fever.   Respiratory:  Negative for apnea, chest tightness and shortness of breath.    Cardiovascular:  Negative for chest pain, palpitations and leg swelling.   Musculoskeletal:  Positive for joint swelling and neck pain. Negative for gait problem.   Skin:  Positive for wound. Negative for pallor and rash.   Neurological:  Negative for syncope and weakness.   Psychiatric/Behavioral:  Negative for agitation. The patient is not nervous/anxious.    All other systems reviewed and are negative.    Objective:      Physical Exam  Vitals reviewed.   Constitutional:       General: He is not in acute distress.     Appearance: Normal appearance.   Cardiovascular:      Rate and Rhythm: Normal rate and regular rhythm.      Pulses: Normal pulses.   Pulmonary:      Effort: No respiratory distress.   Musculoskeletal:         General: No swelling.      Left ankle: Swelling present. No tenderness.   Skin:     General: Skin is warm and dry.      Capillary Refill: Capillary refill takes less than 2 seconds.      Findings: Wound present. No erythema.          Neurological:      General: No focal deficit present.      Mental Status: He is alert and oriented to person, place, and time.   Psychiatric:         Mood and Affect: Mood normal.         Behavior: Behavior normal.         Thought Content: Thought content normal.         Judgment: Judgment normal.       Assessment:       1.  Dehiscence of operative wound, subsequent encounter    2. Essential hypertension, benign    3. Arthrosis of left ankle             Incision/Site 08/04/22 1208 Left Malleolus/Ankle (Active)   08/04/22 1208   Present Prior to Hospital Arrival?:    Side: Left   Location: Malleolus/Ankle   Orientation:    Incision Type:    Closure Method:    Additional Comments:    Removal Indication and Assessment:    Wound Outcome:    Removal Indications:    Wound Image    01/18/23 1058   Dressing Appearance Dry;Intact;Clean;Moist drainage 01/18/23 1058   Drainage Amount Small 01/18/23 1058   Drainage Characteristics/Odor Bleeding uncontrolled;Tan 01/18/23 1058   Yellow (%), Wound Tissue Color 100 % 01/18/23 1058   Periwound Area Intact;Edematous;Pink 01/18/23 1058   Wound Length (cm) 0.9 cm 01/18/23 1058   Wound Width (cm) 0.2 cm 01/18/23 1058   Wound Depth (cm) 0.1 cm 01/18/23 1058   Wound Volume (cm^3) 0.018 cm^3 01/18/23 1058   Wound Surface Area (cm^2) 0.18 cm^2 01/18/23 1058   Care Cleansed with:;Wound cleanser 01/18/23 1058   Dressing Applied;Island/border;Other (comment) 01/18/23 1058   Periwound Care Skin barrier film applied 01/18/23 1058       Hipolito was seen in the clinic room and placed in the supine position on the treatment table.  The wound was noted to be open to air. The ankle was cleansed with wound cleanser and dried thoroughly. No erythema, odor, or warmth noted from patient's baseline.      Plan of Care: Skin prep to periwound, polymem to wound bed, and covered with a mepilex border dressing.      Plan:     Left ankle dressed as detailed above.  Patient was instructed to not get the dressings wet and to use cast covers for showering.  Should the dressing become wet, he is to remove it, place a moist dressing over the wound, cover with gauze and roll gauze and use ace wraps to secure bandages.  He should then notify this office as soon as possible to have a new dressing applied.    Discussed nutrition and the role  of protein in wound healing with the patient. Instructed patient to continue to optimize protein for wound healing.     Instructed patient to elevate legs whenever sedentary and to avoid trauma to the area.     Continue pain medication as prescribed by Dr. Elias as needed.      Written and verbal instructions given to patient  RTC in 1 week      Melissa Pickens PA-C

## 2023-01-24 ENCOUNTER — PATIENT MESSAGE (OUTPATIENT)
Dept: CARDIOLOGY | Facility: CLINIC | Age: 63
End: 2023-01-24
Payer: COMMERCIAL

## 2023-01-25 ENCOUNTER — PATIENT MESSAGE (OUTPATIENT)
Dept: PAIN MEDICINE | Facility: CLINIC | Age: 63
End: 2023-01-25
Payer: COMMERCIAL

## 2023-01-25 ENCOUNTER — PATIENT MESSAGE (OUTPATIENT)
Dept: WOUND CARE | Facility: CLINIC | Age: 63
End: 2023-01-25

## 2023-01-25 ENCOUNTER — OFFICE VISIT (OUTPATIENT)
Dept: WOUND CARE | Facility: CLINIC | Age: 63
End: 2023-01-25
Payer: COMMERCIAL

## 2023-01-25 ENCOUNTER — TELEPHONE (OUTPATIENT)
Dept: WOUND CARE | Facility: CLINIC | Age: 63
End: 2023-01-25

## 2023-01-25 VITALS
SYSTOLIC BLOOD PRESSURE: 155 MMHG | HEIGHT: 70 IN | DIASTOLIC BLOOD PRESSURE: 90 MMHG | HEART RATE: 98 BPM | BODY MASS INDEX: 30.23 KG/M2 | WEIGHT: 211.19 LBS | TEMPERATURE: 99 F

## 2023-01-25 DIAGNOSIS — Z87.828 HEALED WOUND: ICD-10-CM

## 2023-01-25 DIAGNOSIS — M19.072 ARTHROSIS OF LEFT ANKLE: Primary | ICD-10-CM

## 2023-01-25 PROCEDURE — 99214 PR OFFICE/OUTPT VISIT, EST, LEVL IV, 30-39 MIN: ICD-10-PCS | Mod: S$GLB,,,

## 2023-01-25 PROCEDURE — 3077F PR MOST RECENT SYSTOLIC BLOOD PRESSURE >= 140 MM HG: ICD-10-PCS | Mod: CPTII,S$GLB,,

## 2023-01-25 PROCEDURE — 4010F PR ACE/ARB THEARPY RXD/TAKEN: ICD-10-PCS | Mod: CPTII,S$GLB,,

## 2023-01-25 PROCEDURE — 99214 OFFICE O/P EST MOD 30 MIN: CPT | Mod: S$GLB,,,

## 2023-01-25 PROCEDURE — 4010F ACE/ARB THERAPY RXD/TAKEN: CPT | Mod: CPTII,S$GLB,,

## 2023-01-25 PROCEDURE — 3077F SYST BP >= 140 MM HG: CPT | Mod: CPTII,S$GLB,,

## 2023-01-25 PROCEDURE — 3080F PR MOST RECENT DIASTOLIC BLOOD PRESSURE >= 90 MM HG: ICD-10-PCS | Mod: CPTII,S$GLB,,

## 2023-01-25 PROCEDURE — 3008F BODY MASS INDEX DOCD: CPT | Mod: CPTII,S$GLB,,

## 2023-01-25 PROCEDURE — 1159F PR MEDICATION LIST DOCUMENTED IN MEDICAL RECORD: ICD-10-PCS | Mod: CPTII,S$GLB,,

## 2023-01-25 PROCEDURE — 3080F DIAST BP >= 90 MM HG: CPT | Mod: CPTII,S$GLB,,

## 2023-01-25 PROCEDURE — 99999 PR PBB SHADOW E&M-EST. PATIENT-LVL IV: CPT | Mod: PBBFAC,,,

## 2023-01-25 PROCEDURE — 1159F MED LIST DOCD IN RCRD: CPT | Mod: CPTII,S$GLB,,

## 2023-01-25 PROCEDURE — 99999 PR PBB SHADOW E&M-EST. PATIENT-LVL IV: ICD-10-PCS | Mod: PBBFAC,,,

## 2023-01-25 PROCEDURE — 3008F PR BODY MASS INDEX (BMI) DOCUMENTED: ICD-10-PCS | Mod: CPTII,S$GLB,,

## 2023-01-25 NOTE — PROGRESS NOTES
Subjective:       Patient ID: Hipolito Laws is a 62 y.o. male.    Chief Complaint: Wound Check    Patient presents for a reevaluation of a left ankle wound dehiscence. Patient s/p wound closure on 9/14/22 and arthroscopy of left ankle with debridement on 8/4/22. Dr. Jurado cultured the wound- no growth at this time. Patient reports taking Norco 5-325 mg- prescribed by his orthopedic surgeon. Denies fever, chills, erythema, warmth, or purulent drainage.      Review of Systems   Constitutional:  Negative for activity change, chills, diaphoresis, fatigue and fever.   Respiratory:  Negative for apnea, chest tightness and shortness of breath.    Cardiovascular:  Negative for chest pain, palpitations and leg swelling.   Musculoskeletal:  Positive for joint swelling and neck pain. Negative for gait problem.   Skin:  Positive for wound. Negative for pallor and rash.   Neurological:  Negative for syncope and weakness.   Psychiatric/Behavioral:  Negative for agitation. The patient is not nervous/anxious.    All other systems reviewed and are negative.    Objective:      Physical Exam  Vitals reviewed.   Constitutional:       General: He is not in acute distress.     Appearance: Normal appearance.   Cardiovascular:      Rate and Rhythm: Normal rate and regular rhythm.      Pulses: Normal pulses.   Pulmonary:      Effort: No respiratory distress.   Musculoskeletal:         General: No swelling.      Left ankle: Swelling present. No tenderness.   Skin:     General: Skin is warm and dry.      Capillary Refill: Capillary refill takes less than 2 seconds.      Findings: No erythema or wound.          Neurological:      General: No focal deficit present.      Mental Status: He is alert and oriented to person, place, and time.   Psychiatric:         Mood and Affect: Mood normal.         Behavior: Behavior normal.         Thought Content: Thought content normal.         Judgment: Judgment normal.       Assessment:       1. Arthrosis  of left ankle    2. Healed wound               Incision/Site 09/14/22 1312 Left Malleolus/Ankle (Active)   09/14/22 1312   Present Prior to Hospital Arrival?:    Side: Left   Location: Malleolus/Ankle   Orientation:    Incision Type:    Closure Method:    Additional Comments:    Removal Indication and Assessment:    Wound Outcome:    Removal Indications:    Wound Image    01/25/23 1020   Dressing Appearance Dry;Intact;Clean 01/25/23 1020   Drainage Amount None 01/25/23 1020   Black (%), Wound Tissue Color 100 % 01/25/23 1020   Care Cleansed with:;Soap and water 01/25/23 1020       Hipolito was seen in the clinic room and placed in the supine position on the treatment table.  The wound dressing was removed. The ankle was cleansed with wound cleanser and dried thoroughly. No erythema, odor, or warmth noted from patient's baseline. Sharp debridement with 5 mm curette to remove scab. No open wounds noted.    Plan:     No open wounds noted.  Precautions given to prevent wounds from re-opening. Discussed how area is extremely fragile. Protect area from friction, wash area gently, and pat dry. If the wound should re-open, instructed patient to contact the office.      Written and verbal instructions given to patient  RTC AMARJIT Pickens PA-C

## 2023-01-25 NOTE — TELEPHONE ENCOUNTER
Called patient regarding message. Pt states he is unsure if he needs a clearance note now. He states that Dr. Lawton's MA sent his information to scheduling. Instructed patient to contact office if things change and he needs clearance.      Melissa Pickens PA-C

## 2023-01-27 DIAGNOSIS — M54.12 CERVICAL RADICULITIS: Primary | ICD-10-CM

## 2023-01-30 ENCOUNTER — OFFICE VISIT (OUTPATIENT)
Dept: ORTHOPEDICS | Facility: CLINIC | Age: 63
End: 2023-01-30
Payer: COMMERCIAL

## 2023-01-30 VITALS — BODY MASS INDEX: 29.53 KG/M2 | HEIGHT: 70 IN | WEIGHT: 206.25 LBS

## 2023-01-30 DIAGNOSIS — M19.072 ARTHROSIS OF LEFT ANKLE: ICD-10-CM

## 2023-01-30 DIAGNOSIS — S39.012A LUMBAR STRAIN, INITIAL ENCOUNTER: Primary | ICD-10-CM

## 2023-01-30 DIAGNOSIS — M77.8 SHOULDER TENDINITIS, LEFT: ICD-10-CM

## 2023-01-30 PROCEDURE — 1159F MED LIST DOCD IN RCRD: CPT | Mod: CPTII,S$GLB,, | Performed by: ORTHOPAEDIC SURGERY

## 2023-01-30 PROCEDURE — 99999 PR PBB SHADOW E&M-EST. PATIENT-LVL III: ICD-10-PCS | Mod: PBBFAC,,, | Performed by: ORTHOPAEDIC SURGERY

## 2023-01-30 PROCEDURE — 99999 PR PBB SHADOW E&M-EST. PATIENT-LVL III: CPT | Mod: PBBFAC,,, | Performed by: ORTHOPAEDIC SURGERY

## 2023-01-30 PROCEDURE — 1160F PR REVIEW ALL MEDS BY PRESCRIBER/CLIN PHARMACIST DOCUMENTED: ICD-10-PCS | Mod: CPTII,S$GLB,, | Performed by: ORTHOPAEDIC SURGERY

## 2023-01-30 PROCEDURE — 1159F PR MEDICATION LIST DOCUMENTED IN MEDICAL RECORD: ICD-10-PCS | Mod: CPTII,S$GLB,, | Performed by: ORTHOPAEDIC SURGERY

## 2023-01-30 PROCEDURE — 4010F PR ACE/ARB THEARPY RXD/TAKEN: ICD-10-PCS | Mod: CPTII,S$GLB,, | Performed by: ORTHOPAEDIC SURGERY

## 2023-01-30 PROCEDURE — 1160F RVW MEDS BY RX/DR IN RCRD: CPT | Mod: CPTII,S$GLB,, | Performed by: ORTHOPAEDIC SURGERY

## 2023-01-30 PROCEDURE — 3008F PR BODY MASS INDEX (BMI) DOCUMENTED: ICD-10-PCS | Mod: CPTII,S$GLB,, | Performed by: ORTHOPAEDIC SURGERY

## 2023-01-30 PROCEDURE — 4010F ACE/ARB THERAPY RXD/TAKEN: CPT | Mod: CPTII,S$GLB,, | Performed by: ORTHOPAEDIC SURGERY

## 2023-01-30 PROCEDURE — 3008F BODY MASS INDEX DOCD: CPT | Mod: CPTII,S$GLB,, | Performed by: ORTHOPAEDIC SURGERY

## 2023-01-30 PROCEDURE — 99213 OFFICE O/P EST LOW 20 MIN: CPT | Mod: S$GLB,,, | Performed by: ORTHOPAEDIC SURGERY

## 2023-01-30 PROCEDURE — 99213 PR OFFICE/OUTPT VISIT, EST, LEVL III, 20-29 MIN: ICD-10-PCS | Mod: S$GLB,,, | Performed by: ORTHOPAEDIC SURGERY

## 2023-01-30 RX ORDER — CYCLOBENZAPRINE HCL 5 MG
5 TABLET ORAL 3 TIMES DAILY PRN
Qty: 30 TABLET | Refills: 0 | Status: SHIPPED | OUTPATIENT
Start: 2023-01-30 | End: 2023-02-09

## 2023-01-30 RX ORDER — METHYLPREDNISOLONE 4 MG/1
TABLET ORAL
Qty: 1 EACH | Refills: 0 | Status: SHIPPED | OUTPATIENT
Start: 2023-01-30 | End: 2023-03-17

## 2023-01-30 NOTE — LETTER
January 30, 2023    Hipolito Laws  [address]  [place] [address]             [facility] 5[address]  [address]  [facility] [address]  Phone: [phone] To whom it May concern,     I evaluated Mr. Laws today in clinic for his left ankle.  His wound is now healed but he still has some limitations with prolonged walking or standing.  I would allow him to return to work on 02/08/2022 and recommend starting with light duty and progressing to regular duty over time as strength and stamina improve.  He should avoid climbing ladders at this time.  If there are any questions please notify me.    Sincerely     Aleksandr Elias MD

## 2023-01-30 NOTE — PROGRESS NOTES
Hipolito Laws  Returns today for follow-up.  It has been four weeks since I have last seen him but he has been going to wound care for the chronic wound on his left ankle.  His last visit with wound care was last week and it was felt that his wound was essentially healed.  He returns today and reports that his ankle is doing well with regards to his wound and pain but he still has a lot of weakness and dysfunction.  His main complaints right now are neck, low back and left shoulder.  He is scheduled for a cervical injection in a couple of weeks.  He states he had a history of trauma to his left shoulder and had a what sounds like a subluxation episode recently and he is got some irritation.    Examination:  The left ankle reveals a very small scab over the previous wound.  There is no significant swelling or tenderness.  He has some decreased motion of the ankle.  He has some atrophy of his calf.    Impression:  1. Lumbar strain, initial encounter  methylPREDNISolone (MEDROL, MAKAYLA,) 4 mg tablet    cyclobenzaprine (FLEXERIL) 5 MG tablet      2. Arthrosis of left ankle        3. Shoulder tendinitis, left  methylPREDNISolone (MEDROL, MAKAYLA,) 4 mg tablet            Recommendation:  With regards to his low back and shoulder complaints I prescribed a course of Flexeril and a Medrol Dosepak.  I offered him a referral for his left shoulder but he states these usually heal.  He is already being followed by Dr. Goldman and Dr. Lawton for his spine issues.  With regards to his ankle, he can start to progress his activity as tolerated.  He is reluctant to return to therapy because he had some issues with the wound previously, but he does have a Thera-Band at home and I encouraged him to start doing some strengthening exercises and progressing his low-impact activities.  I would allow him to return to work on 02/08/2022 and would recommend returning in a light duty status to begin with until he develops more strength and stamina  on his left lower extremity.      Follow-up as needed

## 2023-01-31 LAB
ACID FAST MOD KINY STN SPEC: NORMAL
MYCOBACTERIUM SPEC QL CULT: NORMAL

## 2023-02-15 ENCOUNTER — PATIENT MESSAGE (OUTPATIENT)
Dept: PAIN MEDICINE | Facility: OTHER | Age: 63
End: 2023-02-15
Payer: COMMERCIAL

## 2023-02-16 ENCOUNTER — HOSPITAL ENCOUNTER (OUTPATIENT)
Facility: OTHER | Age: 63
Discharge: HOME | End: 2023-02-16
Attending: ANESTHESIOLOGY | Admitting: ANESTHESIOLOGY
Payer: COMMERCIAL

## 2023-02-16 VITALS
OXYGEN SATURATION: 97 % | TEMPERATURE: 98 F | WEIGHT: 200 LBS | SYSTOLIC BLOOD PRESSURE: 158 MMHG | HEART RATE: 97 BPM | BODY MASS INDEX: 28.63 KG/M2 | HEIGHT: 70 IN | RESPIRATION RATE: 16 BRPM | DIASTOLIC BLOOD PRESSURE: 80 MMHG

## 2023-02-16 DIAGNOSIS — M54.12 CERVICAL RADICULOPATHY: Primary | ICD-10-CM

## 2023-02-16 DIAGNOSIS — M50.30 DDD (DEGENERATIVE DISC DISEASE), CERVICAL: ICD-10-CM

## 2023-02-16 DIAGNOSIS — G89.29 CHRONIC PAIN: ICD-10-CM

## 2023-02-16 PROCEDURE — 62321 NJX INTERLAMINAR CRV/THRC: CPT | Mod: ,,, | Performed by: ANESTHESIOLOGY

## 2023-02-16 PROCEDURE — 62321 PR INJ CERV/THORAC, W/GUIDANCE: ICD-10-PCS | Mod: ,,, | Performed by: ANESTHESIOLOGY

## 2023-02-16 PROCEDURE — 63600175 PHARM REV CODE 636 W HCPCS: Performed by: ANESTHESIOLOGY

## 2023-02-16 PROCEDURE — A4216 STERILE WATER/SALINE, 10 ML: HCPCS | Performed by: ANESTHESIOLOGY

## 2023-02-16 PROCEDURE — 62321 NJX INTERLAMINAR CRV/THRC: CPT | Performed by: ANESTHESIOLOGY

## 2023-02-16 PROCEDURE — 25500020 PHARM REV CODE 255: Performed by: ANESTHESIOLOGY

## 2023-02-16 PROCEDURE — 25000003 PHARM REV CODE 250: Performed by: ANESTHESIOLOGY

## 2023-02-16 RX ORDER — SODIUM CHLORIDE 9 MG/ML
500 INJECTION, SOLUTION INTRAVENOUS CONTINUOUS
Status: DISCONTINUED | OUTPATIENT
Start: 2023-02-16 | End: 2023-02-16 | Stop reason: HOSPADM

## 2023-02-16 RX ORDER — FENTANYL CITRATE 50 UG/ML
INJECTION, SOLUTION INTRAMUSCULAR; INTRAVENOUS
Status: DISCONTINUED | OUTPATIENT
Start: 2023-02-16 | End: 2023-02-16 | Stop reason: HOSPADM

## 2023-02-16 RX ORDER — LIDOCAINE HYDROCHLORIDE 10 MG/ML
INJECTION, SOLUTION EPIDURAL; INFILTRATION; INTRACAUDAL; PERINEURAL
Status: DISCONTINUED | OUTPATIENT
Start: 2023-02-16 | End: 2023-02-16 | Stop reason: HOSPADM

## 2023-02-16 RX ORDER — DEXAMETHASONE SODIUM PHOSPHATE 10 MG/ML
INJECTION INTRAMUSCULAR; INTRAVENOUS
Status: DISCONTINUED | OUTPATIENT
Start: 2023-02-16 | End: 2023-02-16 | Stop reason: HOSPADM

## 2023-02-16 RX ORDER — LIDOCAINE HYDROCHLORIDE 20 MG/ML
INJECTION, SOLUTION INFILTRATION; PERINEURAL
Status: DISCONTINUED | OUTPATIENT
Start: 2023-02-16 | End: 2023-02-16 | Stop reason: HOSPADM

## 2023-02-16 RX ORDER — SODIUM CHLORIDE 9 MG/ML
INJECTION, SOLUTION INTRAMUSCULAR; INTRAVENOUS; SUBCUTANEOUS
Status: DISCONTINUED | OUTPATIENT
Start: 2023-02-16 | End: 2023-02-16 | Stop reason: HOSPADM

## 2023-02-16 RX ORDER — MIDAZOLAM HYDROCHLORIDE 1 MG/ML
INJECTION INTRAMUSCULAR; INTRAVENOUS
Status: DISCONTINUED | OUTPATIENT
Start: 2023-02-16 | End: 2023-02-16 | Stop reason: HOSPADM

## 2023-02-16 NOTE — OP NOTE
Cervical Interlaminar Epidural Steroid Injection under Fluoroscopic Guidance    The procedure, risks, benefits, and options were discussed with the patient. There are no contraindications to the procedure. The patent expressed understanding and agreed to the procedure. Informed written consent was obtained prior to the start of the procedure and can be found in the patient's chart.     PATIENT NAME: Hipolito Laws   MRN: [id number]     DATE OF PROCEDURE: 02/16/2023    PROCEDURE: Cervical Interlaminar Epidural Steroid Injection C7/T1 under Fluoroscopic Guidance    PRE-OP DIAGNOSIS: Cervical radiculitis [M54.12] Cervical radiculopathy [M54.12]    POST-OP DIAGNOSIS: Same    PHYSICIAN: Yunier Lawton MD     ASSISTANTS:   Alfonzo Sanchez MD  [facility]      MEDICATIONS INJECTED: Preservative-free Decadron 10mg with 1cc of Lidocaine 1% MPF and preservative free normal saline    LOCAL ANESTHETIC INJECTED: Xylocaine 2%     SEDATION: Versed 2mg and Fentanyl 25mcg                                                                                                                                                                                     Conscious sedation ordered by M.D. Patient re-evaluation prior to administration of conscious sedation. No changes noted in patient's status from initial evaluation. The patient's vital signs were monitored by RN and patient remained hemodynamically stable throughout the procedure.    Event Time In   Sedation Start 0852   Sedation End 0857       ESTIMATED BLOOD LOSS: None    COMPLICATIONS: None    TECHNIQUE: Time-out was performed to identify the patient and procedure to be performed. With the patient laying in a prone position, the surgical area was prepped and draped in the usual sterile fashion using ChloraPrep and a fenestrated drape. The level was determined under fluoroscopy guidance. Skin anesthesia was achieved by injecting Lidocaine 2% over the injection site.  The interlaminar space  was then approached with a 20 gauge, 3.5 inch Tuohy needle that was introduced under fluoroscopic guidance with AP, lateral and/or contralateral oblique imaging. Once the Ligamentum flavum was encountered loss of resistance to saline was used to enter the epidural space. With positive loss of resistance and negative aspiration for CSF or Blood, contrast dye  Omnipaque (240mg/mL) was injected to confirm placement and there was no vascular runoff. Then 2 mL of the medication mixture listed above was then injected slowly. Displacement of the radio opaque contrast after injection of the medication confirmed that the medication went into the area of the epidural space. The needles were removed, and bleeding was nil. A sterile dressing was applied. No specimens collected. The patient tolerated the procedure well.     PAIN BEFORE THE PROCEDURE: 4-8/10    PAIN AFTER THE PROCEDURE: 4/10        The patient was monitored after the procedure in the recovery area. They were given post-procedure and discharge instructions to follow at home. The patient was discharged in a stable condition.      Yunier Lawton MD

## 2023-02-16 NOTE — PLAN OF CARE
Patient tolerated procedure well. Patient reports 4/10 pain after procedure. Assisted patient up for first time. Gait steady. All discharge instructions given.    declines

## 2023-02-16 NOTE — H&P
HPI  Patient presents for scheduled C7/T1 IESI.        Past Medical History:   Diagnosis Date    Hypertension      Past Surgical History:   Procedure Laterality Date    ARTHROSCOPIC REPAIR OF ROTATOR CUFF OF SHOULDER  1992    ARTHROSCOPY OF ANKLE WITH DEBRIDEMENT Left 8/4/2022    Procedure: ARTHROSCOPY, ANKLE, WITH DEBRIDEMENT;  Surgeon: Aleksandr Elias MD;  Location: [facility] OR;  Service: Orthopedics;  Laterality: Left;  Calf tourniquet    CLOSURE OF WOUND Left 9/14/2022    Procedure: CLOSURE, WOUND left ankle;  Surgeon: Aleksandr Elias MD;  Location: [facility] OR;  Service: Orthopedics;  Laterality: Left;  Prevena wound VAC    EPIDURAL STEROID INJECTION N/A 12/13/2021    Procedure: INJECTION, STEROID, EPIDURAL, CERVICAL DIRECT REF/ NEED CONSENT;  Surgeon: Yunier Lawton MD;  Location: [facility] PAIN MGT;  Service: Pain Management;  Laterality: N/A;    EPIDURAL STEROID INJECTION N/A 6/2/2022    Procedure: INJECTION, STEROID, EPIDURAL, CERVICAL C7/T1 CONTRAST DIRECT REF;  Surgeon: Yunier Lawton MD;  Location: [facility] PAIN MGT;  Service: Pain Management;  Laterality: N/A;    EPIDURAL STEROID INJECTION INTO CERVICAL SPINE Right 12/17/2020    Procedure: CERVICAL C5/6 DIGNA TRANSFORAMINAL  DIRECT REFERRAL;  Surgeon: Yunier Lawton MD;  Location: [facility] PAIN MGT;  Service: Pain Management;  Laterality: Right;  NEEDS CONSENT    SURGICAL REMOVAL OF LESION OF FIBULA Left 8/4/2022    Procedure: EXCISION, LESION, FIBULA Nonunion avulsion fragment distal fibula;  Surgeon: Aleksandr Elias MD;  Location: [facility] OR;  Service: Orthopedics;  Laterality: Left;    TRANSFORAMINAL EPIDURAL INJECTION OF STEROID Right 10/14/2021    Procedure: INJECTION, STEROID, EPIDURAL, TRANSFORAMINAL APPROACH, C5-C6 DIRECT REF/NEED CONSNET;  Surgeon: Yunier Lawton MD;  Location: [facility] PAIN MGT;  Service: Pain Management;  Laterality: Right;    UMBILICAL HERNIA REPAIR N/A 4/5/2021    Procedure: REPAIR, HERNIA, UMBILICAL, AGE 5 YEARS OR OLDER,;  Surgeon: Tim IGLESIAS  MD Hugh;  Location: [facility] OR [address] [place] [address];  Service: General;  Laterality: N/A;     Review of patient's allergies indicates:  No Known Allergies     PMHx, PSHx, Allergies, Medications reviewed in epic      ROS negative except pain complaints in HPI    OBJECTIVE:    There were no vitals taken for this visit.    PHYSICAL EXAMINATION:    GENERAL: Well appearing, in no acute distress, alert and oriented x3.  PSYCH:  Mood and affect appropriate.  SKIN: Skin color, texture, turgor normal, no rashes or lesions.  CV: Extremities warm  PULM: No evidence of respiratory difficulty, symmetric chest rise.   NEURO: Cranial nerves grossly intact.    Plan:    Proceed with procedure as planned    Alfonzo Sanchez MD  [facility] Pain Fellow

## 2023-02-16 NOTE — DISCHARGE SUMMARY
Discharge Note  Short Stay      SUMMARY     Admit Date: 2/16/2023    Attending Physician: Yunier Lawton      Discharge Physician: Yunier Lawton      Discharge Date: 2/16/2023 8:57 AM    Procedure(s) (LRB):  INJECTION, STEROID, EPIDURAL, C7/T1 CERVICAL CONTRAST DIRECT REF (N/A)    Final Diagnosis: Cervical radiculitis [M54.12]    Disposition: Home or self care    Patient Instructions:   Current Discharge Medication List        CONTINUE these medications which have NOT CHANGED    Details   ascorbic acid, vitamin C, (VITAMIN C) 100 MG tablet Take 100 mg by mouth once daily.      aspirin 81 MG Chew Take 375 mg by mouth once daily.      atorvastatin (LIPITOR) 40 MG tablet TAKE 1 TABLET(40 MG) BY MOUTH EVERY DAY  Qty: 90 tablet, Refills: 3    Associated Diagnoses: Coronary artery calcification seen on CT scan      DULoxetine (CYMBALTA) 60 MG capsule Take 1 capsule (60 mg total) by mouth once daily.  Qty: 90 capsule, Refills: 3    Associated Diagnoses: Anxiety and depression      gabapentin (NEURONTIN) 100 MG capsule TAKE 1 CAPSULE(100 MG) BY MOUTH THREE TIMES DAILY  Qty: 90 capsule, Refills: 0    Associated Diagnoses: Post-operative pain      HYDROcodone-acetaminophen (NORCO) 5-325 mg per tablet Take 1 tablet by mouth every 6 (six) hours as needed for Pain.  Qty: 28 tablet, Refills: 0    Associated Diagnoses: Chronic pain of left ankle; Arthrosis of left ankle      losartan (COZAAR) 50 MG tablet Take 1 tablet (50 mg total) by mouth once daily.  Qty: 90 tablet, Refills: 2    Associated Diagnoses: Essential hypertension, benign      methylPREDNISolone (MEDROL, MAKAYLA,) 4 mg tablet Take as instructed on package  Qty: 1 each, Refills: 0    Associated Diagnoses: Lumbar strain, initial encounter; Shoulder tendinitis, left      multivitamin capsule Take 1 capsule by mouth once daily.      omega-3 fatty acids/fish oil (FISH OIL-OMEGA-3 FATTY ACIDS) 300-1,000 mg capsule Take by mouth once daily.      traZODone (DESYREL) 50 MG tablet Take  0.5-1 tablets (25-50 mg total) by mouth nightly as needed for Insomnia.  Qty: 90 tablet, Refills: 3    Associated Diagnoses: Sleep disturbance      vitamin D (VITAMIN D3) 1000 units Tab Take 1,000 Units by mouth once daily.      vitamin E 100 UNIT capsule Take 100 Units by mouth once daily.                 Discharge Diagnosis: Cervical radiculitis [M54.12]  Condition on Discharge: Stable with no complications to procedure   Diet on Discharge: Same as before.  Activity: as per instruction sheet.  Discharge to: Home with a responsible adult.  Follow up: 2-4 weeks       Please call my office or pager at [phone] if experienced any weakness or loss of sensation, fever > 101.5, pain uncontrolled with oral medications, persistent nausea/vomiting/or diarrhea, redness or drainage from the incisions, or any other worrisome concerns. If physician on call was not reached or could not communicate with our office for any reason please go to the nearest emergency department

## 2023-03-17 ENCOUNTER — OFFICE VISIT (OUTPATIENT)
Dept: INTERNAL MEDICINE | Facility: CLINIC | Age: 63
End: 2023-03-17
Payer: COMMERCIAL

## 2023-03-17 ENCOUNTER — PATIENT MESSAGE (OUTPATIENT)
Dept: INTERNAL MEDICINE | Facility: CLINIC | Age: 63
End: 2023-03-17

## 2023-03-17 ENCOUNTER — LAB VISIT (OUTPATIENT)
Dept: LAB | Facility: HOSPITAL | Age: 63
End: 2023-03-17
Attending: INTERNAL MEDICINE
Payer: COMMERCIAL

## 2023-03-17 VITALS
DIASTOLIC BLOOD PRESSURE: 88 MMHG | SYSTOLIC BLOOD PRESSURE: 138 MMHG | BODY MASS INDEX: 29.92 KG/M2 | HEART RATE: 89 BPM | HEIGHT: 70 IN | OXYGEN SATURATION: 100 % | WEIGHT: 209 LBS

## 2023-03-17 DIAGNOSIS — M54.12 CERVICAL RADICULITIS: ICD-10-CM

## 2023-03-17 DIAGNOSIS — G47.00 INSOMNIA, UNSPECIFIED TYPE: ICD-10-CM

## 2023-03-17 DIAGNOSIS — T81.31XS DEHISCENCE OF OPERATIVE WOUND, SEQUELA: ICD-10-CM

## 2023-03-17 DIAGNOSIS — E78.5 HYPERLIPIDEMIA, UNSPECIFIED HYPERLIPIDEMIA TYPE: ICD-10-CM

## 2023-03-17 DIAGNOSIS — G47.00 INSOMNIA, UNSPECIFIED TYPE: Primary | ICD-10-CM

## 2023-03-17 DIAGNOSIS — Z00.00 ANNUAL PHYSICAL EXAM: Primary | ICD-10-CM

## 2023-03-17 DIAGNOSIS — Z00.00 LABORATORY EXAM ORDERED AS PART OF ROUTINE GENERAL MEDICAL EXAMINATION: ICD-10-CM

## 2023-03-17 DIAGNOSIS — I10 ESSENTIAL HYPERTENSION, BENIGN: ICD-10-CM

## 2023-03-17 LAB
ALBUMIN SERPL BCP-MCNC: 4.3 G/DL (ref 3.5–5.2)
ALP SERPL-CCNC: 72 U/L (ref 55–135)
ALT SERPL W/O P-5'-P-CCNC: 22 U/L (ref 10–44)
ANION GAP SERPL CALC-SCNC: 11 MMOL/L (ref 8–16)
AST SERPL-CCNC: 19 U/L (ref 10–40)
BASOPHILS # BLD AUTO: 0.07 K/UL (ref 0–0.2)
BASOPHILS NFR BLD: 0.9 % (ref 0–1.9)
BILIRUB SERPL-MCNC: 0.8 MG/DL (ref 0.1–1)
BUN SERPL-MCNC: 25 MG/DL (ref 8–23)
CALCIUM SERPL-MCNC: 10.3 MG/DL (ref 8.7–10.5)
CHLORIDE SERPL-SCNC: 104 MMOL/L (ref 95–110)
CHOLEST SERPL-MCNC: 162 MG/DL (ref 120–199)
CHOLEST/HDLC SERPL: 3 {RATIO} (ref 2–5)
CO2 SERPL-SCNC: 26 MMOL/L (ref 23–29)
COMPLEXED PSA SERPL-MCNC: 0.25 NG/ML (ref 0–4)
CREAT SERPL-MCNC: 0.8 MG/DL (ref 0.5–1.4)
DIFFERENTIAL METHOD: ABNORMAL
EOSINOPHIL # BLD AUTO: 0.3 K/UL (ref 0–0.5)
EOSINOPHIL NFR BLD: 3.8 % (ref 0–8)
ERYTHROCYTE [DISTWIDTH] IN BLOOD BY AUTOMATED COUNT: 13.2 % (ref 11.5–14.5)
EST. GFR  (NO RACE VARIABLE): >60 ML/MIN/1.73 M^2
ESTIMATED AVG GLUCOSE: 105 MG/DL (ref 68–131)
GLUCOSE SERPL-MCNC: 98 MG/DL (ref 70–110)
HBA1C MFR BLD: 5.3 % (ref 4–5.6)
HCT VFR BLD AUTO: 45.6 % (ref 40–54)
HDLC SERPL-MCNC: 54 MG/DL (ref 40–75)
HDLC SERPL: 33.3 % (ref 20–50)
HGB BLD-MCNC: 15.3 G/DL (ref 14–18)
IMM GRANULOCYTES # BLD AUTO: 0.03 K/UL (ref 0–0.04)
IMM GRANULOCYTES NFR BLD AUTO: 0.4 % (ref 0–0.5)
LDLC SERPL CALC-MCNC: 86.4 MG/DL (ref 63–159)
LYMPHOCYTES # BLD AUTO: 2 K/UL (ref 1–4.8)
LYMPHOCYTES NFR BLD: 26.8 % (ref 18–48)
MCH RBC QN AUTO: 31.2 PG (ref 27–31)
MCHC RBC AUTO-ENTMCNC: 33.6 G/DL (ref 32–36)
MCV RBC AUTO: 93 FL (ref 82–98)
MONOCYTES # BLD AUTO: 0.8 K/UL (ref 0.3–1)
MONOCYTES NFR BLD: 10.5 % (ref 4–15)
NEUTROPHILS # BLD AUTO: 4.4 K/UL (ref 1.8–7.7)
NEUTROPHILS NFR BLD: 57.6 % (ref 38–73)
NONHDLC SERPL-MCNC: 108 MG/DL
NRBC BLD-RTO: 0 /100 WBC
PLATELET # BLD AUTO: 350 K/UL (ref 150–450)
PMV BLD AUTO: 9.2 FL (ref 9.2–12.9)
POTASSIUM SERPL-SCNC: 4.5 MMOL/L (ref 3.5–5.1)
PROT SERPL-MCNC: 7.9 G/DL (ref 6–8.4)
RBC # BLD AUTO: 4.9 M/UL (ref 4.6–6.2)
SODIUM SERPL-SCNC: 141 MMOL/L (ref 136–145)
TRIGL SERPL-MCNC: 108 MG/DL (ref 30–150)
TSH SERPL DL<=0.005 MIU/L-ACNC: 0.41 UIU/ML (ref 0.4–4)
WBC # BLD AUTO: 7.62 K/UL (ref 3.9–12.7)

## 2023-03-17 PROCEDURE — 99999 PR PBB SHADOW E&M-EST. PATIENT-LVL III: CPT | Mod: PBBFAC,,, | Performed by: INTERNAL MEDICINE

## 2023-03-17 PROCEDURE — 80053 COMPREHEN METABOLIC PANEL: CPT | Performed by: INTERNAL MEDICINE

## 2023-03-17 PROCEDURE — 84153 ASSAY OF PSA TOTAL: CPT | Performed by: INTERNAL MEDICINE

## 2023-03-17 PROCEDURE — 1160F PR REVIEW ALL MEDS BY PRESCRIBER/CLIN PHARMACIST DOCUMENTED: ICD-10-PCS | Mod: CPTII,S$GLB,, | Performed by: INTERNAL MEDICINE

## 2023-03-17 PROCEDURE — 3008F PR BODY MASS INDEX (BMI) DOCUMENTED: ICD-10-PCS | Mod: CPTII,S$GLB,, | Performed by: INTERNAL MEDICINE

## 2023-03-17 PROCEDURE — 1160F RVW MEDS BY RX/DR IN RCRD: CPT | Mod: CPTII,S$GLB,, | Performed by: INTERNAL MEDICINE

## 2023-03-17 PROCEDURE — 99999 PR PBB SHADOW E&M-EST. PATIENT-LVL III: ICD-10-PCS | Mod: PBBFAC,,, | Performed by: INTERNAL MEDICINE

## 2023-03-17 PROCEDURE — 4010F ACE/ARB THERAPY RXD/TAKEN: CPT | Mod: CPTII,S$GLB,, | Performed by: INTERNAL MEDICINE

## 2023-03-17 PROCEDURE — 3008F BODY MASS INDEX DOCD: CPT | Mod: CPTII,S$GLB,, | Performed by: INTERNAL MEDICINE

## 2023-03-17 PROCEDURE — 3075F SYST BP GE 130 - 139MM HG: CPT | Mod: CPTII,S$GLB,, | Performed by: INTERNAL MEDICINE

## 2023-03-17 PROCEDURE — 1159F MED LIST DOCD IN RCRD: CPT | Mod: CPTII,S$GLB,, | Performed by: INTERNAL MEDICINE

## 2023-03-17 PROCEDURE — 36415 COLL VENOUS BLD VENIPUNCTURE: CPT | Performed by: INTERNAL MEDICINE

## 2023-03-17 PROCEDURE — 3079F PR MOST RECENT DIASTOLIC BLOOD PRESSURE 80-89 MM HG: ICD-10-PCS | Mod: CPTII,S$GLB,, | Performed by: INTERNAL MEDICINE

## 2023-03-17 PROCEDURE — 99396 PR PREVENTIVE VISIT,EST,40-64: ICD-10-PCS | Mod: S$GLB,,, | Performed by: INTERNAL MEDICINE

## 2023-03-17 PROCEDURE — 99396 PREV VISIT EST AGE 40-64: CPT | Mod: S$GLB,,, | Performed by: INTERNAL MEDICINE

## 2023-03-17 PROCEDURE — 1159F PR MEDICATION LIST DOCUMENTED IN MEDICAL RECORD: ICD-10-PCS | Mod: CPTII,S$GLB,, | Performed by: INTERNAL MEDICINE

## 2023-03-17 PROCEDURE — 3075F PR MOST RECENT SYSTOLIC BLOOD PRESS GE 130-139MM HG: ICD-10-PCS | Mod: CPTII,S$GLB,, | Performed by: INTERNAL MEDICINE

## 2023-03-17 PROCEDURE — 4010F PR ACE/ARB THEARPY RXD/TAKEN: ICD-10-PCS | Mod: CPTII,S$GLB,, | Performed by: INTERNAL MEDICINE

## 2023-03-17 PROCEDURE — 84443 ASSAY THYROID STIM HORMONE: CPT | Performed by: INTERNAL MEDICINE

## 2023-03-17 PROCEDURE — 3079F DIAST BP 80-89 MM HG: CPT | Mod: CPTII,S$GLB,, | Performed by: INTERNAL MEDICINE

## 2023-03-17 PROCEDURE — 83036 HEMOGLOBIN GLYCOSYLATED A1C: CPT | Performed by: INTERNAL MEDICINE

## 2023-03-17 PROCEDURE — 80061 LIPID PANEL: CPT | Performed by: INTERNAL MEDICINE

## 2023-03-17 PROCEDURE — 85025 COMPLETE CBC W/AUTO DIFF WBC: CPT | Performed by: INTERNAL MEDICINE

## 2023-03-17 RX ORDER — SUVOREXANT 10 MG/1
10 TABLET, FILM COATED ORAL DAILY
Qty: 30 TABLET | Refills: 3 | Status: SHIPPED | OUTPATIENT
Start: 2023-03-17 | End: 2023-10-17

## 2023-03-17 NOTE — PROGRESS NOTES
"    CHIEF COMPLAINT     Chief Complaint   Patient presents with    Follow-up       HPI     Hipolito Laws is a 63 y.o. male HTN HLD, cervical radiculitis history of heart disease here today for     Hypertension  Taking losartan 50 mg daily.  Does not really check blood pressure home    Hyperlipidemia taking atorvastatin 40 mg daily.      Since last visit,  Had second dehiscence. Resolved wound care. He is back to work x1 month and still pretty deconditioned.  Kids have moved back in and lot of noise in the house.     Up 10lbs since last visit, drinking a few beers nightly sleep. Previously on trazodone not helpful, tried otcs  Personally Reviewed Patient's Medical, surgical, family and social hx. Changes updated in [facility].  Care Team updated in Epic    Review of Systems:  Review of Systems   Constitutional:  Positive for activity change and unexpected weight change.   HENT:  Negative for hearing loss, rhinorrhea and trouble swallowing.    Eyes:  Negative for discharge and visual disturbance.   Respiratory:  Negative for chest tightness and wheezing.    Cardiovascular:  Negative for chest pain and palpitations.   Gastrointestinal:  Negative for blood in stool, constipation, diarrhea and vomiting.   Endocrine: Positive for polydipsia. Negative for polyuria.   Genitourinary:  Negative for difficulty urinating, hematuria and urgency.   Musculoskeletal:  Positive for arthralgias and neck pain. Negative for joint swelling.   Neurological:  Positive for weakness. Negative for headaches.   Psychiatric/Behavioral:  Negative for confusion and dysphoric mood.      Health Maintenance:   Reviewed with patient  Due for the following:      PHYSICAL EXAM     /88   Pulse 89   Ht 5' 10" (1.778 m)   Wt 94.8 kg (209 lb)   SpO2 100%   BMI 29.99 kg/m²     Gen: Well Appearing, NAD  HEENT: PERR, EOMI  Neck: FROM, no thyromegaly, no cervical adenopathy  CVD: RRR, no M/R/G  Pulm: Normal work of breathing, CTAB, no wheezing  Abd:  " Soft, NT, ND non TTP, no mass  MSK: no LE edema  Neuro: A&Ox3, gait normal, speech normal  Mood; Mood normal, behavior normal, thought process linear       LABS     Labs reviewed; Notable for    ASSESSMENT     1. Annual physical exam        2. Essential hypertension, benign        3. Hyperlipidemia, unspecified hyperlipidemia type        4. Cervical radiculitis        5. Dehiscence of operative wound, sequela        6. Insomnia, unspecified type  suvorexant (BELSOMRA) 10 mg Tab              Plan     Hipolito Laws is a 63 y.o. male with HTN HLD Cervical Radiculitis family history of heart disease  1. Annual physical exam  Updated problem list, medical history, care team and discussed HM.       2. Essential hypertension, benign  At goal continue losartan 50    3. Hyperlipidemia, unspecified hyperlipidemia type  Will recheck lipid panel consider coronary calcium score to help determine LDL target    4. Cervical radiculitis  Reports improvement functional status with recent injection  Continue follow-up with pain    5. Dehiscence of operative wound, sequela  Finally healed, but recommended waiting a few months prior restarting physical therapy    6. Insomnia, unspecified type  Will try and write for orexin inhibtor  - suvorexant (BELSOMRA) 10 mg Tab; Take 10 mg by mouth once daily.  Dispense: 30 tablet; Refill: 3  consider    Fabio Rosas MD

## 2023-03-20 RX ORDER — ZOLPIDEM TARTRATE 5 MG/1
5 TABLET ORAL NIGHTLY PRN
Qty: 30 TABLET | Refills: 1 | Status: SHIPPED | OUTPATIENT
Start: 2023-03-20 | End: 2023-03-26 | Stop reason: SDUPTHER

## 2023-03-26 ENCOUNTER — PATIENT MESSAGE (OUTPATIENT)
Dept: INTERNAL MEDICINE | Facility: CLINIC | Age: 63
End: 2023-03-26
Payer: COMMERCIAL

## 2023-03-27 ENCOUNTER — TELEPHONE (OUTPATIENT)
Dept: INTERNAL MEDICINE | Facility: CLINIC | Age: 63
End: 2023-03-27
Payer: COMMERCIAL

## 2023-03-27 NOTE — TELEPHONE ENCOUNTER
GREGORIO PAGAN (Key: [id number])    Your information has been submitted. [employer] is reviewing the PA request and you will receive an electronic response. You may check for the updated outcome later by reopening this request.    The standard fax determination will also be sent to you directly. If you have any questions about your PA submission, please contact [employer] at [phone].

## 2023-03-31 ENCOUNTER — TELEPHONE (OUTPATIENT)
Dept: INTERNAL MEDICINE | Facility: CLINIC | Age: 63
End: 2023-03-31
Payer: COMMERCIAL

## 2023-03-31 NOTE — TELEPHONE ENCOUNTER
Belsomra PA has been pending x 2 weeks. I called his insurance. I was informed that this drug was already denied. Zolpidem was called in 03/27/2023. This is one of the covered drugs. I received no request to obtain a PA.

## 2023-05-12 ENCOUNTER — PATIENT MESSAGE (OUTPATIENT)
Dept: ORTHOPEDICS | Facility: CLINIC | Age: 63
End: 2023-05-12
Payer: COMMERCIAL

## 2023-05-12 ENCOUNTER — PATIENT MESSAGE (OUTPATIENT)
Dept: PAIN MEDICINE | Facility: CLINIC | Age: 63
End: 2023-05-12
Payer: COMMERCIAL

## 2023-05-12 DIAGNOSIS — M54.12 CERVICAL RADICULITIS: Primary | ICD-10-CM

## 2023-05-12 NOTE — PROGRESS NOTES
Patient had a cervical DIGNA on 2/16/23 which gave 90% relief in his pain until 1 week ago. His pain now is 8/10.

## 2023-05-17 ENCOUNTER — PATIENT MESSAGE (OUTPATIENT)
Dept: PAIN MEDICINE | Facility: CLINIC | Age: 63
End: 2023-05-17
Payer: COMMERCIAL

## 2023-05-19 ENCOUNTER — PATIENT MESSAGE (OUTPATIENT)
Dept: PAIN MEDICINE | Facility: CLINIC | Age: 63
End: 2023-05-19
Payer: COMMERCIAL

## 2023-05-22 ENCOUNTER — TELEPHONE (OUTPATIENT)
Dept: PAIN MEDICINE | Facility: CLINIC | Age: 63
End: 2023-05-22
Payer: COMMERCIAL

## 2023-05-22 ENCOUNTER — PATIENT MESSAGE (OUTPATIENT)
Dept: PAIN MEDICINE | Facility: CLINIC | Age: 63
End: 2023-05-22
Payer: COMMERCIAL

## 2023-05-22 DIAGNOSIS — M54.12 CERVICAL RADICULITIS: Primary | ICD-10-CM

## 2023-05-22 NOTE — TELEPHONE ENCOUNTER
----- Message from Idania Johnson sent at 5/22/2023  8:31 AM CDT -----  Regarding: darlyn luciano  Name of caller: stanley       What is the requesting detail: pt is requesting a call back  from [facility] to schedule his procedure.       Can the clinic reply by [facility]: no       What number to call back:[phone]

## 2023-05-23 DIAGNOSIS — G47.00 INSOMNIA, UNSPECIFIED TYPE: ICD-10-CM

## 2023-05-23 RX ORDER — ZOLPIDEM TARTRATE 5 MG/1
5 TABLET ORAL NIGHTLY PRN
Qty: 30 TABLET | Refills: 1 | Status: SHIPPED | OUTPATIENT
Start: 2023-05-23 | End: 2023-10-17

## 2023-05-23 NOTE — TELEPHONE ENCOUNTER
No care due was identified.  [facility] Embedded Care Due Messages. Reference number: [id number].   5/23/2023 11:51:20 AM CDT

## 2023-05-23 NOTE — TELEPHONE ENCOUNTER
Refill Routing Note   Medication(s) are not appropriate for processing by [facility] for the following reason(s):      Medication outside of protocol    [facility] action(s):  Route None identified          Appointments  past 12m or future 3m with PCP    Date Provider   Last Visit   3/17/2023 Fabio Rosas MD   Next Visit   Visit date not found Fabio Rosas MD   ED visits in past 90 days: 0        Note composed:12:11 PM 05/23/2023

## 2023-06-19 ENCOUNTER — PATIENT MESSAGE (OUTPATIENT)
Dept: ADMINISTRATIVE | Facility: OTHER | Age: 63
End: 2023-06-19
Payer: COMMERCIAL

## 2023-06-26 ENCOUNTER — HOSPITAL ENCOUNTER (OUTPATIENT)
Facility: OTHER | Age: 63
Discharge: HOME | End: 2023-06-26
Attending: ANESTHESIOLOGY | Admitting: STUDENT IN AN ORGANIZED HEALTH CARE EDUCATION/TRAINING PROGRAM
Payer: COMMERCIAL

## 2023-06-26 VITALS
BODY MASS INDEX: 29.35 KG/M2 | HEART RATE: 88 BPM | WEIGHT: 205 LBS | SYSTOLIC BLOOD PRESSURE: 158 MMHG | RESPIRATION RATE: 16 BRPM | DIASTOLIC BLOOD PRESSURE: 92 MMHG | HEIGHT: 70 IN | OXYGEN SATURATION: 95 % | TEMPERATURE: 98 F

## 2023-06-26 DIAGNOSIS — G89.29 CHRONIC PAIN: ICD-10-CM

## 2023-06-26 DIAGNOSIS — M54.12 CERVICAL RADICULOPATHY: Primary | ICD-10-CM

## 2023-06-26 PROCEDURE — 62321 NJX INTERLAMINAR CRV/THRC: CPT | Performed by: ANESTHESIOLOGY

## 2023-06-26 PROCEDURE — 63600175 PHARM REV CODE 636 W HCPCS: Performed by: ANESTHESIOLOGY

## 2023-06-26 PROCEDURE — 25000003 PHARM REV CODE 250: Performed by: ANESTHESIOLOGY

## 2023-06-26 PROCEDURE — 25500020 PHARM REV CODE 255: Performed by: ANESTHESIOLOGY

## 2023-06-26 PROCEDURE — 62321 NJX INTERLAMINAR CRV/THRC: CPT | Mod: ,,, | Performed by: ANESTHESIOLOGY

## 2023-06-26 PROCEDURE — 62321 PR INJ CERV/THORAC, W/GUIDANCE: ICD-10-PCS | Mod: ,,, | Performed by: ANESTHESIOLOGY

## 2023-06-26 RX ORDER — LIDOCAINE HYDROCHLORIDE 20 MG/ML
INJECTION, SOLUTION INFILTRATION; PERINEURAL
Status: DISCONTINUED | OUTPATIENT
Start: 2023-06-26 | End: 2023-06-26 | Stop reason: HOSPADM

## 2023-06-26 RX ORDER — SODIUM CHLORIDE 9 MG/ML
INJECTION, SOLUTION INTRAVENOUS CONTINUOUS
Status: DISCONTINUED | OUTPATIENT
Start: 2023-06-26 | End: 2023-06-26 | Stop reason: HOSPADM

## 2023-06-26 RX ORDER — MIDAZOLAM HYDROCHLORIDE 1 MG/ML
INJECTION INTRAMUSCULAR; INTRAVENOUS
Status: DISCONTINUED | OUTPATIENT
Start: 2023-06-26 | End: 2023-06-26 | Stop reason: HOSPADM

## 2023-06-26 RX ORDER — FENTANYL CITRATE 50 UG/ML
INJECTION, SOLUTION INTRAMUSCULAR; INTRAVENOUS
Status: DISCONTINUED | OUTPATIENT
Start: 2023-06-26 | End: 2023-06-26 | Stop reason: HOSPADM

## 2023-06-26 RX ORDER — LIDOCAINE HYDROCHLORIDE 10 MG/ML
INJECTION, SOLUTION EPIDURAL; INFILTRATION; INTRACAUDAL; PERINEURAL
Status: DISCONTINUED | OUTPATIENT
Start: 2023-06-26 | End: 2023-06-26 | Stop reason: HOSPADM

## 2023-06-26 RX ORDER — DEXAMETHASONE SODIUM PHOSPHATE 10 MG/ML
INJECTION INTRAMUSCULAR; INTRAVENOUS
Status: DISCONTINUED | OUTPATIENT
Start: 2023-06-26 | End: 2023-06-26 | Stop reason: HOSPADM

## 2023-06-26 NOTE — H&P
HPI  Hipolito Laws presents for Procedure(s):  CERVICAL DIGNA DIRECT REFRRAL    Recent anticoagulation/antiplatelets reviewed and verified with nursing.  Recent antibiotics/recent infections reviewed and verified with nursing.    Past Medical History:   Diagnosis Date    Hypertension      Past Surgical History:   Procedure Laterality Date    ARTHROSCOPIC REPAIR OF ROTATOR CUFF OF SHOULDER  1992    ARTHROSCOPY OF ANKLE WITH DEBRIDEMENT Left 8/4/2022    Procedure: ARTHROSCOPY, ANKLE, WITH DEBRIDEMENT;  Surgeon: Aleksandr Elias MD;  Location: [facility] OR;  Service: Orthopedics;  Laterality: Left;  Calf tourniquet    CLOSURE OF WOUND Left 9/14/2022    Procedure: CLOSURE, WOUND left ankle;  Surgeon: Aleksandr Elias MD;  Location: [facility] OR;  Service: Orthopedics;  Laterality: Left;  Prevena wound VAC    EPIDURAL STEROID INJECTION N/A 12/13/2021    Procedure: INJECTION, STEROID, EPIDURAL, CERVICAL DIRECT REF/ NEED CONSENT;  Surgeon: Yunier Lawton MD;  Location: [facility] PAIN MGT;  Service: Pain Management;  Laterality: N/A;    EPIDURAL STEROID INJECTION N/A 6/2/2022    Procedure: INJECTION, STEROID, EPIDURAL, CERVICAL C7/T1 CONTRAST DIRECT REF;  Surgeon: Yunier Lawton MD;  Location: [facility]IN [facility]GT;  Service: Pain Management;  Laterality: N/A;    EPIDURAL STEROID INJECTION N/A 2/16/2023    Procedure: INJECTION, STEROID, EPIDURAL, C7/T1 CERVICAL CONTRAST DIRECT REF;  Surgeon: Yunier Lawton MD;  Location: [facility]IN [facility]GT;  Service: Pain Management;  Laterality: N/A;    EPIDURAL STEROID INJECTION INTO CERVICAL SPINE Right 12/17/2020    Procedure: CERVICAL C5/6 DIGNA TRANSFORAMINAL  DIRECT REFERRAL;  Surgeon: Yunier Lawton MD;  Location: [facility] PAIN [facility]GT;  Service: Pain Management;  Laterality: Right;  NEEDS CONSENT    SURGICAL REMOVAL OF LESION OF FIBULA Left 8/4/2022    Procedure: EXCISION, LESION, FIBULA Nonunion avulsion fragment distal fibula;  Surgeon: Aleksandr Elias MD;  Location: [facility] OR;  Service: Orthopedics;  Laterality:  "Left;    TRANSFORAMINAL EPIDURAL INJECTION OF STEROID Right 10/14/2021    Procedure: INJECTION, STEROID, EPIDURAL, TRANSFORAMINAL APPROACH, C5-C6 DIRECT REF/NEED CONSNET;  Surgeon: Yunier Lawton MD;  Location: [facility];  Service: Pain Management;  Laterality: Right;    UMBILICAL HERNIA REPAIR N/A 4/5/2021    Procedure: REPAIR, HERNIA, UMBILICAL, AGE 5 YEARS OR OLDER,;  Surgeon: Tim Reese MD;  Location: [facility] OR [address] [place] [address];  Service: General;  Laterality: N/A;     Review of patient's allergies indicates:  No Known Allergies     PMHx, PSHx, Allergies, Medications reviewed in epic      ROS negative except pain complaints in HPI    OBJECTIVE:    BP (!) 133/100 (BP Location: Right arm, Patient Position: Sitting)   Pulse 96   Temp 98.3 °F (36.8 °C) (Oral)   Resp 16   Ht 5' 10" (1.778 m)   Wt 93 kg (205 lb)   SpO2 96%   BMI 29.41 kg/m²     PHYSICAL EXAMINATION:    GENERAL: Well appearing, in no acute distress, alert and oriented to name, situation, location.  PSYCH:  Mood and affect appropriate.  SKIN: Skin color, texture, turgor normal, no rashes or lesions at site of procedure.  CV: regular rate with palpation of the radial artery.  PULM: No evidence of respiratory difficulty, symmetric chest rise. No audible abnormal respiratory sounds.  NEURO: Cranial nerves grossly intact.    Plan:    Proceed with procedure as planned    Lauren Mcmillan  06/26/2023    "

## 2023-06-26 NOTE — DISCHARGE SUMMARY
Discharge Note  Short Stay      SUMMARY     Admit Date: 6/26/2023    Attending Physician: Yunier Lawton      Discharge Physician: Yunier Lawton      Discharge Date: 6/26/2023 1:28 PM    Procedure(s) (LRB):  CERVICAL DIGNA C7-T1 DIRECT REFRRAL (N/A)    Final Diagnosis: Cervical radiculitis [M54.12]    Disposition: Home or self care    Patient Instructions:   Current Discharge Medication List        CONTINUE these medications which have NOT CHANGED    Details   ascorbic acid, vitamin C, (VITAMIN C) 100 MG tablet Take 100 mg by mouth once daily.      aspirin 81 MG Chew Take 375 mg by mouth once daily.      atorvastatin (LIPITOR) 40 MG tablet TAKE 1 TABLET(40 MG) BY MOUTH EVERY DAY  Qty: 90 tablet, Refills: 3    Associated Diagnoses: Coronary artery calcification seen on CT scan      DULoxetine (CYMBALTA) 60 MG capsule Take 1 capsule (60 mg total) by mouth once daily.  Qty: 90 capsule, Refills: 3    Associated Diagnoses: Anxiety and depression      losartan (COZAAR) 50 MG tablet Take 1 tablet (50 mg total) by mouth once daily.  Qty: 90 tablet, Refills: 2    Associated Diagnoses: Essential hypertension, benign      multivitamin capsule Take 1 capsule by mouth once daily.      omega-3 fatty acids/fish oil (FISH OIL-OMEGA-3 FATTY ACIDS) 300-1,000 mg capsule Take by mouth once daily.      suvorexant (BELSOMRA) 10 mg Tab Take 10 mg by mouth once daily.  Qty: 30 tablet, Refills: 3    Associated Diagnoses: Insomnia, unspecified type      vitamin D (VITAMIN D3) 1000 units Tab Take 1,000 Units by mouth once daily.      vitamin E 100 UNIT capsule Take 100 Units by mouth once daily.      zolpidem (AMBIEN) 5 MG Tab Take 1 tablet (5 mg total) by mouth nightly as needed (insomnia).  Qty: 30 tablet, Refills: 1    Associated Diagnoses: Insomnia, unspecified type                 Discharge Diagnosis: Cervical radiculitis [M54.12]  Condition on Discharge: Stable with no complications to procedure   Diet on Discharge: Same as before.  Activity:  as per instruction sheet.  Discharge to: Home with a responsible adult.  Follow up: 2-4 weeks       Please call my office or pager at [phone] if experienced any weakness or loss of sensation, fever > 101.5, pain uncontrolled with oral medications, persistent nausea/vomiting/or diarrhea, redness or drainage from the incisions, or any other worrisome concerns. If physician on call was not reached or could not communicate with our office for any reason please go to the nearest emergency department

## 2023-06-26 NOTE — OP NOTE
Cervical Interlaminar Epidural Steroid Injection under Fluoroscopic Guidance    The procedure, risks, benefits, and options were discussed with the patient. There are no contraindications to the procedure. The patent expressed understanding and agreed to the procedure. Informed written consent was obtained prior to the start of the procedure and can be found in the patient's chart.     PATIENT NAME: Hipolito Laws   MRN: [id number]     DATE OF PROCEDURE: 06/26/2023    PROCEDURE: Cervical Interlaminar Epidural Steroid Injection C7/T1 under Fluoroscopic Guidance    PRE-OP DIAGNOSIS: Cervical radiculitis [M54.12] Cervical radiculopathy [M54.12]    POST-OP DIAGNOSIS: Same    PHYSICIAN: Yunier Lawton MD     ASSISTANTS: Lauren Mcmillan MD [facility]     MEDICATIONS INJECTED: Preservative-free Decadron 10mg with 1cc of Lidocaine 1% MPF and preservative free normal saline    LOCAL ANESTHETIC INJECTED: Xylocaine 2%     SEDATION: Versed 2mg and Fentanyl 25mcg                                                                                                                                                                                     Conscious sedation ordered by M.D. Patient re-evaluation prior to administration of conscious sedation. No changes noted in patient's status from initial evaluation. The patient's vital signs were monitored by RN and patient remained hemodynamically stable throughout the procedure.    Event Time In   Sedation Start 1319   Sedation End 1324       ESTIMATED BLOOD LOSS: None    COMPLICATIONS: None    TECHNIQUE: Time-out was performed to identify the patient and procedure to be performed. With the patient laying in a prone position, the surgical area was prepped and draped in the usual sterile fashion using ChloraPrep and a fenestrated drape. The level was determined under fluoroscopy guidance. Skin anesthesia was achieved by injecting Lidocaine 2% over the injection site.  The interlaminar space  was then approached with a 20 gauge, 3.5 inch Tuohy needle that was introduced under fluoroscopic guidance with AP, lateral and/or contralateral oblique imaging. Once the Ligamentum flavum was encountered loss of resistance to saline was used to enter the epidural space. With positive loss of resistance and negative aspiration for CSF or Blood, contrast dye  Omnipaque (300mg/mL) was injected to confirm placement and there was no vascular runoff. Then 2 mL of the medication mixture listed above was then injected slowly. Displacement of the radio opaque contrast after injection of the medication confirmed that the medication went into the area of the epidural space. The needles were removed, and bleeding was nil. A sterile dressing was applied. No specimens collected. The patient tolerated the procedure well.     PAIN BEFORE THE PROCEDURE: 8-10/10    PAIN AFTER THE PROCEDURE: 7/10   The patient was monitored after the procedure in the recovery area. They were given post-procedure and discharge instructions to follow at home. The patient was discharged in a stable condition.        Yunier Lawton MD

## 2023-06-26 NOTE — DISCHARGE INSTRUCTIONS

## 2023-10-13 ENCOUNTER — PATIENT MESSAGE (OUTPATIENT)
Dept: ORTHOPEDICS | Facility: CLINIC | Age: 63
End: 2023-10-13
Payer: COMMERCIAL

## 2023-10-17 ENCOUNTER — OFFICE VISIT (OUTPATIENT)
Dept: INTERNAL MEDICINE | Facility: CLINIC | Age: 63
End: 2023-10-17
Payer: COMMERCIAL

## 2023-10-17 ENCOUNTER — IMMUNIZATION (OUTPATIENT)
Dept: INTERNAL MEDICINE | Facility: CLINIC | Age: 63
End: 2023-10-17
Payer: COMMERCIAL

## 2023-10-17 ENCOUNTER — TELEPHONE (OUTPATIENT)
Dept: INTERNAL MEDICINE | Facility: CLINIC | Age: 63
End: 2023-10-17

## 2023-10-17 ENCOUNTER — PATIENT MESSAGE (OUTPATIENT)
Dept: INTERNAL MEDICINE | Facility: CLINIC | Age: 63
End: 2023-10-17

## 2023-10-17 VITALS
WEIGHT: 224.63 LBS | DIASTOLIC BLOOD PRESSURE: 88 MMHG | BODY MASS INDEX: 32.16 KG/M2 | SYSTOLIC BLOOD PRESSURE: 138 MMHG | HEIGHT: 70 IN | TEMPERATURE: 99 F

## 2023-10-17 DIAGNOSIS — F32.A ANXIETY AND DEPRESSION: ICD-10-CM

## 2023-10-17 DIAGNOSIS — J44.9 CHRONIC OBSTRUCTIVE PULMONARY DISEASE, UNSPECIFIED COPD TYPE: ICD-10-CM

## 2023-10-17 DIAGNOSIS — M70.61 GREATER TROCHANTERIC BURSITIS OF RIGHT HIP: Primary | ICD-10-CM

## 2023-10-17 DIAGNOSIS — F41.9 ANXIETY AND DEPRESSION: ICD-10-CM

## 2023-10-17 DIAGNOSIS — R29.818 SUSPECTED SLEEP APNEA: ICD-10-CM

## 2023-10-17 DIAGNOSIS — G47.00 INSOMNIA, UNSPECIFIED TYPE: ICD-10-CM

## 2023-10-17 PROCEDURE — 3079F PR MOST RECENT DIASTOLIC BLOOD PRESSURE 80-89 MM HG: ICD-10-PCS | Mod: CPTII,S$GLB,, | Performed by: INTERNAL MEDICINE

## 2023-10-17 PROCEDURE — 99999 PR PBB SHADOW E&M-EST. PATIENT-LVL IV: ICD-10-PCS | Mod: PBBFAC,,, | Performed by: INTERNAL MEDICINE

## 2023-10-17 PROCEDURE — 3075F SYST BP GE 130 - 139MM HG: CPT | Mod: CPTII,S$GLB,, | Performed by: INTERNAL MEDICINE

## 2023-10-17 PROCEDURE — 4010F ACE/ARB THERAPY RXD/TAKEN: CPT | Mod: CPTII,S$GLB,, | Performed by: INTERNAL MEDICINE

## 2023-10-17 PROCEDURE — 90471 IMMUNIZATION ADMIN: CPT | Mod: S$GLB,,, | Performed by: INTERNAL MEDICINE

## 2023-10-17 PROCEDURE — 4010F PR ACE/ARB THEARPY RXD/TAKEN: ICD-10-PCS | Mod: CPTII,S$GLB,, | Performed by: INTERNAL MEDICINE

## 2023-10-17 PROCEDURE — 99214 PR OFFICE/OUTPT VISIT, EST, LEVL IV, 30-39 MIN: ICD-10-PCS | Mod: S$GLB,,, | Performed by: INTERNAL MEDICINE

## 2023-10-17 PROCEDURE — 90686 FLU VACCINE (QUAD) GREATER THAN OR EQUAL TO 3YO PRESERVATIVE FREE IM: ICD-10-PCS | Mod: S$GLB,,, | Performed by: INTERNAL MEDICINE

## 2023-10-17 PROCEDURE — 3008F PR BODY MASS INDEX (BMI) DOCUMENTED: ICD-10-PCS | Mod: CPTII,S$GLB,, | Performed by: INTERNAL MEDICINE

## 2023-10-17 PROCEDURE — 1159F PR MEDICATION LIST DOCUMENTED IN MEDICAL RECORD: ICD-10-PCS | Mod: CPTII,S$GLB,, | Performed by: INTERNAL MEDICINE

## 2023-10-17 PROCEDURE — 3079F DIAST BP 80-89 MM HG: CPT | Mod: CPTII,S$GLB,, | Performed by: INTERNAL MEDICINE

## 2023-10-17 PROCEDURE — 90686 IIV4 VACC NO PRSV 0.5 ML IM: CPT | Mod: S$GLB,,, | Performed by: INTERNAL MEDICINE

## 2023-10-17 PROCEDURE — 3044F PR MOST RECENT HEMOGLOBIN A1C LEVEL <7.0%: ICD-10-PCS | Mod: CPTII,S$GLB,, | Performed by: INTERNAL MEDICINE

## 2023-10-17 PROCEDURE — 90471 FLU VACCINE (QUAD) GREATER THAN OR EQUAL TO 3YO PRESERVATIVE FREE IM: ICD-10-PCS | Mod: S$GLB,,, | Performed by: INTERNAL MEDICINE

## 2023-10-17 PROCEDURE — 1159F MED LIST DOCD IN RCRD: CPT | Mod: CPTII,S$GLB,, | Performed by: INTERNAL MEDICINE

## 2023-10-17 PROCEDURE — 3075F PR MOST RECENT SYSTOLIC BLOOD PRESS GE 130-139MM HG: ICD-10-PCS | Mod: CPTII,S$GLB,, | Performed by: INTERNAL MEDICINE

## 2023-10-17 PROCEDURE — 3008F BODY MASS INDEX DOCD: CPT | Mod: CPTII,S$GLB,, | Performed by: INTERNAL MEDICINE

## 2023-10-17 PROCEDURE — 99999 PR PBB SHADOW E&M-EST. PATIENT-LVL IV: CPT | Mod: PBBFAC,,, | Performed by: INTERNAL MEDICINE

## 2023-10-17 PROCEDURE — 99214 OFFICE O/P EST MOD 30 MIN: CPT | Mod: S$GLB,,, | Performed by: INTERNAL MEDICINE

## 2023-10-17 PROCEDURE — 3044F HG A1C LEVEL LT 7.0%: CPT | Mod: CPTII,S$GLB,, | Performed by: INTERNAL MEDICINE

## 2023-10-17 RX ORDER — MELOXICAM 15 MG/1
15 TABLET ORAL DAILY
Qty: 30 TABLET | Refills: 1 | Status: SHIPPED | OUTPATIENT
Start: 2023-10-17 | End: 2023-12-16

## 2023-10-17 RX ORDER — BUDESONIDE, GLYCOPYRROLATE, AND FORMOTEROL FUMARATE 160; 9; 4.8 UG/1; UG/1; UG/1
10.7 AEROSOL, METERED RESPIRATORY (INHALATION) 2 TIMES DAILY
Qty: 10.7 G | Refills: 1 | Status: SHIPPED | OUTPATIENT
Start: 2023-10-17

## 2023-10-17 RX ORDER — DULOXETIN HYDROCHLORIDE 30 MG/1
30 CAPSULE, DELAYED RELEASE ORAL DAILY
Qty: 90 CAPSULE | Refills: 3 | Status: SHIPPED | OUTPATIENT
Start: 2023-10-17 | End: 2024-10-16

## 2023-10-17 RX ORDER — ESZOPICLONE 2 MG/1
2 TABLET, FILM COATED ORAL NIGHTLY
Qty: 30 TABLET | Refills: 0 | Status: SHIPPED | OUTPATIENT
Start: 2023-10-17 | End: 2023-12-28 | Stop reason: SDUPTHER

## 2023-10-17 RX ORDER — ALBUTEROL SULFATE 90 UG/1
2 AEROSOL, METERED RESPIRATORY (INHALATION) EVERY 6 HOURS PRN
Qty: 18 G | Refills: 0 | Status: SHIPPED | OUTPATIENT
Start: 2023-10-17 | End: 2024-10-16

## 2023-10-17 NOTE — PROGRESS NOTES
"    CHIEF COMPLAINT     No chief complaint on file.      HPI     Hipolito Laws is a 63 y.o. male HTN HLD, cervical radiculitis history of heart disease, COPD  here today for 6m follow up.    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 Reports less stress, but less movement.    Still having issues with Sleep onset and sleep efficiency. He is drinking beer to help fall asleep. Reports waking up with panic attacks and shortness of breath until he sits up.    Having pain R. Outer thigh.  Concerned that it could be his hip. Has issues laying on right side.    Personally Reviewed Patient's Medical, surgical, family and social hx. Changes updated in [employer].  Care Team updated in Epic    Review of Systems:  Review of Systems   Musculoskeletal:  Positive for arthralgias and gait problem.       Health Maintenance:   Reviewed with patient  Due for the following:      PHYSICAL EXAM     /88 (BP Location: Right arm, Patient Position: Sitting, BP Method: Large (Manual))   Temp 98.8 °F (37.1 °C) (Oral)   Ht 5' 10" (1.778 m)   Wt 101.9 kg (224 lb 10.4 oz)   BMI 32.23 kg/m²     Gen: Well Appearing, NAD  HEENT: PERR, EOMI  Neck: FROM, no thyromegaly, no cervical adenopathy  CVD: RRR, no M/R/G  Pulm: Normal work of breathing, CTAB, no wheezing  Abd:  Soft, NT, ND non TTP, no mass  MSK: no LE edema  TTP r greater Troch  Neuro: A&Ox3, gait normal, speech normal  Mood; Mood normal, behavior normal, thought process linear       LABS     Labs reviewed; Notable for    ASSESSMENT     1. Greater trochanteric bursitis of right hip  meloxicam (MOBIC) 15 MG tablet    X-Ray Hips Bilateral 2 View Incl AP Pelvis      2. Insomnia, unspecified type  eszopiclone (LUNESTA) 2 MG Tab      3. Chronic obstructive pulmonary disease, unspecified COPD type  budesonide-glycopyr-formoterol (BREZTRI AEROSPHERE) 160-9-4.8 mcg/actuation HFAA    albuterol (VENTOLIN HFA) 90 mcg/actuation inhaler      4. Suspected sleep apnea  Ambulatory referral/consult to " Sleep Disorders      5. Anxiety and depression  DULoxetine (CYMBALTA) 30 MG capsule              Plan     Hipolito Laws is a 63 y.o. male withHTN HLD, cervical radiculitis history of heart disease   1. Greater trochanteric bursitis of right hip  Will try meloxicam to bridge until he gets to his ortho appt   Will get xray for hip (but I think pain is hip adjacent)  - meloxicam (MOBIC) 15 MG tablet; Take 1 tablet (15 mg total) by mouth once daily.  Dispense: 30 tablet; Refill: 1  - X-Ray Hips Bilateral 2 View Incl AP Pelvis; Future    2. Insomnia, unspecified type  Trial of lunesta.  Insurance doesn't cover orexin inhibitors  - eszopiclone (LUNESTA) 2 MG Tab; Take 1 tablet (2 mg total) by mouth every evening.  Dispense: 30 tablet; Refill: 0    3. Chronic obstructive pulmonary disease, unspecified COPD type  Trial of ICS/LAMA/LABA inhaler to help with symptoms and reduce rescue albuterol use  - budesonide-glycopyr-formoterol (BREZTRI AEROSPHERE) 160-9-4.8 mcg/actuation HFAA; Inhale 10.7 g into the lungs 2 (two) times daily.  Dispense: 10.7 g; Refill: 1  - albuterol (VENTOLIN HFA) 90 mcg/actuation inhaler; Inhale 2 puffs into the lungs every 6 (six) hours as needed for Wheezing. Rescue  Dispense: 18 g; Refill: 0    4. Suspected sleep apnea  STOP BANG> 4 and possible that SOB episodes are apneic  - Ambulatory referral/consult to Sleep Disorders; Future    5. Anxiety and depression  Restart cymbalta.  - DULoxetine (CYMBALTA) 30 MG capsule; Take 1 capsule (30 mg total) by mouth once daily.  Dispense: 90 capsule; Refill: 3      Fabio Rosas MD

## 2023-10-18 NOTE — TELEPHONE ENCOUNTER
Spoke with pt stated can't come in due to insurance change . [facility] is now out of his network.HE is going to take his prescription for anti inflamed [facility]

## 2023-10-30 ENCOUNTER — OFFICE VISIT (OUTPATIENT)
Dept: ORTHOPEDICS | Facility: CLINIC | Age: 63
End: 2023-10-30
Payer: COMMERCIAL

## 2023-10-30 ENCOUNTER — HOSPITAL ENCOUNTER (OUTPATIENT)
Dept: RADIOLOGY | Facility: HOSPITAL | Age: 63
Discharge: HOME | End: 2023-10-30
Attending: NURSE PRACTITIONER
Payer: COMMERCIAL

## 2023-10-30 VITALS — HEIGHT: 70 IN | WEIGHT: 224.63 LBS | BODY MASS INDEX: 32.16 KG/M2

## 2023-10-30 DIAGNOSIS — M25.551 BILATERAL HIP PAIN: ICD-10-CM

## 2023-10-30 DIAGNOSIS — M19.072 ARTHROSIS OF LEFT ANKLE: Primary | ICD-10-CM

## 2023-10-30 DIAGNOSIS — M25.551 BILATERAL HIP PAIN: Primary | ICD-10-CM

## 2023-10-30 DIAGNOSIS — M25.552 BILATERAL HIP PAIN: Primary | ICD-10-CM

## 2023-10-30 DIAGNOSIS — M70.61 GREATER TROCHANTERIC BURSITIS, RIGHT: Primary | ICD-10-CM

## 2023-10-30 DIAGNOSIS — M25.552 BILATERAL HIP PAIN: ICD-10-CM

## 2023-10-30 PROCEDURE — 20605 DRAIN/INJ JOINT/BURSA W/O US: CPT | Mod: LT,S$GLB,, | Performed by: ORTHOPAEDIC SURGERY

## 2023-10-30 PROCEDURE — 73521 XR HIPS BILATERAL 2 VIEW INCL AP PELVIS: ICD-10-PCS | Mod: 26,,, | Performed by: RADIOLOGY

## 2023-10-30 PROCEDURE — 20605 PR DRAIN/INJECT INTERMEDIATE JOINT/BURSA: ICD-10-PCS | Mod: LT,S$GLB,, | Performed by: ORTHOPAEDIC SURGERY

## 2023-10-30 PROCEDURE — 1159F PR MEDICATION LIST DOCUMENTED IN MEDICAL RECORD: ICD-10-PCS | Mod: CPTII,S$GLB,, | Performed by: ORTHOPAEDIC SURGERY

## 2023-10-30 PROCEDURE — 99999 PR PBB SHADOW E&M-EST. PATIENT-LVL III: ICD-10-PCS | Mod: PBBFAC,,, | Performed by: ORTHOPAEDIC SURGERY

## 2023-10-30 PROCEDURE — 3044F HG A1C LEVEL LT 7.0%: CPT | Mod: CPTII,S$GLB,, | Performed by: ORTHOPAEDIC SURGERY

## 2023-10-30 PROCEDURE — 4010F ACE/ARB THERAPY RXD/TAKEN: CPT | Mod: CPTII,S$GLB,, | Performed by: ORTHOPAEDIC SURGERY

## 2023-10-30 PROCEDURE — 1159F MED LIST DOCD IN RCRD: CPT | Mod: CPTII,S$GLB,, | Performed by: ORTHOPAEDIC SURGERY

## 2023-10-30 PROCEDURE — 3044F PR MOST RECENT HEMOGLOBIN A1C LEVEL <7.0%: ICD-10-PCS | Mod: CPTII,S$GLB,, | Performed by: ORTHOPAEDIC SURGERY

## 2023-10-30 PROCEDURE — 99999 PR PBB SHADOW E&M-EST. PATIENT-LVL III: ICD-10-PCS | Mod: PBBFAC,,, | Performed by: NURSE PRACTITIONER

## 2023-10-30 PROCEDURE — 1160F PR REVIEW ALL MEDS BY PRESCRIBER/CLIN PHARMACIST DOCUMENTED: ICD-10-PCS | Mod: CPTII,S$GLB,, | Performed by: NURSE PRACTITIONER

## 2023-10-30 PROCEDURE — 99213 PR OFFICE/OUTPT VISIT, EST, LEVL III, 20-29 MIN: ICD-10-PCS | Mod: 25,S$GLB,, | Performed by: ORTHOPAEDIC SURGERY

## 2023-10-30 PROCEDURE — 99213 OFFICE O/P EST LOW 20 MIN: CPT | Mod: 25,S$GLB,, | Performed by: NURSE PRACTITIONER

## 2023-10-30 PROCEDURE — 1159F MED LIST DOCD IN RCRD: CPT | Mod: CPTII,S$GLB,, | Performed by: NURSE PRACTITIONER

## 2023-10-30 PROCEDURE — 3044F HG A1C LEVEL LT 7.0%: CPT | Mod: CPTII,S$GLB,, | Performed by: NURSE PRACTITIONER

## 2023-10-30 PROCEDURE — 20610 LARGE JOINT ASPIRATION/INJECTION: R GREATER TROCHANTERIC BURSA: ICD-10-PCS | Mod: RT,S$GLB,, | Performed by: NURSE PRACTITIONER

## 2023-10-30 PROCEDURE — 99999 PR PBB SHADOW E&M-EST. PATIENT-LVL III: CPT | Mod: PBBFAC,,, | Performed by: NURSE PRACTITIONER

## 2023-10-30 PROCEDURE — 4010F PR ACE/ARB THEARPY RXD/TAKEN: ICD-10-PCS | Mod: CPTII,S$GLB,, | Performed by: NURSE PRACTITIONER

## 2023-10-30 PROCEDURE — 4010F PR ACE/ARB THEARPY RXD/TAKEN: ICD-10-PCS | Mod: CPTII,S$GLB,, | Performed by: ORTHOPAEDIC SURGERY

## 2023-10-30 PROCEDURE — 3044F PR MOST RECENT HEMOGLOBIN A1C LEVEL <7.0%: ICD-10-PCS | Mod: CPTII,S$GLB,, | Performed by: NURSE PRACTITIONER

## 2023-10-30 PROCEDURE — 1160F RVW MEDS BY RX/DR IN RCRD: CPT | Mod: CPTII,S$GLB,, | Performed by: NURSE PRACTITIONER

## 2023-10-30 PROCEDURE — 3008F BODY MASS INDEX DOCD: CPT | Mod: CPTII,S$GLB,, | Performed by: ORTHOPAEDIC SURGERY

## 2023-10-30 PROCEDURE — 99213 OFFICE O/P EST LOW 20 MIN: CPT | Mod: 25,S$GLB,, | Performed by: ORTHOPAEDIC SURGERY

## 2023-10-30 PROCEDURE — 4010F ACE/ARB THERAPY RXD/TAKEN: CPT | Mod: CPTII,S$GLB,, | Performed by: NURSE PRACTITIONER

## 2023-10-30 PROCEDURE — 3008F PR BODY MASS INDEX (BMI) DOCUMENTED: ICD-10-PCS | Mod: CPTII,S$GLB,, | Performed by: ORTHOPAEDIC SURGERY

## 2023-10-30 PROCEDURE — 99999 PR PBB SHADOW E&M-EST. PATIENT-LVL III: CPT | Mod: PBBFAC,,, | Performed by: ORTHOPAEDIC SURGERY

## 2023-10-30 PROCEDURE — 73521 X-RAY EXAM HIPS BI 2 VIEWS: CPT | Mod: 26,,, | Performed by: RADIOLOGY

## 2023-10-30 PROCEDURE — 1160F RVW MEDS BY RX/DR IN RCRD: CPT | Mod: CPTII,S$GLB,, | Performed by: ORTHOPAEDIC SURGERY

## 2023-10-30 PROCEDURE — 20610 DRAIN/INJ JOINT/BURSA W/O US: CPT | Mod: RT,S$GLB,, | Performed by: NURSE PRACTITIONER

## 2023-10-30 PROCEDURE — 99213 PR OFFICE/OUTPT VISIT, EST, LEVL III, 20-29 MIN: ICD-10-PCS | Mod: 25,S$GLB,, | Performed by: NURSE PRACTITIONER

## 2023-10-30 PROCEDURE — 1159F PR MEDICATION LIST DOCUMENTED IN MEDICAL RECORD: ICD-10-PCS | Mod: CPTII,S$GLB,, | Performed by: NURSE PRACTITIONER

## 2023-10-30 PROCEDURE — 73521 X-RAY EXAM HIPS BI 2 VIEWS: CPT | Mod: TC

## 2023-10-30 PROCEDURE — 1160F PR REVIEW ALL MEDS BY PRESCRIBER/CLIN PHARMACIST DOCUMENTED: ICD-10-PCS | Mod: CPTII,S$GLB,, | Performed by: ORTHOPAEDIC SURGERY

## 2023-10-30 RX ORDER — LIDOCAINE HYDROCHLORIDE 10 MG/ML
4 INJECTION INFILTRATION; PERINEURAL
Status: DISCONTINUED | OUTPATIENT
Start: 2023-10-30 | End: 2023-10-30 | Stop reason: HOSPADM

## 2023-10-30 RX ORDER — METHYLPREDNISOLONE ACETATE 40 MG/ML
40 INJECTION, SUSPENSION INTRA-ARTICULAR; INTRALESIONAL; INTRAMUSCULAR; SOFT TISSUE
Status: COMPLETED | OUTPATIENT
Start: 2023-10-30 | End: 2023-10-30

## 2023-10-30 RX ORDER — TRIAMCINOLONE ACETONIDE 40 MG/ML
40 INJECTION, SUSPENSION INTRA-ARTICULAR; INTRAMUSCULAR
Status: DISCONTINUED | OUTPATIENT
Start: 2023-10-30 | End: 2023-10-30 | Stop reason: HOSPADM

## 2023-10-30 RX ADMIN — LIDOCAINE HYDROCHLORIDE 4 ML: 10 INJECTION INFILTRATION; PERINEURAL at 02:10

## 2023-10-30 RX ADMIN — TRIAMCINOLONE ACETONIDE 40 MG: 40 INJECTION, SUSPENSION INTRA-ARTICULAR; INTRAMUSCULAR at 02:10

## 2023-10-30 RX ADMIN — METHYLPREDNISOLONE ACETATE 40 MG: 40 INJECTION, SUSPENSION INTRA-ARTICULAR; INTRALESIONAL; INTRAMUSCULAR; SOFT TISSUE at 01:10

## 2023-10-30 NOTE — PROGRESS NOTES
SUBJECTIVE:     Chief Complaint & History of Present Illness:  Hipolito Laws is a Established 63 y.o. year old male patient presenting today for constant right hip pain which started 8 months ago.  There is not a history of trauma.  The pain is located in the lateral aspect of the hip.  The pain is described as achy, 7-8/10.  It is is aggravated by walking and going up/down steps .  There is not radiation into the back, leg, and foot.  Previous treatments include mobic which have provided adequate relief.  There is not a history of previous injury or surgery to the hip.  The patient does not use an assistive device.      Past Medical History:   Diagnosis Date    Hypertension        Past Surgical History:   Procedure Laterality Date    ARTHROSCOPIC REPAIR OF ROTATOR CUFF OF SHOULDER  1992    ARTHROSCOPY OF ANKLE WITH DEBRIDEMENT Left 8/4/2022    Procedure: ARTHROSCOPY, ANKLE, WITH DEBRIDEMENT;  Surgeon: Aleksandr Elias MD;  Location: [facility] OR;  Service: Orthopedics;  Laterality: Left;  Calf tourniquet    CLOSURE OF WOUND Left 9/14/2022    Procedure: CLOSURE, WOUND left ankle;  Surgeon: Aleksandr Elias MD;  Location: [facility] OR;  Service: Orthopedics;  Laterality: Left;  Prevena wound VAC    EPIDURAL STEROID INJECTION N/A 12/13/2021    Procedure: INJECTION, STEROID, EPIDURAL, CERVICAL DIRECT REF/ NEED CONSENT;  Surgeon: Yunier Lawton MD;  Location: [facility] [place] [facility];  Service: Pain Management;  Laterality: N/A;    EPIDURAL STEROID INJECTION N/A 6/2/2022    Procedure: INJECTION, STEROID, EPIDURAL, CERVICAL C7/T1 CONTRAST DIRECT REF;  Surgeon: uYnier Lawton MD;  Location: [facility] [place] [facility];  Service: Pain Management;  Laterality: N/A;    EPIDURAL STEROID INJECTION N/A 2/16/2023    Procedure: INJECTION, STEROID, EPIDURAL, C7/T1 CERVICAL CONTRAST DIRECT REF;  Surgeon: Yunier Lawton MD;  Location: [facility] [place] [facility];  Service: Pain Management;  Laterality: N/A;    EPIDURAL STEROID INJECTION N/A 6/26/2023    Procedure: CERVICAL  DIGNA C7-T1 DIRECT REFRRAL;  Surgeon: Yunier Lawton MD;  Location: [facility]IN [facility]GT;  Service: Pain Management;  Laterality: N/A;    EPIDURAL STEROID INJECTION INTO CERVICAL SPINE Right 12/17/2020    Procedure: CERVICAL C5/6 DIGNA TRANSFORAMINAL  DIRECT REFERRAL;  Surgeon: Yunier Lawton MD;  Location: [facility]IN [facility]GT;  Service: Pain Management;  Laterality: Right;  NEEDS CONSENT    SURGICAL REMOVAL OF LESION OF FIBULA Left 8/4/2022    Procedure: EXCISION, LESION, FIBULA Nonunion avulsion fragment distal fibula;  Surgeon: Aleksandr Elias MD;  Location: [facility] OR;  Service: Orthopedics;  Laterality: Left;    TRANSFORAMINAL EPIDURAL INJECTION OF STEROID Right 10/14/2021    Procedure: INJECTION, STEROID, EPIDURAL, TRANSFORAMINAL APPROACH, C5-C6 DIRECT REF/NEED CONSNET;  Surgeon: Yunier Lawton MD;  Location: [facility]IN [facility]GT;  Service: Pain Management;  Laterality: Right;    UMBILICAL HERNIA REPAIR N/A 4/5/2021    Procedure: REPAIR, HERNIA, UMBILICAL, AGE 5 YEARS OR OLDER,;  Surgeon: Tim Reese MD;  Location: [address];  Service: General;  Laterality: N/A;       Family History   Problem Relation Age of Onset    Coronary artery disease Mother     Heart failure Mother     Coronary artery disease Father 47    Heart attack Father     Coronary artery disease Brother        Review of patient's allergies indicates:  No Known Allergies      Current Outpatient Medications:     albuterol (VENTOLIN HFA) 90 mcg/actuation inhaler, Inhale 2 puffs into the lungs every 6 (six) hours as needed for Wheezing. Rescue, Disp: 18 g, Rfl: 0    ascorbic acid, vitamin C, (VITAMIN C) 100 MG tablet, Take 100 mg by mouth once daily., Disp: , Rfl:     aspirin 81 MG Chew, Take 375 mg by mouth once daily., Disp: , Rfl:     budesonide-glycopyr-formoterol (BREZTRI AEROSPHERE) 160-9-4.8 mcg/actuation HFAA, Inhale 10.7 g into the lungs 2 (two) times daily., Disp: 10.7 g, Rfl: 1    DULoxetine (CYMBALTA) 30 MG capsule, Take 1 capsule (30 mg total) by  "mouth once daily., Disp: 90 capsule, Rfl: 3    eszopiclone (LUNESTA) 2 MG Tab, Take 1 tablet (2 mg total) by mouth every evening., Disp: 30 tablet, Rfl: 0    losartan (COZAAR) 50 MG tablet, Take 1 tablet (50 mg total) by mouth once daily., Disp: 90 tablet, Rfl: 2    meloxicam (MOBIC) 15 MG tablet, Take 1 tablet (15 mg total) by mouth once daily., Disp: 30 tablet, Rfl: 1    multivitamin capsule, Take 1 capsule by mouth once daily., Disp: , Rfl:     omega-3 fatty acids/fish oil (FISH OIL-OMEGA-3 FATTY ACIDS) 300-1,000 mg capsule, Take by mouth once daily., Disp: , Rfl:     vitamin D (VITAMIN D3) 1000 units Tab, Take 1,000 Units by mouth once daily., Disp: , Rfl:     vitamin E 100 UNIT capsule, Take 100 Units by mouth once daily., Disp: , Rfl:   No current facility-administered medications for this visit.    Facility-Administered Medications Ordered in Other Visits:     midazolam (VERSED) 1 mg/mL injection 2 mg, 2 mg, Intravenous, PRN, Gualberto Fuentes MD, 2 mg at 09/14/22 1220    Review of Systems:  ROS:  Constitutional: no fever or chills  Eyes: no visual changes  ENT: no nasal congestion or sore throat  Respiratory: no cough or shortness of breath  Cardiovascular: no chest pain or palpitations  Gastrointestinal: no nausea or vomiting, tolerating diet  Genitourinary: no hematuria or dysuria  Integument/Breast: no rash or pruritis  Hematologic/Lymphatic: no easy bruising or lymphadenopathy  Musculoskeletal: positive for arthralgias  Neurological: no seizures or tremors  Behavioral/Psych: no auditory or visual hallucinations  Endocrine: no heat or cold intolerance      PE:  There were no vitals taken for this visit.  Estimated body mass index is 32.23 kg/m² as calculated from the following:    Height as of an earlier encounter on 10/30/23: 5' 10" (1.778 m).    Weight as of an earlier encounter on 10/30/23: 101.9 kg (224 lb 10.4 oz).   General: Pleasant, cooperative, NAD   HEENT: NCAT, sclera nonicteric   Lungs: " Respirations are equal and unlabored.   Abdomen: Soft and non-tender.  CV: 2+ bilateral upper and lower extremity pulses.   Skin: Intact throughout LE with no rashes, erythema, or lesions  Extremities: No LE edema, NVI lower extremities      Hip Exam:   rightpositives: tenderness over greater trochanter and negatives: FROM  no pain with heel impact  pulses full    110 degrees flexion  -5 degrees extension   45 degrees internal rotation  40 degrees external rotation  25 degrees abduction  20 degrees adduction     RADIOGRAPHS:  Xray of bilateral hips were obtained, findings show no acute fractures.  All radiographs were personally reviewed by me.    ASSESSMENT/PLAN:       ICD-10-CM ICD-9-CM   1. Greater trochanteric bursitis, right  M70.61 726.5       -Hipolito Laws presents to clinic today with c/c right hip pain for the past 8 months, no trauma.  He was given Mobic on 10/17/23 with mild improvement.  States PCP had pushed on side of his hip which recreated his pain and referred him to Ortho.  -X-ray as above.    Suspect patient has greater trochanteric bursitis on the right side.  I will treat him with a Kenalog injection.  See procedural note.  He will continue taking his Mobic as prescribed.  He may also using ice massage to the area.  I advised him if his pain fails to improve in the next couple of weeks he will contact me and I will refer him to physical therapy.

## 2023-10-30 NOTE — PROGRESS NOTES
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  I performed an arthroscopic debridement of his ankle as well as an excision of a nonunion avulsion fragment of the distal fibula over a year ago.  Subsequent to the surgery he developed a wound over the distal fibula surgical site which took a while to heal.  Arthroscopy reveal significant arthrofibrosis of the joint as well as a small chondral lesion which was debrided.  He returns today and reports that he has continued pain of the ankle with any amount of prolonged walking.  He was recently seen by his primary care physician and was placed on a nonsteroidal anti-inflammatory which she states is helping.  He comes in for evaluation.      Examination:  He walks in with a slight antalgic gait.  On inspection there is no significant swelling of the left ankle compared to the right.  His skin is intact and there are no wounds.  He has functional but decreased motion of the left ankle compared to the right.  He has some tenderness especially over the anterolateral aspect of the ankle joint.  There is no significant tenderness over the end of the distal fibula.  He is neurovascularly intact.      Imaging:  I was going to obtain an x-ray but he is not sure if his insurance covers x-rays at [facility] and he thinks he might have to have them done at diagnostic imaging.  I reviewed the x-rays from before his previous surgery which revealed a fairly well-maintained joint space.    Impression:  1. Arthrosis of left ankle  methylPREDNISolone acetate injection 40 mg        Recommendation:  I suspect that his symptoms are due to arthrosis of the ankle and I suggested doing a diagnostic/therapeutic injection.  He agreed to the injection.  After verbal consent sterile prep I injected the left ankle with 2 cc of lidocaine and 40 mg of methylprednisolone.    Follow-up in three months if necessary

## 2023-10-30 NOTE — PROCEDURES
Large Joint Aspiration/Injection: R greater trochanteric bursa    Date/Time: 10/30/2023 2:00 PM    Performed by: Kwadwo Hanna NP  Authorized by: Kwadwo Hanna NP    Consent Done?:  Yes (Verbal)  Indications:  Pain  Site marked: the procedure site was marked    Timeout: prior to procedure the correct patient, procedure, and site was verified      Local anesthesia used?: Yes    Local anesthetic:  Lidocaine spray    Details:  Needle Size:  22 G  Ultrasonic Guidance for needle placement?: No    Approach:  Lateral  Location:  Hip  Site:  R greater trochanteric bursa  Medications:  4 mL LIDOcaine HCL 10 mg/ml (1%) 10 mg/mL (1 %); 40 mg triamcinolone acetonide 40 mg/mL  Patient tolerance:  Patient tolerated the procedure well with no immediate complications

## 2023-12-26 ENCOUNTER — PATIENT MESSAGE (OUTPATIENT)
Dept: INTERNAL MEDICINE | Facility: CLINIC | Age: 63
End: 2023-12-26
Payer: COMMERCIAL

## 2023-12-26 DIAGNOSIS — M54.12 CERVICAL RADICULITIS: ICD-10-CM

## 2023-12-26 DIAGNOSIS — S39.012A LUMBAR STRAIN, INITIAL ENCOUNTER: Primary | ICD-10-CM

## 2023-12-26 NOTE — PROGRESS NOTES
spoke with pt virtually. Pt was last seen 5/12/23 and continues to have neck pain. Pt has tried home exercises and cymbalta with no relief. Pain is 8/10. Pain is 8/10. I have provided the patient with a home exercise program. It is the [employer] cervical exercise program. Exercises include walking erectly with neutral head position, supine neutral head position, supine retraction, sitting or standing neck retraction, posture training, forward/backward/sideward isometric strengthening, prone head lifts, supine head lifts, scapular retraction, and neck rotation. Pt completes each exercise daily for one hour with worsening of pain. MRI demonstrates degenerative changes. Pt had a cervical DIGNA on 6/26/23 with 100% relief for 6 months.  Will order repeat cervical DIGNA with pain management. Pt will fu if pain persists.

## 2023-12-27 ENCOUNTER — PATIENT MESSAGE (OUTPATIENT)
Dept: INTERNAL MEDICINE | Facility: CLINIC | Age: 63
End: 2023-12-27
Payer: COMMERCIAL

## 2023-12-27 ENCOUNTER — PATIENT MESSAGE (OUTPATIENT)
Dept: PAIN MEDICINE | Facility: OTHER | Age: 63
End: 2023-12-27
Payer: COMMERCIAL

## 2023-12-27 DIAGNOSIS — M54.12 CERVICAL RADICULITIS: Primary | ICD-10-CM

## 2023-12-27 DIAGNOSIS — G47.00 INSOMNIA, UNSPECIFIED TYPE: ICD-10-CM

## 2023-12-28 RX ORDER — ESZOPICLONE 2 MG/1
2 TABLET, FILM COATED ORAL NIGHTLY
Qty: 30 TABLET | Refills: 5 | Status: SHIPPED | OUTPATIENT
Start: 2023-12-28 | End: 2024-06-25

## 2023-12-28 NOTE — TELEPHONE ENCOUNTER
Care Due:                  Date            Visit Type   Department     Provider  --------------------------------------------------------------------------------                                MYCHART                              FOLLOWUP/OF  [facility] INTERNAL  Last Visit: 10-      FICE VISIT   MEDICINE       MATT BATISTA  Next Visit: None Scheduled  None         None Found                                                            Last  Test          Frequency    Reason                     Performed    Due Date  --------------------------------------------------------------------------------    Cr..........  12 months..  DULoxetine...............  03- 03-    [facility] Embedded Care Due Messages. Reference number: [id number].   12/28/2023 7:32:24 AM CST

## 2024-01-05 DIAGNOSIS — Z00.00 LABORATORY EXAM ORDERED AS PART OF ROUTINE GENERAL MEDICAL EXAMINATION: Primary | ICD-10-CM

## 2024-01-05 DIAGNOSIS — Z12.2 ENCOUNTER FOR SCREENING FOR LUNG CANCER: ICD-10-CM

## 2024-01-09 ENCOUNTER — PATIENT MESSAGE (OUTPATIENT)
Dept: INTERNAL MEDICINE | Facility: CLINIC | Age: 64
End: 2024-01-09
Payer: COMMERCIAL

## 2024-01-18 ENCOUNTER — PATIENT MESSAGE (OUTPATIENT)
Dept: PAIN MEDICINE | Facility: OTHER | Age: 64
End: 2024-01-18
Payer: COMMERCIAL

## 2024-01-22 ENCOUNTER — HOSPITAL ENCOUNTER (OUTPATIENT)
Dept: RADIOLOGY | Facility: HOSPITAL | Age: 64
Discharge: HOME | End: 2024-01-22
Attending: INTERNAL MEDICINE | Admitting: ANESTHESIOLOGY
Payer: COMMERCIAL

## 2024-01-22 ENCOUNTER — HOSPITAL ENCOUNTER (OUTPATIENT)
Facility: OTHER | Age: 64
Discharge: HOME | End: 2024-01-22
Attending: ANESTHESIOLOGY | Admitting: ANESTHESIOLOGY
Payer: COMMERCIAL

## 2024-01-22 VITALS
TEMPERATURE: 98 F | HEIGHT: 70 IN | DIASTOLIC BLOOD PRESSURE: 101 MMHG | BODY MASS INDEX: 28.63 KG/M2 | WEIGHT: 200 LBS | OXYGEN SATURATION: 98 % | RESPIRATION RATE: 16 BRPM | HEART RATE: 87 BPM | SYSTOLIC BLOOD PRESSURE: 176 MMHG

## 2024-01-22 DIAGNOSIS — M54.12 CERVICAL RADICULOPATHY: Primary | ICD-10-CM

## 2024-01-22 DIAGNOSIS — G89.29 CHRONIC PAIN: ICD-10-CM

## 2024-01-22 DIAGNOSIS — Z12.2 ENCOUNTER FOR SCREENING FOR LUNG CANCER: ICD-10-CM

## 2024-01-22 PROCEDURE — 63600175 PHARM REV CODE 636 W HCPCS: Performed by: ANESTHESIOLOGY

## 2024-01-22 PROCEDURE — 25000003 PHARM REV CODE 250: Performed by: STUDENT IN AN ORGANIZED HEALTH CARE EDUCATION/TRAINING PROGRAM

## 2024-01-22 PROCEDURE — 71271 CT THORAX LUNG CANCER SCR C-: CPT | Mod: 26,,, | Performed by: RADIOLOGY

## 2024-01-22 PROCEDURE — 62321 NJX INTERLAMINAR CRV/THRC: CPT | Mod: ,,, | Performed by: ANESTHESIOLOGY

## 2024-01-22 PROCEDURE — 25500020 PHARM REV CODE 255: Performed by: ANESTHESIOLOGY

## 2024-01-22 PROCEDURE — 62321 NJX INTERLAMINAR CRV/THRC: CPT | Performed by: ANESTHESIOLOGY

## 2024-01-22 PROCEDURE — 71271 CT THORAX LUNG CANCER SCR C-: CPT | Mod: TC

## 2024-01-22 PROCEDURE — 25000003 PHARM REV CODE 250: Performed by: ANESTHESIOLOGY

## 2024-01-22 PROCEDURE — 99152 MOD SED SAME PHYS/QHP 5/>YRS: CPT | Performed by: ANESTHESIOLOGY

## 2024-01-22 RX ORDER — DEXAMETHASONE SODIUM PHOSPHATE 10 MG/ML
INJECTION INTRAMUSCULAR; INTRAVENOUS
Status: DISCONTINUED | OUTPATIENT
Start: 2024-01-22 | End: 2024-01-22 | Stop reason: HOSPADM

## 2024-01-22 RX ORDER — FENTANYL CITRATE 50 UG/ML
INJECTION, SOLUTION INTRAMUSCULAR; INTRAVENOUS
Status: DISCONTINUED | OUTPATIENT
Start: 2024-01-22 | End: 2024-01-22 | Stop reason: HOSPADM

## 2024-01-22 RX ORDER — SODIUM CHLORIDE 9 MG/ML
INJECTION, SOLUTION INTRAVENOUS CONTINUOUS
Status: DISCONTINUED | OUTPATIENT
Start: 2024-01-22 | End: 2024-01-22 | Stop reason: HOSPADM

## 2024-01-22 RX ORDER — MIDAZOLAM HYDROCHLORIDE 1 MG/ML
INJECTION INTRAMUSCULAR; INTRAVENOUS
Status: DISCONTINUED | OUTPATIENT
Start: 2024-01-22 | End: 2024-01-22 | Stop reason: HOSPADM

## 2024-01-22 RX ORDER — LIDOCAINE HYDROCHLORIDE 20 MG/ML
INJECTION, SOLUTION INFILTRATION; PERINEURAL
Status: DISCONTINUED | OUTPATIENT
Start: 2024-01-22 | End: 2024-01-22 | Stop reason: HOSPADM

## 2024-01-22 NOTE — DISCHARGE INSTRUCTIONS

## 2024-01-22 NOTE — OP NOTE
Cervical Interlaminar Epidural Steroid Injection under Fluoroscopic Guidance    The procedure, risks, benefits, and options were discussed with the patient. There are no contraindications to the procedure. The patent expressed understanding and agreed to the procedure. Informed written consent was obtained prior to the start of the procedure and can be found in the patient's chart.     PATIENT NAME: Hipolito Laws   MRN: [id number]     DATE OF PROCEDURE: 01/22/2024    PROCEDURE: Cervical Interlaminar Epidural Steroid Injection C7/T1 under Fluoroscopic Guidance    PRE-OP DIAGNOSIS: Cervical radiculitis [M54.12] Cervical radiculopathy [M54.12]    POST-OP DIAGNOSIS: Same    PHYSICIAN: Yunier Lawton MD     ASSISTANTS: Joel Lua MD  [employer] Pain Fellow     MEDICATIONS INJECTED: Preservative-free Decadron 10mg with 1cc of Lidocaine 1% MPF and preservative free normal saline    LOCAL ANESTHETIC INJECTED: Xylocaine 2%     SEDATION: Versed 2mg and Fentanyl 25mcg                                                                                                                                                                                     Conscious sedation ordered by M.D. Patient re-evaluation prior to administration of conscious sedation. No changes noted in patient's status from initial evaluation. The patient's vital signs were monitored by RN and patient remained hemodynamically stable throughout the procedure.    Event Time In   Sedation Start 1043   Sedation End 1054       ESTIMATED BLOOD LOSS: None    COMPLICATIONS: None    TECHNIQUE: Time-out was performed to identify the patient and procedure to be performed. With the patient laying in a prone position, the surgical area was prepped and draped in the usual sterile fashion using ChloraPrep and a fenestrated drape. The level was determined under fluoroscopy guidance. Skin anesthesia was achieved by injecting Lidocaine 2% over the injection site.  The interlaminar space  was then approached with a 20 gauge, 3.5 inch Tuohy needle that was introduced under fluoroscopic guidance with AP, lateral and/or contralateral oblique imaging. Once the Ligamentum flavum was encountered loss of resistance to saline was used to enter the epidural space. With positive loss of resistance and negative aspiration for CSF or Blood, contrast dye  Omnipaque (300mg/mL) was injected to confirm placement and there was no vascular runoff. Then 2 mL of the medication mixture listed above was then injected slowly. Displacement of the radio opaque contrast after injection of the medication confirmed that the medication went into the area of the epidural space. The needles were removed, and bleeding was nil. A sterile dressing was applied. No specimens collected. The patient tolerated the procedure well.     PRE-PROCEDURE PAIN SCORE: 10/10    POST-PROCEDURE PAIN SCORE: 5/10    The patient was monitored after the procedure in the recovery area. They were given post-procedure and discharge instructions to follow at home. The patient was discharged in a stable condition.        Yunier Lawton MD

## 2024-01-22 NOTE — H&P
HPI  Patient presenting for Procedure(s) (LRB):  CERVICAL DIGNA DIRECT REFERRAL (N/A)     Patient on Anti-coagulation Yes asa, stopped taking last week    No health changes since previous encounter    Past Medical History:   Diagnosis Date    Hypertension      Past Surgical History:   Procedure Laterality Date    ARTHROSCOPIC REPAIR OF ROTATOR CUFF OF SHOULDER  1992    ARTHROSCOPY OF ANKLE WITH DEBRIDEMENT Left 8/4/2022    Procedure: ARTHROSCOPY, ANKLE, WITH DEBRIDEMENT;  Surgeon: Aleksandr Elias MD;  Location: [facility] OR;  Service: Orthopedics;  Laterality: Left;  Calf tourniquet    CLOSURE OF WOUND Left 9/14/2022    Procedure: CLOSURE, WOUND left ankle;  Surgeon: Aleksandr Elias MD;  Location: [facility] OR;  Service: Orthopedics;  Laterality: Left;  Prevena wound VAC    EPIDURAL STEROID INJECTION N/A 12/13/2021    Procedure: INJECTION, STEROID, EPIDURAL, CERVICAL DIRECT REF/ NEED CONSENT;  Surgeon: Yunier Lawton MD;  [place] [facility] [place];  Service: Pain Management;  Laterality: N/A;    EPIDURAL STEROID INJECTION N/A 6/2/2022    Procedure: INJECTION, STEROID, EPIDURAL, CERVICAL C7/T1 CONTRAST DIRECT REF;  Surgeon: Yunier Lawton MD;  Location: [facility] [place];  Service: Pain Management;  Laterality: N/A;    EPIDURAL STEROID INJECTION N/A 2/16/2023    Procedure: INJECTION, STEROID, EPIDURAL, C7/T1 CERVICAL CONTRAST DIRECT REF;  Surgeon: Yunier Lawton MD;  Location: [facility] [place];  Service: Pain Management;  Laterality: N/A;    EPIDURAL STEROID INJECTION N/A 6/26/2023    Procedure: CERVICAL DIGNA C7-T1 DIRECT REFRRAL;  Surgeon: Yunier Lawton MD;  Location: [facility] [place];  Service: Pain Management;  Laterality: N/A;    EPIDURAL STEROID INJECTION INTO CERVICAL SPINE Right 12/17/2020    Procedure: CERVICAL C5/6 DIGNA TRANSFORAMINAL  DIRECT REFERRAL;  Surgeon: Yunier Lawton MD;  Location: [facility] [place]GT;  Service: Pain Management;  Laterality: Right;  NEEDS CONSENT    SURGICAL REMOVAL OF LESION OF FIBULA Left 8/4/2022    Procedure:  "EXCISION, LESION, FIBULA Nonunion avulsion fragment distal fibula;  Surgeon: Aleksandr Elias MD;  Location: [facility] OR;  Service: Orthopedics;  Laterality: Left;    TRANSFORAMINAL EPIDURAL INJECTION OF STEROID Right 10/14/2021    Procedure: INJECTION, STEROID, EPIDURAL, TRANSFORAMINAL APPROACH, C5-C6 DIRECT REF/NEED CONSNET;  Surgeon: Yunier Lawton MD;  Location: [facility];  Service: Pain Management;  Laterality: Right;    UMBILICAL HERNIA REPAIR N/A 4/5/2021    Procedure: REPAIR, HERNIA, UMBILICAL, AGE 5 YEARS OR OLDER,;  Surgeon: Tim Reese MD;  Location: [facility] OR [address] [place] [address];  Service: General;  Laterality: N/A;     Review of patient's allergies indicates:  No Known Allergies   Current Facility-Administered Medications   Medication    0.9%  NaCl infusion     Facility-Administered Medications Ordered in Other Encounters   Medication    midazolam (VERSED) 1 mg/mL injection 2 mg       PMHx, PSHx, Allergies, Medications reviewed in epic    ROS negative except pain complaints in HPI    OBJECTIVE:    BP (!) 163/94 (BP Location: Right arm, Patient Position: Lying)   Pulse 88   Temp 98.3 °F (36.8 °C) (Oral)   Resp 18   Ht 5' 10" (1.778 m)   Wt 90.7 kg (200 lb)   SpO2 98%   BMI 28.70 kg/m²     PHYSICAL EXAMINATION:    GENERAL: Well appearing, in no acute distress, alert and oriented x3.  PSYCH:  Mood and affect appropriate.  SKIN: Skin color, texture, turgor normal, no rashes or lesions which will impact the procedure.  CV: RRR with palpation of the radial artery.  PULM: No evidence of respiratory difficulty, symmetric chest rise. Clear to auscultation.  NEURO: Cranial nerves grossly intact.    Plan:    Proceed with procedure as planned Procedure(s) (LRB):  CERVICAL DIGNA DIRECT REFERRAL (N/A)    Joel Lua  01/22/2024           "

## 2024-01-22 NOTE — DISCHARGE SUMMARY
Discharge Note  Short Stay      SUMMARY     Admit Date: 1/22/2024    Attending Physician: Yunier Lawton      Discharge Physician: Yunier Lawton      Discharge Date: 1/22/2024 10:42 AM    Procedure(s) (LRB):  CERVICAL DIGNA DIRECT REFERRAL (N/A)    Final Diagnosis: Cervical radiculitis [M54.12]    Disposition: Home or self care    Patient Instructions:   Current Discharge Medication List        CONTINUE these medications which have NOT CHANGED    Details   albuterol (VENTOLIN HFA) 90 mcg/actuation inhaler Inhale 2 puffs into the lungs every 6 (six) hours as needed for Wheezing. Rescue  Qty: 18 g, Refills: 0    Associated Diagnoses: Chronic obstructive pulmonary disease, unspecified COPD type      ascorbic acid, vitamin C, (VITAMIN C) 100 MG tablet Take 100 mg by mouth once daily.      aspirin 81 MG Chew Take 375 mg by mouth once daily.      budesonide-glycopyr-formoterol (BREZTRI AEROSPHERE) 160-9-4.8 mcg/actuation HFAA Inhale 10.7 g into the lungs 2 (two) times daily.  Qty: 10.7 g, Refills: 1    Associated Diagnoses: Chronic obstructive pulmonary disease, unspecified COPD type      DULoxetine (CYMBALTA) 30 MG capsule Take 1 capsule (30 mg total) by mouth once daily.  Qty: 90 capsule, Refills: 3    Associated Diagnoses: Anxiety and depression      eszopiclone (LUNESTA) 2 MG Tab Take 1 tablet (2 mg total) by mouth every evening.  Qty: 30 tablet, Refills: 5    Associated Diagnoses: Insomnia, unspecified type      losartan (COZAAR) 50 MG tablet Take 1 tablet (50 mg total) by mouth once daily.  Qty: 90 tablet, Refills: 2    Associated Diagnoses: Essential hypertension, benign      multivitamin capsule Take 1 capsule by mouth once daily.      omega-3 fatty acids/fish oil (FISH OIL-OMEGA-3 FATTY ACIDS) 300-1,000 mg capsule Take by mouth once daily.      vitamin D (VITAMIN D3) 1000 units Tab Take 1,000 Units by mouth once daily.      vitamin E 100 UNIT capsule Take 100 Units by mouth once daily.                 Discharge  Diagnosis: Cervical radiculitis [M54.12]  Condition on Discharge: Stable with no complications to procedure   Diet on Discharge: Same as before.  Activity: as per instruction sheet.  Discharge to: Home with a responsible adult.  Follow up: 2-4 weeks       Please call my office or pager at [phone] if experienced any weakness or loss of sensation, fever > 101.5, pain uncontrolled with oral medications, persistent nausea/vomiting/or diarrhea, redness or drainage from the incisions, or any other worrisome concerns. If physician on call was not reached or could not communicate with our office for any reason please go to the nearest emergency department

## 2024-01-28 DIAGNOSIS — I10 ESSENTIAL HYPERTENSION, BENIGN: ICD-10-CM

## 2024-01-29 RX ORDER — LOSARTAN POTASSIUM 50 MG/1
50 TABLET ORAL
Qty: 90 TABLET | Refills: 2 | OUTPATIENT
Start: 2024-01-29

## 2024-01-29 RX ORDER — LOSARTAN POTASSIUM 50 MG/1
50 TABLET ORAL DAILY
Qty: 90 TABLET | Refills: 2 | Status: SHIPPED | OUTPATIENT
Start: 2024-01-29

## 2024-01-29 NOTE — TELEPHONE ENCOUNTER
Refill Routing Note   Medication(s) are not appropriate for processing by [facility] for the following reason(s):        Required vitals abnormal    [facility] action(s):  Defer               Appointments  past 12m or future 3m with PCP    Date Provider   Last Visit   10/17/2023 Fabio Rosas MD   Next Visit   2/2/2024 Fabio Rosas MD   ED visits in past 90 days: 0        Note composed:11:23 AM 01/29/2024

## 2024-01-29 NOTE — TELEPHONE ENCOUNTER
No care due was identified.  [facility] Embedded Care Due Messages. Reference number: [id number].   1/29/2024 11:23:05 AM CST

## 2024-01-29 NOTE — TELEPHONE ENCOUNTER
Refill Decision Note   Hipolito Laws  is requesting a refill authorization.    Brief Assessment and Rationale for Refill:   Quick Discontinue       Medication Therapy Plan:   Duplicate      Comments:     Note composed:11:22 AM 01/29/2024

## 2024-02-02 ENCOUNTER — HOSPITAL ENCOUNTER (OUTPATIENT)
Dept: RADIOLOGY | Facility: HOSPITAL | Age: 64
Discharge: HOME | End: 2024-02-02
Attending: NURSE PRACTITIONER
Payer: COMMERCIAL

## 2024-02-02 ENCOUNTER — OFFICE VISIT (OUTPATIENT)
Dept: INTERNAL MEDICINE | Facility: CLINIC | Age: 64
End: 2024-02-02
Payer: COMMERCIAL

## 2024-02-02 ENCOUNTER — OFFICE VISIT (OUTPATIENT)
Dept: ORTHOPEDICS | Facility: CLINIC | Age: 64
End: 2024-02-02
Payer: COMMERCIAL

## 2024-02-02 VITALS
SYSTOLIC BLOOD PRESSURE: 160 MMHG | BODY MASS INDEX: 30.99 KG/M2 | HEART RATE: 108 BPM | WEIGHT: 216.5 LBS | DIASTOLIC BLOOD PRESSURE: 100 MMHG | HEIGHT: 70 IN | OXYGEN SATURATION: 95 %

## 2024-02-02 VITALS — WEIGHT: 216.5 LBS | HEIGHT: 70 IN | BODY MASS INDEX: 30.99 KG/M2

## 2024-02-02 DIAGNOSIS — G47.00 INSOMNIA, UNSPECIFIED TYPE: ICD-10-CM

## 2024-02-02 DIAGNOSIS — L84 CORNS/CALLOSITIES: ICD-10-CM

## 2024-02-02 DIAGNOSIS — M19.072 ARTHROSIS OF LEFT ANKLE: Primary | ICD-10-CM

## 2024-02-02 DIAGNOSIS — M25.551 RIGHT HIP PAIN: Primary | ICD-10-CM

## 2024-02-02 DIAGNOSIS — M25.572 CHRONIC PAIN OF LEFT ANKLE: ICD-10-CM

## 2024-02-02 DIAGNOSIS — E78.5 HYPERLIPIDEMIA, UNSPECIFIED HYPERLIPIDEMIA TYPE: ICD-10-CM

## 2024-02-02 DIAGNOSIS — Z00.00 ANNUAL PHYSICAL EXAM: Primary | ICD-10-CM

## 2024-02-02 DIAGNOSIS — I10 ESSENTIAL HYPERTENSION, BENIGN: ICD-10-CM

## 2024-02-02 DIAGNOSIS — G89.29 CHRONIC PAIN OF LEFT ANKLE: ICD-10-CM

## 2024-02-02 DIAGNOSIS — M25.551 RIGHT HIP PAIN: ICD-10-CM

## 2024-02-02 DIAGNOSIS — M70.61 GREATER TROCHANTERIC BURSITIS, RIGHT: Primary | ICD-10-CM

## 2024-02-02 PROCEDURE — 99999 PR PBB SHADOW E&M-EST. PATIENT-LVL IV: CPT | Mod: PBBFAC,,, | Performed by: INTERNAL MEDICINE

## 2024-02-02 PROCEDURE — 99396 PREV VISIT EST AGE 40-64: CPT | Mod: S$GLB,,, | Performed by: INTERNAL MEDICINE

## 2024-02-02 PROCEDURE — 3044F HG A1C LEVEL LT 7.0%: CPT | Mod: CPTII,S$GLB,, | Performed by: ORTHOPAEDIC SURGERY

## 2024-02-02 PROCEDURE — 1159F MED LIST DOCD IN RCRD: CPT | Mod: CPTII,S$GLB,, | Performed by: ORTHOPAEDIC SURGERY

## 2024-02-02 PROCEDURE — 20605 DRAIN/INJ JOINT/BURSA W/O US: CPT | Mod: LT,S$GLB,, | Performed by: ORTHOPAEDIC SURGERY

## 2024-02-02 PROCEDURE — 1160F RVW MEDS BY RX/DR IN RCRD: CPT | Mod: CPTII,S$GLB,, | Performed by: NURSE PRACTITIONER

## 2024-02-02 PROCEDURE — 99999 PR PBB SHADOW E&M-EST. PATIENT-LVL III: CPT | Mod: PBBFAC,,, | Performed by: ORTHOPAEDIC SURGERY

## 2024-02-02 PROCEDURE — 3077F SYST BP >= 140 MM HG: CPT | Mod: CPTII,S$GLB,, | Performed by: INTERNAL MEDICINE

## 2024-02-02 PROCEDURE — 73502 X-RAY EXAM HIP UNI 2-3 VIEWS: CPT | Mod: TC,RT

## 2024-02-02 PROCEDURE — 1159F MED LIST DOCD IN RCRD: CPT | Mod: CPTII,S$GLB,, | Performed by: INTERNAL MEDICINE

## 2024-02-02 PROCEDURE — 3044F HG A1C LEVEL LT 7.0%: CPT | Mod: CPTII,S$GLB,, | Performed by: INTERNAL MEDICINE

## 2024-02-02 PROCEDURE — 3044F HG A1C LEVEL LT 7.0%: CPT | Mod: CPTII,S$GLB,, | Performed by: NURSE PRACTITIONER

## 2024-02-02 PROCEDURE — 11055 PARING/CUTG B9 HYPRKER LES 1: CPT | Mod: 59,S$GLB,, | Performed by: ORTHOPAEDIC SURGERY

## 2024-02-02 PROCEDURE — 20610 DRAIN/INJ JOINT/BURSA W/O US: CPT | Mod: RT,S$GLB,, | Performed by: NURSE PRACTITIONER

## 2024-02-02 PROCEDURE — 99213 OFFICE O/P EST LOW 20 MIN: CPT | Mod: 25,S$GLB,, | Performed by: ORTHOPAEDIC SURGERY

## 2024-02-02 PROCEDURE — 1160F RVW MEDS BY RX/DR IN RCRD: CPT | Mod: CPTII,S$GLB,, | Performed by: ORTHOPAEDIC SURGERY

## 2024-02-02 PROCEDURE — 1160F RVW MEDS BY RX/DR IN RCRD: CPT | Mod: CPTII,S$GLB,, | Performed by: INTERNAL MEDICINE

## 2024-02-02 PROCEDURE — 73502 X-RAY EXAM HIP UNI 2-3 VIEWS: CPT | Mod: 26,RT,, | Performed by: RADIOLOGY

## 2024-02-02 PROCEDURE — 4010F ACE/ARB THERAPY RXD/TAKEN: CPT | Mod: CPTII,S$GLB,, | Performed by: INTERNAL MEDICINE

## 2024-02-02 PROCEDURE — 3008F BODY MASS INDEX DOCD: CPT | Mod: CPTII,S$GLB,, | Performed by: INTERNAL MEDICINE

## 2024-02-02 PROCEDURE — 3080F DIAST BP >= 90 MM HG: CPT | Mod: CPTII,S$GLB,, | Performed by: INTERNAL MEDICINE

## 2024-02-02 PROCEDURE — 4010F ACE/ARB THERAPY RXD/TAKEN: CPT | Mod: CPTII,S$GLB,, | Performed by: NURSE PRACTITIONER

## 2024-02-02 PROCEDURE — 3008F BODY MASS INDEX DOCD: CPT | Mod: CPTII,S$GLB,, | Performed by: NURSE PRACTITIONER

## 2024-02-02 PROCEDURE — 99213 OFFICE O/P EST LOW 20 MIN: CPT | Mod: 25,S$GLB,, | Performed by: NURSE PRACTITIONER

## 2024-02-02 PROCEDURE — 1159F MED LIST DOCD IN RCRD: CPT | Mod: CPTII,S$GLB,, | Performed by: NURSE PRACTITIONER

## 2024-02-02 PROCEDURE — 4010F ACE/ARB THERAPY RXD/TAKEN: CPT | Mod: CPTII,S$GLB,, | Performed by: ORTHOPAEDIC SURGERY

## 2024-02-02 PROCEDURE — 99999 PR PBB SHADOW E&M-EST. PATIENT-LVL III: CPT | Mod: PBBFAC,,, | Performed by: NURSE PRACTITIONER

## 2024-02-02 RX ORDER — UREA 40 %
CREAM (GRAM) TOPICAL 2 TIMES DAILY
Qty: 85 G | Refills: 0 | Status: SHIPPED | OUTPATIENT
Start: 2024-02-02 | End: 2024-04-19 | Stop reason: SDUPTHER

## 2024-02-02 RX ORDER — METHYLPREDNISOLONE ACETATE 40 MG/ML
40 INJECTION, SUSPENSION INTRA-ARTICULAR; INTRALESIONAL; INTRAMUSCULAR; SOFT TISSUE
Status: COMPLETED | OUTPATIENT
Start: 2024-02-02 | End: 2024-02-02

## 2024-02-02 RX ORDER — TRIAMCINOLONE ACETONIDE 40 MG/ML
40 INJECTION, SUSPENSION INTRA-ARTICULAR; INTRAMUSCULAR
Status: DISCONTINUED | OUTPATIENT
Start: 2024-02-02 | End: 2024-02-02 | Stop reason: HOSPADM

## 2024-02-02 RX ORDER — LIDOCAINE HYDROCHLORIDE 10 MG/ML
4 INJECTION INFILTRATION; PERINEURAL
Status: DISCONTINUED | OUTPATIENT
Start: 2024-02-02 | End: 2024-02-02 | Stop reason: HOSPADM

## 2024-02-02 RX ORDER — MELOXICAM 15 MG/1
15 TABLET ORAL DAILY
Qty: 90 TABLET | Refills: 0 | Status: SHIPPED | OUTPATIENT
Start: 2024-02-02 | End: 2024-04-19 | Stop reason: SDUPTHER

## 2024-02-02 RX ORDER — ATORVASTATIN CALCIUM 80 MG/1
80 TABLET, FILM COATED ORAL DAILY
Qty: 90 TABLET | Refills: 3 | Status: SHIPPED | OUTPATIENT
Start: 2024-02-02 | End: 2025-02-01

## 2024-02-02 RX ADMIN — TRIAMCINOLONE ACETONIDE 40 MG: 40 INJECTION, SUSPENSION INTRA-ARTICULAR; INTRAMUSCULAR at 03:02

## 2024-02-02 RX ADMIN — LIDOCAINE HYDROCHLORIDE 4 ML: 10 INJECTION INFILTRATION; PERINEURAL at 03:02

## 2024-02-02 RX ADMIN — METHYLPREDNISOLONE ACETATE 40 MG: 40 INJECTION, SUSPENSION INTRA-ARTICULAR; INTRALESIONAL; INTRAMUSCULAR; SOFT TISSUE at 09:02

## 2024-02-02 NOTE — PROGRESS NOTES
SUBJECTIVE:     Chief Complaint & History of Present Illness:  Hipolito Laws is a Established 63 y.o. year old male patient presenting today for constant right hip pain which started more than 1 year ago.  He was last seen in the clinic in Oct 2023 and give a steroid injection for right greater trochanteric bursitis.  He reports this helped but the pain has returned.  He states he was seen by Dr. Elias earlier for his left ankle pain and was given a steroid injection into his left ankle.  He also suffers with chronic back pain and followed by Dr. Goldman.  There is not a history of trauma.  The pain is located in the lateral aspect of the hip.  The pain is described as achy, 7-8/10.  It is is aggravated by walking and going up/down steps .  There is not radiation into the back, leg, and foot.  Previous treatments include mobic and CSIwhich have provided adequate relief.  There is not a history of previous injury or surgery to the hip.  The patient does not use an assistive device.      Past Medical History:   Diagnosis Date    Hypertension        Past Surgical History:   Procedure Laterality Date    ARTHROSCOPIC REPAIR OF ROTATOR CUFF OF SHOULDER  1992    ARTHROSCOPY OF ANKLE WITH DEBRIDEMENT Left 8/4/2022    Procedure: ARTHROSCOPY, ANKLE, WITH DEBRIDEMENT;  Surgeon: Aleksandr Elias MD;  Location: [facility] OR;  Service: Orthopedics;  Laterality: Left;  Calf tourniquet    CLOSURE OF WOUND Left 9/14/2022    Procedure: CLOSURE, WOUND left ankle;  Surgeon: Aleksandr Elias MD;  Location: [facility] OR;  Service: Orthopedics;  Laterality: Left;  Prevena wound VAC    EPIDURAL STEROID INJECTION N/A 12/13/2021    Procedure: INJECTION, STEROID, EPIDURAL, CERVICAL DIRECT REF/ NEED CONSENT;  Surgeon: Yunier Lawton MD;  Location: [facility] PAIN MGT;  Service: Pain Management;  Laterality: N/A;    EPIDURAL STEROID INJECTION N/A 6/2/2022    Procedure: INJECTION, STEROID, EPIDURAL, CERVICAL C7/T1 CONTRAST DIRECT REF;  Surgeon:  Yunier Lawton MD;  Location: [facility]IN [facility]GT;  Service: Pain Management;  Laterality: N/A;    EPIDURAL STEROID INJECTION N/A 2/16/2023    Procedure: INJECTION, STEROID, EPIDURAL, C7/T1 CERVICAL CONTRAST DIRECT REF;  Surgeon: Yunier Lawton MD;  Location: [facility] PAIN MGT;  Service: Pain Management;  Laterality: N/A;    EPIDURAL STEROID INJECTION N/A 6/26/2023    Procedure: CERVICAL DIGNA C7-T1 DIRECT REFRRAL;  Surgeon: Yunier Lawton MD;  Location: [facility]IN [facility]GT;  Service: Pain Management;  Laterality: N/A;    EPIDURAL STEROID INJECTION N/A 1/22/2024    Procedure: CERVICAL DIGNA DIRECT REFERRAL;  Surgeon: Yunier Lawton MD;  Location: [facility]IN MGT;  Service: Pain Management;  Laterality: N/A;  [phone]    EPIDURAL STEROID INJECTION INTO CERVICAL SPINE Right 12/17/2020    Procedure: CERVICAL C5/6 DIGNA TRANSFORAMINAL  DIRECT REFERRAL;  Surgeon: Yunier Lawton MD;  Location: [facility]IN [facility]GT;  Service: Pain Management;  Laterality: Right;  NEEDS CONSENT    SURGICAL REMOVAL OF LESION OF FIBULA Left 8/4/2022    Procedure: EXCISION, LESION, FIBULA Nonunion avulsion fragment distal fibula;  Surgeon: Aleksandr Elias MD;  Location: [facility];  Service: Orthopedics;  Laterality: Left;    TRANSFORAMINAL EPIDURAL INJECTION OF STEROID Right 10/14/2021    Procedure: INJECTION, STEROID, EPIDURAL, TRANSFORAMINAL APPROACH, C5-C6 DIRECT REF/NEED CONSNET;  Surgeon: Yunier Lawton MD;  Location: [facility]IN [facility]GT;  Service: Pain Management;  Laterality: Right;    UMBILICAL HERNIA REPAIR N/A 4/5/2021    Procedure: REPAIR, HERNIA, UMBILICAL, AGE 5 YEARS OR OLDER,;  Surgeon: Tim Reese MD;  Location: [facility] OR [address] [place] [address];  Service: General;  Laterality: N/A;       Family History   Problem Relation Age of Onset    Coronary artery disease Mother     Heart failure Mother     Coronary artery disease Father 47    Heart attack Father     Coronary artery disease Brother        Review of patient's allergies indicates:  No Known Allergies      Current Outpatient  Medications:     albuterol (VENTOLIN HFA) 90 mcg/actuation inhaler, Inhale 2 puffs into the lungs every 6 (six) hours as needed for Wheezing. Rescue, Disp: 18 g, Rfl: 0    ascorbic acid, vitamin C, (VITAMIN C) 100 MG tablet, Take 100 mg by mouth once daily., Disp: , Rfl:     aspirin 81 MG Chew, Take 375 mg by mouth once daily., Disp: , Rfl:     atorvastatin (LIPITOR) 80 MG tablet, Take 1 tablet (80 mg total) by mouth once daily., Disp: 90 tablet, Rfl: 3    budesonide-glycopyr-formoterol (BREZTRI AEROSPHERE) 160-9-4.8 mcg/actuation HFAA, Inhale 10.7 g into the lungs 2 (two) times daily., Disp: 10.7 g, Rfl: 1    DULoxetine (CYMBALTA) 30 MG capsule, Take 1 capsule (30 mg total) by mouth once daily., Disp: 90 capsule, Rfl: 3    eszopiclone (LUNESTA) 2 MG Tab, Take 1 tablet (2 mg total) by mouth every evening., Disp: 30 tablet, Rfl: 5    losartan (COZAAR) 50 MG tablet, Take 1 tablet (50 mg total) by mouth once daily., Disp: 90 tablet, Rfl: 2    meloxicam (MOBIC) 15 MG tablet, Take 1 tablet (15 mg total) by mouth once daily., Disp: 90 tablet, Rfl: 0    multivitamin capsule, Take 1 capsule by mouth once daily., Disp: , Rfl:     omega-3 fatty acids/fish oil (FISH OIL-OMEGA-3 FATTY ACIDS) 300-1,000 mg capsule, Take by mouth once daily., Disp: , Rfl:     urea (CARMOL) 40 % Crea, Apply topically 2 (two) times daily., Disp: 85 g, Rfl: 0    vitamin D (VITAMIN D3) 1000 units Tab, Take 1,000 Units by mouth once daily., Disp: , Rfl:     vitamin E 100 UNIT capsule, Take 100 Units by mouth once daily., Disp: , Rfl:   No current facility-administered medications for this visit.    Facility-Administered Medications Ordered in Other Visits:     midazolam (VERSED) 1 mg/mL injection 2 mg, 2 mg, Intravenous, PRN, Gualberto Fuentes MD, 2 mg at 09/14/22 1220    Review of Systems:  ROS:  Constitutional: no fever or chills  Eyes: no visual changes  ENT: no nasal congestion or sore throat  Respiratory: no cough or shortness of  "breath  Cardiovascular: no chest pain or palpitations  Gastrointestinal: no nausea or vomiting, tolerating diet  Genitourinary: no hematuria or dysuria  Integument/Breast: no rash or pruritis  Hematologic/Lymphatic: no easy bruising or lymphadenopathy  Musculoskeletal: positive for arthralgias  Neurological: no seizures or tremors  Behavioral/Psych: no auditory or visual hallucinations  Endocrine: no heat or cold intolerance      PE:  Ht 5' 10" (1.778 m)   Wt 98.2 kg (216 lb 7.9 oz)   BMI 31.06 kg/m²   Estimated body mass index is 31.06 kg/m² as calculated from the following:    Height as of this encounter: 5' 10" (1.778 m).    Weight as of this encounter: 98.2 kg (216 lb 7.9 oz).   General: Pleasant, cooperative, NAD   HEENT: NCAT, sclera nonicteric   Lungs: Respirations are equal and unlabored.   Abdomen: Soft and non-tender.  CV: 2+ bilateral upper and lower extremity pulses.   Skin: Intact throughout LE with no rashes, erythema, or lesions  Extremities: No LE edema, NVI lower extremities      Hip Exam:   rightpositives: tenderness over greater trochanter and negatives: FROM  no pain with heel impact  pulses full    110 degrees flexion  -5 degrees extension   45 degrees internal rotation  40 degrees external rotation  25 degrees abduction  20 degrees adduction     RADIOGRAPHS:  Xray of bilateral hips were obtained, findings show no acute fractures.  All radiographs were personally reviewed by me.    ASSESSMENT/PLAN:       ICD-10-CM ICD-9-CM   1. Greater trochanteric bursitis, right  M70.61 726.5     -Hipolito Laws presents to clinic today with c/c right hip pain for the past 8 months, no trauma.  He was given Mobic on 10/17/23 with mild improvement followed by CSI on 10-3-23 with good relief.    -X-ray as above.    I will inject him again with steroids into the right greater trochanteric.  See procedural note.  Advised the patient he can repeat the injection every 3 months if needed.    Currently reports no " pain in the right groin or down his leg to suspect of the causes.    Patient to follow up in 3 months p.r.n..

## 2024-02-02 NOTE — PROGRESS NOTES
"    CHIEF COMPLAINT     Chief Complaint   Patient presents with    Annual Exam    Foot Pain       HPI     Hipolito Laws is a 63 y.o. male  HTN HLD, cervical radiculitis history of heart disease, COPD here today for annual exam    Ran out of BP med 10 days ago, hasn't had chance to  2/2 work schedule    Having multi joint pain and corn on dorsum of left foot. Scheduled for ankle injection later today.    Personally Reviewed Patient's Medical, surgical, family and social hx. Changes updated in [facility].  Care Team updated in Epic    Review of Systems:  Review of Systems   Constitutional:  Negative for activity change and unexpected weight change.   HENT:  Negative for hearing loss, rhinorrhea and trouble swallowing.    Eyes:  Negative for discharge and visual disturbance.   Respiratory:  Negative for chest tightness and wheezing.    Cardiovascular:  Negative for chest pain and palpitations.   Gastrointestinal:  Negative for blood in stool, constipation, diarrhea and vomiting.   Endocrine: Negative for polydipsia and polyuria.   Genitourinary:  Negative for difficulty urinating, hematuria and urgency.   Musculoskeletal:  Positive for arthralgias and neck pain. Negative for joint swelling.   Neurological:  Negative for weakness and headaches.   Psychiatric/Behavioral:  Negative for confusion and dysphoric mood.        Health Maintenance:   Reviewed with patient  Due for the following:      PHYSICAL EXAM     BP (!) 160/100 (BP Location: Right arm, Patient Position: Sitting, BP Method: Medium (Manual)) Comment: Pt did not take BP meds in 10 days  Pulse 108   Ht 5' 10" (1.778 m)   Wt 98.2 kg (216 lb 7.9 oz)   SpO2 95%   BMI 31.06 kg/m²     Gen: Well Appearing, NAD  HEENT: PERR, EOMI  Neck: FROM, no thyromegaly, no cervical adenopathy  CVD: RRR, no M/R/G  Pulm: Normal work of breathing, CTAB, no wheezing  Abd:  Soft, NT, ND non TTP, no mass  MSK: no LE edema  Neuro: A&Ox3, gait normal, speech normal  Mood; Mood " normal, behavior normal, thought process linear   Corn on left foot 5th metatarsal head l foot    LABS     Labs reviewed; Notable for  Lab Results   Component Value Date    WBC 11.92 01/22/2024    HGB 16.2 01/22/2024    HCT 52.5 01/22/2024    MCV 96 01/22/2024     01/22/2024       CMP  Sodium   Date Value Ref Range Status   01/22/2024 140 136 - 145 mmol/L Final     Potassium   Date Value Ref Range Status   01/22/2024 4.8 3.5 - 5.1 mmol/L Final     Chloride   Date Value Ref Range Status   01/22/2024 100 95 - 110 mmol/L Final     CO2   Date Value Ref Range Status   01/22/2024 28 23 - 29 mmol/L Final     Glucose   Date Value Ref Range Status   01/22/2024 85 70 - 110 mg/dL Final     BUN   Date Value Ref Range Status   01/22/2024 13 8 - 23 mg/dL Final     Creatinine   Date Value Ref Range Status   01/22/2024 0.9 0.5 - 1.4 mg/dL Final     Calcium   Date Value Ref Range Status   01/22/2024 10.3 8.7 - 10.5 mg/dL Final     Total Protein   Date Value Ref Range Status   01/22/2024 7.9 6.0 - 8.4 g/dL Final     Albumin   Date Value Ref Range Status   01/22/2024 4.2 3.5 - 5.2 g/dL Final     Total Bilirubin   Date Value Ref Range Status   01/22/2024 0.3 0.1 - 1.0 mg/dL Final     Comment:     For infants and newborns, interpretation of results should be based  on gestational age, weight and in agreement with clinical  observations.    Premature Infant recommended reference ranges:  Up to 24 hours.............<8.0 mg/dL  Up to 48 hours............<12.0 mg/dL  3-5 days..................<15.0 mg/dL  6-29 days.................<15.0 mg/dL       Alkaline Phosphatase   Date Value Ref Range Status   01/22/2024 68 55 - 135 U/L Final     AST   Date Value Ref Range Status   01/22/2024 18 10 - 40 U/L Final     ALT   Date Value Ref Range Status   01/22/2024 25 10 - 44 U/L Final     Anion Gap   Date Value Ref Range Status   01/22/2024 12 8 - 16 mmol/L Final     eGFR   Date Value Ref Range Status   01/22/2024 >60.0 >60 mL/min/1.73 m^2  Final     Lab Results   Component Value Date    HGBA1C 5.3 01/22/2024     Lab Results   Component Value Date    LDLCALC 161.4 (H) 01/22/2024     PSA, Screen (ng/mL)   Date Value   01/22/2024 0.22     There are no abnormal opacities that require further evaluation.  The largest opacity in the right lung appears solid and measures 0.2 cm on series 4, image 273.  The largest opacity in the left lung appears solid and measures 0.2 cm on series 4, image 63.  The lungs show findings consistent with mild centrilobular emphysema.   ASSESSMENT     1. Annual physical exam        2. Hyperlipidemia, unspecified hyperlipidemia type  atorvastatin (LIPITOR) 80 MG tablet    LIPID PANEL      3. Essential hypertension, benign        4. Insomnia, unspecified type        5. Corns/callosities  urea (CARMOL) 40 % Crea      6. Chronic pain of left ankle  meloxicam (MOBIC) 15 MG tablet              Plan     Hipolito Laws is a 63 y.o. male with  HTN HLD, cervical radiculitis history of heart disease, COPD   1. Annual physical exam  Updated problem list, medical history, care team and discussed HM.     2. Hyperlipidemia, unspecified hyperlipidemia type  Restart statin   Target LDL <70 2/2 atherosclerosis seen on non-cardiac imaging  BP <130/80    Work on maintaining normal body weight  Increase physical activity  Reduce added sugar and refined carbs in diet  Get 8 hours of sleep nightly    - atorvastatin (LIPITOR) 80 MG tablet; Take 1 tablet (80 mg total) by mouth once daily.  Dispense: 90 tablet; Refill: 3  - LIPID PANEL; Future    3. Essential hypertension, benign  Restart losartan will send home readings after being back on medication for 2 weeks    4. Insomnia, unspecified type  Continue lunesta,  Think reducing alcohol consumption will help    5. Corns/callosities  Will reach out to me in a couple of weeks if not improved and will send to podiatry  Discussed otc corn pads  - urea (CARMOL) 40 % Crea; Apply topically 2 (two) times  daily.  Dispense: 85 g; Refill: 0    6. Chronic pain of left ankle  - meloxicam (MOBIC) 15 MG tablet; Take 1 tablet (15 mg total) by mouth once daily.  Dispense: 90 tablet; Refill: 0      Fabio Rosas MD

## 2024-02-02 NOTE — PROCEDURES
Large Joint Aspiration/Injection: R greater trochanteric bursa    Date/Time: 2/2/2024 3:30 PM    Performed by: Kwadwo Hanna NP  Authorized by: Kwadwo Hanna NP    Consent Done?:  Yes (Verbal)  Indications:  Pain  Site marked: the procedure site was marked      Local anesthesia used?: Yes    Local anesthetic:  Lidocaine spray    Details:  Needle Size:  22 G  Ultrasonic Guidance for needle placement?: No    Approach:  Lateral  Location:  Hip  Site:  R greater trochanteric bursa  Medications:  4 mL LIDOcaine HCL 10 mg/ml (1%) 10 mg/mL (1 %); 40 mg triamcinolone acetonide 40 mg/mL  Patient tolerance:  Patient tolerated the procedure well with no immediate complications

## 2024-02-02 NOTE — PROGRESS NOTES
Hipolito Laws  Returns today for follow-up.  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  I performed an arthroscopic debridement of his ankle along with an excision of a nonunion avulsion fragment of the distal fibula which was complicated by a postoperative wound that took a long time to heal.  His last visit with me was on 10/03/2023 at which time I performed a corticosteroid injection of his left ankle.  He reports that the steroid injection helped.  He also reports that in the last six weeks or so he has developed a painful lesion underneath the left 5th toe.  He was told that he might have a corn.    Examination:  He walks in today with a slight antalgic gait.  On standing inspection he has plantigrade alignment his feet.  There is some mild swelling of the left ankle compared to the right.  There is a small hyperkeratotic lesion underneath the 5th metatarsal head consistent with a corn.  He has some tenderness about the left ankle joint line but does have functional motion.    Impression:  1. Arthrosis of left ankle  methylPREDNISolone acetate injection 40 mg        Recommendation:  After verbal consent and sterile prep I injected the left ankle with 2 cc lidocaine and 40 mg of methylprednisolone.  Using a 15 blade I shaved the corn underneath the 5th metatarsal head.      Follow-up in three months

## 2024-02-06 ENCOUNTER — PATIENT MESSAGE (OUTPATIENT)
Dept: INTERNAL MEDICINE | Facility: CLINIC | Age: 64
End: 2024-02-06
Payer: COMMERCIAL

## 2024-04-19 DIAGNOSIS — M25.572 CHRONIC PAIN OF LEFT ANKLE: ICD-10-CM

## 2024-04-19 DIAGNOSIS — G89.29 CHRONIC PAIN OF LEFT ANKLE: ICD-10-CM

## 2024-04-19 DIAGNOSIS — L84 CORNS/CALLOSITIES: ICD-10-CM

## 2024-04-19 NOTE — TELEPHONE ENCOUNTER
Refill Routing Note   Medication(s) are not appropriate for processing by [facility] for the following reason(s):        Outside of protocol    [facility] action(s):  Route               Appointments  past 12m or future 3m with PCP    Date Provider   Last Visit   2/2/2024 Fabio Rosas MD   Next Visit   Visit date not found Fabio Rosas MD   ED visits in past 90 days: 0        Note composed:4:13 PM 04/19/2024

## 2024-04-22 RX ORDER — UREA 40 %
CREAM (GRAM) TOPICAL 2 TIMES DAILY
Qty: 85 G | Refills: 4 | Status: SHIPPED | OUTPATIENT
Start: 2024-04-22

## 2024-04-22 RX ORDER — MELOXICAM 15 MG/1
15 TABLET ORAL DAILY
Qty: 90 TABLET | Refills: 3 | Status: SHIPPED | OUTPATIENT
Start: 2024-04-22

## 2024-05-02 ENCOUNTER — OFFICE VISIT (OUTPATIENT)
Dept: ORTHOPEDICS | Facility: CLINIC | Age: 64
End: 2024-05-02
Payer: COMMERCIAL

## 2024-05-02 ENCOUNTER — HOSPITAL ENCOUNTER (OUTPATIENT)
Dept: RADIOLOGY | Facility: HOSPITAL | Age: 64
Discharge: HOME | End: 2024-05-02
Attending: ORTHOPAEDIC SURGERY
Payer: COMMERCIAL

## 2024-05-02 VITALS — BODY MASS INDEX: 30.99 KG/M2 | HEIGHT: 70 IN | WEIGHT: 216.5 LBS

## 2024-05-02 DIAGNOSIS — M19.072 ARTHROSIS OF LEFT ANKLE: ICD-10-CM

## 2024-05-02 DIAGNOSIS — M19.072 ARTHROSIS OF LEFT ANKLE: Primary | ICD-10-CM

## 2024-05-02 PROCEDURE — 1159F MED LIST DOCD IN RCRD: CPT | Mod: CPTII,S$GLB,, | Performed by: ORTHOPAEDIC SURGERY

## 2024-05-02 PROCEDURE — 1160F RVW MEDS BY RX/DR IN RCRD: CPT | Mod: CPTII,S$GLB,, | Performed by: ORTHOPAEDIC SURGERY

## 2024-05-02 PROCEDURE — 3044F HG A1C LEVEL LT 7.0%: CPT | Mod: CPTII,S$GLB,, | Performed by: ORTHOPAEDIC SURGERY

## 2024-05-02 PROCEDURE — 4010F ACE/ARB THERAPY RXD/TAKEN: CPT | Mod: CPTII,S$GLB,, | Performed by: ORTHOPAEDIC SURGERY

## 2024-05-02 PROCEDURE — 99213 OFFICE O/P EST LOW 20 MIN: CPT | Mod: S$GLB,,, | Performed by: ORTHOPAEDIC SURGERY

## 2024-05-02 PROCEDURE — 73610 X-RAY EXAM OF ANKLE: CPT | Mod: 26,LT,, | Performed by: RADIOLOGY

## 2024-05-02 PROCEDURE — 3008F BODY MASS INDEX DOCD: CPT | Mod: CPTII,S$GLB,, | Performed by: ORTHOPAEDIC SURGERY

## 2024-05-02 PROCEDURE — 99999 PR PBB SHADOW E&M-EST. PATIENT-LVL IV: CPT | Mod: PBBFAC,,, | Performed by: ORTHOPAEDIC SURGERY

## 2024-05-02 PROCEDURE — 73610 X-RAY EXAM OF ANKLE: CPT | Mod: TC,LT

## 2024-05-02 RX ORDER — METHYLPREDNISOLONE 4 MG/1
TABLET ORAL
Qty: 1 EACH | Refills: 0 | Status: SHIPPED | OUTPATIENT
Start: 2024-05-02

## 2024-05-02 NOTE — PROGRESS NOTES
Hipolito Laws  Returns today for follow-up.  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  I had performed a arthroscopic debridement of his left ankle along with an excision of a nonunion avulsion fragment of the distal fibula which was complicated by postoperative wound that took a long time to heal.  Subsequently he has had some continued symptoms and has had periodic corticosteroid injections.  I last saw him three months ago which time I gave him an injection.  He reports that subsequent to my visit he has been seen by Dr. Rucker and has received two more injections in the lateral aspect of his hindfoot and ankle which she states is also giving him some relief but he continues to report some pain of the ankle and is also has been issues with hip and neck pain.    Examination:  He walks in today with a slight antalgic gait.  Inspection of the left ankle reveals no significant swelling today.  On sitting exam he has functional motion of his ankle and subtalar joint without any significant discomfort.  His tenderness is in the sub fibular region around the previous surgical site.  He has good function and strength of all the tendons about his ankle.  There is no gross instability of the ankle.    Imaging:  I ordered and reviewed an x-ray of the left ankle to compare to his previous x-rays from August 2022.  Previous x-rays were nonweightbearing.  Weight-bearing x-rays today show some mild varus alignment of the mortise with narrowing of the joint space medially.    Impression:    Recommendation:  1. Arthrosis of left ankle  X-Ray Ankle Complete Left    methylPREDNISolone (MEDROL, MAKAYLA,) 4 mg tablet    Ambulatory referral/consult to Physical/Occupational Therapy        Recommendation:  His symptoms seem to be more focally related to the previous site of the removal of the avulsion fracture fragment.  I suspect that he does have some arthritis of the ankle joint as well but I do  not believe this is his primary pain generator.  He reminded me that he has not had any therapy since his surgery and he would like to try some therapy so I put in a referral physical therapy and I ordered a Medrol Dosepak which seems to give him good relief for 2-3 weeks.      Follow-up in three months if necessary

## 2024-05-21 ENCOUNTER — CLINICAL SUPPORT (OUTPATIENT)
Dept: REHABILITATION | Facility: HOSPITAL | Age: 64
End: 2024-05-21
Attending: ORTHOPAEDIC SURGERY
Payer: COMMERCIAL

## 2024-05-21 DIAGNOSIS — M25.572 CHRONIC PAIN OF LEFT ANKLE: Primary | ICD-10-CM

## 2024-05-21 DIAGNOSIS — M19.072 ARTHROSIS OF LEFT ANKLE: ICD-10-CM

## 2024-05-21 DIAGNOSIS — G89.29 CHRONIC PAIN OF LEFT ANKLE: Primary | ICD-10-CM

## 2024-05-21 DIAGNOSIS — R26.9 ABNORMAL GAIT: ICD-10-CM

## 2024-05-21 PROCEDURE — 97161 PT EVAL LOW COMPLEX 20 MIN: CPT

## 2024-05-21 PROCEDURE — 97140 MANUAL THERAPY 1/> REGIONS: CPT

## 2024-05-21 PROCEDURE — 97112 NEUROMUSCULAR REEDUCATION: CPT

## 2024-05-25 PROBLEM — M25.572 CHRONIC PAIN OF LEFT ANKLE: Status: ACTIVE | Noted: 2024-05-25

## 2024-05-25 PROBLEM — R26.9 ABNORMAL GAIT: Status: ACTIVE | Noted: 2024-05-25

## 2024-05-25 PROBLEM — G89.29 CHRONIC PAIN OF LEFT ANKLE: Status: ACTIVE | Noted: 2024-05-25

## 2024-05-25 NOTE — PLAN OF CARE
[facility]   Physical Therapy Initial Evaluation      [id number]    Therapy Diagnosis:   Encounter Diagnoses   Name Primary?    Arthrosis of left ankle     Chronic pain of left ankle Yes    Abnormal gait         Physician: Aleksandr Elias MD    Physician Orders: PT Eval and Treat   Medical Diagnosis from Referral: Arthrosis of left ankle [M19.072]   Evaluation Date: 5/21/2024  Authorization Period Expiration: 12/31/24  Plan of Care Expiration: 9/1/24  Progress Note Due: 6/21/24  Date of Surgery: 8/4/22  Visit # / Visits authorized: 1/ 1   FOTO: 1/ 3  FOTO 1st Follow Up:  FOTO 2nd Follow Up:      Precautions: Standard     Time In: 4:50  Time Out: 5:50  Total Billable Time: 60 minutes    Subjective     Date of onset: August 2022    History of current condition - Hipolito reports: having surgery about 2 years ago on his left ankle. He was doing some PT but was having difficulty with his wound closing and was in and out of PT. Ultimately he held on PT and was doing wound care. He has been getting injections in his ankle over the last couple months due to the pain and had to switch jobs to mostly desk work. He has increased his job with WB demands and has to walk more. Initially it was worse but is getting a little better. Was not able to fully complete PT due to wound issues from ankle.     Imaging: x-ray - Impression:     1. No acute displaced fracture or dislocation of the ankle noting degenerative changes.    Prior Therapy: after ankle surgery  Occupation:   Prior Level of Function: pain with ambulating, walking, everyday work activities  Current Level of Function: pain with ambulating, walking, everyday work activities    Pain:  Current 4/10, worst 10/10, best 2/10   Location: left ankle  Description: Aching, Dull, Deep, and Sharp  Aggravating Factors: Standing, Walking, Extension, and Flexing  Easing Factors: pain medication, ice,  and rest    Patients goals: improve pain, be able to walk, see if there is anything to help with pain and exercises.      Medical History:   Past Medical History:   Diagnosis Date    Hypertension        Surgical History:   Hipolito Laws  has a past surgical history that includes Arthroscopic repair of rotator cuff of shoulder (1992); Epidural steroid injection into cervical spine (Right, 12/17/2020); Umbilical hernia repair (N/A, 4/5/2021); Transforaminal epidural injection of steroid (Right, 10/14/2021); Epidural steroid injection (N/A, 12/13/2021); Epidural steroid injection (N/A, 6/2/2022); Arthroscopy of ankle with debridement (Left, 8/4/2022); Surgical removal of lesion of fibula (Left, 8/4/2022); Closure of wound (Left, 9/14/2022); Epidural steroid injection (N/A, 2/16/2023); Epidural steroid injection (N/A, 6/26/2023); and Epidural steroid injection (N/A, 1/22/2024).    Medications:   Hipolito has a current medication list which includes the following prescription(s): albuterol, ascorbic acid (vitamin c), aspirin, atorvastatin, breztri aerosphere, duloxetine, eszopiclone, losartan, meloxicam, methylprednisolone, multivitamin, fish oil-omega-3 fatty acids, urea, vitamin d, and vitamin e, and the following Facility-Administered Medications: midazolam.    Allergies:   Review of patient's allergies indicates:  No Known Allergies     Objective      Observation: ambulation with antaglic gait, decreased DF during stance, decreased push off    Active Range of Motion:   Ankle Right Left   DF (knee extended) 5 0*   Plantarflexion 40 35*   Inversion 15 10   Eversion 12 10      Strength:  Ankle Right Left   Dorsiflexion 5/5 4/5   Plantarflexion 5/5 4/5   Inversion 5/5 4/5*   Eversion 5/5 4/5*     Special Tests:  Anterior Drawer nt   Talar tilt nt   Squeeze test nt   Flor neg     Joint Mobility: decreased TC, subtalar eversion, distal tib/fib    Palpation: TTP lateral ankle    Sensation: decreased lateral  heel    Functional Tests:   DL heel raise: weight shift to right     Intake Outcome Measure for FOTO ankle Survey    Therapist reviewed FOTO scores for Hipolito Laws on 5/21/2024.   FOTO report - see Media section or FOTO account episode details.    Intake Score: see media         Treatment     Total Treatment time (time-based codes) separate from Evaluation: 23 minutes     Hipolito received the treatments listed below:      manual therapy techniques: Joint mobilizations were applied to the: ankle for 13 minutes, including:  TC mobilizations   Subtalar mobilizations  Tib/fib mobilizations    neuromuscular re-education activities to improve: Proprioception, Posture, and motor control for 10 minutes. The following activities were included:  Standing DF mobilizations strap  Pt education  HEP    Patient Education and Home Exercises     Education provided:   - HEP  - ankle mobility    Written Home Exercises Provided: yes. Exercises were reviewed and Hipolito was able to demonstrate them prior to the end of the session.  Hipolito demonstrated good  understanding of the education provided. See EMR under Patient Instructions for exercises provided during therapy sessions.    Assessment     Hipolito is a 64 y.o. male referred to outpatient Physical Therapy with a medical diagnosis of  Arthrosis of left ankle [M19.072] . Patient presents with decreased ankle range, decreased strength, abnormal gait, decreased activity tolerance, and pain affecting everyday activities.     Patient prognosis is Good.   Patient will benefit from skilled outpatient Physical Therapy to address the deficits stated above and in the chart below, provide patient /family education, and to maximize patientt's level of independence.     Plan of care discussed with patient: Yes  Patient's spiritual, cultural and educational needs considered and patient is agreeable to the plan of care and goals as stated below:     Anticipated Barriers for therapy:  scheduling    Medical Necessity is demonstrated by the following  History  Co-morbidities and personal factors that may impact the plan of care [] LOW: no personal factors / co-morbidities  [] MODERATE: 1-2 personal factors / co-morbidities  [x] HIGH: 3+ personal factors / co-morbidities    Moderate / High Support Documentation:   Co-morbidities affecting plan of care: see medical    Personal Factors:   age     Examination  Body Structures and Functions, activity limitations and participation restrictions that may impact the plan of care [] LOW: addressing 1-2 elements  [] MODERATE: 3+ elements  [x] HIGH: 4+ elements (please support below)    Moderate / High Support Documentation: range, strength, joint mobility, motor control, gait, edema       Clinical Presentation [x] LOW: stable  [] MODERATE: Evolving  [] HIGH: Unstable     Decision Making/ Complexity Score: low       GOALS: Short Term Goals:  2-6 weeks  1.Report decreased ankle pain  < / =  3/10  to increase tolerance for ambulation  2. Increase ROM by 5 degrees in order to walk with min to no compensation.  3. Increase strength by 1/3 MMT grade for  ankle  to increase tolerance for ADL and work activities.  4. Pt to tolerate HEP to improve ROM and independence with ADL's    Long Term Goals: 6-20 weeks  1.Report decreased ankle pain  < / =  1/10  to increase tolerance for ambulation  2.Patient goal: improve strength, ambulation, and work activity tolerance  3.Increase strength to 4+/5 for  ankle  to increase tolerance for ADL and work activities.  4. Pt will report at CJ level (20-40% impaired) on Modified FIM score for mobility to demonstrate increase in LE function and mobility in home and community environment.     Plan     Plan of care Certification: 5/21/2024 to 9/1/24.    Outpatient Physical Therapy 1-2 times weekly for 6-20 weeks to include the following interventions: Gait Training, Manual Therapy, Moist Heat/ Ice, Neuromuscular Re-ed, Patient  Education, Self Care, Therapeutic Activities, and Therapeutic Exercise.     Eric Colón, PT        Physician's Signature: _________________________________________ Date: ________________

## 2024-06-10 ENCOUNTER — PATIENT MESSAGE (OUTPATIENT)
Dept: INTERNAL MEDICINE | Facility: CLINIC | Age: 64
End: 2024-06-10
Payer: COMMERCIAL

## 2024-06-12 ENCOUNTER — PATIENT MESSAGE (OUTPATIENT)
Dept: PAIN MEDICINE | Facility: CLINIC | Age: 64
End: 2024-06-12
Payer: COMMERCIAL

## 2024-06-13 ENCOUNTER — TELEPHONE (OUTPATIENT)
Dept: PAIN MEDICINE | Facility: CLINIC | Age: 64
End: 2024-06-13
Payer: COMMERCIAL

## 2024-06-13 NOTE — TELEPHONE ENCOUNTER
Staff spoke with patient. Patient states he wants to schedule an appointment with Dr. Lawton. Staff scheduled him for 7/2. Patient is in agreement to the above and verbalized understanding.

## 2024-06-13 NOTE — TELEPHONE ENCOUNTER
----- Message from Madeline Madrid sent at 6/13/2024 12:31 PM CDT -----  Regarding: Appointment Access              Name of Who is Calling:  Hipolito Laws    Who Left The Message:  Hipolito Laws      What is the request in detail:         Patient called requesting to schedule his outpatient neck injection procedure with Dr. Yunier Lawton MD. Please give the patient a call back at your earliest convenience and further advise. Thank you      Reply by [facility]: NO      Preferred Call Back :  [phone] (m)

## 2024-06-14 ENCOUNTER — PATIENT MESSAGE (OUTPATIENT)
Dept: PAIN MEDICINE | Facility: OTHER | Age: 64
End: 2024-06-14
Payer: COMMERCIAL

## 2024-06-14 ENCOUNTER — TELEPHONE (OUTPATIENT)
Dept: PAIN MEDICINE | Facility: OTHER | Age: 64
End: 2024-06-14
Payer: COMMERCIAL

## 2024-06-14 DIAGNOSIS — M54.12 CERVICAL RADICULITIS: Primary | ICD-10-CM

## 2024-06-14 NOTE — TELEPHONE ENCOUNTER
----- Message from Fabio Rosas MD sent at 6/14/2024 10:57 AM CDT -----  Regarding: RE: Request to hold Aspirin  Okay to hold aspirin  ----- Message -----  From: Yue Garay LPN  Sent: 6/14/2024  10:52 AM CDT  To: Fabio Rosas MD  Subject: Request to hold Aspirin                          Good morning,    Patient will be scheduled to have a procedure with Dr. Lawton, Cervical Epidural Steroid Injection . Staff is requesting to hold Aspirin for 5 days prior to procedure. Please advise.    Thank you,  Yue

## 2024-06-17 ENCOUNTER — TELEPHONE (OUTPATIENT)
Dept: PAIN MEDICINE | Facility: OTHER | Age: 64
End: 2024-06-17
Payer: COMMERCIAL

## 2024-06-17 NOTE — TELEPHONE ENCOUNTER
----- Message from Fbaio Rosas MD sent at 6/14/2024  3:51 PM CDT -----  Regarding: RE: Request to hold Aspirin  Okay to hold aspirin    Fabio Rosas  ----- Message -----  From: Yue Garay LPN  Sent: 6/14/2024  10:52 AM CDT  To: Fabio Rosas MD  Subject: Request to hold Aspirin                          Good morning,    Patient will be scheduled to have a procedure with Dr. Lawton, Cervical Epidural Steroid Injection . Staff is requesting to hold Aspirin for 5 days prior to procedure. Please advise.    Thank you,  Yue

## 2024-06-18 ENCOUNTER — CLINICAL SUPPORT (OUTPATIENT)
Dept: REHABILITATION | Facility: HOSPITAL | Age: 64
End: 2024-06-18
Payer: COMMERCIAL

## 2024-06-18 DIAGNOSIS — R26.9 ABNORMAL GAIT: ICD-10-CM

## 2024-06-18 DIAGNOSIS — G89.29 CHRONIC PAIN OF LEFT ANKLE: Primary | ICD-10-CM

## 2024-06-18 DIAGNOSIS — M25.572 CHRONIC PAIN OF LEFT ANKLE: Primary | ICD-10-CM

## 2024-06-18 PROCEDURE — 97112 NEUROMUSCULAR REEDUCATION: CPT

## 2024-06-18 PROCEDURE — 97140 MANUAL THERAPY 1/> REGIONS: CPT

## 2024-06-19 NOTE — PROGRESS NOTES
"[facility]   Physical Therapy Treatment Note      Name: Hipolito PARKS Eusebia  Clinic Number: [id number]    Therapy Diagnosis:   Encounter Diagnoses   Name Primary?    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 Has not been doing exercises at home. Still walking at work but walking less. Too hard to make it to PT cause I am tired and dont feel like doing the exercises.    He was not compliant with home exercise program.  Response to previous treatment: ongoing  Functional change: ongoing    Pain: 1/10  Location: left ankle    Objective      Objective Measures updated at progress report unless specified.     Treatment     Hipolito received the treatments listed below:      therapeutic exercises to develop strength, endurance, and ROM for 0 minutes including:      manual therapy techniques: Joint mobilizations were applied to the: ankle for 10 minutes, including:  TC mobilization  Distal tib/fib mobilizations    neuromuscular re-education activities to improve: Proprioception, Posture, and motor control for 17 minutes. The following activities were included:  Ankle DF stretching supine 20x10"  Standing DF at step 2x10 each  Pt education  Assessment  HEP    therapeutic activities to improve functional performance for 0  minutes, including:      gait training to improve functional " mobility and safety for 0  minutes, including:    Patient Education and Home Exercises       Education provided:   - HEP  - ankle mobility    Written Home Exercises Provided: YES. Exercises were reviewed and Hipolito was able to demonstrate them prior to the end of the session.  Hipolito demonstrated good understanding of the education provided. See EMR under Patient Instructions for exercises provided during therapy sessions    Assessment     Hipolito showed improved ankle mobility and further improvement with pain following manual. Discussion today about exercises and progressing with exercises at home. He has not been doing them except maybe occasionally. He feels like the pain is improving and is having trouble making it to PT and doing anything after work. Will hold on PT for a little and if symptoms return he will reach out.     Hipolito Is progressing well towards his goals.   Pt prognosis is Good.     Pt will continue to benefit from skilled outpatient physical therapy to address the deficits listed in the problem list box on initial evaluation, provide pt/family education and to maximize pt's level of independence in the home and community environment.     Pt's spiritual, cultural and educational needs considered and pt agreeable to plan of care and goals.     Anticipated barriers to physical therapy: scheduling, work    GOALS: Short Term Goals:  2-6 weeks  -progressing  1.Report decreased ankle pain  < / =  3/10  to increase tolerance for ambulation  2. Increase ROM by 5 degrees in order to walk with min to no compensation.  3. Increase strength by 1/3 MMT grade for  ankle  to increase tolerance for ADL and work activities.  4. Pt to tolerate HEP to improve ROM and independence with ADL's     Long Term Goals: 6-20 weeks - progressing  1.Report decreased ankle pain  < / =  1/10  to increase tolerance for ambulation  2.Patient goal: improve strength, ambulation, and work activity tolerance  3.Increase strength to 4+/5 for   ankle  to increase tolerance for ADL and work activities.  4. Pt will report at CJ level (20-40% impaired) on Modified FIM score for mobility to demonstrate increase in LE function and mobility in home and community environment.    Plan     Hold on PT with HEP.     Eric Colón, PT, DPT, OCS

## 2024-07-01 ENCOUNTER — OFFICE VISIT (OUTPATIENT)
Dept: INTERNAL MEDICINE | Facility: CLINIC | Age: 64
End: 2024-07-01
Payer: COMMERCIAL

## 2024-07-01 VITALS
OXYGEN SATURATION: 97 % | HEIGHT: 70 IN | WEIGHT: 217.38 LBS | HEART RATE: 98 BPM | DIASTOLIC BLOOD PRESSURE: 70 MMHG | BODY MASS INDEX: 31.12 KG/M2 | SYSTOLIC BLOOD PRESSURE: 130 MMHG

## 2024-07-01 DIAGNOSIS — R10.10 UPPER ABDOMINAL PAIN: ICD-10-CM

## 2024-07-01 DIAGNOSIS — J44.9 CHRONIC OBSTRUCTIVE PULMONARY DISEASE, UNSPECIFIED COPD TYPE: Primary | ICD-10-CM

## 2024-07-01 DIAGNOSIS — T81.89XS: ICD-10-CM

## 2024-07-01 PROCEDURE — 4010F ACE/ARB THERAPY RXD/TAKEN: CPT | Mod: CPTII,S$GLB,, | Performed by: STUDENT IN AN ORGANIZED HEALTH CARE EDUCATION/TRAINING PROGRAM

## 2024-07-01 PROCEDURE — 3075F SYST BP GE 130 - 139MM HG: CPT | Mod: CPTII,S$GLB,, | Performed by: STUDENT IN AN ORGANIZED HEALTH CARE EDUCATION/TRAINING PROGRAM

## 2024-07-01 PROCEDURE — 1160F RVW MEDS BY RX/DR IN RCRD: CPT | Mod: CPTII,S$GLB,, | Performed by: STUDENT IN AN ORGANIZED HEALTH CARE EDUCATION/TRAINING PROGRAM

## 2024-07-01 PROCEDURE — 99214 OFFICE O/P EST MOD 30 MIN: CPT | Mod: S$GLB,,, | Performed by: STUDENT IN AN ORGANIZED HEALTH CARE EDUCATION/TRAINING PROGRAM

## 2024-07-01 PROCEDURE — 3078F DIAST BP <80 MM HG: CPT | Mod: CPTII,S$GLB,, | Performed by: STUDENT IN AN ORGANIZED HEALTH CARE EDUCATION/TRAINING PROGRAM

## 2024-07-01 PROCEDURE — 3008F BODY MASS INDEX DOCD: CPT | Mod: CPTII,S$GLB,, | Performed by: STUDENT IN AN ORGANIZED HEALTH CARE EDUCATION/TRAINING PROGRAM

## 2024-07-01 PROCEDURE — 3044F HG A1C LEVEL LT 7.0%: CPT | Mod: CPTII,S$GLB,, | Performed by: STUDENT IN AN ORGANIZED HEALTH CARE EDUCATION/TRAINING PROGRAM

## 2024-07-01 PROCEDURE — 99999 PR PBB SHADOW E&M-EST. PATIENT-LVL V: CPT | Mod: PBBFAC,,, | Performed by: STUDENT IN AN ORGANIZED HEALTH CARE EDUCATION/TRAINING PROGRAM

## 2024-07-01 PROCEDURE — 1159F MED LIST DOCD IN RCRD: CPT | Mod: CPTII,S$GLB,, | Performed by: STUDENT IN AN ORGANIZED HEALTH CARE EDUCATION/TRAINING PROGRAM

## 2024-07-01 RX ORDER — DICYCLOMINE HYDROCHLORIDE 20 MG/1
20 TABLET ORAL 4 TIMES DAILY PRN
Qty: 120 TABLET | Refills: 0 | Status: SHIPPED | OUTPATIENT
Start: 2024-07-01 | End: 2024-07-31

## 2024-07-01 RX ORDER — BUDESONIDE, GLYCOPYRROLATE, AND FORMOTEROL FUMARATE 160; 9; 4.8 UG/1; UG/1; UG/1
2 AEROSOL, METERED RESPIRATORY (INHALATION) 2 TIMES DAILY
Qty: 32.1 G | Refills: 3 | Status: SHIPPED | OUTPATIENT
Start: 2024-07-01 | End: 2025-06-26

## 2024-07-01 RX ORDER — ALBUTEROL SULFATE 90 UG/1
2 AEROSOL, METERED RESPIRATORY (INHALATION) EVERY 6 HOURS PRN
Qty: 18 G | Refills: 0 | Status: SHIPPED | OUTPATIENT
Start: 2024-07-01 | End: 2025-07-01

## 2024-07-01 RX ORDER — SUCRALFATE 1 G/10ML
1 SUSPENSION ORAL
Qty: 473 ML | Refills: 0 | Status: SHIPPED | OUTPATIENT
Start: 2024-07-01 | End: 2024-07-13

## 2024-07-01 NOTE — PROGRESS NOTES
"[facility] FOLLOW-UP VISIT      CHIEF COMPLAINT:   Chief Complaint   Patient presents with    URI    Abdominal Pain       HISTORY OF PRESENT ILLNESS: Hipolito Laws is a 64 y.o. male who presents here today for     Respiratory Symptoms   Associated symptoms include productive cough, dyspnea, and wheezing. Detailed ROS as below. Symptoms have been present for 12 days, gradually improving in this timeframe. Patient denies sick contacts. Patient denies underlying respiratory or lung disease although has been told he is "borderline COPD." However he is prescribed Breztri BID, which he is no longer taking; and Albuterol PRN which he used during recent symptoms with relief. Patient denies history of allergies or sinus issues. Patient is a former smoker, quit in 2011 He went to Urgent Care 8 days ago, was prescribed prednisone, cough syrup, intranasal medicine, and montelukast. He is still taking Montelukast but has completed the prednisone and is no longer using the cough syrup; no longer using intranasal medicine.       Abdominal Pain  Pain is located in upper abdomen and has been present for about a week, stable in this timeframe. Pain is described as "hunger pain" and is intermittent. Episodes last a few minutes and occur a few times per day. No associated symptoms. Denies nausea, vomiting, diarrhea, constipation, bloating, heartburn, acid reflux, fever, chills, unintentional weight loss, blood in stool. Patient cannot identify any apparent triggers - no recent travel, new foods, dietary changes, sick contacts. However did recently have prednisone course as above. He has history of umbilical hernia, S/P repair 2021. However he reports that the hernia has returned and he has a suture "poking out" of abdomen. He has not had any other abdominal surgeries.         REVIEW OF SYSTEMS:    Review of Systems   Constitutional:  Negative for fever.   HENT:  Positive for sore throat. Negative for ear pain.  "   Respiratory:  Positive for sputum production, shortness of breath and wheezing. Negative for hemoptysis.    Cardiovascular:  Negative for chest pain, orthopnea, claudication, leg swelling and PND.   Gastrointestinal:  Positive for abdominal pain. Negative for vomiting.   Musculoskeletal:  Positive for neck pain.   Neurological:  Negative for headaches.     Answers submitted by the patient for this visit:  Shortness of Breath Questionnaire (Submitted on 6/30/2024)  Chief Complaint: Shortness of breath  Chronicity: recurrent  Onset: 1 to 4 weeks ago  Frequency: daily  Progression since onset: gradually improving  syncope: No  leg pain: Yes  rhinorrhea: Yes  coryza: Yes  swollen glands: No  Improvement on treatment: mild  Risk factors for DVT/PE: no known risk factors  asthma: Yes  allergies: Yes  COPD: Yes  pneumonia: Yes  aspirin allergies: No  CAD: No  DVT: No  heart failure: No  PE: No  recent surgery: No  bronchiolitis: No  chronic lung disease: No      MEDICAL HISTORY:    Past Medical History:   Diagnosis Date    Hypertension        MEDICATIONS:    Current Outpatient Medications on File Prior to Visit   Medication Sig Dispense Refill    albuterol (VENTOLIN HFA) 90 mcg/actuation inhaler Inhale 2 puffs into the lungs every 6 (six) hours as needed for Wheezing. Rescue 18 g 0    aspirin 81 MG Chew Take 375 mg by mouth once daily.      atorvastatin (LIPITOR) 80 MG tablet Take 1 tablet (80 mg total) by mouth once daily. 90 tablet 3    DULoxetine (CYMBALTA) 30 MG capsule Take 1 capsule (30 mg total) by mouth once daily. 90 capsule 3    losartan (COZAAR) 50 MG tablet Take 1 tablet (50 mg total) by mouth once daily. 90 tablet 2    meloxicam (MOBIC) 15 MG tablet Take 1 tablet (15 mg total) by mouth once daily. 90 tablet 3    multivitamin capsule Take 1 capsule by mouth once daily.      vitamin D (VITAMIN D3) 1000 units Tab Take 1,000 Units by mouth once daily.      ascorbic acid, vitamin C, (VITAMIN C) 100 MG tablet Take  "100 mg by mouth once daily.      budesonide-glycopyr-formoterol (BREZTRI AEROSPHERE) 160-9-4.8 mcg/actuation HFAA Inhale 10.7 g into the lungs 2 (two) times daily. 10.7 g 1    methylPREDNISolone (MEDROL, MAKAYLA,) 4 mg tablet Take as instructed on package 1 each 0    omega-3 fatty acids/fish oil (FISH OIL-OMEGA-3 FATTY ACIDS) 300-1,000 mg capsule Take by mouth once daily.      urea (CARMOL) 40 % Crea Apply topically 2 (two) times daily. 85 g 4    vitamin E 100 UNIT capsule Take 100 Units by mouth once daily.       Current Facility-Administered Medications on File Prior to Visit   Medication Dose Route Frequency Provider Last Rate Last Admin    midazolam (VERSED) 1 mg/mL injection 2 mg  2 mg Intravenous PRN Gualberto Fuentes MD   2 mg at 09/14/22 1220         PHYSICAL EXAM:    /70 (BP Location: Right arm, Patient Position: Sitting, BP Method: Medium (Manual))   Pulse 98   Ht 5' 10" (1.778 m)   Wt 98.6 kg (217 lb 6 oz)   SpO2 97%   BMI 31.19 kg/m²     Physical Exam  Vitals and nursing note reviewed.   Constitutional:       General: He is not in acute distress.     Appearance: Normal appearance. He is not ill-appearing, toxic-appearing or diaphoretic.   HENT:      Head: Normocephalic and atraumatic.      Nose: Nose normal.   Eyes:      Extraocular Movements: Extraocular movements intact.      Conjunctiva/sclera: Conjunctivae normal.      Pupils: Pupils are equal, round, and reactive to light.   Cardiovascular:      Rate and Rhythm: Normal rate and regular rhythm.      Heart sounds: Normal heart sounds. No murmur heard.  Pulmonary:      Effort: Pulmonary effort is normal. No respiratory distress.      Breath sounds: Normal breath sounds. No stridor. No wheezing, rhonchi or rales.   Abdominal:      General: Abdomen is protuberant. Bowel sounds are normal.      Tenderness: There is no abdominal tenderness.      Hernia: A hernia is present. Hernia is present in the umbilical area (s/p repair, exposed suture). " "  Musculoskeletal:         General: No deformity. Normal range of motion.   Skin:     Findings: No lesion or rash.   Neurological:      General: No focal deficit present.      Mental Status: He is alert.      Motor: No weakness.      Gait: Gait normal.   Psychiatric:         Mood and Affect: Mood normal.         Behavior: Behavior normal.         Thought Content: Thought content normal.         Judgment: Judgment normal.             ASSESSMENT & PLAN:    Hipolito was seen today for uri and abdominal pain.    Diagnoses and all orders for this visit:    Chronic obstructive pulmonary disease, unspecified COPD type  Patient with recent episode of dyspnea, productive cough, wheezing that resolved with Prednisone course that was prescribed at urgent care.   Presentation consistent with COPD exacerbation however unclear if this is an established diagnosis - no prior PFT or Pulm evaluation in records. Patient previously prescribed Breztri which he has not been taking (although this did help him "a lot' in the past), and Albuterol PRN which he did use with recent symptoms.   Restart Breztri BID  Continue Albuterol PRN  F/U with Pulmonology    -     budesonide-glycopyr-formoterol (BREZTRI AEROSPHERE) 160-9-4.8 mcg/actuation HFAA; Inhale 2 puffs into the lungs 2 (two) times daily.  -     albuterol (VENTOLIN HFA) 90 mcg/actuation inhaler; Inhale 2 puffs into the lungs every 6 (six) hours as needed for Wheezing. Rescue  -     Ambulatory referral/consult to Pulmonology; Future    Upper abdominal pain  Intermittent brief episodes of upper abdominal pain for past week, no associated symptoms.   No abnormalities on exam - abdomen soft, non-tender.   Possible side effect of prednisone which he has now completed  Trial Sucralfate before meals & bedtime  Bentyl PRN for pain  If no improvement in 1-2 weeks, return for further evaluation   -     sucralfate (CARAFATE) 100 mg/mL suspension; Take 10 mLs (1 g total) by mouth 4 (four) times daily " before meals and nightly. for 12 days  -     dicyclomine (BENTYL) 20 mg tablet; Take 1 tablet (20 mg total) by mouth 4 (four) times daily as needed (abdominal pain).    Exposed surgical suture as complication of procedure, sequela  Suture from umbilical hernia is exposed through skin - this area is non-erythematous, non-tender, no fluctuance, drainage, discharge  Advised to F/U with General Surgery            Angelita Fuller MD  [facility]

## 2024-07-01 NOTE — PATIENT INSTRUCTIONS
For respiratory issues:  - Breztri inhaler twice daily every day  - Albuterol inhaler as needed  - F/U with Pulmonology - call [phone] to schedule, or schedule at check-out    For abdominal issues:  - Sucralfate 10 ml before every meal and at bedtime for at least 7 days  - Bentyl as needed for abdominal pain  - Let me know if no improvement in 1-2 weeks

## 2024-07-08 DIAGNOSIS — G47.00 INSOMNIA, UNSPECIFIED TYPE: ICD-10-CM

## 2024-07-08 RX ORDER — ESZOPICLONE 2 MG/1
2 TABLET, FILM COATED ORAL NIGHTLY
Qty: 30 TABLET | Refills: 5 | Status: SHIPPED | OUTPATIENT
Start: 2024-07-08 | End: 2025-01-04

## 2024-07-08 NOTE — TELEPHONE ENCOUNTER
No care due was identified.  [facility] Embedded Care Due Messages. Reference number: [id number].   7/08/2024 6:01:46 AM CDT

## 2024-07-11 ENCOUNTER — HOSPITAL ENCOUNTER (OUTPATIENT)
Facility: OTHER | Age: 64
Discharge: HOME | End: 2024-07-11
Attending: ANESTHESIOLOGY | Admitting: ANESTHESIOLOGY
Payer: COMMERCIAL

## 2024-07-11 VITALS
SYSTOLIC BLOOD PRESSURE: 153 MMHG | OXYGEN SATURATION: 95 % | BODY MASS INDEX: 30.06 KG/M2 | WEIGHT: 210 LBS | RESPIRATION RATE: 16 BRPM | TEMPERATURE: 98 F | HEART RATE: 82 BPM | DIASTOLIC BLOOD PRESSURE: 83 MMHG | HEIGHT: 70 IN

## 2024-07-11 DIAGNOSIS — G89.29 CHRONIC PAIN: ICD-10-CM

## 2024-07-11 DIAGNOSIS — M54.12 CERVICAL RADICULOPATHY: Primary | ICD-10-CM

## 2024-07-11 PROCEDURE — 25500020 PHARM REV CODE 255: Performed by: ANESTHESIOLOGY

## 2024-07-11 PROCEDURE — 62321 NJX INTERLAMINAR CRV/THRC: CPT | Performed by: ANESTHESIOLOGY

## 2024-07-11 PROCEDURE — 25000003 PHARM REV CODE 250: Performed by: ANESTHESIOLOGY

## 2024-07-11 PROCEDURE — 62321 NJX INTERLAMINAR CRV/THRC: CPT | Mod: ,,, | Performed by: ANESTHESIOLOGY

## 2024-07-11 PROCEDURE — 63600175 PHARM REV CODE 636 W HCPCS: Performed by: ANESTHESIOLOGY

## 2024-07-11 RX ORDER — LIDOCAINE HYDROCHLORIDE 10 MG/ML
INJECTION, SOLUTION EPIDURAL; INFILTRATION; INTRACAUDAL; PERINEURAL
Status: DISCONTINUED | OUTPATIENT
Start: 2024-07-11 | End: 2024-07-11 | Stop reason: HOSPADM

## 2024-07-11 RX ORDER — FENTANYL CITRATE 50 UG/ML
INJECTION, SOLUTION INTRAMUSCULAR; INTRAVENOUS
Status: DISCONTINUED | OUTPATIENT
Start: 2024-07-11 | End: 2024-07-11 | Stop reason: HOSPADM

## 2024-07-11 RX ORDER — SODIUM CHLORIDE 9 MG/ML
INJECTION, SOLUTION INTRAVENOUS CONTINUOUS
Status: DISCONTINUED | OUTPATIENT
Start: 2024-07-11 | End: 2024-07-11 | Stop reason: HOSPADM

## 2024-07-11 RX ORDER — DEXAMETHASONE SODIUM PHOSPHATE 10 MG/ML
INJECTION INTRAMUSCULAR; INTRAVENOUS
Status: DISCONTINUED | OUTPATIENT
Start: 2024-07-11 | End: 2024-07-11 | Stop reason: HOSPADM

## 2024-07-11 RX ORDER — MIDAZOLAM HYDROCHLORIDE 1 MG/ML
INJECTION INTRAMUSCULAR; INTRAVENOUS
Status: DISCONTINUED | OUTPATIENT
Start: 2024-07-11 | End: 2024-07-11 | Stop reason: HOSPADM

## 2024-07-11 RX ORDER — LIDOCAINE HYDROCHLORIDE 20 MG/ML
INJECTION, SOLUTION INFILTRATION; PERINEURAL
Status: DISCONTINUED | OUTPATIENT
Start: 2024-07-11 | End: 2024-07-11 | Stop reason: HOSPADM

## 2024-07-11 NOTE — OP NOTE
Cervical Interlaminar Epidural Steroid Injection under Fluoroscopic Guidance    The procedure, risks, benefits, and options were discussed with the patient. There are no contraindications to the procedure. The patent expressed understanding and agreed to the procedure. Informed written consent was obtained prior to the start of the procedure and can be found in the patient's chart.     PATIENT NAME: Hipolito Laws   MRN: [id number]     DATE OF PROCEDURE: 07/11/2024    PROCEDURE: Cervical Interlaminar Epidural Steroid Injection C6/C7 under Fluoroscopic Guidance    PRE-OP DIAGNOSIS: Cervical radiculitis [M54.12] Cervical radiculopathy [M54.12]    POST-OP DIAGNOSIS: Same    PHYSICIAN: Yunier Lawton MD     ASSISTANTS: MD Daryl     MEDICATIONS INJECTED: Preservative-free Decadron 10mg with 1cc of Lidocaine 1% MPF and preservative free normal saline    LOCAL ANESTHETIC INJECTED: Xylocaine 2%     SEDATION: Versed 2mg and Fentanyl 75mcg                                                                                                                                                                                     Conscious sedation ordered by M.D. Patient re-evaluation prior to administration of conscious sedation. No changes noted in patient's status from initial evaluation. The patient's vital signs were monitored by RN and patient remained hemodynamically stable throughout the procedure.    Event Time In   Sedation Start 0857   Sedation End 0903       ESTIMATED BLOOD LOSS: None    COMPLICATIONS: None    TECHNIQUE: Time-out was performed to identify the patient and procedure to be performed. With the patient laying in a prone position, the surgical area was prepped and draped in the usual sterile fashion using ChloraPrep and a fenestrated drape. The level was determined under fluoroscopy guidance. Skin anesthesia was achieved by injecting Lidocaine 2% over the injection site.  The interlaminar space was then approached  with a 20 gauge, 3.5 inch Tuohy needle that was introduced under fluoroscopic guidance with AP, lateral and/or contralateral oblique imaging. Once the Ligamentum flavum was encountered loss of resistance to saline was used to enter the epidural space. With positive loss of resistance and negative aspiration for CSF or Blood, contrast dye  Omnipaque (300mg/mL) was injected to confirm placement and there was no vascular runoff. Then 2 mL of the medication mixture listed above was then injected slowly. Displacement of the radio opaque contrast after injection of the medication confirmed that the medication went into the area of the epidural space. The needles were removed, and bleeding was nil. A sterile dressing was applied. No specimens collected. The patient tolerated the procedure well.     PRE-PROCEDURE PAIN SCORE: 6-10/10    POST-PROCEDURE PAIN SCORE: 4/10    The patient was monitored after the procedure in the recovery area. They were given post-procedure and discharge instructions to follow at home. The patient was discharged in a stable condition.    Charlei Brito MD    I reviewed and edited the fellow's note. I conducted my own interview and physical examination. I agree with the findings. I was present and supervising all critical portions of the procedure.

## 2024-07-11 NOTE — DISCHARGE SUMMARY
Discharge Note  Short Stay      SUMMARY     Admit Date: 7/11/2024    Attending Physician: Charlie Brito      Discharge Physician: Charlie Brito      Discharge Date: 7/11/2024 9:04 AM    Procedure(s) (LRB):  CERVICAL DIGNA C6-7 DIRECT REFERRAL *ASPIRIN CLEARANCE IN CHART* (N/A)    Final Diagnosis: Cervical radiculitis [M54.12]    Disposition: Home or self care    Patient Instructions:   Current Discharge Medication List        CONTINUE these medications which have NOT CHANGED    Details   albuterol (VENTOLIN HFA) 90 mcg/actuation inhaler Inhale 2 puffs into the lungs every 6 (six) hours as needed for Wheezing. Rescue  Qty: 18 g, Refills: 0    Associated Diagnoses: Chronic obstructive pulmonary disease, unspecified COPD type      aspirin 81 MG Chew Take 375 mg by mouth once daily.      atorvastatin (LIPITOR) 80 MG tablet Take 1 tablet (80 mg total) by mouth once daily.  Qty: 90 tablet, Refills: 3    Associated Diagnoses: Hyperlipidemia, unspecified hyperlipidemia type      budesonide-glycopyr-formoterol (BREZTRI AEROSPHERE) 160-9-4.8 mcg/actuation HFAA Inhale 2 puffs into the lungs 2 (two) times daily.  Qty: 32.1 g, Refills: 3    Associated Diagnoses: Chronic obstructive pulmonary disease, unspecified COPD type      dicyclomine (BENTYL) 20 mg tablet Take 1 tablet (20 mg total) by mouth 4 (four) times daily as needed (abdominal pain).  Qty: 120 tablet, Refills: 0    Associated Diagnoses: Upper abdominal pain      DULoxetine (CYMBALTA) 30 MG capsule Take 1 capsule (30 mg total) by mouth once daily.  Qty: 90 capsule, Refills: 3    Associated Diagnoses: Anxiety and depression      eszopiclone (LUNESTA) 2 MG Tab Take 1 tablet (2 mg total) by mouth every evening.  Qty: 30 tablet, Refills: 5    Associated Diagnoses: Insomnia, unspecified type      losartan (COZAAR) 50 MG tablet Take 1 tablet (50 mg total) by mouth once daily.  Qty: 90 tablet, Refills: 2    Comments: .  Associated Diagnoses: Essential hypertension, benign       meloxicam (MOBIC) 15 MG tablet Take 1 tablet (15 mg total) by mouth once daily.  Qty: 90 tablet, Refills: 3    Associated Diagnoses: Chronic pain of left ankle      multivitamin capsule Take 1 capsule by mouth once daily.      sucralfate (CARAFATE) 100 mg/mL suspension Take 10 mLs (1 g total) by mouth 4 (four) times daily before meals and nightly. for 12 days  Qty: 473 mL, Refills: 0    Associated Diagnoses: Upper abdominal pain      vitamin D (VITAMIN D3) 1000 units Tab Take 1,000 Units by mouth once daily.                 Discharge Diagnosis: Cervical radiculitis [M54.12]  Condition on Discharge: Stable with no complications to procedure   Diet on Discharge: Same as before.  Activity: as per instruction sheet.  Discharge to: Home with a responsible adult.  Follow up: 2-4 weeks       Please call my office or pager at [phone] if experienced any weakness or loss of sensation, fever > 101.5, pain uncontrolled with oral medications, persistent nausea/vomiting/or diarrhea, redness or drainage from the incisions, or any other worrisome concerns. If physician on call was not reached or could not communicate with our office for any reason please go to the nearest emergency department

## 2024-07-11 NOTE — H&P
HPI  Patient presenting for Procedure(s) (LRB):  CERVICAL DIGNA DIRECT REFERRAL *ASPIRIN CLEARANCE IN CHART* (N/A)     Patient on Anti-coagulation No    No health changes since previous encounter    Past Medical History:   Diagnosis Date    Hypertension      Past Surgical History:   Procedure Laterality Date    ARTHROSCOPIC REPAIR OF ROTATOR CUFF OF SHOULDER  1992    ARTHROSCOPY OF ANKLE WITH DEBRIDEMENT Left 8/4/2022    Procedure: ARTHROSCOPY, ANKLE, WITH DEBRIDEMENT;  Surgeon: Aleksandr Elias MD;  Location: [facility] OR;  Service: Orthopedics;  Laterality: Left;  Calf tourniquet    CLOSURE OF WOUND Left 9/14/2022    Procedure: CLOSURE, WOUND left ankle;  Surgeon: Aleksandr Elias MD;  Location: [facility] OR;  Service: Orthopedics;  Laterality: Left;  Prevena wound VAC    EPIDURAL STEROID INJECTION N/A 12/13/2021    Procedure: INJECTION, STEROID, EPIDURAL, CERVICAL DIRECT REF/ NEED CONSENT;  Surgeon: Yunier Lawton MD;  Location: [facility]GT;  Service: Pain Management;  Laterality: N/A;    EPIDURAL STEROID INJECTION N/A 6/2/2022    Procedure: INJECTION, STEROID, EPIDURAL, CERVICAL C7/T1 CONTRAST DIRECT REF;  Surgeon: Yunier Lawton MD;  Location: [facility];  Service: Pain Management;  Laterality: N/A;    EPIDURAL STEROID INJECTION N/A 2/16/2023    Procedure: INJECTION, STEROID, EPIDURAL, C7/T1 CERVICAL CONTRAST DIRECT REF;  Surgeon: Yunier Lawton MD;  Location: [facility];  Service: Pain Management;  Laterality: N/A;    EPIDURAL STEROID INJECTION N/A 6/26/2023    Procedure: CERVICAL DIGNA C7-T1 DIRECT REFRRAL;  Surgeon: Yunier Lawton MD;  Location: [facility];  Service: Pain Management;  Laterality: N/A;    EPIDURAL STEROID INJECTION N/A 1/22/2024    Procedure: CERVICAL DIGNA DIRECT REFERRAL;  Surgeon: Yunier Lawton MD;  Location: [facility];  Service: Pain Management;  Laterality: N/A;  [phone]    EPIDURAL STEROID INJECTION INTO CERVICAL SPINE Right 12/17/2020    Procedure: CERVICAL C5/6 DIGNA TRANSFORAMINAL   DIRECT REFERRAL;  Surgeon: Yunier Lawton MD;  Location: [facility]GT;  Service: Pain Management;  Laterality: Right;  NEEDS CONSENT    SURGICAL REMOVAL OF LESION OF FIBULA Left 8/4/2022    Procedure: EXCISION, LESION, FIBULA Nonunion avulsion fragment distal fibula;  Surgeon: Aleksandr Elias MD;  Location: [facility] OR;  Service: Orthopedics;  Laterality: Left;    TRANSFORAMINAL EPIDURAL INJECTION OF STEROID Right 10/14/2021    Procedure: INJECTION, STEROID, EPIDURAL, TRANSFORAMINAL APPROACH, C5-C6 DIRECT REF/NEED CONSNET;  Surgeon: Yunier Lawton MD;  Location: [facility]GT;  Service: Pain Management;  Laterality: Right;    UMBILICAL HERNIA REPAIR N/A 4/5/2021    Procedure: REPAIR, HERNIA, UMBILICAL, AGE 5 YEARS OR OLDER,;  Surgeon: Tim Reese MD;  Location: [facility] OR [address] [place] [address];  Service: General;  Laterality: N/A;     Review of patient's allergies indicates:  No Known Allergies   No current facility-administered medications for this encounter.     Facility-Administered Medications Ordered in Other Encounters   Medication    midazolam (VERSED) 1 mg/mL injection 2 mg       PMHx, PSHx, Allergies, Medications reviewed in epic    ROS negative except pain complaints in HPI    OBJECTIVE:    There were no vitals taken for this visit.    PHYSICAL EXAMINATION:    GENERAL: Well appearing, in no acute distress, alert and oriented x3.  PSYCH:  Mood and affect appropriate.  SKIN: Skin color, texture, turgor normal, no rashes or lesions which will impact the procedure.  CV: RRR with palpation of the radial artery.  PULM: No evidence of respiratory difficulty, symmetric chest rise. Clear to auscultation.  NEURO: Cranial nerves grossly intact.    Plan:    Proceed with procedure as planned Procedure(s) (LRB):  CERVICAL DIGNA DIRECT REFERRAL *ASPIRIN CLEARANCE IN CHART* (N/A)    Baldev Carlos Reddy  07/11/2024

## 2024-07-11 NOTE — DISCHARGE INSTRUCTIONS

## 2024-08-28 ENCOUNTER — PATIENT MESSAGE (OUTPATIENT)
Dept: ORTHOPEDICS | Facility: CLINIC | Age: 64
End: 2024-08-28
Payer: COMMERCIAL

## 2024-08-29 RX ORDER — METHYLPREDNISOLONE 4 MG/1
TABLET ORAL
Qty: 21 EACH | Refills: 0 | Status: SHIPPED | OUTPATIENT
Start: 2024-08-29 | End: 2024-09-19

## 2024-10-20 DIAGNOSIS — F41.9 ANXIETY AND DEPRESSION: ICD-10-CM

## 2024-10-20 DIAGNOSIS — F32.A ANXIETY AND DEPRESSION: ICD-10-CM

## 2024-10-20 NOTE — TELEPHONE ENCOUNTER
Care Due:                  Date            Visit Type   Department     Provider  --------------------------------------------------------------------------------                                MYCHART                              FOLLOWUP/OF  [facility] INTERNAL  Last Visit: 07-      FICE VISIT   MEDICINE       Angelita Fuller  Next Visit: None Scheduled  None         None Found                                                            Last  Test          Frequency    Reason                     Performed    Due Date  --------------------------------------------------------------------------------    CBC.........  12 months..  meloxicam................  01- 01-    CMP.........  12 months..  DULoxetine, atorvastatin,   01- 01-                             losartan, meloxicam......    Lipid Panel.  12 months..  atorvastatin.............  01- 01-    Health [facility] Embedded Care Due Messages. Reference number: [id number].   10/20/2024 4:18:44 PM CDT

## 2024-10-21 RX ORDER — DULOXETIN HYDROCHLORIDE 30 MG/1
30 CAPSULE, DELAYED RELEASE ORAL DAILY
Qty: 90 CAPSULE | Refills: 0 | Status: SHIPPED | OUTPATIENT
Start: 2024-10-21

## 2024-10-21 NOTE — TELEPHONE ENCOUNTER
Refill Routing Note   Medication(s) are not appropriate for processing by [facility] for the following reason(s):        Required vitals abnormal    [facility] action(s):  Defer   Requires labs : Yes             Appointments  past 12m or future 3m with PCP    Date Provider   Last Visit   2/2/2024 Fabio Rosas MD   Next Visit   Visit date not found Fabio Rosas MD   ED visits in past 90 days: 0        Note composed:11:00 AM 10/21/2024

## 2024-11-04 DIAGNOSIS — I10 ESSENTIAL HYPERTENSION, BENIGN: ICD-10-CM

## 2024-11-04 NOTE — TELEPHONE ENCOUNTER
No care due was identified.  Health [facility] Embedded Care Due Messages. Reference number: [id number].   11/04/2024 5:45:54 PM CST

## 2024-11-05 RX ORDER — LOSARTAN POTASSIUM 50 MG/1
50 TABLET ORAL DAILY
Qty: 90 TABLET | Refills: 3 | Status: SHIPPED | OUTPATIENT
Start: 2024-11-05

## 2024-11-05 NOTE — TELEPHONE ENCOUNTER
Refill Routing Note   Medication(s) are not appropriate for processing by [facility] for the following reason(s):        Required vitals abnormal    [facility] action(s):  Defer               Appointments  past 12m or future 3m with PCP    Date Provider   Last Visit   2/2/2024 Fabio Rosas MD   Next Visit   Visit date not found Fabio Rosas MD   ED visits in past 90 days: 0        Note composed:3:52 PM 11/05/2024

## 2024-12-05 ENCOUNTER — TELEPHONE (OUTPATIENT)
Dept: ORTHOPEDICS | Facility: CLINIC | Age: 64
End: 2024-12-05
Payer: COMMERCIAL

## 2024-12-17 NOTE — PROGRESS NOTES
Established Patient - Audio Only Telehealth Visit     The patient location is: [place]  The chief complaint leading to consultation is: f/u  Visit type: Virtual visit with audio only (telephone)  Total time spent with patient: 15min     The reason for the audio only service rather than synchronous audio and video virtual visit was related to technical difficulties or patient preference/necessity.     Each patient to whom I provide medical services by telemedicine is:  (1) informed of the relationship between the physician and patient and the respective role of any other health care provider with respect to management of the patient; and (2) notified that they may decline to receive medical services by telemedicine and may withdraw from such care at any time. Patient verbally consented to receive this service via voice-only telephone call.    DATE: 12/19/2024  PATIENT: Hipolito Laws    Attending Physician: Neno Reyez M.D.    HISTORY:  Hipolito Laws is a 64 y.o. male who returns to me today for follow up afer a DIGNA on 7/11/24 that gave 705 relief in his pan for 2 months.  He was last seen by me Visit date not found.  Today he is doing well but notes neck and arm pain.  The Patient denies myelopathic symptoms such as handwriting changes or difficulty with buttons/coins/keys. Denies perineal paresthesias, bowel/bladder dysfunction.    IMAGING:  Today I personally re-reviewed AP, Lat and Flex/Ex  upright C-spine films that demonstrate spondylosis from C5-C7.     There is no height or weight on file to calculate BMI.    Hemoglobin A1C   Date Value Ref Range Status   01/22/2024 5.3 4.0 - 5.6 % Final     Comment:     ADA Screening Guidelines:  5.7-6.4%  Consistent with prediabetes  >or=6.5%  Consistent with diabetes    High levels of fetal hemoglobin interfere with the HbA1C  assay. Heterozygous hemoglobin variants (HbS, HgC, etc)do  not significantly interfere with this assay.   However, presence of multiple variants  may affect accuracy.     03/17/2023 5.3 4.0 - 5.6 % Final     Comment:     ADA Screening Guidelines:  5.7-6.4%  Consistent with prediabetes  >or=6.5%  Consistent with diabetes    High levels of fetal hemoglobin interfere with the HbA1C  assay. Heterozygous hemoglobin variants (HbS, HgC, etc)do  not significantly interfere with this assay.   However, presence of multiple variants may affect accuracy.     01/07/2022 5.3 4.0 - 5.6 % Final     Comment:     ADA Screening Guidelines:  5.7-6.4%  Consistent with prediabetes  >or=6.5%  Consistent with diabetes    High levels of fetal hemoglobin interfere with the HbA1C  assay. Heterozygous hemoglobin variants (HbS, HgC, etc)do  not significantly interfere with this assay.   However, presence of multiple variants may affect accuracy.           ASSESSMENT/PLAN:    Diagnoses and all orders for this visit:    Cervical radiculopathy  -     X-Ray Cervical Spine AP Lat with Flex Ex; Future  -     MRI Cervical Spine Without Contrast; Future  -     gabapentin (NEURONTIN) 300 MG capsule; Take 1 capsule (300 mg total) by mouth 3 (three) times daily.  -     methocarbamoL (ROBAXIN) 750 MG Tab; Take 1-2 tablets (750-1,500 mg total) by mouth 3 (three) times daily as needed (muscle tension).      New imaging ordered, I will call with results and order another DIGNA.      This service was not originating from a related E/M service provided within the previous 7 days nor will  to an E/M service or procedure within the next 24 hours or my soonest available appointment.  Prevailing standard of care was able to be met in this audio-only visit.

## 2024-12-19 ENCOUNTER — OFFICE VISIT (OUTPATIENT)
Dept: ORTHOPEDICS | Facility: CLINIC | Age: 64
End: 2024-12-19
Payer: COMMERCIAL

## 2024-12-19 ENCOUNTER — OFFICE VISIT (OUTPATIENT)
Dept: INTERNAL MEDICINE | Facility: CLINIC | Age: 64
End: 2024-12-19
Payer: COMMERCIAL

## 2024-12-19 ENCOUNTER — TELEPHONE (OUTPATIENT)
Dept: ORTHOPEDICS | Facility: CLINIC | Age: 64
End: 2024-12-19
Payer: COMMERCIAL

## 2024-12-19 ENCOUNTER — LAB VISIT (OUTPATIENT)
Dept: LAB | Facility: HOSPITAL | Age: 64
End: 2024-12-19
Attending: INTERNAL MEDICINE
Payer: COMMERCIAL

## 2024-12-19 ENCOUNTER — PATIENT MESSAGE (OUTPATIENT)
Dept: INTERNAL MEDICINE | Facility: CLINIC | Age: 64
End: 2024-12-19

## 2024-12-19 VITALS
HEART RATE: 76 BPM | OXYGEN SATURATION: 98 % | SYSTOLIC BLOOD PRESSURE: 150 MMHG | HEIGHT: 70 IN | DIASTOLIC BLOOD PRESSURE: 80 MMHG | WEIGHT: 220.69 LBS | BODY MASS INDEX: 31.59 KG/M2

## 2024-12-19 DIAGNOSIS — Z12.5 ENCOUNTER FOR PROSTATE CANCER SCREENING: ICD-10-CM

## 2024-12-19 DIAGNOSIS — Z00.00 ANNUAL PHYSICAL EXAM: ICD-10-CM

## 2024-12-19 DIAGNOSIS — K42.9 UMBILICAL HERNIA WITHOUT OBSTRUCTION AND WITHOUT GANGRENE: ICD-10-CM

## 2024-12-19 DIAGNOSIS — M54.12 CERVICAL RADICULITIS: ICD-10-CM

## 2024-12-19 DIAGNOSIS — M54.12 CERVICAL RADICULOPATHY: Primary | ICD-10-CM

## 2024-12-19 DIAGNOSIS — J44.9 CHRONIC OBSTRUCTIVE PULMONARY DISEASE, UNSPECIFIED COPD TYPE: ICD-10-CM

## 2024-12-19 DIAGNOSIS — E78.5 HYPERLIPIDEMIA, UNSPECIFIED HYPERLIPIDEMIA TYPE: ICD-10-CM

## 2024-12-19 DIAGNOSIS — Z00.00 ANNUAL PHYSICAL EXAM: Primary | ICD-10-CM

## 2024-12-19 LAB
ALBUMIN SERPL BCP-MCNC: 4.2 G/DL (ref 3.5–5.2)
ALP SERPL-CCNC: 89 U/L (ref 40–150)
ALT SERPL W/O P-5'-P-CCNC: 22 U/L (ref 10–44)
ANION GAP SERPL CALC-SCNC: 9 MMOL/L (ref 8–16)
AST SERPL-CCNC: 18 U/L (ref 10–40)
BASOPHILS # BLD AUTO: 0.08 K/UL (ref 0–0.2)
BASOPHILS NFR BLD: 0.7 % (ref 0–1.9)
BILIRUB SERPL-MCNC: 0.6 MG/DL (ref 0.1–1)
BUN SERPL-MCNC: 13 MG/DL (ref 8–23)
CALCIUM SERPL-MCNC: 10.1 MG/DL (ref 8.7–10.5)
CHLORIDE SERPL-SCNC: 100 MMOL/L (ref 95–110)
CHOLEST SERPL-MCNC: 149 MG/DL (ref 120–199)
CHOLEST/HDLC SERPL: 2.9 {RATIO} (ref 2–5)
CO2 SERPL-SCNC: 28 MMOL/L (ref 23–29)
COMPLEXED PSA SERPL-MCNC: 0.37 NG/ML (ref 0–4)
CREAT SERPL-MCNC: 0.8 MG/DL (ref 0.5–1.4)
DIFFERENTIAL METHOD BLD: ABNORMAL
EOSINOPHIL # BLD AUTO: 0.2 K/UL (ref 0–0.5)
EOSINOPHIL NFR BLD: 1.9 % (ref 0–8)
ERYTHROCYTE [DISTWIDTH] IN BLOOD BY AUTOMATED COUNT: 15 % (ref 11.5–14.5)
EST. GFR  (NO RACE VARIABLE): >60 ML/MIN/1.73 M^2
ESTIMATED AVG GLUCOSE: 111 MG/DL (ref 68–131)
GLUCOSE SERPL-MCNC: 99 MG/DL (ref 70–110)
HBA1C MFR BLD: 5.5 % (ref 4–5.6)
HCT VFR BLD AUTO: 46.2 % (ref 40–54)
HDLC SERPL-MCNC: 52 MG/DL (ref 40–75)
HDLC SERPL: 34.9 % (ref 20–50)
HGB BLD-MCNC: 14.9 G/DL (ref 14–18)
IMM GRANULOCYTES # BLD AUTO: 0.04 K/UL (ref 0–0.04)
IMM GRANULOCYTES NFR BLD AUTO: 0.3 % (ref 0–0.5)
LDLC SERPL CALC-MCNC: 77.4 MG/DL (ref 63–159)
LYMPHOCYTES # BLD AUTO: 2.1 K/UL (ref 1–4.8)
LYMPHOCYTES NFR BLD: 17.4 % (ref 18–48)
MCH RBC QN AUTO: 28.5 PG (ref 27–31)
MCHC RBC AUTO-ENTMCNC: 32.3 G/DL (ref 32–36)
MCV RBC AUTO: 88 FL (ref 82–98)
MONOCYTES # BLD AUTO: 1 K/UL (ref 0.3–1)
MONOCYTES NFR BLD: 8 % (ref 4–15)
NEUTROPHILS # BLD AUTO: 8.5 K/UL (ref 1.8–7.7)
NEUTROPHILS NFR BLD: 71.7 % (ref 38–73)
NONHDLC SERPL-MCNC: 97 MG/DL
NRBC BLD-RTO: 0 /100 WBC
PLATELET # BLD AUTO: 441 K/UL (ref 150–450)
PMV BLD AUTO: 9.2 FL (ref 9.2–12.9)
POTASSIUM SERPL-SCNC: 4.5 MMOL/L (ref 3.5–5.1)
PROT SERPL-MCNC: 8.5 G/DL (ref 6–8.4)
RBC # BLD AUTO: 5.23 M/UL (ref 4.6–6.2)
SODIUM SERPL-SCNC: 137 MMOL/L (ref 136–145)
TRIGL SERPL-MCNC: 98 MG/DL (ref 30–150)
WBC # BLD AUTO: 11.9 K/UL (ref 3.9–12.7)

## 2024-12-19 PROCEDURE — 1160F RVW MEDS BY RX/DR IN RCRD: CPT | Mod: CPTII,S$GLB,, | Performed by: INTERNAL MEDICINE

## 2024-12-19 PROCEDURE — 1159F MED LIST DOCD IN RCRD: CPT | Mod: CPTII,95,, | Performed by: REGISTERED NURSE

## 2024-12-19 PROCEDURE — 83036 HEMOGLOBIN GLYCOSYLATED A1C: CPT | Performed by: INTERNAL MEDICINE

## 2024-12-19 PROCEDURE — 99999 PR PBB SHADOW E&M-EST. PATIENT-LVL III: CPT | Mod: PBBFAC,,, | Performed by: INTERNAL MEDICINE

## 2024-12-19 PROCEDURE — 1159F MED LIST DOCD IN RCRD: CPT | Mod: CPTII,S$GLB,, | Performed by: INTERNAL MEDICINE

## 2024-12-19 PROCEDURE — 3008F BODY MASS INDEX DOCD: CPT | Mod: CPTII,S$GLB,, | Performed by: INTERNAL MEDICINE

## 2024-12-19 PROCEDURE — 3044F HG A1C LEVEL LT 7.0%: CPT | Mod: CPTII,S$GLB,, | Performed by: INTERNAL MEDICINE

## 2024-12-19 PROCEDURE — 99396 PREV VISIT EST AGE 40-64: CPT | Mod: S$GLB,,, | Performed by: INTERNAL MEDICINE

## 2024-12-19 PROCEDURE — 80053 COMPREHEN METABOLIC PANEL: CPT | Performed by: INTERNAL MEDICINE

## 2024-12-19 PROCEDURE — 85025 COMPLETE CBC W/AUTO DIFF WBC: CPT | Performed by: INTERNAL MEDICINE

## 2024-12-19 PROCEDURE — 99214 OFFICE O/P EST MOD 30 MIN: CPT | Mod: 95,,, | Performed by: REGISTERED NURSE

## 2024-12-19 PROCEDURE — 3044F HG A1C LEVEL LT 7.0%: CPT | Mod: CPTII,95,, | Performed by: REGISTERED NURSE

## 2024-12-19 PROCEDURE — 1160F RVW MEDS BY RX/DR IN RCRD: CPT | Mod: CPTII,95,, | Performed by: REGISTERED NURSE

## 2024-12-19 PROCEDURE — 3077F SYST BP >= 140 MM HG: CPT | Mod: CPTII,S$GLB,, | Performed by: INTERNAL MEDICINE

## 2024-12-19 PROCEDURE — 80061 LIPID PANEL: CPT | Performed by: INTERNAL MEDICINE

## 2024-12-19 PROCEDURE — 4010F ACE/ARB THERAPY RXD/TAKEN: CPT | Mod: CPTII,S$GLB,, | Performed by: INTERNAL MEDICINE

## 2024-12-19 PROCEDURE — 3079F DIAST BP 80-89 MM HG: CPT | Mod: CPTII,S$GLB,, | Performed by: INTERNAL MEDICINE

## 2024-12-19 PROCEDURE — 84153 ASSAY OF PSA TOTAL: CPT | Performed by: INTERNAL MEDICINE

## 2024-12-19 PROCEDURE — 4010F ACE/ARB THERAPY RXD/TAKEN: CPT | Mod: CPTII,95,, | Performed by: REGISTERED NURSE

## 2024-12-19 RX ORDER — METHYLPREDNISOLONE 4 MG/1
TABLET ORAL
Qty: 21 EACH | Refills: 0 | Status: SHIPPED | OUTPATIENT
Start: 2024-12-19 | End: 2025-01-09

## 2024-12-19 RX ORDER — GABAPENTIN 300 MG/1
300 CAPSULE ORAL 3 TIMES DAILY
Qty: 90 CAPSULE | Refills: 11 | Status: SHIPPED | OUTPATIENT
Start: 2024-12-19 | End: 2025-12-19

## 2024-12-19 RX ORDER — METHOCARBAMOL 750 MG/1
750-1500 TABLET, FILM COATED ORAL 3 TIMES DAILY PRN
Qty: 60 TABLET | Refills: 2 | Status: SHIPPED | OUTPATIENT
Start: 2024-12-19 | End: 2025-01-18

## 2024-12-19 RX ORDER — FLUTICASONE FUROATE, UMECLIDINIUM BROMIDE AND VILANTEROL TRIFENATATE 200; 62.5; 25 UG/1; UG/1; UG/1
1 POWDER RESPIRATORY (INHALATION) DAILY
Qty: 60 EACH | Refills: 11 | Status: SHIPPED | OUTPATIENT
Start: 2024-12-19

## 2024-12-19 RX ORDER — HYDROCODONE BITARTRATE AND ACETAMINOPHEN 5; 325 MG/1; MG/1
1 TABLET ORAL EVERY 6 HOURS PRN
Qty: 28 TABLET | Refills: 0 | Status: SHIPPED | OUTPATIENT
Start: 2024-12-19

## 2024-12-19 RX ORDER — ALBUTEROL SULFATE 90 UG/1
2 INHALANT RESPIRATORY (INHALATION) EVERY 6 HOURS PRN
Qty: 18 G | Refills: 0 | Status: SHIPPED | OUTPATIENT
Start: 2024-12-19 | End: 2025-12-19

## 2024-12-19 NOTE — TELEPHONE ENCOUNTER
Spoke to patient regarding mri /x-ray and audio appointment. Patient scheduled for 11:15/11:45 am for mri/x-ray on 01/09/2025 and audio for mri /x-ray results for 12:30 pm on 01/14/2025. Patient stated thank you. Thanks.

## 2024-12-19 NOTE — PROGRESS NOTES
"    CHIEF COMPLAINT     Chief Complaint   Patient presents with    Annual Exam       HPI     Hipolito Laws is a 64 y.o. male   HTN HLD, cervical radiculitis history of heart disease, COPD here today for annual exam    Cervical radiculitis  His surgeon left the area. Saw new Surgeon this past week and is waiting for imaging. Last DIGNA was in July. Having trouble rescheduling.  He is leaving country for [place] wedding tomorrow am.     Abdominal pain  KYLEE pain. Reports started acutely after bending over.  Small bulge where his umbilicus is.      Personally Reviewed Patient's Medical, surgical, family and social hx. Changes updated in [facility].  Care Team updated in Epic    Review of Systems:  Review of Systems    Health Maintenance:   Reviewed with patient  Due for the following:      PHYSICAL EXAM     BP (!) 150/80 (BP Location: Right arm, Patient Position: Sitting)   Pulse 76   Ht 5' 10" (1.778 m)   Wt 100.1 kg (220 lb 10.9 oz)   SpO2 98%   BMI 31.66 kg/m²     Gen: Well Appearing, NAD  HEENT: PERR, EOMI  Neck: FROM, no thyromegaly, no cervical adenopathy  CVD: RRR, no M/R/G  Pulm: Normal work of breathing, CTAB, no wheezing  Abd:  Soft, NT, ND non TTP, small umbilical hernia,  MSK: no LE edema  Neuro: A&Ox3, gait normal, speech normal  Mood; Mood normal, behavior normal, thought process linear       LABS     Labs reviewed; Notable for    ASSESSMENT     1. Annual physical exam  CBC Auto Differential    Comprehensive Metabolic Panel    Hemoglobin A1C    Lipid Panel      2. Hyperlipidemia, unspecified hyperlipidemia type        3. Cervical radiculitis  methylPREDNISolone (MEDROL DOSEPACK) 4 mg tablet    HYDROcodone-acetaminophen (NORCO) 5-325 mg per tablet      4. Umbilical hernia without obstruction and without gangrene        5. Encounter for prostate cancer screening  PSA, SCREENING      6. Chronic obstructive pulmonary disease, unspecified COPD type  albuterol (VENTOLIN HFA) 90 mcg/actuation inhaler    " fluticasone-umeclidin-vilanter (TRELEGY ELLIPTA) 200-62.5-25 mcg inhaler              Plan     Hipolito Laws is a 64 y.o. male with   HTN HLD, cervical radiculitis history of heart disease, COPD   1. Annual physical exam  Updated problem list, medical history, care team and discussed HM.     - CBC Auto Differential; Future  - Comprehensive Metabolic Panel; Future  - Hemoglobin A1C; Future  - Lipid Panel; Future    2. Hyperlipidemia, unspecified hyperlipidemia type  Continue atorvastatin 80    3. Cervical radiculitis  Medrol Dosepak and short course of Norco given  Sent message to pain provider trying to reschedule  - methylPREDNISolone (MEDROL DOSEPACK) 4 mg tablet; use as directed  Dispense: 21 each; Refill: 0  - HYDROcodone-acetaminophen (NORCO) 5-325 mg per tablet; Take 1 tablet by mouth every 6 (six) hours as needed for Pain.  Dispense: 28 tablet; Refill: 0    4. Umbilical hernia without obstruction and without gangrene  Recommend hernia belt.  Discussed evaluation for elective repair.    5. Encounter for prostate cancer screening  - PSA, SCREENING; Future    6. Chronic obstructive pulmonary disease, unspecified COPD type  Will try Trelegy to see if it is more cost effective than current medication  - albuterol (VENTOLIN HFA) 90 mcg/actuation inhaler; Inhale 2 puffs into the lungs every 6 (six) hours as needed for Wheezing. Rescue  Dispense: 18 g; Refill: 0      Fabio Rosas MD

## 2025-01-07 ENCOUNTER — OFFICE VISIT (OUTPATIENT)
Dept: PULMONOLOGY | Facility: CLINIC | Age: 65
End: 2025-01-07
Payer: COMMERCIAL

## 2025-01-07 VITALS
DIASTOLIC BLOOD PRESSURE: 91 MMHG | HEIGHT: 70 IN | OXYGEN SATURATION: 98 % | BODY MASS INDEX: 31.97 KG/M2 | SYSTOLIC BLOOD PRESSURE: 153 MMHG | WEIGHT: 223.31 LBS | HEART RATE: 77 BPM

## 2025-01-07 DIAGNOSIS — F17.210 CIGARETTE NICOTINE DEPENDENCE WITHOUT COMPLICATION: Primary | ICD-10-CM

## 2025-01-07 DIAGNOSIS — I10 ESSENTIAL HYPERTENSION, BENIGN: ICD-10-CM

## 2025-01-07 DIAGNOSIS — Z87.891 FORMER CIGARETTE SMOKER: ICD-10-CM

## 2025-01-07 DIAGNOSIS — J43.8 OTHER EMPHYSEMA: ICD-10-CM

## 2025-01-07 DIAGNOSIS — J44.9 CHRONIC OBSTRUCTIVE PULMONARY DISEASE, UNSPECIFIED COPD TYPE: ICD-10-CM

## 2025-01-07 PROCEDURE — 3077F SYST BP >= 140 MM HG: CPT | Mod: CPTII,S$GLB,, | Performed by: INTERNAL MEDICINE

## 2025-01-07 PROCEDURE — 99999 PR PBB SHADOW E&M-EST. PATIENT-LVL IV: CPT | Mod: PBBFAC,,, | Performed by: INTERNAL MEDICINE

## 2025-01-07 PROCEDURE — 3008F BODY MASS INDEX DOCD: CPT | Mod: CPTII,S$GLB,, | Performed by: INTERNAL MEDICINE

## 2025-01-07 PROCEDURE — 1159F MED LIST DOCD IN RCRD: CPT | Mod: CPTII,S$GLB,, | Performed by: INTERNAL MEDICINE

## 2025-01-07 PROCEDURE — 99204 OFFICE O/P NEW MOD 45 MIN: CPT | Mod: S$GLB,,, | Performed by: INTERNAL MEDICINE

## 2025-01-07 PROCEDURE — 3080F DIAST BP >= 90 MM HG: CPT | Mod: CPTII,S$GLB,, | Performed by: INTERNAL MEDICINE

## 2025-01-08 PROBLEM — J43.8 OTHER EMPHYSEMA: Status: ACTIVE | Noted: 2025-01-08

## 2025-01-08 PROBLEM — Z87.891 FORMER CIGARETTE SMOKER: Status: ACTIVE | Noted: 2025-01-08

## 2025-01-08 NOTE — PROGRESS NOTES
Established Patient - Audio Only Telehealth Visit     The patient location is: [place]  The chief complaint leading to consultation is: MRI results  Visit type: Virtual visit with audio only (telephone)  Total time spent with patient: 20min     The reason for the audio only service rather than synchronous audio and video virtual visit was related to technical difficulties or patient preference/necessity.     Each patient to whom I provide medical services by telemedicine is:  (1) informed of the relationship between the physician and patient and the respective role of any other health care provider with respect to management of the patient; and (2) notified that they may decline to receive medical services by telemedicine and may withdraw from such care at any time. Patient verbally consented to receive this service via voice-only telephone call.    DATE: 1/14/2025  PATIENT: Hipolito Laws    Attending Physician: Neno Reyez M.D.    HISTORY:  Hipolito Laws is a 64 y.o. male who returns to me today for MRI results.  He was last seen by me 12/19/2024.  Today he is doing well but notes neck and arm pain.   The Patient denies myelopathic symptoms such as handwriting changes or difficulty with buttons/coins/keys. Denies perineal paresthesias, bowel/bladder dysfunction.    IMAGING:  Today I personally re-reviewed AP, Lat and Flex/Ex  upright C-spine films that demonstrate spondylosis from C5-C7.     Cervical MRI shows moderate stenosis from C4-7.     There is no height or weight on file to calculate BMI.    Hemoglobin A1C   Date Value Ref Range Status   12/19/2024 5.5 4.0 - 5.6 % Final     Comment:     ADA Screening Guidelines:  5.7-6.4%  Consistent with prediabetes  >or=6.5%  Consistent with diabetes    High levels of fetal hemoglobin interfere with the HbA1C  assay. Heterozygous hemoglobin variants (HbS, HgC, etc)do  not significantly interfere with this assay.   However, presence of multiple variants may affect  accuracy.     01/22/2024 5.3 4.0 - 5.6 % Final     Comment:     ADA Screening Guidelines:  5.7-6.4%  Consistent with prediabetes  >or=6.5%  Consistent with diabetes    High levels of fetal hemoglobin interfere with the HbA1C  assay. Heterozygous hemoglobin variants (HbS, HgC, etc)do  not significantly interfere with this assay.   However, presence of multiple variants may affect accuracy.     03/17/2023 5.3 4.0 - 5.6 % Final     Comment:     ADA Screening Guidelines:  5.7-6.4%  Consistent with prediabetes  >or=6.5%  Consistent with diabetes    High levels of fetal hemoglobin interfere with the HbA1C  assay. Heterozygous hemoglobin variants (HbS, HgC, etc)do  not significantly interfere with this assay.   However, presence of multiple variants may affect accuracy.           ASSESSMENT/PLAN:    Diagnoses and all orders for this visit:    Cervical radiculopathy  -     Procedure Order to Pain Management; Future      Repeat DIGNA ordered. Follow up as needed.      This service was not originating from a related E/M service provided within the previous 7 days nor will  to an E/M service or procedure within the next 24 hours or my soonest available appointment.  Prevailing standard of care was able to be met in this audio-only visit.

## 2025-01-08 NOTE — ASSESSMENT & PLAN NOTE
Strong suspicion for COPD. Patient never had PFTs. PFTs ordered but patient did not want to schedule. He has been using Breztri and feels this is helping him. Also has an active order for Trelegy. Discussed that either triple therapy appropriate as long as well covered by his insurance.

## 2025-01-08 NOTE — ASSESSMENT & PLAN NOTE
Quit approx 2014. Also with significant asbestos exposure. Yearly LDCT at least through 2029, due now.

## 2025-01-08 NOTE — PROGRESS NOTES
Subjective:       Patient ID: Hipolito Laws is a 64 y.o. male.    Chief Complaint: COPD    63 yo male former smoker who quit 10 years ago, occupational exposures to multiple chemical and absestos who presents for evaluation of RUBIO. He notes RUBIO going up 1 flight of stairs and severe after 3 flights of stairs over last year. Slightly progressive. No chest pains. No orthopnea or LE edema. He is currently on a Breztri inhaler and notes this helps his breathing but has only been using 1 puff once daily. Has an active Rx for Trelegy but reports has not filled this.     COPD  Review of Systems      Past Medical History:   Diagnosis Date    Hypertension         Family History   Problem Relation Name Age of Onset    Coronary artery disease Mother      Heart failure Mother      Coronary artery disease Father  47    Heart attack Father      Coronary artery disease Brother        If not mentioned in HPI, Family history is reviewed and not contributory    Social History     Tobacco Use    Smoking status: Former     Current packs/day: 0.00     Average packs/day: 1.5 packs/day for 30.0 years (45.0 ttl pk-yrs)     Types: Cigarettes, Vaping with nicotine     Start date: 12/3/1981     Quit date: 12/3/2011     Years since quittin.1    Smokeless tobacco: Former   Substance Use Topics    Alcohol use: Yes     Alcohol/week: 4.0 standard drinks of alcohol     Types: 4 Cans of beer per week     Comment: everyday- 4 beers a night    Drug use: Not Currently        Objective:        Vitals:    25 1537   BP: (!) 153/91   Pulse: 77     Wt Readings from Last 3 Encounters:   25 101.3 kg (223 lb 5.2 oz)   24 100.1 kg (220 lb 10.9 oz)   24 95.3 kg (210 lb)     Temp Readings from Last 3 Encounters:   24 97.8 °F (36.6 °C) (Oral)   24 98.3 °F (36.8 °C) (Oral)   10/17/23 98.8 °F (37.1 °C) (Oral)     BP Readings from Last 3 Encounters:   25 (!) 153/91   24 (!) 150/80   24 (!) 153/83      Pulse Readings from Last 3 Encounters:   01/07/25 77   12/19/24 76   07/11/24 82       Physical Exam   Constitutional: He is oriented to person, place, and time. He appears well-developed and well-nourished. He is obese.   HENT:   Head: Normocephalic.   Mouth/Throat: Oropharynx is clear and moist.   Cardiovascular: Normal rate and regular rhythm.   Pulmonary/Chest: Normal expansion, symmetric chest wall expansion and effort normal. He has wheezes.   Abdominal: Soft. He exhibits no distension.   Musculoskeletal:         General: No edema. Normal range of motion.   Lymphadenopathy: No supraclavicular adenopathy is present.     He has no cervical adenopathy.   Neurological: He is alert and oriented to person, place, and time. Gait normal.   Skin: Skin is warm and dry. No rash noted.   Psychiatric: He has a normal mood and affect. His behavior is normal. Thought content normal.   Vitals reviewed.    CBC  Lab Results   Component Value Date    WBC 11.90 12/19/2024    HGB 14.9 12/19/2024    HCT 46.2 12/19/2024    MCV 88 12/19/2024     12/19/2024         CMP  Sodium   Date Value Ref Range Status   12/19/2024 137 136 - 145 mmol/L Final     Potassium   Date Value Ref Range Status   12/19/2024 4.5 3.5 - 5.1 mmol/L Final     Chloride   Date Value Ref Range Status   12/19/2024 100 95 - 110 mmol/L Final     CO2   Date Value Ref Range Status   12/19/2024 28 23 - 29 mmol/L Final     Glucose   Date Value Ref Range Status   12/19/2024 99 70 - 110 mg/dL Final     BUN   Date Value Ref Range Status   12/19/2024 13 8 - 23 mg/dL Final     Creatinine   Date Value Ref Range Status   12/19/2024 0.8 0.5 - 1.4 mg/dL Final     Calcium   Date Value Ref Range Status   12/19/2024 10.1 8.7 - 10.5 mg/dL Final     Total Protein   Date Value Ref Range Status   12/19/2024 8.5 (H) 6.0 - 8.4 g/dL Final     Albumin   Date Value Ref Range Status   12/19/2024 4.2 3.5 - 5.2 g/dL Final     Total Bilirubin   Date Value Ref Range Status   12/19/2024  "0.6 0.1 - 1.0 mg/dL Final     Comment:     For infants and newborns, interpretation of results should be based  on gestational age, weight and in agreement with clinical  observations.    Premature Infant recommended reference ranges:  Up to 24 hours.............<8.0 mg/dL  Up to 48 hours............<12.0 mg/dL  3-5 days..................<15.0 mg/dL  6-29 days.................<15.0 mg/dL       Alkaline Phosphatase   Date Value Ref Range Status   12/19/2024 89 40 - 150 U/L Final     AST   Date Value Ref Range Status   12/19/2024 18 10 - 40 U/L Final     ALT   Date Value Ref Range Status   12/19/2024 22 10 - 44 U/L Final     Anion Gap   Date Value Ref Range Status   12/19/2024 9 8 - 16 mmol/L Final     eGFR   Date Value Ref Range Status   12/19/2024 >60.0 >60 mL/min/1.73 m^2 Final       ABG  No results found for: "PH", "PO2", "PCO2"        Personal Diagnostic Review  I have personally reviewed the following data and added my own interpretation as below:  1/22/24 CT Chest images personally reviewed and shows mild emphysema, micronodules, no ILD  PCP note reviewed      1/7/2025     3:37 PM 12/19/2024    11:25 AM 7/11/2024     9:20 AM 7/11/2024     9:05 AM 7/11/2024     8:57 AM 7/11/2024     8:52 AM 7/11/2024     8:47 AM   Pulmonary Function Tests   SpO2 98 % 98 % 95 % 93 % 95 % 96 % 100 %   Height 5' 10" (1.778 m) 5' 10" (1.778 m)        Weight 101.3 kg (223 lb 5.2 oz) 100.1 kg (220 lb 10.9 oz)        BMI (Calculated) 32 31.7              Assessment:       1. Cigarette nicotine dependence without complication    2. Chronic obstructive pulmonary disease, unspecified COPD type    3. Former cigarette smoker    4. Other emphysema    5. Essential hypertension, benign        Outpatient Encounter Medications as of 1/7/2025   Medication Sig Dispense Refill    albuterol (VENTOLIN HFA) 90 mcg/actuation inhaler Inhale 2 puffs into the lungs every 6 (six) hours as needed for Wheezing. Rescue 18 g 0    aspirin 81 MG Chew Take 375 " mg by mouth once daily.      atorvastatin (LIPITOR) 80 MG tablet Take 1 tablet (80 mg total) by mouth once daily. 90 tablet 3    DULoxetine (CYMBALTA) 30 MG capsule Take 1 capsule (30 mg total) by mouth once daily. 90 capsule 0    fluticasone-umeclidin-vilanter (TRELEGY ELLIPTA) 200-62.5-25 mcg inhaler Inhale 1 puff into the lungs once daily. 60 each 11    gabapentin (NEURONTIN) 300 MG capsule Take 1 capsule (300 mg total) by mouth 3 (three) times daily. 90 capsule 11    HYDROcodone-acetaminophen (NORCO) 5-325 mg per tablet Take 1 tablet by mouth every 6 (six) hours as needed for Pain. 28 tablet 0    losartan (COZAAR) 50 MG tablet Take 1 tablet (50 mg total) by mouth once daily. 90 tablet 3    meloxicam (MOBIC) 15 MG tablet Take 1 tablet (15 mg total) by mouth once daily. 90 tablet 3    methocarbamoL (ROBAXIN) 750 MG Tab Take 1-2 tablets (750-1,500 mg total) by mouth 3 (three) times daily as needed (muscle tension). 60 tablet 2    methylPREDNISolone (MEDROL DOSEPACK) 4 mg tablet use as directed 21 each 0    multivitamin capsule Take 1 capsule by mouth once daily.      vitamin D (VITAMIN D3) 1000 units Tab Take 1,000 Units by mouth once daily.      [] eszopiclone (LUNESTA) 2 MG Tab Take 1 tablet (2 mg total) by mouth every evening. 30 tablet 5    [] RSV, preF A and preF B,PF, (ABRYSVO, PF,) 120 mcg/0.5 mL SolR vaccine Inject 0.5 mLs (120 mcg total) into the muscle once. for 1 dose 1 each 0    [DISCONTINUED] albuterol (VENTOLIN HFA) 90 mcg/actuation inhaler Inhale 2 puffs into the lungs every 6 (six) hours as needed for Wheezing. Rescue 18 g 0    [DISCONTINUED] budesonide-glycopyr-formoterol (BREZTRI AEROSPHERE) 160-9-4.8 mcg/actuation HFAA Inhale 2 puffs into the lungs 2 (two) times daily. 32.1 g 3     Facility-Administered Encounter Medications as of 2025   Medication Dose Route Frequency Provider Last Rate Last Admin    midazolam (VERSED) 1 mg/mL injection 2 mg  2 mg Intravenous  Gualberto Sanchez MD   2 mg at 09/14/22 1220     1. Chronic obstructive pulmonary disease, unspecified COPD type  - Ambulatory referral/consult to Pulmonology  - Complete PFT with bronchodilator; Future    2. Cigarette nicotine dependence without complication  - CT Chest Lung Screening Low Dose; Future    3. Former cigarette smoker    4. Other emphysema    5. Essential hypertension, benign    Plan:     Problem List Items Addressed This Visit          Pulmonary    Other emphysema    Current Assessment & Plan     Strong suspicion for COPD. Patient never had PFTs. PFTs ordered but patient did not want to schedule. He has been using Breztri and feels this is helping him. Also has an active order for Trelegy. Discussed that either triple therapy appropriate as long as well covered by his insurance.            Cardiac/Vascular    Essential hypertension, benign    Current Assessment & Plan     Avoid beta blockers            Other    Former cigarette smoker    Current Assessment & Plan     Quit approx 2014. Also with significant asbestos exposure. Yearly LDCT at least through 2029, due now.          Other Visit Diagnoses       Cigarette nicotine dependence without complication    -  Primary    Relevant Orders    CT Chest Lung Screening Low Dose    Chronic obstructive pulmonary disease, unspecified COPD type        Relevant Orders    Complete PFT with bronchodilator            Please note Overview Notes are historic documentation. Please review A/P for current updates.  No follow-ups on file.    Future Appointments   Date Time Provider Department Center   1/9/2025 11:15 AM [facility]-MRI2 [facility]   1/9/2025 11:45 AM [facility]-XRAY [facility] XRAY [facility]   1/9/2025 12:15 PM [facility]-CT1 500 LB LIMIT [facility] CTSC [facility]   1/14/2025 12:30 PM TIMO Pizano NP [facility] SPINE [address]   1/27/2025 10:00 AM Rupesh Tuttle MD [facility] PULMON [place]   2/5/2025  3:00 PM Yunier Lawton MD [facility] PAINMGT  [facility]   6/19/2025  4:15 PM [facility] MD [facility]           Rupesh Tuttle MD

## 2025-01-08 NOTE — H&P (VIEW-ONLY)
Established Patient - Audio Only Telehealth Visit     The patient location is: [place]  The chief complaint leading to consultation is: MRI results  Visit type: Virtual visit with audio only (telephone)  Total time spent with patient: 20min     The reason for the audio only service rather than synchronous audio and video virtual visit was related to technical difficulties or patient preference/necessity.     Each patient to whom I provide medical services by telemedicine is:  (1) informed of the relationship between the physician and patient and the respective role of any other health care provider with respect to management of the patient; and (2) notified that they may decline to receive medical services by telemedicine and may withdraw from such care at any time. Patient verbally consented to receive this service via voice-only telephone call.    DATE: 1/14/2025  PATIENT: Hipolito aLws    Attending Physician: Neno Reyez M.D.    HISTORY:  Hipolito Laws is a 64 y.o. male who returns to me today for MRI results.  He was last seen by me 12/19/2024.  Today he is doing well but notes neck and arm pain.   The Patient denies myelopathic symptoms such as handwriting changes or difficulty with buttons/coins/keys. Denies perineal paresthesias, bowel/bladder dysfunction.    IMAGING:  Today I personally re-reviewed AP, Lat and Flex/Ex  upright C-spine films that demonstrate spondylosis from C5-C7.     Cervical MRI shows moderate stenosis from C4-7.     There is no height or weight on file to calculate BMI.    Hemoglobin A1C   Date Value Ref Range Status   12/19/2024 5.5 4.0 - 5.6 % Final     Comment:     ADA Screening Guidelines:  5.7-6.4%  Consistent with prediabetes  >or=6.5%  Consistent with diabetes    High levels of fetal hemoglobin interfere with the HbA1C  assay. Heterozygous hemoglobin variants (HbS, HgC, etc)do  not significantly interfere with this assay.   However, presence of multiple variants may affect  accuracy.     01/22/2024 5.3 4.0 - 5.6 % Final     Comment:     ADA Screening Guidelines:  5.7-6.4%  Consistent with prediabetes  >or=6.5%  Consistent with diabetes    High levels of fetal hemoglobin interfere with the HbA1C  assay. Heterozygous hemoglobin variants (HbS, HgC, etc)do  not significantly interfere with this assay.   However, presence of multiple variants may affect accuracy.     03/17/2023 5.3 4.0 - 5.6 % Final     Comment:     ADA Screening Guidelines:  5.7-6.4%  Consistent with prediabetes  >or=6.5%  Consistent with diabetes    High levels of fetal hemoglobin interfere with the HbA1C  assay. Heterozygous hemoglobin variants (HbS, HgC, etc)do  not significantly interfere with this assay.   However, presence of multiple variants may affect accuracy.           ASSESSMENT/PLAN:    Diagnoses and all orders for this visit:    Cervical radiculopathy  -     Procedure Order to Pain Management; Future      Repeat DIGNA ordered. Follow up as needed.      This service was not originating from a related E/M service provided within the previous 7 days nor will  to an E/M service or procedure within the next 24 hours or my soonest available appointment.  Prevailing standard of care was able to be met in this audio-only visit.

## 2025-01-09 ENCOUNTER — HOSPITAL ENCOUNTER (OUTPATIENT)
Dept: RADIOLOGY | Facility: HOSPITAL | Age: 65
Discharge: HOME | End: 2025-01-09
Attending: REGISTERED NURSE
Payer: COMMERCIAL

## 2025-01-09 ENCOUNTER — PATIENT MESSAGE (OUTPATIENT)
Dept: PAIN MEDICINE | Facility: CLINIC | Age: 65
End: 2025-01-09
Payer: COMMERCIAL

## 2025-01-09 ENCOUNTER — PATIENT MESSAGE (OUTPATIENT)
Dept: INTERNAL MEDICINE | Facility: CLINIC | Age: 65
End: 2025-01-09
Payer: COMMERCIAL

## 2025-01-09 ENCOUNTER — HOSPITAL ENCOUNTER (OUTPATIENT)
Dept: RADIOLOGY | Facility: HOSPITAL | Age: 65
Discharge: HOME | End: 2025-01-09
Attending: INTERNAL MEDICINE
Payer: COMMERCIAL

## 2025-01-09 DIAGNOSIS — F41.9 ANXIETY AND DEPRESSION: ICD-10-CM

## 2025-01-09 DIAGNOSIS — M54.12 CERVICAL RADICULOPATHY: ICD-10-CM

## 2025-01-09 DIAGNOSIS — F32.A ANXIETY AND DEPRESSION: ICD-10-CM

## 2025-01-09 DIAGNOSIS — F17.210 CIGARETTE NICOTINE DEPENDENCE WITHOUT COMPLICATION: ICD-10-CM

## 2025-01-09 PROCEDURE — 72050 X-RAY EXAM NECK SPINE 4/5VWS: CPT | Mod: 26,,, | Performed by: RADIOLOGY

## 2025-01-09 PROCEDURE — 72141 MRI NECK SPINE W/O DYE: CPT | Mod: 26,,, | Performed by: RADIOLOGY

## 2025-01-09 PROCEDURE — 72050 X-RAY EXAM NECK SPINE 4/5VWS: CPT | Mod: TC

## 2025-01-09 PROCEDURE — 72141 MRI NECK SPINE W/O DYE: CPT | Mod: TC

## 2025-01-09 PROCEDURE — 71271 CT THORAX LUNG CANCER SCR C-: CPT | Mod: 26,,, | Performed by: RADIOLOGY

## 2025-01-09 PROCEDURE — 71271 CT THORAX LUNG CANCER SCR C-: CPT | Mod: TC

## 2025-01-09 NOTE — TELEPHONE ENCOUNTER
No care due was identified.  [facility] Embedded Care Due Messages. Reference number: [id number].   1/09/2025 2:40:20 PM CST

## 2025-01-12 DIAGNOSIS — F32.A ANXIETY AND DEPRESSION: ICD-10-CM

## 2025-01-12 DIAGNOSIS — F41.9 ANXIETY AND DEPRESSION: ICD-10-CM

## 2025-01-12 RX ORDER — DULOXETIN HYDROCHLORIDE 30 MG/1
30 CAPSULE, DELAYED RELEASE ORAL DAILY
Qty: 90 CAPSULE | Refills: 0 | Status: SHIPPED | OUTPATIENT
Start: 2025-01-12

## 2025-01-12 RX ORDER — DULOXETIN HYDROCHLORIDE 30 MG/1
30 CAPSULE, DELAYED RELEASE ORAL DAILY
Qty: 90 CAPSULE | Refills: 0 | Status: CANCELLED | OUTPATIENT
Start: 2025-01-12

## 2025-01-12 NOTE — TELEPHONE ENCOUNTER
No care due was identified.  [facility] Embedded Care Due Messages. Reference number: [id number].   1/12/2025 1:42:15 PM CST

## 2025-01-13 ENCOUNTER — PATIENT MESSAGE (OUTPATIENT)
Dept: PAIN MEDICINE | Facility: CLINIC | Age: 65
End: 2025-01-13
Payer: COMMERCIAL

## 2025-01-13 DIAGNOSIS — M54.12 CERVICAL RADICULITIS: ICD-10-CM

## 2025-01-13 RX ORDER — HYDROCODONE BITARTRATE AND ACETAMINOPHEN 5; 325 MG/1; MG/1
1 TABLET ORAL EVERY 6 HOURS PRN
Qty: 28 TABLET | Refills: 0 | Status: SHIPPED | OUTPATIENT
Start: 2025-01-13

## 2025-01-13 NOTE — TELEPHONE ENCOUNTER
Spoke to patient.  Given another script of Norco.  Will reach out to [facility] to see if he needs referral or can just schedule for evaluation for new injection

## 2025-01-14 ENCOUNTER — OFFICE VISIT (OUTPATIENT)
Dept: ORTHOPEDICS | Facility: CLINIC | Age: 65
End: 2025-01-14
Payer: COMMERCIAL

## 2025-01-14 DIAGNOSIS — M54.12 CERVICAL RADICULOPATHY: Primary | ICD-10-CM

## 2025-01-14 PROCEDURE — 1159F MED LIST DOCD IN RCRD: CPT | Mod: CPTII,93,, | Performed by: REGISTERED NURSE

## 2025-01-14 PROCEDURE — 1160F RVW MEDS BY RX/DR IN RCRD: CPT | Mod: CPTII,93,, | Performed by: REGISTERED NURSE

## 2025-01-14 PROCEDURE — 98005 SYNCH AUDIO-VIDEO EST LOW 20: CPT | Mod: 93,,, | Performed by: REGISTERED NURSE

## 2025-01-15 ENCOUNTER — PATIENT MESSAGE (OUTPATIENT)
Dept: PAIN MEDICINE | Facility: OTHER | Age: 65
End: 2025-01-15
Payer: COMMERCIAL

## 2025-02-03 ENCOUNTER — OFFICE VISIT (OUTPATIENT)
Dept: PULMONOLOGY | Facility: CLINIC | Age: 65
End: 2025-02-03
Payer: COMMERCIAL

## 2025-02-03 ENCOUNTER — HOSPITAL ENCOUNTER (OUTPATIENT)
Facility: OTHER | Age: 65
Discharge: HOME | End: 2025-02-03
Attending: ANESTHESIOLOGY | Admitting: ANESTHESIOLOGY
Payer: COMMERCIAL

## 2025-02-03 VITALS
RESPIRATION RATE: 18 BRPM | HEART RATE: 81 BPM | HEIGHT: 70 IN | DIASTOLIC BLOOD PRESSURE: 88 MMHG | OXYGEN SATURATION: 95 % | TEMPERATURE: 98 F | SYSTOLIC BLOOD PRESSURE: 177 MMHG | WEIGHT: 215 LBS | BODY MASS INDEX: 30.78 KG/M2

## 2025-02-03 VITALS
HEART RATE: 91 BPM | BODY MASS INDEX: 31.75 KG/M2 | HEIGHT: 70 IN | SYSTOLIC BLOOD PRESSURE: 175 MMHG | DIASTOLIC BLOOD PRESSURE: 98 MMHG | OXYGEN SATURATION: 96 % | WEIGHT: 221.81 LBS

## 2025-02-03 DIAGNOSIS — J43.8 OTHER EMPHYSEMA: Primary | ICD-10-CM

## 2025-02-03 DIAGNOSIS — M54.12 CERVICAL RADICULITIS: ICD-10-CM

## 2025-02-03 DIAGNOSIS — M54.12 CERVICAL RADICULOPATHY: Primary | ICD-10-CM

## 2025-02-03 DIAGNOSIS — G89.29 CHRONIC PAIN: ICD-10-CM

## 2025-02-03 DIAGNOSIS — Z87.891 FORMER CIGARETTE SMOKER: ICD-10-CM

## 2025-02-03 PROCEDURE — 62321 NJX INTERLAMINAR CRV/THRC: CPT | Performed by: ANESTHESIOLOGY

## 2025-02-03 PROCEDURE — 99152 MOD SED SAME PHYS/QHP 5/>YRS: CPT | Performed by: ANESTHESIOLOGY

## 2025-02-03 PROCEDURE — 1159F MED LIST DOCD IN RCRD: CPT | Mod: CPTII,S$GLB,, | Performed by: INTERNAL MEDICINE

## 2025-02-03 PROCEDURE — 3080F DIAST BP >= 90 MM HG: CPT | Mod: CPTII,S$GLB,, | Performed by: INTERNAL MEDICINE

## 2025-02-03 PROCEDURE — 62321 NJX INTERLAMINAR CRV/THRC: CPT | Mod: ,,, | Performed by: ANESTHESIOLOGY

## 2025-02-03 PROCEDURE — 99214 OFFICE O/P EST MOD 30 MIN: CPT | Mod: S$GLB,,, | Performed by: INTERNAL MEDICINE

## 2025-02-03 PROCEDURE — 99999 PR PBB SHADOW E&M-EST. PATIENT-LVL III: CPT | Mod: PBBFAC,,, | Performed by: INTERNAL MEDICINE

## 2025-02-03 PROCEDURE — 63600175 PHARM REV CODE 636 W HCPCS: Performed by: ANESTHESIOLOGY

## 2025-02-03 PROCEDURE — 3008F BODY MASS INDEX DOCD: CPT | Mod: CPTII,S$GLB,, | Performed by: INTERNAL MEDICINE

## 2025-02-03 PROCEDURE — 3077F SYST BP >= 140 MM HG: CPT | Mod: CPTII,S$GLB,, | Performed by: INTERNAL MEDICINE

## 2025-02-03 PROCEDURE — 4010F ACE/ARB THERAPY RXD/TAKEN: CPT | Mod: CPTII,S$GLB,, | Performed by: INTERNAL MEDICINE

## 2025-02-03 RX ORDER — SODIUM CHLORIDE 9 MG/ML
INJECTION, SOLUTION INTRAVENOUS CONTINUOUS
Status: DISCONTINUED | OUTPATIENT
Start: 2025-02-03 | End: 2025-02-03 | Stop reason: HOSPADM

## 2025-02-03 RX ORDER — LIDOCAINE HYDROCHLORIDE 10 MG/ML
INJECTION, SOLUTION EPIDURAL; INFILTRATION; INTRACAUDAL; PERINEURAL
Status: DISCONTINUED | OUTPATIENT
Start: 2025-02-03 | End: 2025-02-03 | Stop reason: HOSPADM

## 2025-02-03 RX ORDER — FENTANYL CITRATE 50 UG/ML
INJECTION, SOLUTION INTRAMUSCULAR; INTRAVENOUS
Status: DISCONTINUED | OUTPATIENT
Start: 2025-02-03 | End: 2025-02-03 | Stop reason: HOSPADM

## 2025-02-03 RX ORDER — LIDOCAINE HYDROCHLORIDE 20 MG/ML
INJECTION, SOLUTION INFILTRATION; PERINEURAL
Status: DISCONTINUED | OUTPATIENT
Start: 2025-02-03 | End: 2025-02-03 | Stop reason: HOSPADM

## 2025-02-03 RX ORDER — DEXAMETHASONE SODIUM PHOSPHATE 10 MG/ML
INJECTION, SOLUTION INTRA-ARTICULAR; INTRALESIONAL; INTRAMUSCULAR; INTRAVENOUS; SOFT TISSUE
Status: DISCONTINUED | OUTPATIENT
Start: 2025-02-03 | End: 2025-02-03 | Stop reason: HOSPADM

## 2025-02-03 RX ORDER — MIDAZOLAM HYDROCHLORIDE 1 MG/ML
INJECTION INTRAMUSCULAR; INTRAVENOUS
Status: DISCONTINUED | OUTPATIENT
Start: 2025-02-03 | End: 2025-02-03 | Stop reason: HOSPADM

## 2025-02-03 RX ORDER — ALBUTEROL SULFATE 90 UG/1
2 INHALANT RESPIRATORY (INHALATION) EVERY 4 HOURS PRN
Qty: 18 G | Refills: 11 | Status: SHIPPED | OUTPATIENT
Start: 2025-02-03 | End: 2026-02-03

## 2025-02-03 NOTE — ASSESSMENT & PLAN NOTE
Quit approx 2014. Also with significant asbestos exposure. Yearly LDCT at least through 2029, next in Jan 2026.

## 2025-02-03 NOTE — DISCHARGE SUMMARY
Discharge Note  Short Stay      SUMMARY     Admit Date: 2/3/2025    Attending Physician: Yunier Lawton      Discharge Physician: Yunier Lawton      Discharge Date: 2/3/2025 8:44 AM    Procedure(s) (LRB):  CERVICAL C7/T1 IL DIGNA DIRECT REFERRAL *ASPIRIN OTC* HOLD FOR 5 DAYS (N/A)    Final Diagnosis: Cervical radiculopathy [M54.12]    Disposition: Home or self care    Patient Instructions:   Current Discharge Medication List        CONTINUE these medications which have NOT CHANGED    Details   albuterol (VENTOLIN HFA) 90 mcg/actuation inhaler Inhale 2 puffs into the lungs every 6 (six) hours as needed for Wheezing. Rescue  Qty: 18 g, Refills: 0    Associated Diagnoses: Chronic obstructive pulmonary disease, unspecified COPD type      aspirin 81 MG Chew Take 375 mg by mouth once daily.      atorvastatin (LIPITOR) 80 MG tablet Take 1 tablet (80 mg total) by mouth once daily.  Qty: 90 tablet, Refills: 3    Associated Diagnoses: Hyperlipidemia, unspecified hyperlipidemia type      DULoxetine (CYMBALTA) 30 MG capsule Take 1 capsule (30 mg total) by mouth once daily.  Qty: 90 capsule, Refills: 0    Associated Diagnoses: Anxiety and depression      fluticasone-umeclidin-vilanter (TRELEGY ELLIPTA) 200-62.5-25 mcg inhaler Inhale 1 puff into the lungs once daily.  Qty: 60 each, Refills: 11    Associated Diagnoses: Chronic obstructive pulmonary disease, unspecified COPD type      gabapentin (NEURONTIN) 300 MG capsule Take 1 capsule (300 mg total) by mouth 3 (three) times daily.  Qty: 90 capsule, Refills: 11    Associated Diagnoses: Cervical radiculopathy      HYDROcodone-acetaminophen (NORCO) 5-325 mg per tablet Take 1 tablet by mouth every 6 (six) hours as needed for Pain.  Qty: 28 tablet, Refills: 0    Comments: Quantity prescribed more than 7 day supply? No  Associated Diagnoses: Cervical radiculitis      losartan (COZAAR) 50 MG tablet Take 1 tablet (50 mg total) by mouth once daily.  Qty: 90 tablet, Refills: 3    Comments:  .  Associated Diagnoses: Essential hypertension, benign      meloxicam (MOBIC) 15 MG tablet Take 1 tablet (15 mg total) by mouth once daily.  Qty: 90 tablet, Refills: 3    Associated Diagnoses: Chronic pain of left ankle      multivitamin capsule Take 1 capsule by mouth once daily.      vitamin D (VITAMIN D3) 1000 units Tab Take 1,000 Units by mouth once daily.                 Discharge Diagnosis: Cervical radiculopathy [M54.12]  Condition on Discharge: Stable with no complications to procedure   Diet on Discharge: Same as before.  Activity: as per instruction sheet.  Discharge to: Home with a responsible adult.  Follow up: 2-4 weeks       Please call my office or pager at [phone] if experienced any weakness or loss of sensation, fever > 101.5, pain uncontrolled with oral medications, persistent nausea/vomiting/or diarrhea, redness or drainage from the incisions, or any other worrisome concerns. If physician on call was not reached or could not communicate with our office for any reason please go to the nearest emergency department

## 2025-02-03 NOTE — PROGRESS NOTES
Subjective:       Patient ID: Hipolito Laws is a 64 y.o. male.  The patient's last visit with me was on 1/7/2025.     Patient has been using both Breztri and Trelegy. Discussed only using 1 controller inhaler. He is unsure they are helping at all with dyspnea but has been distracted by multiple chronic pain issues that have flaired up and can't be certain. Discussed role of regular exercise in chronic lung disease. Discussed roles of inhalers.  Review of Systems    Objective:      Vitals:    02/03/25 1554   BP: (!) 175/98   Pulse: 91     Wt Readings from Last 3 Encounters:   02/03/25 100.6 kg (221 lb 12.5 oz)   02/03/25 97.5 kg (215 lb)   01/07/25 101.3 kg (223 lb 5.2 oz)     Temp Readings from Last 3 Encounters:   02/03/25 98.4 °F (36.9 °C) (Oral)   07/11/24 97.8 °F (36.6 °C) (Oral)   01/22/24 98.3 °F (36.8 °C) (Oral)     BP Readings from Last 3 Encounters:   02/03/25 (!) 175/98   02/03/25 (!) 177/88   01/07/25 (!) 153/91     Pulse Readings from Last 3 Encounters:   02/03/25 91   02/03/25 81   01/07/25 77       Physical Exam    CBC  Lab Results   Component Value Date    WBC 11.90 12/19/2024    HGB 14.9 12/19/2024    HCT 46.2 12/19/2024    MCV 88 12/19/2024     12/19/2024         CMP  Sodium   Date Value Ref Range Status   12/19/2024 137 136 - 145 mmol/L Final     Potassium   Date Value Ref Range Status   12/19/2024 4.5 3.5 - 5.1 mmol/L Final     Chloride   Date Value Ref Range Status   12/19/2024 100 95 - 110 mmol/L Final     CO2   Date Value Ref Range Status   12/19/2024 28 23 - 29 mmol/L Final     Glucose   Date Value Ref Range Status   12/19/2024 99 70 - 110 mg/dL Final     BUN   Date Value Ref Range Status   12/19/2024 13 8 - 23 mg/dL Final     Creatinine   Date Value Ref Range Status   12/19/2024 0.8 0.5 - 1.4 mg/dL Final     Calcium   Date Value Ref Range Status   12/19/2024 10.1 8.7 - 10.5 mg/dL Final     Total Protein   Date Value Ref Range Status   12/19/2024 8.5 (H) 6.0 - 8.4 g/dL Final  "    Albumin   Date Value Ref Range Status   12/19/2024 4.2 3.5 - 5.2 g/dL Final     Total Bilirubin   Date Value Ref Range Status   12/19/2024 0.6 0.1 - 1.0 mg/dL Final     Comment:     For infants and newborns, interpretation of results should be based  on gestational age, weight and in agreement with clinical  observations.    Premature Infant recommended reference ranges:  Up to 24 hours.............<8.0 mg/dL  Up to 48 hours............<12.0 mg/dL  3-5 days..................<15.0 mg/dL  6-29 days.................<15.0 mg/dL       Alkaline Phosphatase   Date Value Ref Range Status   12/19/2024 89 40 - 150 U/L Final     AST   Date Value Ref Range Status   12/19/2024 18 10 - 40 U/L Final     ALT   Date Value Ref Range Status   12/19/2024 22 10 - 44 U/L Final     Anion Gap   Date Value Ref Range Status   12/19/2024 9 8 - 16 mmol/L Final     eGFR   Date Value Ref Range Status   12/19/2024 >60.0 >60 mL/min/1.73 m^2 Final       ABG  No results found for: "PH", "PO2", "PCO2"        Personal Diagnostic Review  I have personally reviewed the following data and added my own interpretation as below:  CT Chest 1/9/25 images personally reviewed and shows no underlying lung disease, micronodules of no clinical concern      2/3/2025     3:54 PM 2/3/2025     9:13 AM 2/3/2025     8:58 AM 2/3/2025     8:53 AM 2/3/2025     8:48 AM 2/3/2025     8:42 AM 2/3/2025     7:48 AM   Pulmonary Function Tests   SpO2 96 % 95 % 95 % 95 % 95 % 95 % 95 %   Height 5' 10" (1.778 m)      5' 10" (1.778 m)   Weight 100.6 kg (221 lb 12.5 oz)      97.5 kg (215 lb)   BMI (Calculated) 31.8      30.8         Assessment:       1. Other emphysema    2. Former cigarette smoker        Outpatient Encounter Medications as of 2/3/2025   Medication Sig Dispense Refill    albuterol (VENTOLIN HFA) 90 mcg/actuation inhaler Inhale 2 puffs into the lungs every 6 (six) hours as needed for Wheezing. Rescue 18 g 0    aspirin 81 MG Chew Take 375 mg by mouth once daily.   "    atorvastatin (LIPITOR) 80 MG tablet Take 1 tablet (80 mg total) by mouth once daily. 90 tablet 3    DULoxetine (CYMBALTA) 30 MG capsule Take 1 capsule (30 mg total) by mouth once daily. 90 capsule 0    fluticasone-umeclidin-vilanter (TRELEGY ELLIPTA) 200-62.5-25 mcg inhaler Inhale 1 puff into the lungs once daily. 60 each 11    gabapentin (NEURONTIN) 300 MG capsule Take 1 capsule (300 mg total) by mouth 3 (three) times daily. 90 capsule 11    HYDROcodone-acetaminophen (NORCO) 5-325 mg per tablet Take 1 tablet by mouth every 6 (six) hours as needed for Pain. 28 tablet 0    losartan (COZAAR) 50 MG tablet Take 1 tablet (50 mg total) by mouth once daily. 90 tablet 3    meloxicam (MOBIC) 15 MG tablet Take 1 tablet (15 mg total) by mouth once daily. 90 tablet 3    multivitamin capsule Take 1 capsule by mouth once daily.      vitamin D (VITAMIN D3) 1000 units Tab Take 1,000 Units by mouth once daily.      albuterol (VENTOLIN HFA) 90 mcg/actuation inhaler Inhale 2 puffs into the lungs every 4 (four) hours as needed for Wheezing. Rescue 18 g 11    [] methocarbamoL (ROBAXIN) 750 MG Tab Take 1-2 tablets (750-1,500 mg total) by mouth 3 (three) times daily as needed (muscle tension). 60 tablet 2     Facility-Administered Encounter Medications as of 2/3/2025   Medication Dose Route Frequency Provider Last Rate Last Admin    midazolam (VERSED) 1 mg/mL injection 2 mg  2 mg Intravenous PRN Gualberto Fuentes MD   2 mg at 22 1220    [DISCONTINUED] 0.9% NaCl infusion   Intravenous Continuous Carlos Rosario MD        [DISCONTINUED] dexAMETHasone sodium phos (PF) injection    PRN Yunier Lawton MD   10 mg at 25 0847    [DISCONTINUED] fentaNYL injection    PRN Yunier Lawton MD   25 mcg at 25 0851    [DISCONTINUED] LIDOcaine (PF) 10 mg/ml (1%) injection    PRN Yunier Lawton MD   5 mL at 25 0847    [DISCONTINUED] LIDOcaine HCL 20 mg/ml (2%) injection    PRN Yunier Lawton MD   10 mL at  02/03/25 0847    [DISCONTINUED] midazolam (VERSED) 1 mg/mL injection    PRN Yunier Lawotn MD   2 mg at 02/03/25 0844     1. Other emphysema    2. Former cigarette smoker    Plan:     Problem List Items Addressed This Visit          Pulmonary    Other emphysema - Primary    Current Assessment & Plan     Strong suspicion for COPD. Patient never had PFTs. PFTs ordered but patient does not want to schedule.   -has been on triple therapy but unsure any benefit with his dyspnea primarily with stairs  -refill albuterol Rx  -discussed if not symptom improvement with inhalers, no clear benefit for him.            Other    Former cigarette smoker    Current Assessment & Plan     Quit approx 2014. Also with significant asbestos exposure. Yearly LDCT at least through 2029, next in Jan 2026.            Please note Overview Notes are historic documentation. Please review A/P for current updates.  No follow-ups on file.    Future Appointments   Date Time Provider Department Center   2/12/2025  3:45 PM Neno Reyez MD [facility] SPINE [facility]   2/18/2025 10:00 AM Yunier Lawton MD [facility]IN[facility]   6/19/2025  4:15 PM Fabio Rosas MD [facility] IM [facility]         Rupesh Tuttle MD

## 2025-02-03 NOTE — DISCHARGE INSTRUCTIONS

## 2025-02-03 NOTE — OP NOTE
Cervical Interlaminar Epidural Steroid Injection under Fluoroscopic Guidance    The procedure, risks, benefits, and options were discussed with the patient. There are no contraindications to the procedure. The patent expressed understanding and agreed to the procedure. Informed written consent was obtained prior to the start of the procedure and can be found in the patient's chart.     PATIENT NAME: Hipolito Laws   MRN: [id number]     DATE OF PROCEDURE: 02/03/2025    PROCEDURE: Cervical Interlaminar Epidural Steroid Injection C7/T1 under Fluoroscopic Guidance    PRE-OP DIAGNOSIS: Cervical radiculopathy [M54.12] Cervical radiculopathy [M54.12]    POST-OP DIAGNOSIS: Same    PHYSICIAN: Yunier Lawton MD     ASSISTANTS: Carlos Rosario MD  [employer] PM&R Resident Baldev Blakely MD  [facility] pain fellow     MEDICATIONS INJECTED: Preservative-free Decadron 10mg with 1cc of Lidocaine 1% MPF and preservative free normal saline    LOCAL ANESTHETIC INJECTED: Xylocaine 2%     SEDATION: Versed 2mg and Fentanyl 100mcg                                                                                                                                                                                     Conscious sedation ordered by M.D. Patient re-evaluation prior to administration of conscious sedation. No changes noted in patient's status from initial evaluation. The patient's vital signs were monitored by RN and patient remained hemodynamically stable throughout the procedure.    Event Time In   Sedation Start 0844   Sedation End 0855       ESTIMATED BLOOD LOSS: None    COMPLICATIONS: None    TECHNIQUE: Time-out was performed to identify the patient and procedure to be performed. With the patient laying in a prone position, the surgical area was prepped and draped in the usual sterile fashion using ChloraPrep and a fenestrated drape. The level was determined under fluoroscopy guidance. Skin anesthesia was achieved by injecting Lidocaine 2% over  the injection site.  The interlaminar space was then approached with a 20 gauge, 3.5 inch Tuohy needle that was introduced under fluoroscopic guidance with AP, lateral and/or contralateral oblique imaging. Once the Ligamentum flavum was encountered loss of resistance to saline was used to enter the epidural space. With positive loss of resistance and negative aspiration for CSF or Blood, contrast dye  Omnipaque (300mg/mL) was injected to confirm placement and there was no vascular runoff. Then 2 mL of the medication mixture listed above was then injected slowly. Displacement of the radio opaque contrast after injection of the medication confirmed that the medication went into the area of the epidural space. The needles were removed, and bleeding was nil. A sterile dressing was applied. No specimens collected. The patient tolerated the procedure well.     PRE-PROCEDURE PAIN SCORE: 1-9/10    POST-PROCEDURE PAIN SCORE: 5/10    The patient was monitored after the procedure in the recovery area. They were given post-procedure and discharge instructions to follow at home. The patient was discharged in a stable condition.    Yunier Lawton MD

## 2025-02-12 ENCOUNTER — PATIENT MESSAGE (OUTPATIENT)
Dept: INTERNAL MEDICINE | Facility: CLINIC | Age: 65
End: 2025-02-12
Payer: COMMERCIAL

## 2025-02-12 ENCOUNTER — OFFICE VISIT (OUTPATIENT)
Dept: ORTHOPEDICS | Facility: CLINIC | Age: 65
End: 2025-02-12
Payer: COMMERCIAL

## 2025-02-12 VITALS — BODY MASS INDEX: 31.97 KG/M2 | HEIGHT: 70 IN | WEIGHT: 223.31 LBS

## 2025-02-12 DIAGNOSIS — M47.812 CERVICAL SPONDYLOSIS: ICD-10-CM

## 2025-02-12 DIAGNOSIS — M50.30 DDD (DEGENERATIVE DISC DISEASE), CERVICAL: ICD-10-CM

## 2025-02-12 DIAGNOSIS — M54.12 CERVICAL RADICULOPATHY: Primary | ICD-10-CM

## 2025-02-12 PROCEDURE — 99999 PR PBB SHADOW E&M-EST. PATIENT-LVL III: CPT | Mod: PBBFAC,,, | Performed by: ORTHOPAEDIC SURGERY

## 2025-02-13 DIAGNOSIS — G47.00 INSOMNIA, UNSPECIFIED TYPE: ICD-10-CM

## 2025-02-13 RX ORDER — ESZOPICLONE 2 MG/1
2 TABLET, FILM COATED ORAL NIGHTLY
Qty: 30 TABLET | Refills: 5 | Status: SHIPPED | OUTPATIENT
Start: 2025-02-13 | End: 2025-08-12

## 2025-02-13 NOTE — PROGRESS NOTES
DATE: 2/13/2025  PATIENT: Hipolito Laws    Attending Physician: Neno Reyez M.D.    CHIEF COMPLAINT: neck and R shoulder pain    HISTORY:  Hipolito Laws is a 64 y.o. male presents for initial evaluation of neck and right shoulder pain (Neck - 8, Shoulder - 8). The pain has been present for 3-4 years. The patient describes the pain as dull and it radiates down to R shoulder. The pain is worse with activity and improved by rest. There is both fingertips associated numbness and tingling. There is no subjective weakness. Prior treatments have included meds (gabapentin), PT, DIGNA, but no surgery.     The Patient denies myelopathic symptoms such as handwriting changes or difficulty with buttons/coins/keys. Denies perineal paresthesias, bowel/bladder dysfunction.    The patient does not smoke, have DM or endorse IVDU. The patient is not on any blood thinners and does not take chronic narcotics. He works as an advisor.    PAST MEDICAL/SURGICAL HISTORY:  Past Medical History:   Diagnosis Date    Hypertension      Past Surgical History:   Procedure Laterality Date    ARTHROSCOPIC REPAIR OF ROTATOR CUFF OF SHOULDER  1992    ARTHROSCOPY OF ANKLE WITH DEBRIDEMENT Left 8/4/2022    Procedure: ARTHROSCOPY, ANKLE, WITH DEBRIDEMENT;  Surgeon: Aleksandr Elias MD;  Location: [facility] OR;  Service: Orthopedics;  Laterality: Left;  Calf tourniquet    CLOSURE OF WOUND Left 9/14/2022    Procedure: CLOSURE, WOUND left ankle;  Surgeon: Aleksandr Elias MD;  Location: [facility] OR;  Service: Orthopedics;  Laterality: Left;  Prevena wound VAC    EPIDURAL STEROID INJECTION N/A 12/13/2021    Procedure: INJECTION, STEROID, EPIDURAL, CERVICAL DIRECT REF/ NEED CONSENT;  Surgeon: Yunier Lawton MD;  Location: [facility] PAIN T;  Service: Pain Management;  Laterality: N/A;    EPIDURAL STEROID INJECTION N/A 6/2/2022    Procedure: INJECTION, STEROID, EPIDURAL, CERVICAL C7/T1 CONTRAST DIRECT REF;  Surgeon: Yunier Lawton MD;  Location: [facility] PAIN T;   Service: Pain Management;  Laterality: N/A;    EPIDURAL STEROID INJECTION N/A 2/16/2023    Procedure: INJECTION, STEROID, EPIDURAL, C7/T1 CERVICAL CONTRAST DIRECT REF;  Surgeon: Yunier Lawton MD;  Location: [facility] PAIN MGT;  Service: Pain Management;  Laterality: N/A;    EPIDURAL STEROID INJECTION N/A 6/26/2023    Procedure: CERVICAL DIGNA C7-T1 DIRECT REFRRAL;  Surgeon: Yunier Lawton MD;  Location: [facility] PAIN MGT;  Service: Pain Management;  Laterality: N/A;    EPIDURAL STEROID INJECTION N/A 1/22/2024    Procedure: CERVICAL DIGNA DIRECT REFERRAL;  Surgeon: Yunier Lawton MD;  Location: [facility]IN [facility]GT;  Service: Pain Management;  Laterality: N/A;  [phone]    EPIDURAL STEROID INJECTION N/A 7/11/2024    Procedure: CERVICAL DIGNA C6-7 DIRECT REFERRAL *ASPIRIN CLEARANCE IN CHART*;  Surgeon: Yunier Lawton MD;  Location: [facility] PAIN MGT;  Service: Pain Management;  Laterality: N/A;  [phone]    EPIDURAL STEROID INJECTION INTO CERVICAL SPINE Right 12/17/2020    Procedure: CERVICAL C5/6 DIGNA TRANSFORAMINAL  DIRECT REFERRAL;  Surgeon: Yunier Lawton MD;  Location: [facility] PAIN MGT;  Service: Pain Management;  Laterality: Right;  NEEDS CONSENT    SURGICAL REMOVAL OF LESION OF FIBULA Left 8/4/2022    Procedure: EXCISION, LESION, FIBULA Nonunion avulsion fragment distal fibula;  Surgeon: Aleksandr Elias MD;  Location: [facility] OR;  Service: Orthopedics;  Laterality: Left;    TRANSFORAMINAL EPIDURAL INJECTION OF STEROID Right 10/14/2021    Procedure: INJECTION, STEROID, EPIDURAL, TRANSFORAMINAL APPROACH, C5-C6 DIRECT REF/NEED CONSNET;  Surgeon: Yunier Lawton MD;  Location: [facility]IN [facility]GT;  Service: Pain Management;  Laterality: Right;    TRANSFORAMINAL EPIDURAL INJECTION OF STEROID N/A 2/3/2025    Procedure: CERVICAL C7/T1 IL DIGNA DIRECT REFERRAL *ASPIRIN OTC* HOLD FOR 5 DAYS;  Surgeon: Yunier Lawton MD;  Location: [facility]IN [facility]GT;  Service: Pain Management;  Laterality: N/A;    UMBILICAL HERNIA REPAIR N/A 4/5/2021    Procedure: REPAIR, HERNIA,  UMBILICAL, AGE 5 YEARS OR OLDER,;  Surgeon: Tim Reese MD;  Location: [facility] OR [address] [place] [address];  Service: General;  Laterality: N/A;       Current Medications:   Current Outpatient Medications:     albuterol (VENTOLIN HFA) 90 mcg/actuation inhaler, Inhale 2 puffs into the lungs every 6 (six) hours as needed for Wheezing. Rescue, Disp: 18 g, Rfl: 0    albuterol (VENTOLIN HFA) 90 mcg/actuation inhaler, Inhale 2 puffs into the lungs every 4 (four) hours as needed for Wheezing. Rescue, Disp: 18 g, Rfl: 11    aspirin 81 MG Chew, Take 375 mg by mouth once daily., Disp: , Rfl:     atorvastatin (LIPITOR) 80 MG tablet, Take 1 tablet (80 mg total) by mouth once daily., Disp: 90 tablet, Rfl: 3    DULoxetine (CYMBALTA) 30 MG capsule, Take 1 capsule (30 mg total) by mouth once daily., Disp: 90 capsule, Rfl: 0    fluticasone-umeclidin-vilanter (TRELEGY ELLIPTA) 200-62.5-25 mcg inhaler, Inhale 1 puff into the lungs once daily., Disp: 60 each, Rfl: 11    gabapentin (NEURONTIN) 300 MG capsule, Take 1 capsule (300 mg total) by mouth 3 (three) times daily., Disp: 90 capsule, Rfl: 11    HYDROcodone-acetaminophen (NORCO) 5-325 mg per tablet, Take 1 tablet by mouth every 6 (six) hours as needed for Pain., Disp: 28 tablet, Rfl: 0    losartan (COZAAR) 50 MG tablet, Take 1 tablet (50 mg total) by mouth once daily., Disp: 90 tablet, Rfl: 3    meloxicam (MOBIC) 15 MG tablet, Take 1 tablet (15 mg total) by mouth once daily., Disp: 90 tablet, Rfl: 3    multivitamin capsule, Take 1 capsule by mouth once daily., Disp: , Rfl:     vitamin D (VITAMIN D3) 1000 units Tab, Take 1,000 Units by mouth once daily., Disp: , Rfl:   No current facility-administered medications for this visit.    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   Tobacco Use    Smoking status: Former     Current packs/day: 0.00     Average packs/day: 1.5 packs/day for 30.0 years (45.0 ttl pk-yrs)     Types: Cigarettes, Vaping with nicotine     Start date: 12/3/1981     Quit date: 12/3/2011     Years since quittin.2    Smokeless tobacco: Former   Substance and Sexual Activity    Alcohol use: Yes     Alcohol/week: 4.0 standard drinks of alcohol     Types: 4 Cans of beer per week     Comment: everyday- 4 beers a night    Drug use: Not Currently    Sexual activity: Yes     Social Drivers of Health     Financial Resource Strain: Low Risk  (2024)    Overall Financial Resource Strain (CARDIA)     Difficulty of Paying Living Expenses: Not hard at all   Food Insecurity: No Food Insecurity (2024)    Hunger Vital Sign     Worried About Running Out of Food in the Last Year: Never true     Ran Out of Food in the Last Year: Never true   Physical Activity: Inactive (2024)    Exercise Vital Sign     Days of Exercise per Week: 0 days     Minutes of Exercise per Session: 0 min   Stress: Stress Concern Present (2024)    [employer] - Occupational Stress Questionnaire     Feeling of Stress : Rather much   Housing Stability: Unknown (2024)    Housing Stability Vital Sign     Unable to Pay for Housing in the Last Year: No       REVIEW OF SYSTEMS:  Constitution: Negative. Negative for chills, fever and night sweats.   Cardiovascular: Negative for chest pain and syncope.   Respiratory: Negative for cough and shortness of breath.   Gastrointestinal: See HPI. Negative for nausea/vomiting. Negative for abdominal pain.  Genitourinary: See HPI. Negative for discoloration or dysuria.  Skin: Negative for dry skin, itching and rash.   Hematologic/Lymphatic: negative for bleeding/clotting disorders.   Musculoskeletal: Negative for falls and muscle weakness.   Neurological: See HPI. no  "history of seizures. no history of cranial surgery or shunts.  Endocrine: Negative for polydipsia, polyphagia and polyuria.   Allergic/Immunologic: Negative for hives and persistent infections.  Psychiatric/Behavioral: Negative for depression and insomnia.         EXAM:  Ht 5' 10" (1.778 m)   Wt 101.3 kg (223 lb 5.2 oz)   BMI 32.04 kg/m²     General: The patient is a 64 y.o. male in no apparent distress, the patient is orientatied to person, place and time.  Psych: Normal mood and affect  HEENT: Vision grossly intact, hearing intact to the spoken word.  Lungs: Respirations unlabored.  Gait: Normal station and gait, no difficulty with toe or heel walk.   Skin: Cervical skin negative for rashes, lesions, hairy patches and surgical scars.  Range of motion: Cervical range of motion is acceptable. There is posterior cervical tenderness to palpation.  Spinal Balance: Global saggital and coronal spinal balance acceptable, no significant for scoliosis and kyphosis.  Musculoskeletal: No pain with the range of motion of the bilateral shoulders and elbows. Normal bulk and contour of the bilateral hands.  Vascular: Bilateral hands warm and well perfused, Radial pulses 2+ bilaterally.  Neurological: Normal strength and tone in all major motor groups in the bilateral upper and lower extremities. Normal sensation to light touch in the C5-T1 and L2-S1 dermatomes bilaterally.  Deep tendon reflexes symmetric 2+ in the bilateral upper and lower extremities.  Negative Inverted Radial Reflex and Lagunas's bilaterally. Negative Babinski bilaterally.     IMAGING:   Today I independently reviewed the following images and my interpretations are as follows:    AP, Lat and Flex/Ex  upright C-spine films demonstrate spondylosis and DDD.    MRI cervical showed no significant central stenosis. C3-7 mod b/l foraminal stenosis.     Body mass index is 32.04 kg/m².  Hemoglobin A1C   Date Value Ref Range Status   12/19/2024 5.5 4.0 - 5.6 % Final    "  Comment:     ADA Screening Guidelines:  5.7-6.4%  Consistent with prediabetes  >or=6.5%  Consistent with diabetes    High levels of fetal hemoglobin interfere with the HbA1C  assay. Heterozygous hemoglobin variants (HbS, HgC, etc)do  not significantly interfere with this assay.   However, presence of multiple variants may affect accuracy.     01/22/2024 5.3 4.0 - 5.6 % Final     Comment:     ADA Screening Guidelines:  5.7-6.4%  Consistent with prediabetes  >or=6.5%  Consistent with diabetes    High levels of fetal hemoglobin interfere with the HbA1C  assay. Heterozygous hemoglobin variants (HbS, HgC, etc)do  not significantly interfere with this assay.   However, presence of multiple variants may affect accuracy.     03/17/2023 5.3 4.0 - 5.6 % Final     Comment:     ADA Screening Guidelines:  5.7-6.4%  Consistent with prediabetes  >or=6.5%  Consistent with diabetes    High levels of fetal hemoglobin interfere with the HbA1C  assay. Heterozygous hemoglobin variants (HbS, HgC, etc)do  not significantly interfere with this assay.   However, presence of multiple variants may affect accuracy.         ASSESSMENT/PLAN:  Cervical radiculopathy    Cervical spondylosis    DDD (degenerative disc disease), cervical      Follow up in about 4 weeks (around 3/12/2025).    Patient has cervical spondylosis and stenosis with RUE radiculopathy. I discussed the natural history of their diagnoses as well as surgical and nonsurgical treatment options. I educated the patient on the importance of core/back strengthening, correct posture, bending/lifting ergonomics, and low-impact aerobic exercises (walking, elliptical, and aquatherapy). Continue medications. I ordered EMG BUE to further elucidate his radiculopathy. Patient will follow up in 4 weeks for EMG review.    Neno Reyez MD  Orthopaedic Spine Surgeon  Department of Orthopaedic Surgery  [phone]

## 2025-02-18 ENCOUNTER — PATIENT MESSAGE (OUTPATIENT)
Dept: INTERNAL MEDICINE | Facility: CLINIC | Age: 65
End: 2025-02-18
Payer: COMMERCIAL

## 2025-02-18 ENCOUNTER — OFFICE VISIT (OUTPATIENT)
Dept: PAIN MEDICINE | Facility: CLINIC | Age: 65
End: 2025-02-18
Attending: ANESTHESIOLOGY
Payer: COMMERCIAL

## 2025-02-18 VITALS
DIASTOLIC BLOOD PRESSURE: 77 MMHG | BODY MASS INDEX: 31.85 KG/M2 | WEIGHT: 222 LBS | HEART RATE: 85 BPM | SYSTOLIC BLOOD PRESSURE: 138 MMHG | TEMPERATURE: 98 F

## 2025-02-18 DIAGNOSIS — M71.9 CERVICOTHORACIC INTERSPINOUS BURSITIS: Primary | ICD-10-CM

## 2025-02-18 DIAGNOSIS — M47.812 CERVICAL SPONDYLOSIS: ICD-10-CM

## 2025-02-18 DIAGNOSIS — M48.02 STENOSIS OF CERVICAL SPINE REGION: ICD-10-CM

## 2025-02-18 DIAGNOSIS — M79.18 MYOFASCIAL PAIN: ICD-10-CM

## 2025-02-18 DIAGNOSIS — M54.12 CERVICAL RADICULOPATHY: ICD-10-CM

## 2025-02-18 PROCEDURE — 99999 PR PBB SHADOW E&M-EST. PATIENT-LVL IV: CPT | Mod: PBBFAC,,, | Performed by: ANESTHESIOLOGY

## 2025-02-18 NOTE — PROGRESS NOTES
"Subjective:      Patient ID: Hipolito Laws is a 64 y.o. male.    Chief Complaint: Neck Pain    Referred by: No ref. provider found     Initial HPI 2/18/2025:  Patient presents today as a new patient for upper back pain. He states he has pain at C6. He used to be seen by Dr. Burkett who was providing direct referrals for injections with Dr. Lawton, he has undergone nine cervical DIGNA over the past five years. Dr. Burkett has left [facility] and patient has been "interviewing surgeons" to see if there are surgical options.     His pain started five years ago with no inciting incident. The pain is focused to the middle of his back but can be radicular in nature. Currently the pain is only shooting into his R shoulder. He underwent ILESI C7/T1 on 2/3/25 which has provided him 80% pain relief. He states the relief from ILESI lasts four to six months, he states it can be less, but 4-6 months is the average. His pain is exacerbated by driving on a bumpy road. Mitigated by injections and his inflatable neck pillow.     Patient recently started on Gabapentin. He takes Cymbalta and Meloxicam, which provide some relief.     Pain Interventions:  12/2020:  C5/6 TFESI   10/2021: C5/6 TFESI   12/2021: RUSSELL   6/2022: C7/T1 ILESI   2/2023: C7/T1 ILESI   6/2023: C7/T1 ILESI   1/22/2024: C7/T1 ILESI   7/11/2024: C6/C7 ILESI   2/3/2025: C7/T1 ILESI       Relevant Surgeries:  none    Conservative Treatment  - Physical Therpy: Completed   - Home Exercise Program: participates   - Chiropractic: none     Medications    Gabapentin, Cymbalta, Meloxicam   Blood thinners: ASA     Relevant Imaging  MRI CERVICAL SPINE WITHOUT CONTRAST 12/2024     CLINICAL HISTORY:  Neck pain, chronic, degenerative changes on xray; Radiculopathy, cervical region     TECHNIQUE:  Multiplanar, multisequence MR images of the cervical spine were performed utilizing no contrast.     COMPARISON:  Cervical spine radiograph 11/17/2021     FINDINGS:  C1-C2: Dens is intact. "  Pre dens space is maintained.     Alignment: Straightening of the cervical lordosis.  No spondylolisthesis.     Vertebrae: No fracture or marrow infiltrative process.     Discs: Multilevel disc height loss and desiccation, most advanced at C5-C6 and C6-C7.     Cord: Normal caliber and signal.     Skull base and craniocervical junction: Normal.     Degenerative findings:     C2-C3: No spinal canal stenosis or neural foraminal narrowing.     C3-C4: Posterior disc osteophyte complex, uncovertebral spurring, moderate facet arthropathy.  Mild spinal canal stenosis.  Moderate bilateral neural foraminal narrowing.     C4-C5: Posterior disc osteophyte complex, uncovertebral spurring, mild facet arthropathy.  Moderate spinal canal stenosis.  Moderate bilateral neural foraminal narrowing.     C5-C6: Posterior disc osteophyte complex, uncovertebral spurring, mild facet arthropathy.  Moderate spinal canal stenosis.  Severe right, moderate left neural foraminal narrowing.     C6-C7: Posterior disc osteophyte complex, left greater than right uncovertebral spurring.  Moderate spinal canal stenosis.  Severe bilateral neural foraminal narrowing.     C7-T1: Posterior disc osteophyte complex.  No spinal canal stenosis or neural foraminal narrowing.     Paraspinal muscles & soft tissues: Unremarkable.     Impression:     Advanced multilevel degenerative change with multilevel moderate spinal canal stenosis and severe neural foraminal narrowing at C5-C6 and C6-C7.      Past Medical History:   Diagnosis Date    Hypertension        Past Surgical History:   Procedure Laterality Date    ARTHROSCOPIC REPAIR OF ROTATOR CUFF OF SHOULDER  1992    ARTHROSCOPY OF ANKLE WITH DEBRIDEMENT Left 8/4/2022    Procedure: ARTHROSCOPY, ANKLE, WITH DEBRIDEMENT;  Surgeon: Aleksandr Elias MD;  Location: [facility];  Service: Orthopedics;  Laterality: Left;  Calf tourniquet    CLOSURE OF WOUND Left 9/14/2022    Procedure: CLOSURE, WOUND left ankle;  Surgeon:  Aleksandr Elias MD;  Location: [facility] OR;  Service: Orthopedics;  Laterality: Left;  Prevena wound VAC    EPIDURAL STEROID INJECTION N/A 12/13/2021    Procedure: INJECTION, STEROID, EPIDURAL, CERVICAL DIRECT REF/ NEED CONSENT;  Surgeon: Yunier Lawton MD;  Location: [facility];  Service: Pain Management;  Laterality: N/A;    EPIDURAL STEROID INJECTION N/A 6/2/2022    Procedure: INJECTION, STEROID, EPIDURAL, CERVICAL C7/T1 CONTRAST DIRECT REF;  Surgeon: Yunier Lawton MD;  Location: [facility];  Service: Pain Management;  Laterality: N/A;    EPIDURAL STEROID INJECTION N/A 2/16/2023    Procedure: INJECTION, STEROID, EPIDURAL, C7/T1 CERVICAL CONTRAST DIRECT REF;  Surgeon: Yunier Lawton MD;  Location: [facility];  Service: Pain Management;  Laterality: N/A;    EPIDURAL STEROID INJECTION N/A 6/26/2023    Procedure: CERVICAL DIGNA C7-T1 DIRECT REFRRAL;  Surgeon: Yunier Lawton MD;  Location: [facility];  Service: Pain Management;  Laterality: N/A;    EPIDURAL STEROID INJECTION N/A 1/22/2024    Procedure: CERVICAL DIGNA DIRECT REFERRAL;  Surgeon: Yunier Lawton MD;  Location: [facility];  Service: Pain Management;  Laterality: N/A;  [phone]    EPIDURAL STEROID INJECTION N/A 7/11/2024    Procedure: CERVICAL DIGNA C6-7 DIRECT REFERRAL *ASPIRIN CLEARANCE IN CHART*;  Surgeon: Yunier Lawton MD;  Location: [facility];  Service: Pain Management;  Laterality: N/A;  [phone]    EPIDURAL STEROID INJECTION INTO CERVICAL SPINE Right 12/17/2020    Procedure: CERVICAL C5/6 DIGNA TRANSFORAMINAL  DIRECT REFERRAL;  Surgeon: Yunier Lawotn MD;  Location: [facility];  Service: Pain Management;  Laterality: Right;  NEEDS CONSENT    SURGICAL REMOVAL OF LESION OF FIBULA Left 8/4/2022    Procedure: EXCISION, LESION, FIBULA Nonunion avulsion fragment distal fibula;  Surgeon: Aleksandr Elias MD;  Location: [facility] OR;  Service: Orthopedics;  Laterality: Left;    TRANSFORAMINAL EPIDURAL INJECTION OF STEROID Right 10/14/2021     Procedure: INJECTION, STEROID, EPIDURAL, TRANSFORAMINAL APPROACH, C5-C6 DIRECT REF/NEED CONSNET;  Surgeon: Yunier Lawton MD;  Location: [facility];  Service: Pain Management;  Laterality: Right;    TRANSFORAMINAL EPIDURAL INJECTION OF STEROID N/A 2/3/2025    Procedure: CERVICAL C7/T1 IL DIGNA DIRECT REFERRAL *ASPIRIN OTC* HOLD FOR 5 DAYS;  Surgeon: Yunier Lawton MD;  Location: [facility];  Service: Pain Management;  Laterality: N/A;    UMBILICAL HERNIA REPAIR N/A 4/5/2021    Procedure: REPAIR, HERNIA, UMBILICAL, AGE 5 YEARS OR OLDER,;  Surgeon: Tim Reese MD;  Location: [facility] OR [address] [place] [address];  Service: General;  Laterality: N/A;       Review of patient's allergies indicates:  No Known Allergies    Current Medications[1]    Family History   Problem Relation Name Age of Onset    Coronary artery disease Mother      Heart failure Mother      Coronary artery disease Father  47    Heart attack Father      Coronary artery disease Brother         Social History[2]        Review of Systems   Constitutional: Negative for chills and decreased appetite.   HENT:  Negative for ear discharge.    Eyes:  Negative for blurred vision.   Cardiovascular:  Negative for claudication.   Respiratory:  Negative for cough.    Endocrine: Negative for cold intolerance.   Skin:  Negative for color change.   Musculoskeletal:  Positive for arthritis, back pain and neck pain.   Gastrointestinal:  Negative for bloating.   Genitourinary:  Negative for bladder incontinence.   Neurological:  Negative for aphonia.   Psychiatric/Behavioral:  Negative for altered mental status.            Objective:   /77 (Patient Position: Sitting)   Pulse 85   Temp 97.6 °F (36.4 °C)   Wt 100.7 kg (222 lb)   BMI 31.85 kg/m²   Pain Disability Index Review:      2/18/2025     9:59 AM   Last 3 PDI Scores   Pain Disability Index (PDI) 52            PHYSICAL EXAM   Vitals:    02/18/25 0959   BP: 138/77   Pulse: 85   Temp:      GENERAL: Alert and Oriented  x3  H&N: NC/AT  CV/PULM: Regular rate. Normal WOB. Symmetric chest rise. No peripheral edema.  SKIN: Intact. No visible rashes or lesions.     MUSCULOSKELETAL/NEUROLGIC:  INSPECTION: Limbs are grossly symmetric. Normal muscle bulk for age. No obvious deformities.  SENSORY: Intact and symmetric to light touch distally in BUE and BLE  MOTOR:  Upper Extremity Manual Muscle Testing (*= weakness due to pain)   Right Left   Elbow Flexion 5/5 5/5   Elbow Extension 5/5 5/5   Wrist Extension 5/5 5/5   Finger Flexion () 5/5 5/5   Finger Abduction 5/5 5/5   Shoulder Abduction 5/5 5/5   Shoulder Flexion 5/5 5/5     Lower Extremity Manual Muscle Testing   Right Left   Hip Flexion 5/5 5/5   Knee Extension 5/5 5/5   Ankle Dorsiflexion 5/5 5/5   Ankle Plantarflexion 5/5 5/5   Great Toe Extension (EHL) 5/5 5/5   Knee Flexion 5/5 5/5     DTR:    Right Left   Biceps 2+ 2+   Brachioradialis 2+ 2+   Triceps 2+ 2+   Patellar 2+ 2+   Achilles 2+ 2+       Upper Track Signs:    Right Left   Lagunas's  Negative  Negative   Babinski Response Downgoing Downgoing   Ankle Clonus Not present Not present       CERVICAL SPINE:   INSPECTION: No obvious abnormalities.  PALPATION:   - Tender to palpation over: C6/7 spinous process   ROM: Full ROM, pain with lateral rotation bilaterally to the contralateral side (muscular pain)  Spurling's: negative   Facet Loading: negative  Llhermitte's: negative     LUMBAR SPINE:  INSPECTION: No gross atrophy or deformity   PALPATION: non tender   ROM: full, mild pain with flexion and extension   SLR: negative   Facet loading: negative   FADIR: negative   FCO: negative     Ortho/SPM Exam      Assessment:       Encounter Diagnoses   Name Primary?    Cervicothoracic interspinous bursitis Yes    Cervical radiculopathy     Stenosis of cervical spine region     Cervical spondylosis     Myofascial pain          Plan:   We discussed with the patient the assessment and recommendations. The following is the plan we  agreed on:        Hipolito was seen today for neck pain.    Diagnoses and all orders for this visit:    Cervicothoracic interspinous bursitis    Cervical radiculopathy  -     Ambulatory referral/consult to [facility]; Future    Stenosis of cervical spine region  -     Ambulatory referral/consult to [facility]; Future    Cervical spondylosis  -     Ambulatory referral/consult to [facility]; Future    Myofascial pain        We discussed neck pain and the nature of neck pain.  We discussed that it is not one thing that causes the pain but an accumulation of multiple things that we do.    We discussed posture sitting and the importance of trying to sit better.    We discussed the benefits of therapy and exercise and continuing to move.   We discussed that the nature of his pain is more likely attributable to spinal stenosis and neural foraminal narrowing which are appreciated on his Cervical MRI.   We discussed that MBB and RFA may be an option further down the line if patient has worsening spondylosis, but at this time, we should continue treatment with RUSSELL as they provide 4-6 months of relief.   Patient does have pain to palpation over the C6/7 spinous processes. We will schedule him for a follow up for a C6/7 Interspinous Ligament Injection with Dexamethasone 4 mg in clinic.   Ambulatory referral to [facility] for core strengthening, cervical stabilization, and development of Home Exercise Program  RTC for injection         Charlie Brito M.D.  PGY-5  Interventional Pain Management Fellow  [facility]  Pager: [phone]  I have personally taken the history and examined this patient and agree with the fellow's note as stated above.          [1]   Current Outpatient Medications   Medication Sig Dispense Refill    albuterol (VENTOLIN HFA) 90 mcg/actuation inhaler Inhale 2 puffs into the lungs every 4 (four) hours as needed for Wheezing. Rescue 18 g 11    aspirin 81  MG Chew Take 375 mg by mouth once daily.      DULoxetine (CYMBALTA) 30 MG capsule Take 1 capsule (30 mg total) by mouth once daily. 90 capsule 0    eszopiclone (LUNESTA) 2 MG Tab Take 1 tablet (2 mg total) by mouth every evening. 30 tablet 5    fluticasone-umeclidin-vilanter (TRELEGY ELLIPTA) 200-62.5-25 mcg inhaler Inhale 1 puff into the lungs once daily. 60 each 11    gabapentin (NEURONTIN) 300 MG capsule Take 1 capsule (300 mg total) by mouth 3 (three) times daily. 90 capsule 11    losartan (COZAAR) 50 MG tablet Take 1 tablet (50 mg total) by mouth once daily. 90 tablet 3    meloxicam (MOBIC) 15 MG tablet Take 1 tablet (15 mg total) by mouth once daily. 90 tablet 3    multivitamin capsule Take 1 capsule by mouth once daily.      vitamin D (VITAMIN D3) 1000 units Tab Take 1,000 Units by mouth once daily.      albuterol (VENTOLIN HFA) 90 mcg/actuation inhaler Inhale 2 puffs into the lungs every 6 (six) hours as needed for Wheezing. Rescue 18 g 0    atorvastatin (LIPITOR) 80 MG tablet Take 1 tablet (80 mg total) by mouth once daily. 90 tablet 3    HYDROcodone-acetaminophen (NORCO) 5-325 mg per tablet Take 1 tablet by mouth every 6 (six) hours as needed for Pain. 28 tablet 0     No current facility-administered medications for this visit.     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   Tobacco Use    Smoking status: Former     Current packs/day: 0.00     Average packs/day: 1.5 packs/day for 30.0 years (45.0 ttl pk-yrs)     Types: Cigarettes, Vaping with nicotine     Start date: 12/3/1981     Quit date: 12/3/2011     Years since quittin.2    Smokeless tobacco: Former   Substance and Sexual  Activity    Alcohol use: Yes     Alcohol/week: 4.0 standard drinks of alcohol     Types: 4 Cans of beer per week     Comment: everyday- 4 beers a night    Drug use: Not Currently    Sexual activity: Yes     Social Drivers of Health     Financial Resource Strain: Low Risk  (6/30/2024)    Overall Financial Resource Strain (CARDIA)     Difficulty of Paying Living Expenses: Not hard at all   Food Insecurity: No Food Insecurity (6/30/2024)    Hunger Vital Sign     Worried About Running Out of Food in the Last Year: Never true     Ran Out of Food in the Last Year: Never true   Physical Activity: Inactive (6/30/2024)    Exercise Vital Sign     Days of Exercise per Week: 0 days     Minutes of Exercise per Session: 0 min   Stress: Stress Concern Present (6/30/2024)    [employer] [place] [employer] - Occupational Stress Questionnaire     Feeling of Stress : Rather much   Housing Stability: Unknown (6/30/2024)    Housing Stability Vital Sign     Unable to Pay for Housing in the Last Year: No

## 2025-02-26 ENCOUNTER — CLINICAL SUPPORT (OUTPATIENT)
Dept: REHABILITATION | Facility: HOSPITAL | Age: 65
End: 2025-02-26
Attending: ANESTHESIOLOGY
Payer: COMMERCIAL

## 2025-02-26 DIAGNOSIS — M48.02 STENOSIS OF CERVICAL SPINE REGION: ICD-10-CM

## 2025-02-26 DIAGNOSIS — R29.3 POOR POSTURE: Primary | ICD-10-CM

## 2025-02-26 DIAGNOSIS — M54.12 CERVICAL RADICULOPATHY: ICD-10-CM

## 2025-02-26 DIAGNOSIS — M47.812 CERVICAL SPONDYLOSIS: ICD-10-CM

## 2025-02-26 PROCEDURE — 97110 THERAPEUTIC EXERCISES: CPT

## 2025-02-26 PROCEDURE — 97530 THERAPEUTIC ACTIVITIES: CPT

## 2025-02-26 PROCEDURE — 97750 PHYSICAL PERFORMANCE TEST: CPT | Mod: 32

## 2025-02-26 NOTE — PROGRESS NOTES
[facility]  Physical Therapy Cervical Evaluation      [id number]    Therapy Diagnosis:   Encounter Diagnoses   Name Primary?    Cervical radiculopathy     Stenosis of cervical spine region     Cervical spondylosis     Poor posture Yes     Physician: Yunier Lawton MD    Physician Orders: PT Eval and Treat   Medical Diagnosis from Referral:   M54.12 (ICD-10-CM) - Cervical radiculopathy   M48.02 (ICD-10-CM) - Stenosis of cervical spine region   M47.812 (ICD-10-CM) - Cervical spondylosis   Evaluation Date: 2/26/2025  Authorization Period Expiration: 12/31/2025  Plan of Care Expiration: 5/8/2025  Reassessment Due: 3/27/2025  Visit # / Visits authorized: 1/1  MedX Testing:MedX testing visit 2    Time In: 3:10 PM  Time Out: 4:20 PM  Total Billable Time: 70 minutes  INSURANCE and OUTCOMES: Fee for Service with FOTO Outcomes 1/3    Precautions: standard and COPD    Pattern of pain determined: 1 JAILYN    Subjective     Date of onset: chronic, 10 years ago  History of current condition: Hipolito reports chronic neck pain for past ten years, but pain has worsened since changing jobs about 1.5/2 years ago which now has him at a desk all day and driving 40 miles each way to work.  Pt previously saw Dr. Goldman, who is no longer with [facility], so he has a longer wait to get an injection, making the last four months very painful.  He uses a blow up traction pillow on his neck which helps.  Most pain stems from his daily drives.     Per pain management note:  His pain started five years ago with no inciting incident. The pain is focused to the middle of his back but can be radicular in nature. Currently the pain is only shooting into his R shoulder. He underwent ILESI C7/T1 on 2/3/25 which has provided him 80% pain relief. He states the relief from ILESI lasts four to six months, he states it can be less, but 4-6 months is the average. His pain is exacerbated  by driving on a bumpy road. Mitigated by injections and his inflatable neck pillow.      Medical History:   Past Medical History:   Diagnosis Date    Hypertension        Surgical History:   Hipolito Laws  has a past surgical history that includes Arthroscopic repair of rotator cuff of shoulder (1992); Epidural steroid injection into cervical spine (Right, 12/17/2020); Umbilical hernia repair (N/A, 4/5/2021); Transforaminal epidural injection of steroid (Right, 10/14/2021); Epidural steroid injection (N/A, 12/13/2021); Epidural steroid injection (N/A, 6/2/2022); Arthroscopy of ankle with debridement (Left, 8/4/2022); Surgical removal of lesion of fibula (Left, 8/4/2022); Closure of wound (Left, 9/14/2022); Epidural steroid injection (N/A, 2/16/2023); Epidural steroid injection (N/A, 6/26/2023); Epidural steroid injection (N/A, 1/22/2024); Epidural steroid injection (N/A, 7/11/2024); and Transforaminal epidural injection of steroid (N/A, 2/3/2025).    Medications:   Hipolito has a current medication list which includes the following prescription(s): albuterol, aspirin, atorvastatin, duloxetine, eszopiclone, trelegy ellipta, gabapentin, losartan, meloxicam, multivitamin, and vitamin d, and the following Facility-Administered Medications: midazolam.    Allergies:   Review of patient's allergies indicates:  No Known Allergies     Imaging: MRI   MRI CERVICAL SPINE WITHOUT CONTRAST     CLINICAL HISTORY:  Neck pain, chronic, degenerative changes on xray; Radiculopathy, cervical region     TECHNIQUE:  Multiplanar, multisequence MR images of the cervical spine were performed utilizing no contrast.     COMPARISON:  Cervical spine radiograph 11/17/2021     FINDINGS:  C1-C2: Dens is intact.  Pre dens space is maintained.     Alignment: Straightening of the cervical lordosis.  No spondylolisthesis.     Vertebrae: No fracture or marrow infiltrative process.     Discs: Multilevel disc height loss and desiccation, most advanced at  C5-C6 and C6-C7.     Cord: Normal caliber and signal.     Skull base and craniocervical junction: Normal.     Degenerative findings:     C2-C3: No spinal canal stenosis or neural foraminal narrowing.     C3-C4: Posterior disc osteophyte complex, uncovertebral spurring, moderate facet arthropathy.  Mild spinal canal stenosis.  Moderate bilateral neural foraminal narrowing.     C4-C5: Posterior disc osteophyte complex, uncovertebral spurring, mild facet arthropathy.  Moderate spinal canal stenosis.  Moderate bilateral neural foraminal narrowing.     C5-C6: Posterior disc osteophyte complex, uncovertebral spurring, mild facet arthropathy.  Moderate spinal canal stenosis.  Severe right, moderate left neural foraminal narrowing.     C6-C7: Posterior disc osteophyte complex, left greater than right uncovertebral spurring.  Moderate spinal canal stenosis.  Severe bilateral neural foraminal narrowing.     C7-T1: Posterior disc osteophyte complex.  No spinal canal stenosis or neural foraminal narrowing.     Paraspinal muscles & soft tissues: Unremarkable.     Impression:     Advanced multilevel degenerative change with multilevel moderate spinal canal stenosis and severe neural foraminal narrowing at C5-C6 and C6-C7.    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  Where is your pain the worst? neck  2.  Is your pain constant or intermittent? constant  3.  Does bending forward make your typical pain worse? yes  4.  Since the start of your neck pain, has there been a change in your bowel or bladder? no  5.  What can't you do now that you use to be able to do? Able to do everything but with more time and more pain    Pts goals: "reduce pain"    Red Flag Screening:   Cough/Sneeze Strain: (--)  Bladder/bowel: (--)  Falls: (--)  Night pain: (--)  Unexplained weight loss: (--)  General health: good    Objective      Postural examination/scapula alignment: Rounded shoulder, Head forward, and Slouched posture  Sitting: slouched, forward head  Standing: rounded shoulders, forward head  Correction of posture: better with lumbar roll    Range of Motion - MOVEMENT LOSS    ROM Loss   Flexion minimal loss   Extension minimal loss   Side bending Right moderate loss   Side bending Left minimal loss   Rotation Right moderate loss   Rotation Left minimal loss   Protraction within functional limits   Retraction  moderate loss     Upper Extremity Strength  (R) UE  (L) UE    Shoulder flexion: 4+/5 Shoulder flexion: 4+/5   Shoulder Abduction: 4+/5 Shoulder abduction: 4+/5   Elbow flexion: 5/5 Elbow flexion: 5/5   Elbow extension: 5/5 Elbow extension: 5/5   Wrist flexion: 4+/5 Wrist flexion: 4+/5   Wrist extension: 4+/5 Wrist extension: 4+/5    4+/5 : 4+/5     NEUROLOGICAL SCREEN:    Sensory Deficits: intact to light touch    Special Tests:   Test Name  Testing Result   Compression (+)   Distraction (+)   Neural Tension Test (--)   Saddle Sensation (--)     Reflexes:    Left Right   Biceps  2+ 2+   Brachioradialis  2+ 2+   Clonus (--) (--)   Babinski NT NT "     REPEATED TEST MOVEMENTS:   Repeated Protraction in Sitting end range pain  no effect   Repeated Flexion in Sitting end range pain  no effect   Repeated Retraction in Sitting  end range pain  pain during motion   Repeated Retraction Extension in Sitting pain during motion  no effect   Repeated Protraction in lying end range pain  no effect   Repeated Flexion in lying end range pain  no effect   Repeated Retraction in lying end range pain  pain during motion  no effect   Repeated Retraction Extension in lying end range pain  pain during motion  no effect         Cervical MedX testing Chart  Cervical MedX Testing visit 2      GAIT:  Assistive Device used: none  Level of Assistance: independent  Patient displays the following gait deviations:  decreased trunk rotation .       Intake Outcome Measure for FOTO Neck Survey    Therapist reviewed FOTO scores for Hipolito Laws on 2/26/2025.   FOTO report - see Media section or FOTO account episode details.    Intake Score: 57% functional ability  Goal: 62% functional ability           Treatment     Total Treatment time separate from Evaluation: 30 minutes    Hipolito received therapeutic exercises to develop/improve posture, cervical ROM, strength, and muscular endurance for 10 minutes including the following exercises:     RIS x 10  No moneys with RTB x 10  SOC x 10  Standing open books x 10    Written Home Exercises Provided: yes.    HEP AS FOLLOWS:  RIS, no moneys, SOC, standing open books    Exercises were reviewed and Hipolito was able to demonstrate prior to the end of the session. Hipolito demonstrated good  understanding of the education provided.     See EMR under Patient Instructions for exercises provided 2/26/2025.      MedX Testing:  MedX testing to be performed next visit        Therapeutic Education/Activity provided for 20 minutes:   - Patient was given an [facility] Visit 1 handout which discusses the following:  - what to expect in therapy  - an overview  of the program, including health coaching and wellness  - importance of spinal hygiene, proper posture, lifting mechanics, sleep quality, and nutrition/hydration   - Cris roll trialed, recommended, and purchase information was provided.  - Patient received a handout regarding anticipated muscular soreness following the isometric test and strategies for management were reviewed with patient including stretching, using ice and scheduled rest.   - Patient received verbal education on the following:   - Healthy Back program,   - purpose of the isometric test,   - safe progression of neck strengthening, wellness approach, and systemic strengthening.   - safe usage of MedX machine and testing protocols.  - heavy postural education      Assessment     Hipolito is a 65 y.o. male referred to [facility] with a medical diagnosis of M54.12 (ICD-10-CM) - Cervical radiculopathy, M48.02 (ICD-10-CM) - Stenosis of cervical spine region, M47.812 (ICD-10-CM) - Cervical spondylosis. Pt presents with chronic neck pain, decreased range of motion, poor posture, impaired soft tissue tension, decreased cervical paraspinal strength and decreased tolerance for driving and ADLs due to pain.     Pain Pattern: 1 JAILYN       Pt prognosis is Fair.     Pt will benefit from skilled outpatient Physical Therapy to address the deficits stated above and in the chart below, to provide pt/family education, and to maximize pt's level of independence.     Plan of care discussed with patient: Yes  Pt's spiritual, cultural and educational needs considered and patient is agreeable to the plan of care and goals as stated below:     Anticipated Barriers for therapy: chronicity of impairments    PT Evaluation Completed? MedX testing next session.    Medical necessity is demonstrated by the following problem list:      History  Co-morbidities and personal factors that may impact the plan of care [x] LOW: no personal factors / co-morbidities  [] MODERATE:  1-2 personal factors / co-morbidities  [] HIGH: 3+ personal factors / co-morbidities    Moderate / High Support Documentation:   Co-morbidities affecting plan of care: none    Personal Factors:   lifestyle     Examination  Body Structures and Functions, activity limitations and participation restrictions that may impact the plan of care [] LOW: addressing 1-2 elements  [x] MODERATE: 3+ elements  [] HIGH: 4+ elements (please support below)    Moderate / High Support Documentation: posture, strength deficits, decreased range of motion     Clinical Presentation [] LOW: stable  [x] MODERATE: Evolving  [] HIGH: Unstable     Decision Making/ Complexity Score: moderate       GOALS: Pt is in agreement with the following goals.    Short term goals: 6 weeks or 10 visits   - Pt will demonstratte increased cervical MedX ROM as measured by med ex by 6 degrees from initial test which results in improved  ROM of neck for ease with ADLs and driving. Appropriate and Ongoing  - Pt will demonstrate independence with reducing or controlling symptoms with ther ex, movement, or position independently, able to reduce pain 1-2 points on pain scale using strategies taught in therapy.  Appropriate and Ongoing  - Pt will demonstrate increased MedX average isometric strength value by 30% with  when compared to the initial testing resulting in improved ability to perform bending, lifting, and carrying activities safely and confidently. Appropriate and Ongoing    Long term goals: 10 weeks or 20 visits  - Pt will demonstratte increased cervical MedX ROM as measured by med ex by 9 degrees from initial test which results in functional ROM of neck for ease with ADLs and driving.  Appropriate and Ongoing  - Pt will demonstrate increased MedX average isometric strength value  by 50% from initial test to improve ability to lift and carry, and sustain good posture while performing ADL's. Appropriate and Ongoing  - Pt will demonstrate reduced pain and  improved functional outcomes as reported on the FOTO by reaching an intake score of >/= 62% functional ability in order to demonstrate subjective improvement in patient's condition. . Appropriate and Ongoing  - Pt will demonstrate independence with reducing or controlling symptoms with ther ex, movement, or position independently, able to reduce pain 2-4 points on pain scale using strategies taught in therapy. Appropriate and Ongoing  - Pt will demonstrate independence with the HEP at discharge. Appropriate and Ongoing  - Pt will be able to tolerate drive to and from work without increase in pain.(patient goal)  Appropriate and Ongoing    Plan     Outpatient physical therapy 2x week for 10 weeks or 20 visits to include the following:   - Patient education  - Therapeutic exercise  - Manual therapy  - Performance testing   - Neuromuscular Re-education  - Therapeutic activity   - Modalities    Pt may be seen by PTA as part of the rehabilitation team.     Therapist: Mala Williamson, PT  2/27/2025

## 2025-02-27 PROBLEM — R29.3 POOR POSTURE: Status: ACTIVE | Noted: 2025-02-27

## 2025-03-16 DIAGNOSIS — E78.5 HYPERLIPIDEMIA, UNSPECIFIED HYPERLIPIDEMIA TYPE: ICD-10-CM

## 2025-03-16 NOTE — TELEPHONE ENCOUNTER
No care due was identified.  [facility] Embedded Care Due Messages. Reference number: [id number].   3/16/2025 6:54:01 PM CDT

## 2025-03-17 ENCOUNTER — PATIENT OUTREACH (OUTPATIENT)
Dept: INTERNAL MEDICINE | Facility: CLINIC | Age: 65
End: 2025-03-17
Payer: COMMERCIAL

## 2025-03-17 VITALS — DIASTOLIC BLOOD PRESSURE: 77 MMHG | SYSTOLIC BLOOD PRESSURE: 138 MMHG

## 2025-03-17 RX ORDER — ATORVASTATIN CALCIUM 80 MG/1
80 TABLET, FILM COATED ORAL DAILY
Qty: 90 TABLET | Refills: 3 | Status: SHIPPED | OUTPATIENT
Start: 2025-03-17 | End: 2026-03-17

## 2025-03-17 NOTE — TELEPHONE ENCOUNTER
Refill Decision Note   Hipolito Laws  is requesting a refill authorization.  Brief Assessment and Rationale for Refill:  Approve     Medication Therapy Plan:         Comments:     Note composed:2:10 PM 03/17/2025

## 2025-03-19 ENCOUNTER — TELEPHONE (OUTPATIENT)
Dept: WOUND CARE | Facility: CLINIC | Age: 65
End: 2025-03-19
Payer: COMMERCIAL

## 2025-03-19 ENCOUNTER — PATIENT MESSAGE (OUTPATIENT)
Dept: WOUND CARE | Facility: CLINIC | Age: 65
End: 2025-03-19
Payer: COMMERCIAL

## 2025-03-19 NOTE — TELEPHONE ENCOUNTER
Called and spoke with patient regarding his Leospheret message about a non-healing wound.  Asked patient if it was his ankle wound that we treated several years ago - No.  Patient advised it is a non-healing abdominal wound from umbilical surgery.  Patient asked me to return him a call because he was in the doctor's office.  Call end.

## 2025-03-25 ENCOUNTER — PATIENT MESSAGE (OUTPATIENT)
Dept: INTERNAL MEDICINE | Facility: CLINIC | Age: 65
End: 2025-03-25
Payer: COMMERCIAL

## 2025-03-27 ENCOUNTER — CLINICAL SUPPORT (OUTPATIENT)
Dept: REHABILITATION | Facility: HOSPITAL | Age: 65
End: 2025-03-27
Attending: ANESTHESIOLOGY
Payer: COMMERCIAL

## 2025-03-27 DIAGNOSIS — R29.3 POOR POSTURE: Primary | ICD-10-CM

## 2025-03-27 PROCEDURE — 97110 THERAPEUTIC EXERCISES: CPT

## 2025-03-27 PROCEDURE — 97112 NEUROMUSCULAR REEDUCATION: CPT

## 2025-03-27 NOTE — PROGRESS NOTES
"  Outpatient Rehab    Physical Therapy Visit    Patient Name: Hipolito Laws  MRN: [id number]  YOB: 1960  Encounter Date: 3/27/2025    Therapy Diagnosis:   Encounter Diagnosis   Name Primary?    Poor posture Yes     Physician: Charlie Brito MD    Physician Orders: Eval and Treat  Medical Diagnosis: Cervical spondylosis  Stenosis of cervical spine region  Cervical radiculopathy    Visit # / Visits Authorized:  1 / 10  Insurance Authorization Period: 2/26/2025 to 12/31/2025  Date of Evaluation: 2/26/2025  Plan of Care Certification: 2/26/2025 to 5/8/2025     PT/PTA: PT   Number of PTA visits since last PT visit:   Time In:     Time Out:    Total Time:     Total Billable Time:      FOTO:  Intake Score: 57 (62% goal)%  Survey Score 1:  %  Survey Score 2:  %    INSURANCE and OUTCOMES: Fee for Service with FOTO Outcomes 1/3     Pattern: 1 JAILYN    Occupation: works for [employer] full time, 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  He got a pain injection and his left ankle is bothering him, so he isn't sure what is making his neck feel better..  Pain reported as 3/10.      Objective      Isometric Testing on Med X equipment: Testing administered by PT    Test Initial Baseline Midpoint Final   Date 3/27/2025     ROM  deg     Max Peak Torque 214      Min Peak Torque 79      Flex/Ext Ratio 2.7:1     % variance  normative data 58%     % change from initial test N/A visit 1           Outcomes:  Intake Score: 57%  Visit 6 Score:   Visit 10 Score:   Discharge Score:  Goal Score: 62%     Treatment     Patient received the treatments listed below:      Medical MedX Treatment as follows:  Time: 15 min  MedX testing performed: Patient  received neuromuscular education to engage " "spinal musculature correctly for motor control and engagement of musculature iincluding the MedX exercise component and practice and standard testing. MedX dynamic exercise and baseline isometric test performed with instructions to guide the patient safely through the testing procedure. Patient instructed to perform isometric test correctly and safely while building to an optimal force with a pain-free effort. Patient also instructed that they should feel support/pressure from MedX restraints but no pain/discomfort, and encouraged to report any pain to therapist. Patient demonstrated appropriate understanding of information and tolerance of test.  Education regarding purpose of test, safety during test given, and reviewed possible more soreness and strategies.           3/27/2025     4:59 PM   [facility]   Visit Number 2   VAS Pain Rating 3   Time 5   Retraction in Sitting 10   Retraction with Extension 10   Flexion 2   Sidebending 2   Rotation 5   Scapular Retraction 15   Lumbar Stretches - Slouch Overcorrection 10   Cervical Extension Seat Pad 0   Seat Adjustment 340   Top Dead Center 66   Counterweight 1   Cervical Flexion 108   Cervical Extension 48   Cervical Peak Torque 214 ft. lbs.   Ice - Z Lie (in min.) 0       Patient participated in therapeutic exercises to develop strength, endurance, ROM, flexibility, posture, and core stabilization for 40 minutes including:  RIS x 10  Cervical retraction + extension with towel support x 10  Cervical rotation stretch 5" x 5  Self upper trap stretch 15" x 2  Self levator scap stretch 15" x 2  Thoracic extension over half roll x 10  SOC x 10    Peripheral muscle strengthening which included 1 set of 15-20 repetitions at a slow, controlled 10-13 second per rep pace focused on strengthening supporting musculature for improved body mechanics and functional mobility.  Pt and therapist focused on proper form during treatment to ensure optimal strengthening of each " targeted muscle group.  Machines were utilized including torso rotation, leg press, hip abd and hip add, leg ext.  Leg curl, triceps, biceps, chest and row added visit 3      Patient participated in dynamic functional therapeutic activities to improve functional performance and simulate household and community activities for 0  minutes. The following activities were included:    Patient  received manual therapy techniques for 0  minutes. The following activities were included:        Pt given cold pack for 0 minutes, pt declined      Time Entry(in minutes):       Assessment & Plan   Assessment:         Patient will continue to benefit from skilled outpatient physical therapy to address the deficits listed in the problem list box on initial evaluation, provide pt/family education and to maximize pt's level of independence in the home and community environment.     Patient's spiritual, cultural, and educational needs considered and patient agreeable to plan of care and goals.         Patient Education and Home Exercises     Home exercises include:  RIS, no money with RTB, SOC, standing open books  Cardio program (V5): -  Lifting education (V11): -  Posture/Lumbar roll: purchased  Fridge Magnet Discharge handout (date given): -  Equipment at home/gym membership: no    Education provided:   - PT role and POC  - HEP  - MedX testing results  - pacing and extension hold for max strength and endurance gains  - RPE scale    Plan: Continue present POC per healthy back protocol.    Goals:   Active       Physical Therapy       Physical Therapy Goal (Progressing)       Start:  02/27/25       Short term goals: 6 weeks or 10 visits   - Pt will demonstratte increased cervical MedX ROM as measured by med ex by 6 degrees from initial test which results in improved  ROM of neck for ease with ADLs and driving. Appropriate and Ongoing  - Pt will demonstrate independence with reducing or controlling symptoms with ther ex, movement, or  position independently, able to reduce pain 1-2 points on pain scale using strategies taught in therapy.  Appropriate and Ongoing  - Pt will demonstrate increased MedX average isometric strength value by 30% with  when compared to the initial testing resulting in improved ability to perform bending, lifting, and carrying activities safely and confidently. Appropriate and Ongoing    Long term goals: 10 weeks or 20 visits  - Pt will demonstratte increased cervical MedX ROM as measured by med ex by 9 degrees from initial test which results in functional ROM of neck for ease with ADLs and driving.  Appropriate and Ongoing  - Pt will demonstrate increased MedX average isometric strength value  by 50% from initial test to improve ability to lift and carry, and sustain good posture while performing ADL's. Appropriate and Ongoing  - Pt will demonstrate reduced pain and improved functional outcomes as reported on the FOTO by reaching an intake score of >/= 62% functional ability in order to demonstrate subjective improvement in patient's condition. . Appropriate and Ongoing  - Pt will demonstrate independence with reducing or controlling symptoms with ther ex, movement, or position independently, able to reduce pain 2-4 points on pain scale using strategies taught in therapy. Appropriate and Ongoing  - Pt will demonstrate independence with the HEP at discharge. Appropriate and Ongoing  - Pt will be able to tolerate drive to and from work without increase in pain.(patient goal)  Appropriate and Ongoing              long term goals       Pt will demonstratte increased cervical MedX ROM as measured by med ex by 9 degrees from initial test which results in functional ROM of neck for ease with ADLs and driving.   (Progressing)       Start:  03/27/25    Expected End:  05/08/25            Pt will demonstrate increased MedX average isometric strength value  by 50% from initial test to improve ability to lift and carry, and sustain  good posture while performing ADL's. (Progressing)       Start:  03/27/25    Expected End:  05/08/25            Pt will demonstrate reduced pain and improved functional outcomes as reported on the FOTO by reaching an intake score of >/= 62% functional ability in order to demonstrate subjective improvement in patient's condition.  (Progressing)       Start:  03/27/25    Expected End:  05/08/25            Pt will demonstrate independence with reducing or controlling symptoms with ther ex, movement, or position independently, able to reduce pain 2-4 points on pain scale using strategies taught in therapy.  (Progressing)       Start:  03/27/25    Expected End:  05/08/25            Pt will demonstrate independence with the HEP at discharge.  (Progressing)       Start:  03/27/25    Expected End:  05/08/25               patient goals       Pt will be able to tolerate drive to and from work without increase in pain.(patient goal)  (Progressing)       Start:  03/27/25    Expected End:  05/08/25               short term goals       Pt will demonstratte increased cervical MedX ROM as measured by med ex by 6 degrees from initial test which results in improved  ROM of neck for ease with ADLs and driving.  (Progressing)       Start:  03/27/25    Expected End:  04/09/25            Pt will demonstrate independence with reducing or controlling symptoms with ther ex, movement, or position independently, able to reduce pain 1-2 points on pain scale using strategies taught in therapy.   (Progressing)       Start:  03/27/25    Expected End:  04/09/25            Pt will demonstrate increased MedX average isometric strength value by 30% with  when compared to the initial testing resulting in improved ability to perform bending, lifting, and carrying activities safely and confidently.  (Progressing)       Start:  03/27/25    Expected End:  04/09/25                Mala Williamson, PT

## 2025-03-31 ENCOUNTER — PATIENT MESSAGE (OUTPATIENT)
Dept: INTERNAL MEDICINE | Facility: CLINIC | Age: 65
End: 2025-03-31
Payer: COMMERCIAL

## 2025-04-01 NOTE — PROGRESS NOTES
"Outpatient Rehab    Physical Therapy Visit    Patient Name: Hipolito Laws  MRN: [id number]  YOB: 1960  Encounter Date: 4/3/2025    Therapy Diagnosis:   Encounter Diagnosis   Name Primary?    Poor posture Yes       Physician: Yunier Lawton MD    Physician Orders: Eval and Treat  Medical Diagnosis: Cervical spondylosis  Stenosis of cervical spine region  Cervical radiculopathy    Visit # / Visits Authorized:  2 / 10  Insurance Authorization Period: 2/26/2025 to 12/31/2025  Date of Evaluation: 2/26/2025  Plan of Care Certification: 2/26/2025 to 5/8/2025     PT/PTA: PT   Number of PTA visits since last PT visit:0  Time In: 1700   Time Out: 1755  Total Time: 55   Total Billable Time:  55 mins     FOTO:  Intake Score:  %  Survey Score 1:  %  Survey Score 2:  %    INSURANCE and OUTCOMES: Fee for Service with FOTO Outcomes 1/3     Pattern: 1 JAILYN    Occupation: works for [employer] full time, 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  Pain reported as 5/10.      Objective      Isometric Testing on Med X equipment: Testing administered by PT    Test Initial Baseline Midpoint Final   Date 3/27/2025     ROM  deg     Max Peak Torque 214      Min Peak Torque 79      Flex/Ext Ratio 2.7:1     % variance  normative data 58%     % change from initial test N/A visit 1           Outcomes:  Intake Score: 57%  Visit 6 Score:   Visit 10 Score:   Discharge Score:  Goal Score: 62%     Treatment     Patient received the treatments listed below:      Hipolito received neuromuscular education  to isolate and engage spinal stabilization musculature correctly for motor control and coordination to aid in function and posture for 10 minutes on the Medical Medleemail Machine.  Patient performed MedX dynamic " "exercise with emphasis on spinal muscular control using pacer throughout  active range of motion. Therapist assisted patient in achieving optimal exertion for neural reeducation and endurance training by using the  José Luis Exertion Rating scale, by instructing the patient to aim for mid range of exertion, performing 15-20 repetitions, slowly, correctly,and safely.             4/3/2025     5:59 PM   [facility]   Visit Number 3   VAS Pain Rating 5   Time 5   Retraction in Sitting 10   Retraction with Extension 10   Flexion 2   Sidebending 2   Rotation 5   Scapular Retraction 15   Lumbar Stretches - Slouch Overcorrection 10   Cervical Weight 171 lbs   Repetitions 15   Rating of Perceived Exertion 3   Ice - Z Lie (in min.) 0           Patient participated in therapeutic exercises to develop strength, endurance, ROM, flexibility, posture, and core stabilization for 45 minutes including:    RIS x 10  Cervical retraction + extension with towel support x 10  Cervical rotation stretch 5" x 5  Self upper trap stretch 20" x 2  Self levator scap stretch 20" x 2  Thoracic extension over half roll x 10  SOC x 10  No moneys x10 w/o band, x10 w/ RTB    Peripheral muscle strengthening which included 1 set of 15-20 repetitions at a slow, controlled 10-13 second per rep pace focused on strengthening supporting musculature for improved body mechanics and functional mobility.  Pt and therapist focused on proper form during treatment to ensure optimal strengthening of each targeted muscle group.  Machines were utilized including torso rotation, leg press, hip abd and hip add, leg ext.  Leg curl, triceps, biceps, chest and row added visit 3      Patient participated in dynamic functional therapeutic activities to improve functional performance and simulate household and community activities for 0  minutes. The following activities were included:    Patient  received manual therapy techniques for 0  minutes. The following activities " were included:        Pt given cold pack for 0 minutes, pt declined      Time Entry(in minutes):  Neuromuscular Re-Education Time Entry: 10  Therapeutic Exercise Time Entry: 45    Assessment & Plan   Assessment: Hipolito arrived for 3rd [facility] visit reporting 5/10 pain in neck. Tolerated treatment well, with mod cues for form with upper trapezius and levator scap stretching. Performed MedX Cervical Paraspinal strengthening at 80% of max peak torque, at 171 in-lbs for 15 reps with José Luis rating of 3/10, indicating appropriate pacing. Completed full peripheral circuit w/ appropriate pacing and without symptom exacerbation. Will continue per HB protocol.       Patient will continue to benefit from skilled outpatient physical therapy to address the deficits listed in the problem list box on initial evaluation, provide pt/family education and to maximize pt's level of independence in the home and community environment.     Patient's spiritual, cultural, and educational needs considered and patient agreeable to plan of care and goals.         Patient Education and Home Exercises     Home exercises include:  RIS, no money with RTB, SOC, standing open books  Cardio program (V5): -  Lifting education (V11): -  Posture/Lumbar roll: purchased  Fridge Magnet Discharge handout (date given): -  Equipment at home/gym membership: no    Education provided:   - PT role and POC  - HEP  - MedX testing results  - pacing and extension hold for max strength and endurance gains  - RPE scale    Plan: Continue present POC per healthy back protocol.    Goals:   Active       Physical Therapy       Physical Therapy Goal (Progressing)       Start:  02/27/25       Short term goals: 6 weeks or 10 visits   - Pt will demonstratte increased cervical MedX ROM as measured by med ex by 6 degrees from initial test which results in improved  ROM of neck for ease with ADLs and driving. Appropriate and Ongoing  - Pt will demonstrate independence with  reducing or controlling symptoms with ther ex, movement, or position independently, able to reduce pain 1-2 points on pain scale using strategies taught in therapy.  Appropriate and Ongoing  - Pt will demonstrate increased MedX average isometric strength value by 30% with  when compared to the initial testing resulting in improved ability to perform bending, lifting, and carrying activities safely and confidently. Appropriate and Ongoing    Long term goals: 10 weeks or 20 visits  - Pt will demonstratte increased cervical MedX ROM as measured by med ex by 9 degrees from initial test which results in functional ROM of neck for ease with ADLs and driving.  Appropriate and Ongoing  - Pt will demonstrate increased MedX average isometric strength value  by 50% from initial test to improve ability to lift and carry, and sustain good posture while performing ADL's. Appropriate and Ongoing  - Pt will demonstrate reduced pain and improved functional outcomes as reported on the FOTO by reaching an intake score of >/= 62% functional ability in order to demonstrate subjective improvement in patient's condition. . Appropriate and Ongoing  - Pt will demonstrate independence with reducing or controlling symptoms with ther ex, movement, or position independently, able to reduce pain 2-4 points on pain scale using strategies taught in therapy. Appropriate and Ongoing  - Pt will demonstrate independence with the HEP at discharge. Appropriate and Ongoing  - Pt will be able to tolerate drive to and from work without increase in pain.(patient goal)  Appropriate and Ongoing              long term goals       Pt will demonstratte increased cervical MedX ROM as measured by med ex by 9 degrees from initial test which results in functional ROM of neck for ease with ADLs and driving.   (Progressing)       Start:  03/27/25    Expected End:  05/08/25            Pt will demonstrate increased MedX average isometric strength value  by 50% from  initial test to improve ability to lift and carry, and sustain good posture while performing ADL's. (Progressing)       Start:  03/27/25    Expected End:  05/08/25            Pt will demonstrate reduced pain and improved functional outcomes as reported on the FOTO by reaching an intake score of >/= 62% functional ability in order to demonstrate subjective improvement in patient's condition.  (Progressing)       Start:  03/27/25    Expected End:  05/08/25            Pt will demonstrate independence with reducing or controlling symptoms with ther ex, movement, or position independently, able to reduce pain 2-4 points on pain scale using strategies taught in therapy.  (Progressing)       Start:  03/27/25    Expected End:  05/08/25            Pt will demonstrate independence with the HEP at discharge.  (Progressing)       Start:  03/27/25    Expected End:  05/08/25               patient goals       Pt will be able to tolerate drive to and from work without increase in pain.(patient goal)  (Progressing)       Start:  03/27/25    Expected End:  05/08/25               short term goals       Pt will demonstratte increased cervical MedX ROM as measured by med ex by 6 degrees from initial test which results in improved  ROM of neck for ease with ADLs and driving.  (Progressing)       Start:  03/27/25    Expected End:  04/09/25            Pt will demonstrate independence with reducing or controlling symptoms with ther ex, movement, or position independently, able to reduce pain 1-2 points on pain scale using strategies taught in therapy.   (Progressing)       Start:  03/27/25    Expected End:  04/09/25            Pt will demonstrate increased MedX average isometric strength value by 30% with  when compared to the initial testing resulting in improved ability to perform bending, lifting, and carrying activities safely and confidently.  (Progressing)       Start:  03/27/25    Expected End:  04/09/25                  Manolo  Ree, PT

## 2025-04-02 ENCOUNTER — PROCEDURE VISIT (OUTPATIENT)
Dept: NEUROLOGY | Facility: CLINIC | Age: 65
End: 2025-04-02
Payer: COMMERCIAL

## 2025-04-02 DIAGNOSIS — M54.12 CERVICAL RADICULOPATHY: ICD-10-CM

## 2025-04-02 PROCEDURE — 95886 MUSC TEST DONE W/N TEST COMP: CPT | Mod: S$GLB,,, | Performed by: PHYSICAL MEDICINE & REHABILITATION

## 2025-04-02 PROCEDURE — 95911 NRV CNDJ TEST 9-10 STUDIES: CPT | Mod: S$GLB,,, | Performed by: PHYSICAL MEDICINE & REHABILITATION

## 2025-04-02 NOTE — PROCEDURES
Test Date:  2025    Patient: stanley laws : 1960 Physician: Joel Duffy D.O.   ID#: [id number] SEX: Male Ref. Phys: Neno Reyez MD     HPI: Stanley Laws is a 65 y.o.male who presents for NCS/EMG to evaluate for cervical radiculopathy.      PROCEDURE:  Prior to the procedure, the procedure was discussed in detail with the patient.  All questions were answered, and verbal consent was obtained.  For nerve conduction studies, a combination of surface electrodes, bar electrodes, and/or ring electrodes were used as needed.  For needle EMG, each site was cleaned and prepped in usual fashion with an alcohol pad.  A monopolar needle (28G) was used.  There was no significant bleeding, and bandages were applied as needed.  The procedure was tolerated without adverse effect.  The patient was instructed on post-procedure care including ice if needed for 10-15 minutes up to 4 times/day for any sore muscles.  I discussed with the patient that the data would be reviewed and a report sent to the referring provider, where any follow up questions regarding next steps should be directed.        NCV & EMG Findings:  Evaluation of the left median sensory and the left ulnar sensory nerves showed prolonged distal onset latency, prolonged distal peak latency, and decreased conduction velocity.  The right median sensory nerve showed prolonged distal onset latency, prolonged distal peak latency, reduced amplitude, and decreased conduction velocity.  All remaining nerves (as indicated in the following tables) were within normal limits.  Needle evaluation of the right Triceps Brachii and the right Pronator Teres muscles showed increased motor unit amplitude and diminished recruitment.  All remaining muscles (as indicated in the following table) showed no evidence of electrical instability.      IMPRESSIONS:  There is evidence to suggest a chronic right C6 and/or C7 radiculopathy without any active/ongoing  denervation.  There is electrophysiologic evidence of a bilateral sensory median mononeuropathy across the wrist (I.e. Carpal tunnel syndrome).  There is no motor axonal loss.  This is graded as Mild in severity bilaterally.    There is also evidence of a relatively mild left ulnar neuropathy at the wrist, affecting the superficial ulnar sensory branch.        ___________________________  Joel Duffy D.O.        NCS+  Motor Nerve Results      Latency Amplitude F-Lat Segment Distance CV Comment   Site (ms) Norm (mV) Norm (ms)  (cm) (m/s) Norm    Left Median (APB)   Wrist 4.6  < 4.7 7.3  > 3.8         Elbow 9.1 - 6.7 -  Elbow-Wrist 26 58  > 47    Right Median (APB)   Wrist 4.5  < 4.7 8.2  > 3.8         Elbow 9.5 - 3.7 -  Elbow-Wrist 26 52  > 47    Left Ulnar (ADM)   Wrist 3.5  < 3.7 9.0  > 3.0         Bel Elbow 8.4 - 8.4 -  Bel Elbow-Wrist 27 55  > 52    Abv Elbow 10.6 - 7.8 -  Abv Elbow-Bel Elbow 12 55  > 43    Right Ulnar (ADM)   Wrist 3.3  < 3.7 8.9  > 3.0         Bel Elbow 7.9 - 9.2 -  Bel Elbow-Wrist 26 57  > 52    Abv Elbow 10.0 - 8.6 -  Abv Elbow-Bel Elbow 10.5 50  > 43      Sensory Nerve Results      Start Lat Latency (Peak) Amplitude (P-P) Segment Distance Start CV Comment   Site (ms) Norm (ms) (µV) Norm  (cm) (m/s) Norm    Left Median (Rec:Dig II)   Wrist *3.7  < 3.3 *4.8 14  > 8 Wrist-Dig II 14 *38  > 42    Right Median (Rec:Dig II)   Wrist *3.9  < 3.3 *4.6 *2  > 8 Wrist-Dig II 14 *36  > 42    Left Ulnar (Rec:Dig V)   Wrist *3.5  < 3.1 *4.1 8  > 4 Wrist-Dig V 14 *40  > 45    Right Ulnar (Rec:Dig V)   Wrist 3.1  < 3.1 3.9 13  > 4 Wrist-Dig V 14 45  > 45    Right Radial (Rec:Wrist)   Forearm 1.65  < 2.2 2.3 13  > 11 Forearm-Wrist 10 61 -      EMG+     Side Muscle Nerve Root Ins Act Fibs Psw Amp Dur Poly Recrt Int Pat Comment   Right Deltoid Axillary C5-C6 Nml Nml Nml Nml Nml 0 Nml Nml    Right Biceps Musculocut C5-C6 Nml Nml Nml Nml Nml 0 Nml Nml    Right Triceps Radial C6-C8 Nml Nml Nml *Incr Nml 0  *Reduced Nml    Right Pronator Teres Median C6-C7 Nml Nml Nml *Incr Nml 0 *Reduced Nml    Right FDI Ulnar C8-T1 Nml Nml Nml Nml Nml 0 Nml Nml    Left Deltoid Axillary C5-C6 Nml Nml Nml Nml Nml 0 Nml Nml    Left Biceps Musculocut C5-C6 Nml Nml Nml Nml Nml 0 Nml Nml    Left Triceps Radial C6-C8 Nml Nml Nml Nml Nml 0 Nml Nml    Left Pronator Teres Median C6-C7 Nml Nml Nml Nml Nml 0 Nml Nml    Left FDI Ulnar C8-T1 Nml Nml Nml Nml Nml 0 Nml Nml    Right Cervical Parasp (Lower) Rami C7-C8 Nml Nml Nml                 Waveforms:    Motor              Sensory

## 2025-04-03 ENCOUNTER — CLINICAL SUPPORT (OUTPATIENT)
Dept: REHABILITATION | Facility: HOSPITAL | Age: 65
End: 2025-04-03
Payer: COMMERCIAL

## 2025-04-03 DIAGNOSIS — R29.3 POOR POSTURE: Primary | ICD-10-CM

## 2025-04-03 PROCEDURE — 97112 NEUROMUSCULAR REEDUCATION: CPT

## 2025-04-03 PROCEDURE — 97110 THERAPEUTIC EXERCISES: CPT

## 2025-04-09 ENCOUNTER — OFFICE VISIT (OUTPATIENT)
Dept: ORTHOPEDICS | Facility: CLINIC | Age: 65
End: 2025-04-09
Payer: COMMERCIAL

## 2025-04-09 VITALS — BODY MASS INDEX: 31.78 KG/M2 | WEIGHT: 222 LBS | HEIGHT: 70 IN

## 2025-04-09 DIAGNOSIS — M50.30 DDD (DEGENERATIVE DISC DISEASE), CERVICAL: ICD-10-CM

## 2025-04-09 DIAGNOSIS — M54.12 CERVICAL RADICULOPATHY: Primary | ICD-10-CM

## 2025-04-09 DIAGNOSIS — M47.812 CERVICAL SPONDYLOSIS: ICD-10-CM

## 2025-04-09 PROCEDURE — 1159F MED LIST DOCD IN RCRD: CPT | Mod: CPTII,S$GLB,, | Performed by: ORTHOPAEDIC SURGERY

## 2025-04-09 PROCEDURE — 99999 PR PBB SHADOW E&M-EST. PATIENT-LVL III: CPT | Mod: PBBFAC,,, | Performed by: ORTHOPAEDIC SURGERY

## 2025-04-09 PROCEDURE — 3008F BODY MASS INDEX DOCD: CPT | Mod: CPTII,S$GLB,, | Performed by: ORTHOPAEDIC SURGERY

## 2025-04-09 PROCEDURE — 1160F RVW MEDS BY RX/DR IN RCRD: CPT | Mod: CPTII,S$GLB,, | Performed by: ORTHOPAEDIC SURGERY

## 2025-04-09 PROCEDURE — 4010F ACE/ARB THERAPY RXD/TAKEN: CPT | Mod: CPTII,S$GLB,, | Performed by: ORTHOPAEDIC SURGERY

## 2025-04-09 PROCEDURE — 99214 OFFICE O/P EST MOD 30 MIN: CPT | Mod: S$GLB,,, | Performed by: ORTHOPAEDIC SURGERY

## 2025-04-09 NOTE — PROGRESS NOTES
"Outpatient Rehab    Physical Therapy Visit    Patient Name: Hipolito Laws  MRN: [id number]  YOB: 1960  Encounter Date: 4/10/2025    Therapy Diagnosis:   Encounter Diagnoses   Name Primary?    Poor posture Yes    Decreased range of motion of neck          Physician: Yunier Lawton MD    Physician Orders: Eval and Treat  Medical Diagnosis: Cervical spondylosis  Stenosis of cervical spine region  Cervical radiculopathy    Visit # / Visits Authorized:  3 / 10  Insurance Authorization Period: 2/26/2025 to 12/31/2025  Date of Evaluation: 2/26/2025  Plan of Care Certification: 2/26/2025 to 5/8/2025     PT/PTA: PT   Number of PTA visits since last PT visit:0  Time In: 1700   Time Out: 1740  Total Time: 40   Total Billable Time: 40 mins    FOTO:  Intake Score: 57%  Survey Score 1:  %  Survey Score 2:  %    INSURANCE and OUTCOMES: Fee for Service with FOTO Outcomes 1/3     Pattern: 1 JAILYN    Occupation: works for [employer] full time, 6  x 10 hour days with extended commute - works in high [employer]  Leisure: no time for leisure activities right now  Exercise or gym: NO  Goals: "reduce pain"            Subjective   Pt reports moderate pain today, has been doing stretch routine from HEP 8-10 times a day and pushing himself to end range, noticing pain when he does it..  Pain reported as 5/10.      Objective      Isometric Testing on Med X equipment: Testing administered by PT    Test Initial Baseline Midpoint Final   Date 3/27/2025     ROM  deg     Max Peak Torque 214      Min Peak Torque 79      Flex/Ext Ratio 2.7:1     % variance  normative data 58%     % change from initial test N/A visit 1           Outcomes:  Intake Score: 57%  Visit 6 Score:   Visit 10 Score:   Discharge Score:  Goal Score: 62%     Treatment     Patient received the treatments listed below:      Hipolito received neuromuscular education  to isolate and engage spinal stabilization musculature correctly for motor control and coordination to aid " "in function and posture for 10 minutes on the Medical Medx Machine.  Patient performed MedX dynamic exercise with emphasis on spinal muscular control using pacer throughout  active range of motion. Therapist assisted patient in achieving optimal exertion for neural reeducation and endurance training by using the  José Luis Exertion Rating scale, by instructing the patient to aim for mid range of exertion, performing 15-20 repetitions, slowly, correctly,and safely.           4/10/2025     5:42 PM   [facility]   Visit Number 4   VAS Pain Rating 5   Time 5   Retraction in Sitting 25   Retraction with Extension 10   Flexion 2   Sidebending 2   Rotation 5   Scapular Retraction 15   Lumbar Stretches - Slouch Overcorrection 10   Cervical Weight 171 lbs   Repetitions 18   Rating of Perceived Exertion 3         Patient participated in therapeutic exercises to develop strength, endurance, ROM, flexibility, posture, and core stabilization for 30 minutes including:    RIS x 10  Cervical retraction + extension with towel support x 10  Cervical rotation stretch 5" x 5  Self upper trap stretch 20" x 2  Self levator scap stretch 20" x 2  Thoracic extension over half roll x 10  SOC x 10  No moneys x15 w/ RTB  Horizontal abductions x10 w/ RTB    Peripheral muscle strengthening which included 1 set of 15-20 repetitions at a slow, controlled 10-13 second per rep pace focused on strengthening supporting musculature for improved body mechanics and functional mobility.  Pt and therapist focused on proper form during treatment to ensure optimal strengthening of each targeted muscle group.  Machines were utilized including torso rotation, leg press, hip abd and hip add, leg ext.  Leg curl, triceps, biceps, chest and row added visit 3      Patient participated in dynamic functional therapeutic activities to improve functional performance and simulate household and community activities for 0  minutes. The following activities were " "included:    Patient  received manual therapy techniques for 0  minutes. The following activities were included:        Pt given cold pack for 0 minutes, pt declined      Time Entry(in minutes):  Neuromuscular Re-Education Time Entry: 10  Therapeutic Exercise Time Entry: 30    Assessment & Plan   Assessment: Hipolito presents to 4th healthy back visit reporting similar pain to last week, though feeling increased sense of wellness, energy, and "pep in my step" after last full body training session. Pt was able to complete peripheral strengthening ex's with appropriate muscular fatigue and without increased pain. MedX strengthening performed for at 171 in-lbs for 18 reps with RPE = 3/10. Added horizontal abductions to HEP to improve periscapular strength. Advised pt to reduce frequency of HEP performance and amplitude of his home stretches to avoid flaring neck musculature.    Evaluation/Treatment Tolerance: Patient tolerated treatment well    Patient will continue to benefit from skilled outpatient physical therapy to address the deficits listed in the problem list box on initial evaluation, provide pt/family education and to maximize pt's level of independence in the home and community environment.     Patient's spiritual, cultural, and educational needs considered and patient agreeable to plan of care and goals.         Patient Education and Home Exercises     Home exercises include:  RIS, no money with RTB, SOC, standing open books  Cardio program (V5): -  Lifting education (V11): -  Posture/Lumbar roll: purchased  [employer] Discharge handout (date given): -  Equipment at home/gym membership: no    Education provided:   - PT role and POC  - HEP  - MedX testing results  - pacing and extension hold for max strength and endurance gains  - RPE scale    Plan: Continue present POC per healthy back protocol.    Goals:     Active       Physical Therapy       Physical Therapy Goal (Progressing)       Start:  02/27/25       " Short term goals: 6 weeks or 10 visits   - Pt will demonstratte increased cervical MedX ROM as measured by med ex by 6 degrees from initial test which results in improved  ROM of neck for ease with ADLs and driving. Appropriate and Ongoing  - Pt will demonstrate independence with reducing or controlling symptoms with ther ex, movement, or position independently, able to reduce pain 1-2 points on pain scale using strategies taught in therapy.  Appropriate and Ongoing  - Pt will demonstrate increased MedX average isometric strength value by 30% with  when compared to the initial testing resulting in improved ability to perform bending, lifting, and carrying activities safely and confidently. Appropriate and Ongoing    Long term goals: 10 weeks or 20 visits  - Pt will demonstratte increased cervical MedX ROM as measured by med ex by 9 degrees from initial test which results in functional ROM of neck for ease with ADLs and driving.  Appropriate and Ongoing  - Pt will demonstrate increased MedX average isometric strength value  by 50% from initial test to improve ability to lift and carry, and sustain good posture while performing ADL's. Appropriate and Ongoing  - Pt will demonstrate reduced pain and improved functional outcomes as reported on the FOTO by reaching an intake score of >/= 62% functional ability in order to demonstrate subjective improvement in patient's condition. . Appropriate and Ongoing  - Pt will demonstrate independence with reducing or controlling symptoms with ther ex, movement, or position independently, able to reduce pain 2-4 points on pain scale using strategies taught in therapy. Appropriate and Ongoing  - Pt will demonstrate independence with the HEP at discharge. Appropriate and Ongoing  - Pt will be able to tolerate drive to and from work without increase in pain.(patient goal)  Appropriate and Ongoing              long term goals       Pt will demonstratte increased cervical MedX ROM as  measured by med ex by 9 degrees from initial test which results in functional ROM of neck for ease with ADLs and driving.   (Progressing)       Start:  03/27/25    Expected End:  05/08/25            Pt will demonstrate increased MedX average isometric strength value  by 50% from initial test to improve ability to lift and carry, and sustain good posture while performing ADL's. (Progressing)       Start:  03/27/25    Expected End:  05/08/25            Pt will demonstrate reduced pain and improved functional outcomes as reported on the FOTO by reaching an intake score of >/= 62% functional ability in order to demonstrate subjective improvement in patient's condition.  (Progressing)       Start:  03/27/25    Expected End:  05/08/25            Pt will demonstrate independence with reducing or controlling symptoms with ther ex, movement, or position independently, able to reduce pain 2-4 points on pain scale using strategies taught in therapy.  (Progressing)       Start:  03/27/25    Expected End:  05/08/25            Pt will demonstrate independence with the HEP at discharge.  (Progressing)       Start:  03/27/25    Expected End:  05/08/25               patient goals       Pt will be able to tolerate drive to and from work without increase in pain.(patient goal)  (Progressing)       Start:  03/27/25    Expected End:  05/08/25               short term goals       Pt will demonstratte increased cervical MedX ROM as measured by med ex by 6 degrees from initial test which results in improved  ROM of neck for ease with ADLs and driving.  (Progressing)       Start:  03/27/25    Expected End:  04/09/25            Pt will demonstrate independence with reducing or controlling symptoms with ther ex, movement, or position independently, able to reduce pain 1-2 points on pain scale using strategies taught in therapy.   (Progressing)       Start:  03/27/25    Expected End:  04/09/25            Pt will demonstrate increased MedX  average isometric strength value by 30% with  when compared to the initial testing resulting in improved ability to perform bending, lifting, and carrying activities safely and confidently.  (Progressing)       Start:  03/27/25    Expected End:  04/09/25                    Manolo Keenan PT

## 2025-04-10 ENCOUNTER — CLINICAL SUPPORT (OUTPATIENT)
Dept: REHABILITATION | Facility: HOSPITAL | Age: 65
End: 2025-04-10
Payer: COMMERCIAL

## 2025-04-10 DIAGNOSIS — R29.3 POOR POSTURE: Primary | ICD-10-CM

## 2025-04-10 DIAGNOSIS — R29.898 DECREASED RANGE OF MOTION OF NECK: ICD-10-CM

## 2025-04-10 PROCEDURE — 97110 THERAPEUTIC EXERCISES: CPT

## 2025-04-10 PROCEDURE — 97112 NEUROMUSCULAR REEDUCATION: CPT

## 2025-04-11 NOTE — PROGRESS NOTES
"DATE: 4/11/2025  PATIENT: Hipolito Laws    Attending Physician: Neno Reyez M.D.    HISTORY:  Hipolito Laws is a 65 y.o. male who returns to me today for FU. Patient continues to have neck pain that radiates down to R shoulder. He has been doing PT, which helps a lot. He would like to continue conservative management.    The patient does not smoke, have DM or endorse IVDU. The patient is not on any blood thinners and does not take chronic narcotics. He works as an advisor.    PMH/PSH/FamHx/SocHx:  Unchanged from prior visit    ROS:  Positive for neck pain  Denies myelopathic symptoms, perineal paresthesias, bowel or bladder incontinence    EXAM:  Ht 5' 10" (1.778 m)   Wt 100.7 kg (222 lb 0.1 oz)   BMI 31.85 kg/m²     My physical examination was notable for the following findings: motor intact BUE; SILT    IMAGING:  Today I independently reviewed the following images and my interpretations are as follows:    Previous AP and Lat upright C-spine films showed spondylosis and DDD.    MRI cervical showed no significant central stenosis. C3-7 mod b/l foraminal stenosis.     EMG BUE showed chronic R C6 and C7 radiculopathy.    ASSESSMENT/PLAN:  Patient has cervical spondylosis and stenosis with RUE radiculopathy. I discussed the natural history of their diagnoses as well as surgical and nonsurgical treatment options. I educated the patient on the importance of core/back strengthening, correct posture, bending/lifting ergonomics, and low-impact aerobic exercises (walking, elliptical, and aquatherapy). Continue medications and PT/exercises. Patient will follow up PRN.    Neno Reyez MD  Orthopaedic Spine Surgeon  Department of Orthopaedic Surgery  [phone]  "

## 2025-04-13 DIAGNOSIS — M54.12 CERVICAL RADICULOPATHY: ICD-10-CM

## 2025-04-14 RX ORDER — METHOCARBAMOL 750 MG/1
TABLET, FILM COATED ORAL
Qty: 60 TABLET | Refills: 2 | OUTPATIENT
Start: 2025-04-14

## 2025-04-16 NOTE — PROGRESS NOTES
"Outpatient Rehab    Physical Therapy Visit    Patient Name: Hipolito Laws  MRN: [id number]  YOB: 1960  Encounter Date: 4/17/2025    Therapy Diagnosis:   Encounter Diagnoses   Name Primary?    Decreased range of motion of neck Yes    Poor posture            Physician: Yunier Lawton MD    Physician Orders: Eval and Treat  Medical Diagnosis: Cervical spondylosis  Stenosis of cervical spine region  Cervical radiculopathy    Visit # / Visits Authorized:  4 / 10  Insurance Authorization Period: 2/26/2025 to 12/31/2025  Date of Evaluation: 2/26/2025  Plan of Care Certification: 2/26/2025 to 5/8/2025     PT/PTA: PT   Number of PTA visits since last PT visit:0  Time In: 1705   Time Out: 1750  Total Time: 45   Total Billable Time: 45 mins    FOTO:  Intake Score: 57 %  Survey Score 1:  %  Survey Score 2:  %    INSURANCE and OUTCOMES: Fee for Service with FOTO Outcomes 1/3     Pattern: 1 JAILYN    Occupation: works for [employer] full time, 6  x 10 hour days with extended commute - 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  Pain reported as 2/10.      Objective      Isometric Testing on Med X equipment: Testing administered by PT    Test Initial Baseline Midpoint Final   Date 3/27/2025     ROM  deg     Max Peak Torque 214      Min Peak Torque 79      Flex/Ext Ratio 2.7:1     % variance  normative data 58%     % change from initial test N/A visit 1           Outcomes:  Intake Score: 57%  Visit 6 Score:   Visit 10 Score:   Discharge Score:  Goal Score: 62%     Treatment     Patient received the treatments listed below:      Hipolito received neuromuscular education  to isolate and engage spinal stabilization musculature correctly for motor control and coordination to aid in " "function and posture for 10 minutes on the Medical Medx Machine.  Patient performed MedX dynamic exercise with emphasis on spinal muscular control using pacer throughout  active range of motion. Therapist assisted patient in achieving optimal exertion for neural reeducation and endurance training by using the  José Luis Exertion Rating scale, by instructing the patient to aim for mid range of exertion, performing 15-20 repetitions, slowly, correctly,and safely.           4/17/2025     5:45 PM   [facility]   Visit Number 5   VAS Pain Rating 2   Retraction in Sitting 25   Retraction with Extension 10   Flexion 2   Sidebending 2   Rotation 5   Scapular Retraction 15   Lumbar Stretches - Slouch Overcorrection 10   Cervical Weight 171 lbs   Repetitions 20   Rating of Perceived Exertion 3         Patient participated in therapeutic exercises to develop strength, endurance, ROM, flexibility, posture, and core stabilization for 35 minutes including:    RIS x 10  Cervical retraction + extension with towel support x 10  Cervical rotation stretch 5" x 5  Self upper trap stretch 20" x 2  Self levator scap stretch 20" x 2  Thoracic extension over half roll x 10  SOC x 10  No moneys x10 w/ GTB  Horizontal abductions x10 w/ RTB    Peripheral muscle strengthening which included 1 set of 15-20 repetitions at a slow, controlled 10-13 second per rep pace focused on strengthening supporting musculature for improved body mechanics and functional mobility.  Pt and therapist focused on proper form during treatment to ensure optimal strengthening of each targeted muscle group.  Machines were utilized including torso rotation, leg press, hip abd and hip add, leg ext.  Leg curl, triceps, biceps, chest and row added visit 3      Patient participated in dynamic functional therapeutic activities to improve functional performance and simulate household and community activities for 0  minutes. The following activities were " included:    Patient  received manual therapy techniques for 0  minutes. The following activities were included:        Pt given cold pack for 0 minutes, pt declined      Time Entry(in minutes):  Neuromuscular Re-Education Time Entry: 10  Therapeutic Exercise Time Entry: 35    Assessment & Plan   Assessment: Hipolito presents to 5th healthy back visit reporting reduced pain after reducing his HEP participation from 8-10x daily to 1-2x daily. Continued with flexibility, strengthening and neuromuscular re-education, progressing some periscapular strengthening exercises. Pt was able to complete peripheral strengthening ex's with appropriate muscular fatigue and without increased pain. MedX strengthening performed for at 171 in-lbs for 20 reps with RPE = 3/10.       Patient will continue to benefit from skilled outpatient physical therapy to address the deficits listed in the problem list box on initial evaluation, provide pt/family education and to maximize pt's level of independence in the home and community environment.     Patient's spiritual, cultural, and educational needs considered and patient agreeable to plan of care and goals.         Patient Education and Home Exercises     Home exercises include:  RIS, no money with RTB, SOC, standing open books  Cardio program (V5): -  Lifting education (V11): -  Posture/Lumbar roll: purchased  [employer] Discharge handout (date given): -  Equipment at home/gym membership: no    Education provided:   - PT role and POC  - HEP  - MedX testing results  - pacing and extension hold for max strength and endurance gains  - RPE scale    Plan: Continue present POC per healthy back protocol.    Goals:     Active       Physical Therapy       Physical Therapy Goal (Progressing)       Start:  02/27/25       Short term goals: 6 weeks or 10 visits   - Pt will demonstratte increased cervical MedX ROM as measured by med ex by 6 degrees from initial test which results in improved  ROM of  neck for ease with ADLs and driving. Appropriate and Ongoing  - Pt will demonstrate independence with reducing or controlling symptoms with ther ex, movement, or position independently, able to reduce pain 1-2 points on pain scale using strategies taught in therapy.  Appropriate and Ongoing  - Pt will demonstrate increased MedX average isometric strength value by 30% with  when compared to the initial testing resulting in improved ability to perform bending, lifting, and carrying activities safely and confidently. Appropriate and Ongoing    Long term goals: 10 weeks or 20 visits  - Pt will demonstratte increased cervical MedX ROM as measured by med ex by 9 degrees from initial test which results in functional ROM of neck for ease with ADLs and driving.  Appropriate and Ongoing  - Pt will demonstrate increased MedX average isometric strength value  by 50% from initial test to improve ability to lift and carry, and sustain good posture while performing ADL's. Appropriate and Ongoing  - Pt will demonstrate reduced pain and improved functional outcomes as reported on the FOTO by reaching an intake score of >/= 62% functional ability in order to demonstrate subjective improvement in patient's condition. . Appropriate and Ongoing  - Pt will demonstrate independence with reducing or controlling symptoms with ther ex, movement, or position independently, able to reduce pain 2-4 points on pain scale using strategies taught in therapy. Appropriate and Ongoing  - Pt will demonstrate independence with the HEP at discharge. Appropriate and Ongoing  - Pt will be able to tolerate drive to and from work without increase in pain.(patient goal)  Appropriate and Ongoing              long term goals       Pt will demonstratte increased cervical MedX ROM as measured by med ex by 9 degrees from initial test which results in functional ROM of neck for ease with ADLs and driving.   (Progressing)       Start:  03/27/25    Expected End:   05/08/25            Pt will demonstrate increased MedX average isometric strength value  by 50% from initial test to improve ability to lift and carry, and sustain good posture while performing ADL's. (Progressing)       Start:  03/27/25    Expected End:  05/08/25            Pt will demonstrate reduced pain and improved functional outcomes as reported on the FOTO by reaching an intake score of >/= 62% functional ability in order to demonstrate subjective improvement in patient's condition.  (Progressing)       Start:  03/27/25    Expected End:  05/08/25            Pt will demonstrate independence with reducing or controlling symptoms with ther ex, movement, or position independently, able to reduce pain 2-4 points on pain scale using strategies taught in therapy.  (Progressing)       Start:  03/27/25    Expected End:  05/08/25            Pt will demonstrate independence with the HEP at discharge.  (Progressing)       Start:  03/27/25    Expected End:  05/08/25               patient goals       Pt will be able to tolerate drive to and from work without increase in pain.(patient goal)  (Progressing)       Start:  03/27/25    Expected End:  05/08/25               short term goals       Pt will demonstratte increased cervical MedX ROM as measured by med ex by 6 degrees from initial test which results in improved  ROM of neck for ease with ADLs and driving.  (Progressing)       Start:  03/27/25    Expected End:  04/09/25            Pt will demonstrate independence with reducing or controlling symptoms with ther ex, movement, or position independently, able to reduce pain 1-2 points on pain scale using strategies taught in therapy.   (Progressing)       Start:  03/27/25    Expected End:  04/09/25            Pt will demonstrate increased MedX average isometric strength value by 30% with  when compared to the initial testing resulting in improved ability to perform bending, lifting, and carrying activities safely and  confidently.  (Progressing)       Start:  03/27/25    Expected End:  04/09/25                      Manolo Keenan PT

## 2025-04-17 ENCOUNTER — CLINICAL SUPPORT (OUTPATIENT)
Dept: REHABILITATION | Facility: HOSPITAL | Age: 65
End: 2025-04-17
Payer: COMMERCIAL

## 2025-04-17 DIAGNOSIS — R29.3 POOR POSTURE: ICD-10-CM

## 2025-04-17 DIAGNOSIS — R29.898 DECREASED RANGE OF MOTION OF NECK: Primary | ICD-10-CM

## 2025-04-17 PROCEDURE — 97112 NEUROMUSCULAR REEDUCATION: CPT

## 2025-04-17 PROCEDURE — 97110 THERAPEUTIC EXERCISES: CPT

## 2025-04-23 NOTE — PROGRESS NOTES
"Outpatient Rehab    Physical Therapy Visit    Patient Name: Hipolito Laws  MRN: [id number]  YOB: 1960  Encounter Date: 4/24/2025    Therapy Diagnosis:   Encounter Diagnoses   Name Primary?    Decreased range of motion of neck Yes    Poor posture              Physician: Yunier Lawton MD    Physician Orders: Eval and Treat  Medical Diagnosis: Cervical spondylosis  Stenosis of cervical spine region  Cervical radiculopathy    Visit # / Visits Authorized:  5 / 10  Insurance Authorization Period: 2/26/2025 to 12/31/2025  Date of Evaluation: 2/26/2025  Plan of Care Certification: 2/26/2025 to 5/8/2025     PT/PTA: PT   Number of PTA visits since last PT visit:0  Time In: 1700   Time Out: 1750  Total Time: 50   Total Billable Time: 50 mins    FOTO:  Intake Score: 57 %  Survey Score 1:  %  Survey Score 2:  %    INSURANCE and OUTCOMES: Fee for Service with FOTO Outcomes 1/3     Pattern: 1 JAILYN    Occupation: works for [employer] full time, 6  x 10 hour days with extended 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  Pain reported as 2/10.      Objective      Isometric Testing on Med X equipment: Testing administered by PT    Test Initial Baseline Midpoint Final   Date 3/27/2025     ROM  deg     Max Peak Torque 214      Min Peak Torque 79      Flex/Ext Ratio 2.7:1     % variance  normative data 58%     % change from initial test N/A visit 1           Outcomes:  Intake Score: 57%  Visit 6 Score:   Visit 10 Score:   Discharge Score:  Goal Score: 62%     Treatment     Patient received the treatments listed below:      Hipolito received neuromuscular education  to isolate and engage spinal stabilization musculature correctly for motor control and coordination to aid in function and posture for 10 minutes on the Medical Medx Machine.  Patient performed MedX dynamic " "exercise with emphasis on spinal muscular control using pacer throughout  active range of motion. Therapist assisted patient in achieving optimal exertion for neural reeducation and endurance training by using the  José Luis Exertion Rating scale, by instructing the patient to aim for mid range of exertion, performing 15-20 repetitions, slowly, correctly,and safely.           4/24/2025     4:52 PM   [facility]   Visit Number 6   VAS Pain Rating 2   Time 5   Retraction in Sitting 25   Retraction with Extension 10   Flexion 2   Sidebending 2   Rotation 5   Scapular Retraction 15   Lumbar Stretches - Slouch Overcorrection 10   Cervical Weight 180 lbs   Repetitions 15   Rating of Perceived Exertion 3         Patient participated in therapeutic exercises to develop strength, endurance, ROM, flexibility, posture, and core stabilization for 40 minutes including:    RIS x 10  Cervical retraction + extension with towel support x 10  Cervical rotation stretch 5" x 5  Self upper trap stretch 20" x 2  Self levator scap stretch 20" x 2  Thoracic extension over half roll x 10  SOC x 10  No moneys x15 w/ GTB  Horizontal abductions x15 w/ RTB    Peripheral muscle strengthening which included 1 set of 15-20 repetitions at a slow, controlled 10-13 second per rep pace focused on strengthening supporting musculature for improved body mechanics and functional mobility.  Pt and therapist focused on proper form during treatment to ensure optimal strengthening of each targeted muscle group.  Machines were utilized including torso rotation, leg press, hip abd and hip add, leg ext.  Leg curl, triceps, biceps, chest and row added visit 3      Patient participated in dynamic functional therapeutic activities to improve functional performance and simulate household and community activities for 0  minutes. The following activities were included:    Patient  received manual therapy techniques for 0  minutes. The following activities were " included:        Pt given cold pack for 0 minutes, pt declined      Time Entry(in minutes):  Neuromuscular Re-Education Time Entry: 10  Therapeutic Exercise Time Entry: 40    Assessment & Plan   Assessment: Hipolito presents to 5th healthy back visit reporting reduced pain after reducing his HEP participation from 8-10x daily to 1-2x daily. Continued with flexibility, strengthening and neuromuscular re-education, progressing some periscapular strengthening exercises. Pt was able to complete peripheral strengthening ex's with appropriate muscular fatigue and without increased pain. MedX strengthening performed for at 171 in-lbs for 20 reps with RPE = 3/10.  Evaluation/Treatment Tolerance: Patient tolerated treatment well    Patient will continue to benefit from skilled outpatient physical therapy to address the deficits listed in the problem list box on initial evaluation, provide pt/family education and to maximize pt's level of independence in the home and community environment.     Patient's spiritual, cultural, and educational needs considered and patient agreeable to plan of care and goals.         Patient Education and Home Exercises     Home exercises include:  RIS, no money with RTB, SOC, standing open books  Cardio program (V5): -  Lifting education (V11): -  Posture/Lumbar roll: purchased  Fridge Magnet Discharge handout (date given): -  Equipment at home/gym membership: no    Education provided:   - PT role and POC  - HEP  - MedX testing results  - pacing and extension hold for max strength and endurance gains  - RPE scale    Plan: Continue present POC per healthy back protocol.    Goals:     Active       Physical Therapy       Physical Therapy Goal (Progressing)       Start:  02/27/25       Short term goals: 6 weeks or 10 visits   - Pt will demonstratte increased cervical MedX ROM as measured by med ex by 6 degrees from initial test which results in improved  ROM of neck for ease with ADLs and driving.  Appropriate and Ongoing  - Pt will demonstrate independence with reducing or controlling symptoms with ther ex, movement, or position independently, able to reduce pain 1-2 points on pain scale using strategies taught in therapy.  Appropriate and Ongoing  - Pt will demonstrate increased MedX average isometric strength value by 30% with  when compared to the initial testing resulting in improved ability to perform bending, lifting, and carrying activities safely and confidently. Appropriate and Ongoing    Long term goals: 10 weeks or 20 visits  - Pt will demonstratte increased cervical MedX ROM as measured by med ex by 9 degrees from initial test which results in functional ROM of neck for ease with ADLs and driving.  Appropriate and Ongoing  - Pt will demonstrate increased MedX average isometric strength value  by 50% from initial test to improve ability to lift and carry, and sustain good posture while performing ADL's. Appropriate and Ongoing  - Pt will demonstrate reduced pain and improved functional outcomes as reported on the FOTO by reaching an intake score of >/= 62% functional ability in order to demonstrate subjective improvement in patient's condition. . Appropriate and Ongoing  - Pt will demonstrate independence with reducing or controlling symptoms with ther ex, movement, or position independently, able to reduce pain 2-4 points on pain scale using strategies taught in therapy. Appropriate and Ongoing  - Pt will demonstrate independence with the HEP at discharge. Appropriate and Ongoing  - Pt will be able to tolerate drive to and from work without increase in pain.(patient goal)  Appropriate and Ongoing              long term goals       Pt will demonstratte increased cervical MedX ROM as measured by med ex by 9 degrees from initial test which results in functional ROM of neck for ease with ADLs and driving.   (Progressing)       Start:  03/27/25    Expected End:  05/08/25            Pt will demonstrate  increased MedX average isometric strength value  by 50% from initial test to improve ability to lift and carry, and sustain good posture while performing ADL's. (Progressing)       Start:  03/27/25    Expected End:  05/08/25            Pt will demonstrate reduced pain and improved functional outcomes as reported on the FOTO by reaching an intake score of >/= 62% functional ability in order to demonstrate subjective improvement in patient's condition.  (Progressing)       Start:  03/27/25    Expected End:  05/08/25            Pt will demonstrate independence with reducing or controlling symptoms with ther ex, movement, or position independently, able to reduce pain 2-4 points on pain scale using strategies taught in therapy.  (Progressing)       Start:  03/27/25    Expected End:  05/08/25            Pt will demonstrate independence with the HEP at discharge.  (Progressing)       Start:  03/27/25    Expected End:  05/08/25               patient goals       Pt will be able to tolerate drive to and from work without increase in pain.(patient goal)  (Progressing)       Start:  03/27/25    Expected End:  05/08/25               short term goals       Pt will demonstratte increased cervical MedX ROM as measured by med ex by 6 degrees from initial test which results in improved  ROM of neck for ease with ADLs and driving.  (Progressing)       Start:  03/27/25    Expected End:  04/09/25            Pt will demonstrate independence with reducing or controlling symptoms with ther ex, movement, or position independently, able to reduce pain 1-2 points on pain scale using strategies taught in therapy.   (Progressing)       Start:  03/27/25    Expected End:  04/09/25            Pt will demonstrate increased MedX average isometric strength value by 30% with  when compared to the initial testing resulting in improved ability to perform bending, lifting, and carrying activities safely and confidently.  (Progressing)       Start:   03/27/25    Expected End:  04/09/25                        Manolo Keenan PT

## 2025-04-24 ENCOUNTER — CLINICAL SUPPORT (OUTPATIENT)
Dept: REHABILITATION | Facility: HOSPITAL | Age: 65
End: 2025-04-24
Payer: COMMERCIAL

## 2025-04-24 DIAGNOSIS — R29.3 POOR POSTURE: ICD-10-CM

## 2025-04-24 DIAGNOSIS — R29.898 DECREASED RANGE OF MOTION OF NECK: Primary | ICD-10-CM

## 2025-04-24 DIAGNOSIS — F41.9 ANXIETY AND DEPRESSION: ICD-10-CM

## 2025-04-24 DIAGNOSIS — F32.A ANXIETY AND DEPRESSION: ICD-10-CM

## 2025-04-24 PROCEDURE — 97112 NEUROMUSCULAR REEDUCATION: CPT

## 2025-04-24 PROCEDURE — 97110 THERAPEUTIC EXERCISES: CPT

## 2025-04-24 RX ORDER — DULOXETIN HYDROCHLORIDE 30 MG/1
30 CAPSULE, DELAYED RELEASE ORAL DAILY
Qty: 90 CAPSULE | Refills: 2 | Status: SHIPPED | OUTPATIENT
Start: 2025-04-24

## 2025-04-24 NOTE — TELEPHONE ENCOUNTER
Refill Decision Note   Hipolito Laws  is requesting a refill authorization.  Brief Assessment and Rationale for Refill:  Approve     Medication Therapy Plan:         Comments:     Note composed:1:51 PM 04/24/2025

## 2025-04-24 NOTE — TELEPHONE ENCOUNTER
No care due was identified.  [facility] Embedded Care Due Messages. Reference number: [id number].   4/24/2025 11:12:11 AM CDT

## 2025-05-05 ENCOUNTER — OFFICE VISIT (OUTPATIENT)
Dept: INTERNAL MEDICINE | Facility: CLINIC | Age: 65
End: 2025-05-05
Payer: COMMERCIAL

## 2025-05-05 VITALS
OXYGEN SATURATION: 95 % | SYSTOLIC BLOOD PRESSURE: 160 MMHG | DIASTOLIC BLOOD PRESSURE: 84 MMHG | HEART RATE: 102 BPM | BODY MASS INDEX: 31.76 KG/M2 | WEIGHT: 221.31 LBS

## 2025-05-05 DIAGNOSIS — I10 ESSENTIAL HYPERTENSION, BENIGN: ICD-10-CM

## 2025-05-05 DIAGNOSIS — J44.1 COPD EXACERBATION: Primary | ICD-10-CM

## 2025-05-05 DIAGNOSIS — F33.2 SEVERE EPISODE OF RECURRENT MAJOR DEPRESSIVE DISORDER, WITHOUT PSYCHOTIC FEATURES: ICD-10-CM

## 2025-05-05 PROCEDURE — 99999 PR PBB SHADOW E&M-EST. PATIENT-LVL IV: CPT | Mod: PBBFAC,,, | Performed by: STUDENT IN AN ORGANIZED HEALTH CARE EDUCATION/TRAINING PROGRAM

## 2025-05-05 PROCEDURE — 3077F SYST BP >= 140 MM HG: CPT | Mod: CPTII,S$GLB,, | Performed by: STUDENT IN AN ORGANIZED HEALTH CARE EDUCATION/TRAINING PROGRAM

## 2025-05-05 PROCEDURE — 3079F DIAST BP 80-89 MM HG: CPT | Mod: CPTII,S$GLB,, | Performed by: STUDENT IN AN ORGANIZED HEALTH CARE EDUCATION/TRAINING PROGRAM

## 2025-05-05 PROCEDURE — 1159F MED LIST DOCD IN RCRD: CPT | Mod: CPTII,S$GLB,, | Performed by: STUDENT IN AN ORGANIZED HEALTH CARE EDUCATION/TRAINING PROGRAM

## 2025-05-05 PROCEDURE — 99214 OFFICE O/P EST MOD 30 MIN: CPT | Mod: S$GLB,,, | Performed by: STUDENT IN AN ORGANIZED HEALTH CARE EDUCATION/TRAINING PROGRAM

## 2025-05-05 PROCEDURE — 1160F RVW MEDS BY RX/DR IN RCRD: CPT | Mod: CPTII,S$GLB,, | Performed by: STUDENT IN AN ORGANIZED HEALTH CARE EDUCATION/TRAINING PROGRAM

## 2025-05-05 PROCEDURE — 3008F BODY MASS INDEX DOCD: CPT | Mod: CPTII,S$GLB,, | Performed by: STUDENT IN AN ORGANIZED HEALTH CARE EDUCATION/TRAINING PROGRAM

## 2025-05-05 PROCEDURE — 4010F ACE/ARB THERAPY RXD/TAKEN: CPT | Mod: CPTII,S$GLB,, | Performed by: STUDENT IN AN ORGANIZED HEALTH CARE EDUCATION/TRAINING PROGRAM

## 2025-05-05 PROCEDURE — G2211 COMPLEX E/M VISIT ADD ON: HCPCS | Mod: S$GLB,,, | Performed by: STUDENT IN AN ORGANIZED HEALTH CARE EDUCATION/TRAINING PROGRAM

## 2025-05-05 RX ORDER — AZITHROMYCIN 250 MG/1
TABLET, FILM COATED ORAL
Qty: 6 TABLET | Refills: 0 | Status: SHIPPED | OUTPATIENT
Start: 2025-05-05 | End: 2025-05-10

## 2025-05-05 RX ORDER — METHYLPREDNISOLONE 4 MG/1
TABLET ORAL
Qty: 21 TABLET | Refills: 0 | Status: SHIPPED | OUTPATIENT
Start: 2025-05-05

## 2025-05-05 RX ORDER — BUPROPION HYDROCHLORIDE 150 MG/1
150 TABLET ORAL DAILY
Qty: 90 TABLET | Refills: 0 | Status: SHIPPED | OUTPATIENT
Start: 2025-05-05 | End: 2025-08-03

## 2025-05-05 NOTE — PROGRESS NOTES
[facility] VISIT      CHIEF COMPLAINT:   Chief Complaint   Patient presents with    Stress    Cough     For about 2 weeks       Sore Throat     For about 2 weeks       Nasal Congestion     For about 2 weeks        HISTORY OF PRESENT ILLNESS:       History of Present Illness    CHIEF COMPLAINT:  Patient presents today for respiratory symptoms and worsening depression.    RESPIRATORY SYMPTOMS:  He reports cough and congestion for 2 weeks, initially with swollen glands and sore throat. He experiences shortness of breath with exertion, particularly when walking up and down the loco. His symptoms have remained stable without improvement. He reports increased use of Albuterol rescue inhaler, approximately 3 times daily. Previously it was rare that he needed his rescue inhaler. Symptoms are similar to prior COPD exacerbations.     DEPRESSION:  His mental health has deteriorated significantly over the past month, with recent diagnosis of severe depression by therapist. He continues Cymbalta daily in the morning. He reports sleep disturbances and decreased appetite, particularly in the morning. He denies thoughts of self-harm or harming others.    MEDICAL HISTORY:  He has a history of disc issues in his neck. Annual low-dose CTs and labs have consistently yielded good results.      ROS:  General: +loss of appetite, -fevers, -chills  ENT: +sore throat  Respiratory: +productive cough, +chest congestion, +exertional dyspnea  Psychiatric: +anxiety, +depression, +sleep difficulty, +new or worsening mood changes, +sleep disturbances, +difficulty staying asleep, -suicidal ideation, -homicidal ideation       MEDICAL HISTORY:      Past Medical History:   Diagnosis Date    Hypertension          MEDICATIONS:      Medications Ordered Prior to Encounter[1]      PHYSICAL EXAM:    BP (!) 160/84 (BP Location: Right arm, Patient Position: Sitting) Comment: has taken bp meds today  Pulse 102   Wt 100.4 kg (221 lb 5.5 oz)   SpO2  95%   BMI 31.76 kg/m²     Physical Exam  Vitals and nursing note reviewed.   Constitutional:       General: He is not in acute distress.     Appearance: Normal appearance. He is not ill-appearing, toxic-appearing or diaphoretic.   HENT:      Head: Normocephalic and atraumatic.      Nose: Nose normal.   Eyes:      Extraocular Movements: Extraocular movements intact.      Conjunctiva/sclera: Conjunctivae normal.      Pupils: Pupils are equal, round, and reactive to light.   Cardiovascular:      Rate and Rhythm: Normal rate and regular rhythm.      Heart sounds: Normal heart sounds. No murmur heard.  Pulmonary:      Effort: Pulmonary effort is normal. No respiratory distress.      Breath sounds: Normal breath sounds. No stridor. No wheezing, rhonchi or rales.   Musculoskeletal:         General: No deformity. Normal range of motion.   Skin:     Findings: No lesion or rash.   Neurological:      General: No focal deficit present.      Mental Status: He is alert.      Motor: No weakness.      Gait: Gait normal.   Psychiatric:         Mood and Affect: Mood normal.         Behavior: Behavior normal.         Thought Content: Thought content normal.         Judgment: Judgment normal.                     ASSESSMENT & PLAN:    Assessment & Plan    Assessed respiratory symptoms as potential COPD exacerbation.  Determined steroid course and antibiotic treatment appropriate.  Evaluated depression symptoms and current medication regimen.  Added Wellbutrin (bupropion) to existing Cymbalta for depression management, to be taken in the morning at lowest dose.  Considered serotonin syndrome risk with medication combination, deemed low at prescribed doses.      1. COPD exacerbation  - Evaluated patient's 2-week history of coughing, congestion, dyspnea.   - VSS, Spo2 95% on RA. Lungs clear.   - Discussed respiratory symptoms and potential COPD exacerbation.  - Prescribed 5-day course of oral steroids and initiated azithromycin therapy  for infection.  -     methylPREDNISolone (MEDROL DOSEPACK) 4 mg tablet; use as directed  Dispense: 21 tablet; Refill: 0  -     azithromycin (Z-MAKAYLA) 250 MG tablet; Take 2 tablets by mouth on day 1; Take 1 tablet by mouth on days 2-5  Dispense: 6 tablet; Refill: 0    2. Severe episode of recurrent major depressive disorder, without psychotic features  - Evaluated patient reporting severe depression diagnosed by therapist and manifesting with sleep issues, decreased appetite, and work-related stress.  - Therapist has diagnosed severe depression and recommended medication adjustment.  - Added Wellbutrin (bupropion) to existing Cymbalta (duloxetine) regimen as he has not noticed Cymbalta helping his mood but he does like the medication for pain management.   - Patient to take Wellbutrin at lowest dose in the morning, while continuing daily Cymbalta.  - Explained Wellbutrin initial effects expected in 1-2 weeks and full efficacy in 4-8 weeks.  - Informed about rare risk of serotonin syndrome with combined medications and symptoms to watch for.  - Potentially will be taking time off work and may need HR paperwork signed - will let us know.  - Follow up with PCP next month as scheduled  -     buPROPion (WELLBUTRIN XL) 150 MG TB24 tablet; Take 1 tablet (150 mg total) by mouth once daily.  Dispense: 90 tablet; Refill: 0    3. Essential hypertension, benign  - Blood pressure noted to be high today, patient has not yet taken his medications  - Continue current medication regimen   - Follow up with PCP next month as scheduled        FOLLOW-UP:  - PCP follow up in 1 month as scheduled.  - Patient advised to contact office if issues arise before next appointment or return sooner if needed.           Attestation:  This note was generated with the assistance of ambient listening technology. Verbal consent was obtained by the patient and accompanying visitor(s) for the recording of patient appointment to facilitate this note. I  attest to having reviewed and edited the generated note for accuracy, though some syntax or spelling errors may persist. Please contact the author of this note for any clarification.             Angelita Fuller MD  [facility]         [1]   Current Outpatient Medications on File Prior to Visit   Medication Sig Dispense Refill    albuterol (VENTOLIN HFA) 90 mcg/actuation inhaler Inhale 2 puffs into the lungs every 4 (four) hours as needed for Wheezing. Rescue 18 g 11    aspirin 81 MG Chew Take 375 mg by mouth once daily.      atorvastatin (LIPITOR) 80 MG tablet Take 1 tablet (80 mg total) by mouth once daily. 90 tablet 3    DULoxetine (CYMBALTA) 30 MG capsule Take 1 capsule (30 mg total) by mouth once daily. 90 capsule 2    eszopiclone (LUNESTA) 2 MG Tab Take 1 tablet (2 mg total) by mouth every evening. 30 tablet 5    gabapentin (NEURONTIN) 300 MG capsule Take 1 capsule (300 mg total) by mouth 3 (three) times daily. 90 capsule 11    losartan (COZAAR) 50 MG tablet Take 1 tablet (50 mg total) by mouth once daily. 90 tablet 3    meloxicam (MOBIC) 15 MG tablet Take 1 tablet (15 mg total) by mouth once daily. 90 tablet 3    multivitamin capsule Take 1 capsule by mouth once daily.      vitamin D (VITAMIN D3) 1000 units Tab Take 1,000 Units by mouth once daily.      fluticasone-umeclidin-vilanter (TRELEGY ELLIPTA) 200-62.5-25 mcg inhaler Inhale 1 puff into the lungs once daily. (Patient not taking: Reported on 5/5/2025) 60 each 11     Current Facility-Administered Medications on File Prior to Visit   Medication Dose Route Frequency Provider Last Rate Last Admin    midazolam (VERSED) 1 mg/mL injection 2 mg  2 mg Intravenous PRN Gualberto Fuentes MD   2 mg at 09/14/22 1220

## 2025-05-05 NOTE — PATIENT INSTRUCTIONS
Depression/Anxiety -   Wellbutrin 150 mg - take daily in the morning.   Follow up with Dr. Rosas as scheduled next month.     COPD exacerbation:  Steroid course - 5 days  Antibiotic course - 5 days   Albuterol inhaler as needed for shortness of breath  ER for worsening or severe symptoms

## 2025-05-06 ENCOUNTER — PATIENT MESSAGE (OUTPATIENT)
Dept: INTERNAL MEDICINE | Facility: CLINIC | Age: 65
End: 2025-05-06
Payer: COMMERCIAL

## 2025-05-07 DIAGNOSIS — M25.572 CHRONIC PAIN OF LEFT ANKLE: ICD-10-CM

## 2025-05-07 DIAGNOSIS — G89.29 CHRONIC PAIN OF LEFT ANKLE: ICD-10-CM

## 2025-05-08 RX ORDER — MELOXICAM 15 MG/1
TABLET ORAL
Qty: 90 TABLET | Refills: 3 | Status: SHIPPED | OUTPATIENT
Start: 2025-05-08

## 2025-05-08 NOTE — TELEPHONE ENCOUNTER
No care due was identified.  [facility] Embedded Care Due Messages. Reference number: [id number].   5/07/2025 7:59:49 PM CDT

## 2025-05-14 ENCOUNTER — CLINICAL SUPPORT (OUTPATIENT)
Dept: REHABILITATION | Facility: HOSPITAL | Age: 65
End: 2025-05-14
Payer: COMMERCIAL

## 2025-05-14 DIAGNOSIS — R29.898 DECREASED RANGE OF MOTION OF NECK: Primary | ICD-10-CM

## 2025-05-14 DIAGNOSIS — R29.3 POOR POSTURE: ICD-10-CM

## 2025-05-14 PROCEDURE — 97110 THERAPEUTIC EXERCISES: CPT

## 2025-05-14 PROCEDURE — 97112 NEUROMUSCULAR REEDUCATION: CPT

## 2025-05-14 NOTE — PROGRESS NOTES
Outpatient Rehab    Physical Therapy Visit    Patient Name: Hipolito Laws  MRN: [id number]  YOB: 1960  Encounter Date: 5/14/2025    Therapy Diagnosis:   Encounter Diagnoses   Name Primary?    Decreased range of motion of neck Yes    Poor posture      Physician: Yunier Lawton MD    Physician Orders: Eval and Treat  Medical Diagnosis: Cervical spondylosis  Stenosis of cervical spine region  Cervical radiculopathy    Visit # / Visits Authorized:  6 / 10  Insurance Authorization Period: 2/26/2025 to 12/31/2025  Date of Evaluation: 2/26/2025  Plan of Care Certification: 2/27/2025 to 5/8/2025      PT/PTA: PT   Number of PTA visits since last PT visit:0  Time In: 1600   Time Out: 1700  Total Time (in minutes): 60   Total Billable Time (in minutes): 60    FOTO:  Intake Score: 57 %  Survey Score 2:  %  Survey Score 3:  %    Precautions:       Subjective   Pt reports less neck pain and lower spikes in it. Is retiring from work, has been driving less and feeling better. Going for 30 min walks in the morning and sleeping 8 hours per night..  Pain reported as 2/10.      Objective            Isometric Testing on Med X equipment: Testing administered by PT    Test Initial Baseline Midpoint Final   Date 3/27/2025     ROM  deg     Max Peak Torque 214      Min Peak Torque 79      Flex/Ext Ratio 2.7:1     % variance  normative data 58%     % change from initial test N/A visit 1           Outcomes:  Intake Score: 57%  Visit 6 Score:   Visit 10 Score:   Discharge Score:  Goal Score: 62%     Treatment     Patient received the treatments listed below:      Hipolito received neuromuscular education  to isolate and engage spinal stabilization musculature correctly for motor control and coordination to aid in function and posture for 15 minutes on the [employer] Machine.  Patient performed MedX dynamic exercise with emphasis on spinal muscular control using pacer throughout  active range of motion. Therapist assisted  "patient in achieving optimal exertion for neural reeducation and endurance training by using the  José Luis Exertion Rating scale, by instructing the patient to aim for mid range of exertion, performing 15-20 repetitions, slowly, correctly,and safely.           5/14/2025     4:22 PM   [facility]   Visit Number 7   VAS Pain Rating 2   Time 5   Retraction in Sitting 25   Retraction with Extension 10   Flexion 2   Sidebending 2   Rotation 5   Scapular Retraction 15   Lumbar Stretches - Slouch Overcorrection 10   Cervical Flexion 108   Cervical Extension 42   Cervical Weight 180 lbs   Repetitions 18   Rating of Perceived Exertion 4         Patient participated in therapeutic exercises to develop strength, endurance, ROM, flexibility, posture, and core stabilization for 45 minutes including:    RIS x 10  Cervical retraction + extension with towel support x 10  Cervical rotation stretch 5" x 5  Self upper trap stretch 20" x 2  Self levator scap stretch 20" x 2  Thoracic extension over half roll x 10  SOC x 10  No moneys x15 w/ GTB  Horizontal abductions x15 w/ RTB    Peripheral muscle strengthening which included 1 set of 15-20 repetitions at a slow, controlled 10-13 second per rep pace focused on strengthening supporting musculature for improved body mechanics and functional mobility.  Pt and therapist focused on proper form during treatment to ensure optimal strengthening of each targeted muscle group.  Machines were utilized including torso rotation, leg press, hip abd and hip add, leg ext.  Leg curl, triceps, biceps, chest and row added visit 3      Patient participated in dynamic functional therapeutic activities to improve functional performance and simulate household and community activities for 0  minutes. The following activities were included:    Patient  received manual therapy techniques for 0  minutes. The following activities were included:        Pt given cold pack for 0 minutes, pt declined    Time " Entry(in minutes):  Neuromuscular Re-Education Time Entry: 15  Therapeutic Exercise Time Entry: 45    Assessment & Plan   Assessment: Hipolito presents to 7th healthy back visit reporting decreasing pain and improving wellness. Continued with mobility, strengthening and periscapular neuromuscular reeducation to reduce strain on cervical paraspinal musculature. Pt was able to complete peripheral strengthening ex's with appropriate muscular fatigue and without increased pain. MedX strengthening performed for at 180 in-lbs for 18 reps with RPE = 4/10.  Evaluation/Treatment Tolerance: Patient tolerated treatment well    Patient will continue to benefit from skilled outpatient physical therapy to address the deficits listed in the problem list box on initial evaluation, provide pt/family education and to maximize pt's level of independence in the home and community environment.     Patient's spiritual, cultural, and educational needs considered and patient agreeable to plan of care and goals.           Plan: Continue present POC per healthy back protocol.    Goals:   Active       Physical Therapy       Physical Therapy Goal (Progressing)       Start:  02/27/25       Short term goals: 6 weeks or 10 visits   - Pt will demonstratte increased cervical MedX ROM as measured by med ex by 6 degrees from initial test which results in improved  ROM of neck for ease with ADLs and driving. Appropriate and Ongoing  - Pt will demonstrate independence with reducing or controlling symptoms with ther ex, movement, or position independently, able to reduce pain 1-2 points on pain scale using strategies taught in therapy.  Appropriate and Ongoing  - Pt will demonstrate increased MedX average isometric strength value by 30% with  when compared to the initial testing resulting in improved ability to perform bending, lifting, and carrying activities safely and confidently. Appropriate and Ongoing    Long term goals: 10 weeks or 20 visits  - Pt  will demonstratte increased cervical MedX ROM as measured by med ex by 9 degrees from initial test which results in functional ROM of neck for ease with ADLs and driving.  Appropriate and Ongoing  - Pt will demonstrate increased MedX average isometric strength value  by 50% from initial test to improve ability to lift and carry, and sustain good posture while performing ADL's. Appropriate and Ongoing  - Pt will demonstrate reduced pain and improved functional outcomes as reported on the FOTO by reaching an intake score of >/= 62% functional ability in order to demonstrate subjective improvement in patient's condition. . Appropriate and Ongoing  - Pt will demonstrate independence with reducing or controlling symptoms with ther ex, movement, or position independently, able to reduce pain 2-4 points on pain scale using strategies taught in therapy. Appropriate and Ongoing  - Pt will demonstrate independence with the HEP at discharge. Appropriate and Ongoing  - Pt will be able to tolerate drive to and from work without increase in pain.(patient goal)  Appropriate and Ongoing              long term goals       Pt will demonstratte increased cervical MedX ROM as measured by med ex by 9 degrees from initial test which results in functional ROM of neck for ease with ADLs and driving.   (Progressing)       Start:  03/27/25    Expected End:  05/08/25            Pt will demonstrate increased MedX average isometric strength value  by 50% from initial test to improve ability to lift and carry, and sustain good posture while performing ADL's. (Progressing)       Start:  03/27/25    Expected End:  05/08/25            Pt will demonstrate reduced pain and improved functional outcomes as reported on the FOTO by reaching an intake score of >/= 62% functional ability in order to demonstrate subjective improvement in patient's condition.  (Progressing)       Start:  03/27/25    Expected End:  05/08/25            Pt will demonstrate  independence with reducing or controlling symptoms with ther ex, movement, or position independently, able to reduce pain 2-4 points on pain scale using strategies taught in therapy.  (Progressing)       Start:  03/27/25    Expected End:  05/08/25            Pt will demonstrate independence with the HEP at discharge.  (Progressing)       Start:  03/27/25    Expected End:  05/08/25               patient goals       Pt will be able to tolerate drive to and from work without increase in pain.(patient goal)  (Progressing)       Start:  03/27/25    Expected End:  05/08/25               short term goals       Pt will demonstratte increased cervical MedX ROM as measured by med ex by 6 degrees from initial test which results in improved  ROM of neck for ease with ADLs and driving.  (Progressing)       Start:  03/27/25    Expected End:  04/09/25            Pt will demonstrate independence with reducing or controlling symptoms with ther ex, movement, or position independently, able to reduce pain 1-2 points on pain scale using strategies taught in therapy.   (Progressing)       Start:  03/27/25    Expected End:  04/09/25            Pt will demonstrate increased MedX average isometric strength value by 30% with  when compared to the initial testing resulting in improved ability to perform bending, lifting, and carrying activities safely and confidently.  (Progressing)       Start:  03/27/25    Expected End:  04/09/25                Manolo Keenan, PT

## 2025-05-28 ENCOUNTER — CLINICAL SUPPORT (OUTPATIENT)
Dept: REHABILITATION | Facility: HOSPITAL | Age: 65
End: 2025-05-28
Payer: COMMERCIAL

## 2025-05-28 DIAGNOSIS — R29.898 DECREASED RANGE OF MOTION OF NECK: Primary | ICD-10-CM

## 2025-05-28 DIAGNOSIS — R29.3 POOR POSTURE: ICD-10-CM

## 2025-05-28 PROCEDURE — 97110 THERAPEUTIC EXERCISES: CPT

## 2025-05-28 PROCEDURE — 97112 NEUROMUSCULAR REEDUCATION: CPT

## 2025-05-28 NOTE — PROGRESS NOTES
Outpatient Rehab    Physical Therapy Visit    Patient Name: Hipolito Laws  MRN: [id number]  YOB: 1960  Encounter Date: 5/28/2025    Therapy Diagnosis:   Encounter Diagnoses   Name Primary?    Decreased range of motion of neck Yes    Poor posture      Physician: Yunier Lawton MD    Physician Orders: Eval and Treat  Medical Diagnosis: Cervical spondylosis  Stenosis of cervical spine region  Cervical radiculopathy    Visit # / Visits Authorized:  8/10  Insurance Authorization Period: 2/26/2025 to 12/31/2025  Date of Evaluation: 2/26/2025  Plan of Care Certification: 2/27/2025 to 6/30/2025      PT/PTA:     Number of PTA visits since last PT visit:1  Time In: 1500   Time Out: 1600  Total Time (in minutes): 60   Total Billable Time (in minutes): 55    FOTO:  Intake Score: 5757%  Survey Score 2:  %  Survey Score 3:  %    Precautions:       Subjective   Pt arrives with mild neck pain. 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  Pain reported as 2/10.      Objective            Isometric Testing on Med X equipment: Testing administered by PT    Test Initial Baseline Midpoint Final   Date 3/27/2025     ROM  deg     Max Peak Torque 214      Min Peak Torque 79      Flex/Ext Ratio 2.7:1     % variance  normative data 58%     % change from initial test N/A visit 1           Outcomes:  Intake Score: 57%  Visit 6 Score:   Visit 10 Score:   Discharge Score:  Goal Score: 62%     Treatment     Patient received the treatments listed below:      Hipolito received neuromuscular education  to isolate and engage spinal stabilization musculature correctly for motor control and coordination to aid in function and posture for 15 minutes on the [employer] Machine.  Patient performed MedX dynamic exercise with emphasis on spinal muscular control using pacer throughout  active range of motion. Therapist  "assisted patient in achieving optimal exertion for neural reeducation and endurance training by using the  José Luis Exertion Rating scale, by instructing the patient to aim for mid range of exertion, performing 15-20 repetitions, slowly, correctly,and safely.           5/28/2025     3:28 PM   [facility]   Visit Number 8   VAS Pain Rating 2   Retraction in Sitting 15   Retraction with Extension 10   Flexion 2   Sidebending 2   Rotation 5   Scapular Retraction 20   Lumbar Stretches - Slouch Overcorrection 10   Cervical Weight 180 lbs   Repetitions 20   Rating of Perceived Exertion 5         Patient participated in therapeutic exercises to develop strength, endurance, ROM, flexibility, posture, and core stabilization for 45 minutes including:    RIS x 10  Cervical retraction + extension with towel support x 10  Cervical rotation stretch 5" x 5  Self upper trap stretch 20" x 2  Self levator scap stretch 20" x 2  Thoracic extension over half roll x 10  SOC x 10  No moneys x15 w/ GTB  Horizontal abductions x15 w/ GTB  + standing open books x 10    Peripheral muscle strengthening which included 1 set of 15-20 repetitions at a slow, controlled 10-13 second per rep pace focused on strengthening supporting musculature for improved body mechanics and functional mobility.  Pt and therapist focused on proper form during treatment to ensure optimal strengthening of each targeted muscle group.  Machines were utilized including torso rotation, leg press, hip abd and hip add, leg ext.  Leg curl, triceps, biceps, chest and row added visit 3    Patient  received manual therapy techniques for 0  minutes. The following activities were included:        Pt given cold pack for 0 minutes, pt declined    Time Entry(in minutes):       Assessment & Plan   Assessment: Hipolito presents to 8th healthy back visit reporting decreasing pain and improving wellness. Continued with mobility, strengthening and periscapular neuromuscular reeducation to " reduce strain on cervical paraspinal musculature. Pt was able to complete peripheral strengthening ex's with appropriate muscular fatigue and without increased pain. MedX strengthening performed for at 180 in-lbs for 20 reps with RPE = 3/10. He was able to complete full peripheral circut without pain or discomfort.  Evaluation/Treatment Tolerance: Patient tolerated treatment well    Patient will continue to benefit from skilled outpatient physical therapy to address the deficits listed in the problem list box on initial evaluation, provide pt/family education and to maximize pt's level of independence in the home and community environment.     Patient's spiritual, cultural, and educational needs considered and patient agreeable to plan of care and goals.           Plan: Continue present POC per healthy back protocol.    Goals:   Active       Physical Therapy       Physical Therapy Goal (Progressing)       Start:  02/27/25       Short term goals: 6 weeks or 10 visits   - Pt will demonstratte increased cervical MedX ROM as measured by med ex by 6 degrees from initial test which results in improved  ROM of neck for ease with ADLs and driving. Appropriate and Ongoing  - Pt will demonstrate independence with reducing or controlling symptoms with ther ex, movement, or position independently, able to reduce pain 1-2 points on pain scale using strategies taught in therapy.  Appropriate and Ongoing  - Pt will demonstrate increased MedX average isometric strength value by 30% with  when compared to the initial testing resulting in improved ability to perform bending, lifting, and carrying activities safely and confidently. Appropriate and Ongoing    Long term goals: 10 weeks or 20 visits  - Pt will demonstratte increased cervical MedX ROM as measured by med ex by 9 degrees from initial test which results in functional ROM of neck for ease with ADLs and driving.  Appropriate and Ongoing  - Pt will demonstrate increased MedX  average isometric strength value  by 50% from initial test to improve ability to lift and carry, and sustain good posture while performing ADL's. Appropriate and Ongoing  - Pt will demonstrate reduced pain and improved functional outcomes as reported on the FOTO by reaching an intake score of >/= 62% functional ability in order to demonstrate subjective improvement in patient's condition. . Appropriate and Ongoing  - Pt will demonstrate independence with reducing or controlling symptoms with ther ex, movement, or position independently, able to reduce pain 2-4 points on pain scale using strategies taught in therapy. Appropriate and Ongoing  - Pt will demonstrate independence with the HEP at discharge. Appropriate and Ongoing  - Pt will be able to tolerate drive to and from work without increase in pain.(patient goal)  Appropriate and Ongoing              long term goals       Pt will demonstratte increased cervical MedX ROM as measured by med ex by 9 degrees from initial test which results in functional ROM of neck for ease with ADLs and driving.   (Progressing)       Start:  03/27/25    Expected End:  06/30/25            Pt will demonstrate increased MedX average isometric strength value  by 50% from initial test to improve ability to lift and carry, and sustain good posture while performing ADL's. (Progressing)       Start:  03/27/25    Expected End:  06/30/25            Pt will demonstrate reduced pain and improved functional outcomes as reported on the FOTO by reaching an intake score of >/= 62% functional ability in order to demonstrate subjective improvement in patient's condition.  (Progressing)       Start:  03/27/25    Expected End:  06/30/25            Pt will demonstrate independence with reducing or controlling symptoms with ther ex, movement, or position independently, able to reduce pain 2-4 points on pain scale using strategies taught in therapy.  (Progressing)       Start:  03/27/25    Expected End:   06/30/25            Pt will demonstrate independence with the HEP at discharge.  (Progressing)       Start:  03/27/25    Expected End:  06/30/25               patient goals       Pt will be able to tolerate drive to and from work without increase in pain.(patient goal)  (Progressing)       Start:  03/27/25    Expected End:  06/30/25               short term goals       Pt will demonstratte increased cervical MedX ROM as measured by med ex by 6 degrees from initial test which results in improved  ROM of neck for ease with ADLs and driving.  (Progressing)       Start:  03/27/25    Expected End:  06/30/25            Pt will demonstrate independence with reducing or controlling symptoms with ther ex, movement, or position independently, able to reduce pain 1-2 points on pain scale using strategies taught in therapy.   (Progressing)       Start:  03/27/25    Expected End:  06/30/25            Pt will demonstrate increased MedX average isometric strength value by 30% with  when compared to the initial testing resulting in improved ability to perform bending, lifting, and carrying activities safely and confidently.  (Progressing)       Start:  03/27/25    Expected End:  06/30/25                Mala Williamson, PT

## 2025-06-04 ENCOUNTER — CLINICAL SUPPORT (OUTPATIENT)
Dept: REHABILITATION | Facility: HOSPITAL | Age: 65
End: 2025-06-04
Payer: COMMERCIAL

## 2025-06-04 DIAGNOSIS — R29.898 DECREASED RANGE OF MOTION OF NECK: Primary | ICD-10-CM

## 2025-06-04 DIAGNOSIS — R29.3 POOR POSTURE: ICD-10-CM

## 2025-06-04 PROCEDURE — 97110 THERAPEUTIC EXERCISES: CPT

## 2025-06-04 PROCEDURE — 97112 NEUROMUSCULAR REEDUCATION: CPT

## 2025-06-19 ENCOUNTER — LAB VISIT (OUTPATIENT)
Dept: LAB | Facility: HOSPITAL | Age: 65
End: 2025-06-19
Attending: INTERNAL MEDICINE
Payer: COMMERCIAL

## 2025-06-19 ENCOUNTER — OFFICE VISIT (OUTPATIENT)
Dept: INTERNAL MEDICINE | Facility: CLINIC | Age: 65
End: 2025-06-19
Payer: COMMERCIAL

## 2025-06-19 VITALS
HEART RATE: 103 BPM | SYSTOLIC BLOOD PRESSURE: 138 MMHG | WEIGHT: 220.44 LBS | BODY MASS INDEX: 31.63 KG/M2 | OXYGEN SATURATION: 96 % | DIASTOLIC BLOOD PRESSURE: 70 MMHG

## 2025-06-19 DIAGNOSIS — F32.A ANXIETY AND DEPRESSION: Primary | ICD-10-CM

## 2025-06-19 DIAGNOSIS — F41.9 ANXIETY AND DEPRESSION: Primary | ICD-10-CM

## 2025-06-19 DIAGNOSIS — J43.9 PULMONARY EMPHYSEMA, UNSPECIFIED EMPHYSEMA TYPE: ICD-10-CM

## 2025-06-19 DIAGNOSIS — I10 ESSENTIAL HYPERTENSION, BENIGN: ICD-10-CM

## 2025-06-19 DIAGNOSIS — R10.13 DYSPEPSIA: ICD-10-CM

## 2025-06-19 LAB
ABSOLUTE EOSINOPHIL (OHS): 0.28 K/UL
ABSOLUTE MONOCYTE (OHS): 0.97 K/UL (ref 0.3–1)
ABSOLUTE NEUTROPHIL COUNT (OHS): 8.3 K/UL (ref 1.8–7.7)
ALBUMIN SERPL BCP-MCNC: 4.3 G/DL (ref 3.5–5.2)
ALP SERPL-CCNC: 68 UNIT/L (ref 40–150)
ALT SERPL W/O P-5'-P-CCNC: 23 UNIT/L (ref 10–44)
ANION GAP (OHS): 11 MMOL/L (ref 8–16)
AST SERPL-CCNC: 23 UNIT/L (ref 11–45)
BASOPHILS # BLD AUTO: 0.09 K/UL
BASOPHILS NFR BLD AUTO: 0.7 %
BILIRUB SERPL-MCNC: 0.3 MG/DL (ref 0.1–1)
BUN SERPL-MCNC: 19 MG/DL (ref 8–23)
CALCIUM SERPL-MCNC: 9.4 MG/DL (ref 8.7–10.5)
CHLORIDE SERPL-SCNC: 102 MMOL/L (ref 95–110)
CO2 SERPL-SCNC: 26 MMOL/L (ref 23–29)
CREAT SERPL-MCNC: 0.8 MG/DL (ref 0.5–1.4)
ERYTHROCYTE [DISTWIDTH] IN BLOOD BY AUTOMATED COUNT: 15.9 % (ref 11.5–14.5)
GFR SERPLBLD CREATININE-BSD FMLA CKD-EPI: >60 ML/MIN/1.73/M2
GLUCOSE SERPL-MCNC: 103 MG/DL (ref 70–110)
HCT VFR BLD AUTO: 35.8 % (ref 40–54)
HGB BLD-MCNC: 10.9 GM/DL (ref 14–18)
IMM GRANULOCYTES # BLD AUTO: 0.06 K/UL (ref 0–0.04)
IMM GRANULOCYTES NFR BLD AUTO: 0.5 % (ref 0–0.5)
LYMPHOCYTES # BLD AUTO: 2.32 K/UL (ref 1–4.8)
MCH RBC QN AUTO: 27.8 PG (ref 27–31)
MCHC RBC AUTO-ENTMCNC: 30.4 G/DL (ref 32–36)
MCV RBC AUTO: 91 FL (ref 82–98)
NUCLEATED RBC (/100WBC) (OHS): 0 /100 WBC
PLATELET # BLD AUTO: 416 K/UL (ref 150–450)
PMV BLD AUTO: 9.4 FL (ref 9.2–12.9)
POTASSIUM SERPL-SCNC: 4.1 MMOL/L (ref 3.5–5.1)
PROT SERPL-MCNC: 7.4 GM/DL (ref 6–8.4)
RBC # BLD AUTO: 3.92 M/UL (ref 4.6–6.2)
RELATIVE EOSINOPHIL (OHS): 2.3 %
RELATIVE LYMPHOCYTE (OHS): 19.3 % (ref 18–48)
RELATIVE MONOCYTE (OHS): 8.1 % (ref 4–15)
RELATIVE NEUTROPHIL (OHS): 69.1 % (ref 38–73)
SODIUM SERPL-SCNC: 139 MMOL/L (ref 136–145)
WBC # BLD AUTO: 12.02 K/UL (ref 3.9–12.7)

## 2025-06-19 PROCEDURE — 99214 OFFICE O/P EST MOD 30 MIN: CPT | Mod: S$GLB,,, | Performed by: INTERNAL MEDICINE

## 2025-06-19 PROCEDURE — 3078F DIAST BP <80 MM HG: CPT | Mod: CPTII,S$GLB,, | Performed by: INTERNAL MEDICINE

## 2025-06-19 PROCEDURE — 1159F MED LIST DOCD IN RCRD: CPT | Mod: CPTII,S$GLB,, | Performed by: INTERNAL MEDICINE

## 2025-06-19 PROCEDURE — 4010F ACE/ARB THERAPY RXD/TAKEN: CPT | Mod: CPTII,S$GLB,, | Performed by: INTERNAL MEDICINE

## 2025-06-19 PROCEDURE — 1160F RVW MEDS BY RX/DR IN RCRD: CPT | Mod: CPTII,S$GLB,, | Performed by: INTERNAL MEDICINE

## 2025-06-19 PROCEDURE — 3075F SYST BP GE 130 - 139MM HG: CPT | Mod: CPTII,S$GLB,, | Performed by: INTERNAL MEDICINE

## 2025-06-19 PROCEDURE — 36415 COLL VENOUS BLD VENIPUNCTURE: CPT

## 2025-06-19 PROCEDURE — 99999 PR PBB SHADOW E&M-EST. PATIENT-LVL IV: CPT | Mod: PBBFAC,,, | Performed by: INTERNAL MEDICINE

## 2025-06-19 PROCEDURE — 3008F BODY MASS INDEX DOCD: CPT | Mod: CPTII,S$GLB,, | Performed by: INTERNAL MEDICINE

## 2025-06-19 PROCEDURE — 85025 COMPLETE CBC W/AUTO DIFF WBC: CPT

## 2025-06-19 PROCEDURE — 80053 COMPREHEN METABOLIC PANEL: CPT

## 2025-06-19 RX ORDER — DULOXETIN HYDROCHLORIDE 60 MG/1
60 CAPSULE, DELAYED RELEASE ORAL DAILY
Qty: 90 CAPSULE | Refills: 1 | Status: SHIPPED | OUTPATIENT
Start: 2025-06-19

## 2025-06-19 RX ORDER — ESOMEPRAZOLE MAGNESIUM 40 MG/1
40 CAPSULE, DELAYED RELEASE ORAL
Qty: 60 CAPSULE | Refills: 0 | Status: SHIPPED | OUTPATIENT
Start: 2025-06-19 | End: 2025-08-18

## 2025-06-19 NOTE — PROGRESS NOTES
CHIEF COMPLAINT     Chief Complaint   Patient presents with    Follow-up       HPI     Hipolito Laws is a 65 y.o. male HTN HLD, cervical radiculitis history of heart disease, COPD here today for       May 6th for COPD exacerbation and worsening mood.  Was treated with azithromycin and steroids for COPD.  Was treated with Wellbutrin  mg for his mood symptoms  Using Breztri    Neck Pain  In PT which has been helping.     MDD/Anxiety  Has been on duloxetine 30mg daily. Started wellbutrin approximately 5/6. Took for a few days, didn't like the way it made him feel and stopped medication. Mood is still down.  Stressors at work, alcohol use  Seeing a therapist for work. Ready to return to work.      Personally Reviewed Patient's Medical, surgical, family and social hx. Changes updated in [facility].  Care Team updated in Epic    Review of Systems:  Review of Systems   Constitutional:  Negative for fatigue and fever.   Respiratory:  Negative for cough and shortness of breath.        Health Maintenance:   Reviewed with patient  Due for the following:      PHYSICAL EXAM     BP (!) 150/70   Pulse 103   Wt 100 kg (220 lb 7.4 oz)   SpO2 96%   BMI 31.63 kg/m²     Gen: Well Appearing, NAD  HEENT: PERR, EOMI  Neck: FROM, no thyromegaly, no cervical adenopathy  CVD: RRR, no M/R/G  Pulm: Normal work of breathing, CTAB, no wheezing  Abd:  Soft, NT, ND non TTP, no mass  MSK: no LE edema  Neuro: A&Ox3, gait normal, speech normal  Mood; Mood normal, behavior normal, thought process linear       LABS     Labs reviewed; Notable for    ASSESSMENT     1. Anxiety and depression  DULoxetine (CYMBALTA) 60 MG capsule      2. Pulmonary emphysema, unspecified emphysema type        3. Essential hypertension, benign        4. Dyspepsia  CBC Auto Differential    Comprehensive Metabolic Panel    esomeprazole (NEXIUM) 40 MG capsule              Plan     Hipolito Laws is a 65 y.o. male with HTN HLD, cervical radiculitis history of heart  disease, COPD   1. Anxiety and depression  Discussed multimodal tx options including lifestyle optimization, talk therapy and pharmacotherapy.  Lifestyle: increase fruit and vegetable intake, improve sleep hygiene with goal  8 hours of sleep and increase physical activity with goal of 150 minutes weekly   Talk therapy: currently in therapy  Medication: increase cymbalta to 60mg daily  - DULoxetine (CYMBALTA) 60 MG capsule; Take 1 capsule (60 mg total) by mouth once daily.  Dispense: 90 capsule; Refill: 1    2. Pulmonary emphysema, unspecified emphysema type  Follows with PULM continue Breztri  Continue PRN albuterol    3. Essential hypertension, benign  At goal continue losartan 50mg    4. Dyspepsia  Will continue nexium for 60 days  Will check CBC to make sure acutely anemic  - CBC Auto Differential; Future  - Comprehensive Metabolic Panel; Future  - esomeprazole (NEXIUM) 40 MG capsule; Take 1 capsule (40 mg total) by mouth before breakfast.  Dispense: 60 capsule; Refill: 0      Fabio Rosas MD

## 2025-06-20 ENCOUNTER — TELEPHONE (OUTPATIENT)
Dept: INTERNAL MEDICINE | Facility: CLINIC | Age: 65
End: 2025-06-20
Payer: COMMERCIAL

## 2025-06-20 ENCOUNTER — RESULTS FOLLOW-UP (OUTPATIENT)
Dept: INTERNAL MEDICINE | Facility: CLINIC | Age: 65
End: 2025-06-20

## 2025-06-20 ENCOUNTER — PATIENT MESSAGE (OUTPATIENT)
Dept: INTERNAL MEDICINE | Facility: CLINIC | Age: 65
End: 2025-06-20
Payer: COMMERCIAL

## 2025-06-20 DIAGNOSIS — D64.9 ANEMIA, UNSPECIFIED TYPE: Primary | ICD-10-CM

## 2025-06-20 NOTE — TELEPHONE ENCOUNTER
Pt informed. He states that he restarted Nexium 2 days ago and no longer has black stool. He is asymptomatic. He is only SOB on exertion but does have copd.

## 2025-06-20 NOTE — TELEPHONE ENCOUNTER
----- Message from Fabio Rosas MD sent at 6/20/2025  9:33 AM CDT -----  Significant drop in hemoglobin. Concern for GI bleed   Needs to stop meloxicam  If he is still having dark school- having any shortness of breath or light headedness,  needs to go into ED for evaluation    Fabio Rosas    ----- Message -----  From: Lab, Background User  Sent: 6/19/2025  11:08 PM CDT  To: Fabio Rosas MD

## 2025-06-23 ENCOUNTER — LAB VISIT (OUTPATIENT)
Dept: LAB | Facility: HOSPITAL | Age: 65
End: 2025-06-23
Payer: COMMERCIAL

## 2025-06-23 ENCOUNTER — RESULTS FOLLOW-UP (OUTPATIENT)
Dept: INTERNAL MEDICINE | Facility: CLINIC | Age: 65
End: 2025-06-23

## 2025-06-23 ENCOUNTER — PATIENT MESSAGE (OUTPATIENT)
Dept: INTERNAL MEDICINE | Facility: CLINIC | Age: 65
End: 2025-06-23
Payer: COMMERCIAL

## 2025-06-23 DIAGNOSIS — D50.0 IRON DEFICIENCY ANEMIA DUE TO CHRONIC BLOOD LOSS: ICD-10-CM

## 2025-06-23 DIAGNOSIS — D64.9 ANEMIA, UNSPECIFIED TYPE: ICD-10-CM

## 2025-06-23 DIAGNOSIS — D64.9 ANEMIA, UNSPECIFIED TYPE: Primary | ICD-10-CM

## 2025-06-23 DIAGNOSIS — Z87.11 HISTORY OF BLEEDING PEPTIC ULCER: ICD-10-CM

## 2025-06-23 LAB
ERYTHROCYTE [DISTWIDTH] IN BLOOD BY AUTOMATED COUNT: 15.1 % (ref 11.5–14.5)
FERRITIN SERPL-MCNC: 14 NG/ML (ref 20–300)
HCT VFR BLD AUTO: 38.9 % (ref 40–54)
HGB BLD-MCNC: 11.9 GM/DL (ref 14–18)
IRON SATN MFR SERPL: 4 % (ref 20–50)
IRON SERPL-MCNC: 18 UG/DL (ref 45–160)
MCH RBC QN AUTO: 27.6 PG (ref 27–31)
MCHC RBC AUTO-ENTMCNC: 30.6 G/DL (ref 32–36)
MCV RBC AUTO: 90 FL (ref 82–98)
PLATELET # BLD AUTO: 431 K/UL (ref 150–450)
PMV BLD AUTO: 8.7 FL (ref 9.2–12.9)
RBC # BLD AUTO: 4.31 M/UL (ref 4.6–6.2)
TIBC SERPL-MCNC: 488 UG/DL (ref 250–450)
TRANSFERRIN SERPL-MCNC: 330 MG/DL (ref 200–375)
WBC # BLD AUTO: 10.11 K/UL (ref 3.9–12.7)

## 2025-06-23 PROCEDURE — 82728 ASSAY OF FERRITIN: CPT

## 2025-06-23 PROCEDURE — 83540 ASSAY OF IRON: CPT

## 2025-06-23 PROCEDURE — 85027 COMPLETE CBC AUTOMATED: CPT

## 2025-06-23 PROCEDURE — 36415 COLL VENOUS BLD VENIPUNCTURE: CPT

## 2025-06-24 ENCOUNTER — CLINICAL SUPPORT (OUTPATIENT)
Dept: REHABILITATION | Facility: HOSPITAL | Age: 65
End: 2025-06-24
Payer: COMMERCIAL

## 2025-06-24 DIAGNOSIS — R29.898 DECREASED RANGE OF MOTION OF NECK: Primary | ICD-10-CM

## 2025-06-24 DIAGNOSIS — R29.3 POOR POSTURE: ICD-10-CM

## 2025-06-24 PROCEDURE — 97112 NEUROMUSCULAR REEDUCATION: CPT

## 2025-06-24 PROCEDURE — 97110 THERAPEUTIC EXERCISES: CPT

## 2025-06-24 NOTE — TELEPHONE ENCOUNTER
LOV with Alison, Fabio MARLEY MD , 6/19/2025    Patient would like referral to gastroenterologist  Order pended for your review.     Result message dated 6/23/25:  Hemoglobin stable. Would like to start iron supplement and refer you to see gastroenterologist as outpt. Let me know if you are okay with this plan.

## 2025-06-24 NOTE — PROGRESS NOTES
"  Outpatient Rehab    Physical Therapy Progress Note      Patient Name: Hipolito Laws  MRN: [id number]  YOB: 1960  Encounter Date: 6/24/2025    Therapy Diagnosis:   Encounter Diagnoses   Name Primary?    Decreased range of motion of neck Yes    Poor posture      Physician: Yunier Lawton MD    Physician Orders: Eval and Treat  Medical Diagnosis: Cervical spondylosis  Stenosis of cervical spine region  Cervical radiculopathy    Visit # / Visits Authorized:  10/11 (eval + 10)  Insurance Authorization Period: 2/26/2025 to 12/31/2025  Date of Evaluation: 2/26/2025  Plan of Care Certification: 2/27/2025 to 6/30/2025      PT/PTA: PT   Number of PTA visits since last PT visit:0  Time In: 0900   Time Out: 1000  Total Time (in minutes): 60   Total Billable Time (in minutes): 60    FOTO:  Intake Score: 57%  Survey Score 2: 65%  Survey Score 3:  %    Precautions:       Subjective   Pt arrives with "very minimal" pain.  He has beeing doing stretching every other day, walking about a mile every other day..  Pain reported as 1/10.      Objective        Range of Motion - MOVEMENT LOSS     ROM Loss 6/24/25   Flexion minimal loss WFL   Extension minimal loss Min loss   Side bending Right moderate loss Min loss   Side bending Left minimal loss Min loss   Rotation Right moderate loss Min loss   Rotation Left minimal loss functional   Protraction within functional limits WFL   Retraction  moderate loss Min loss        Isometric Testing on Med X equipment: Testing administered by PT    Test Initial Baseline Midpoint Final   Date 3/27/2025 6/24/2025    ROM  deg  deg    Max Peak Torque 214  323    Min Peak Torque 79  255    Flex/Ext Ratio 2.7:1 1.3:1    % variance  normative data 58% 15%    % change from initial test N/A visit 1 117%          Outcomes:  Intake Score: 57%  Visit 10 Score: 65%  Discharge Score:  Goal Score: 62%     Treatment     Patient received the treatments listed below:      Hipolito received " "neuromuscular education  to isolate and engage spinal stabilization musculature correctly for motor control and coordination to aid in function and posture for 15 minutes on the Medical Medx Machine.  Patient performed MedX dynamic exercise with emphasis on spinal muscular control using pacer throughout  active range of motion. Therapist assisted patient in achieving optimal exertion for neural reeducation and endurance training by using the  José Luis Exertion Rating scale, by instructing the patient to aim for mid range of exertion, performing 15-20 repetitions, slowly, correctly,and safely.          6/24/2025    12:01 PM   [facility]   Visit Number 10   VAS Pain Rating 1   Time 5   Retraction in Sitting 10   Retraction with Extension 10   Flexion 2   Sidebending 2   Rotation 5   Scapular Retraction 20   Lumbar Stretches - Slouch Overcorrection 10   Cervical Flexion 108   Cervical Extension 39   Cervical Peak Torque 323 ft. lbs.          Patient participated in therapeutic exercises to develop strength, endurance, ROM, flexibility, posture, and core stabilization for 45 minutes including:    RIS x 10  Cervical retraction + extension with towel support x 10  Cervical rotation stretch 5" x 5  Self upper trap stretch 20" x 2  Self levator scap stretch 20" x 2  Thoracic extension over half roll x 10  SOC x 10  No moneys x15 w/ GTB  Horizontal abductions x15 w/ GTB  + diagonal abduction x 10 with RTB  standing open books x 10    Peripheral muscle strengthening which included 1 set of 15-20 repetitions at a slow, controlled 10-13 second per rep pace focused on strengthening supporting musculature for improved body mechanics and functional mobility.  Pt and therapist focused on proper form during treatment to ensure optimal strengthening of each targeted muscle group.  Machines were utilized including torso rotation, leg press, hip abd and hip add, leg ext.  Leg curl, triceps, biceps, chest and row added visit " 3    Patient  received manual therapy techniques for 0  minutes. The following activities were included:        Pt given cold pack for 0 minutes, pt declined    Time Entry(in minutes):  Neuromuscular Re-Education Time Entry: 15  Therapeutic Exercise Time Entry: 45    Assessment & Plan   Assessment: Patient has attended 10 visits at [facility] which included MD evaluation, PT evaluation with isometric testing, and physical therapy treatment including HEP instruction, education, aerobic activity, dynamic strengthening on MedX equipment for the spine, and whole body strengthening on MedX equipment with increasing resistance. Patient demonstrates increased ability to reduce symptoms, improve posture, improve ROM, and improve strength, as stated below: -Improved posture, is using lumbar roll -Improved cervical ROM, initially on MedX test  degrees and currently  degrees. -Improved strength at each test point on lumbar MedX isometric test with 117% average improvement noted with reduced pain noted by patient. -Initial outcome tool score 57% functional ability and current outcome tool score 65% functional ability indicating reduced pain and improved function.    Evaluation/Treatment Tolerance: Patient tolerated treatment well    Patient will continue to benefit from skilled outpatient physical therapy to address the deficits listed in the problem list box on initial evaluation, provide pt/family education and to maximize pt's level of independence in the home and community environment.     Patient's spiritual, cultural, and educational needs considered and patient agreeable to plan of care and goals.           Plan: Continue present POC per [facility] protocol.    Goals:   Active       Physical Therapy       Physical Therapy Goal (Progressing)       Start:  02/27/25       Short term goals: 6 weeks or 10 visits   - Pt will demonstratte increased cervical MedX ROM as measured by med ex by 6 degrees from  initial test which results in improved  ROM of neck for ease with ADLs and driving. Appropriate and Ongoing  - Pt will demonstrate independence with reducing or controlling symptoms with ther ex, movement, or position independently, able to reduce pain 1-2 points on pain scale using strategies taught in therapy.  Appropriate and Ongoing  - Pt will demonstrate increased MedX average isometric strength value by 30% with  when compared to the initial testing resulting in improved ability to perform bending, lifting, and carrying activities safely and confidently. Appropriate and Ongoing    Long term goals: 10 weeks or 20 visits  - Pt will demonstratte increased cervical MedX ROM as measured by med ex by 9 degrees from initial test which results in functional ROM of neck for ease with ADLs and driving.  Appropriate and Ongoing  - Pt will demonstrate increased MedX average isometric strength value  by 50% from initial test to improve ability to lift and carry, and sustain good posture while performing ADL's. Appropriate and Ongoing  - Pt will demonstrate reduced pain and improved functional outcomes as reported on the FOTO by reaching an intake score of >/= 62% functional ability in order to demonstrate subjective improvement in patient's condition. . Appropriate and Ongoing  - Pt will demonstrate independence with reducing or controlling symptoms with ther ex, movement, or position independently, able to reduce pain 2-4 points on pain scale using strategies taught in therapy. Appropriate and Ongoing  - Pt will demonstrate independence with the HEP at discharge. Appropriate and Ongoing  - Pt will be able to tolerate drive to and from work without increase in pain.(patient goal)  Appropriate and Ongoing              long term goals       Pt will demonstratte increased cervical MedX ROM as measured by med ex by 9 degrees from initial test which results in functional ROM of neck for ease with ADLs and driving.   (Met)        Start:  03/27/25    Expected End:  06/30/25    Resolved:  06/24/25         Pt will demonstrate increased MedX average isometric strength value  by 50% from initial test to improve ability to lift and carry, and sustain good posture while performing ADL's. (Met)       Start:  03/27/25    Expected End:  06/30/25    Resolved:  06/24/25         Pt will demonstrate reduced pain and improved functional outcomes as reported on the FOTO by reaching an intake score of >/= 62% functional ability in order to demonstrate subjective improvement in patient's condition.  (Met)       Start:  03/27/25    Expected End:  06/30/25    Resolved:  06/24/25         Pt will demonstrate independence with reducing or controlling symptoms with ther ex, movement, or position independently, able to reduce pain 2-4 points on pain scale using strategies taught in therapy.  (Met)       Start:  03/27/25    Expected End:  06/30/25    Resolved:  06/24/25         Pt will demonstrate independence with the HEP at discharge.  (Progressing)       Start:  03/27/25    Expected End:  06/30/25               patient goals       Pt will be able to tolerate drive to and from work without increase in pain.(patient goal)  (Progressing)       Start:  03/27/25    Expected End:  06/30/25              Resolved       short term goals       Pt will demonstratte increased cervical MedX ROM as measured by med ex by 6 degrees from initial test which results in improved  ROM of neck for ease with ADLs and driving.  (Met)       Start:  03/27/25    Expected End:  06/30/25    Resolved:  06/24/25         Pt will demonstrate independence with reducing or controlling symptoms with ther ex, movement, or position independently, able to reduce pain 1-2 points on pain scale using strategies taught in therapy.   (Met)       Start:  03/27/25    Expected End:  06/30/25    Resolved:  06/24/25         Pt will demonstrate increased MedX average isometric strength value by 30% with  when  compared to the initial testing resulting in improved ability to perform bending, lifting, and carrying activities safely and confidently.  (Met)       Start:  03/27/25    Expected End:  06/30/25    Resolved:  06/24/25             Mala Williamson PT

## 2025-06-25 ENCOUNTER — TELEPHONE (OUTPATIENT)
Dept: ENDOSCOPY | Facility: HOSPITAL | Age: 65
End: 2025-06-25
Payer: COMMERCIAL

## 2025-06-25 VITALS — BODY MASS INDEX: 31.5 KG/M2 | HEIGHT: 70 IN | WEIGHT: 220 LBS

## 2025-06-25 DIAGNOSIS — D50.0 IRON DEFICIENCY ANEMIA DUE TO CHRONIC BLOOD LOSS: ICD-10-CM

## 2025-06-25 DIAGNOSIS — Z87.11 HISTORY OF BLEEDING PEPTIC ULCER: Primary | ICD-10-CM

## 2025-06-25 NOTE — TELEPHONE ENCOUNTER
Patient is scheduled for a Upper Endoscopy (EGD) on 7/17/25 with Dr. YAMILEX Rocha  Referral for procedure from  [facility] referral (see Appts tab)

## 2025-06-28 ENCOUNTER — ANESTHESIA EVENT (OUTPATIENT)
Dept: ENDOSCOPY | Facility: HOSPITAL | Age: 65
End: 2025-06-28
Payer: COMMERCIAL

## 2025-06-28 NOTE — ANESTHESIA PREPROCEDURE EVALUATION
06/28/2025  Hipolito Laws is a 65 y.o., male.  [facility]  Anesthesia Pre-Operative Evaluation       Patient Name: Hipolito Laws  YOB: 1960  MRN: [id number]  [facility]: [id number]      Code Status: Prior   Date of Procedure: 7/17/2025  Anesthesia: Choice Procedure: Procedure(s) (LRB):  EGD (ESOPHAGOGASTRODUODENOSCOPY) (N/A)  Pre-Operative Diagnosis: History of bleeding peptic ulcer [Z87.11]  Iron deficiency anemia due to chronic blood loss [D50.0]  Proceduralist: Surgeons and Role:     * Tom Rocha MD - Primary        SUBJECTIVE:   Hipolito Laws is a 65 y.o. male who  has a past medical history of Hypertension..     he has a current medication list which includes the following long-term medication(s): albuterol, atorvastatin, duloxetine, esomeprazole, gabapentin, and losartan.     ALLERGIES:   Review of patient's allergies indicates:  No Known Allergies  LDA:          Lines/Drains/Airways       None                  Anesthesia Evaluation      Airway   Dental      Pulmonary    (+) COPD  Cardiovascular   (+) hypertension, CAD    Neuro/Psych      GI/Hepatic/Renal      Endo/Other    Abdominal                     MEDICATIONS:     Antibiotics (From admission, onward)      None          VTE Risk Mitigation (From admission, onward)      None              Current Medications[1]       History:   There are no hospital problems to display for this patient.    Surgical History:    has a past surgical history that includes Arthroscopic repair of rotator cuff of shoulder (1992); Epidural steroid injection into cervical spine (Right, 12/17/2020); Umbilical hernia repair (N/A, 4/5/2021); Transforaminal epidural injection of steroid (Right, 10/14/2021); Epidural steroid injection (N/A, 12/13/2021); Epidural steroid injection (N/A, 6/2/2022); Arthroscopy of ankle with debridement (Left,  "8/4/2022); Surgical removal of lesion of fibula (Left, 8/4/2022); Closure of wound (Left, 9/14/2022); Epidural steroid injection (N/A, 2/16/2023); Epidural steroid injection (N/A, 6/26/2023); Epidural steroid injection (N/A, 1/22/2024); Epidural steroid injection (N/A, 7/11/2024); and Transforaminal epidural injection of steroid (N/A, 2/3/2025).   Social History:    reports being sexually active.  reports that he quit smoking about 13 years ago. His smoking use included cigarettes and vaping with nicotine. He started smoking about 43 years ago. He has a 45 pack-year smoking history. He has quit using smokeless tobacco. He reports current alcohol use of about 4.0 standard drinks of alcohol per week. He reports that he does not currently use drugs.     OBJECTIVE:     Vital Signs (Most Recent):    Vital Signs Range (Last 24H):          There is no height or weight on file to calculate BMI.   Wt Readings from Last 4 Encounters:   06/25/25 99.8 kg (220 lb)   06/19/25 100 kg (220 lb 7.4 oz)   05/05/25 100.4 kg (221 lb 5.5 oz)   04/09/25 100.7 kg (222 lb 0.1 oz)       Significant Labs:  Lab Results   Component Value Date    WBC 10.11 06/23/2025    HGB 11.9 (L) 06/23/2025    HCT 38.9 (L) 06/23/2025     06/23/2025     06/19/2025    K 4.1 06/19/2025     06/19/2025    CREATININE 0.8 06/19/2025    BUN 19 06/19/2025    CO2 26 06/19/2025     06/19/2025    CALCIUM 9.4 06/19/2025    ALKPHOS 68 06/19/2025    ALT 23 06/19/2025    AST 23 06/19/2025    ALBUMIN 4.3 06/19/2025    HGBA1C 5.5 12/19/2024     No LMP for male patient.  No results found for this or any previous visit (from the past 72 hours).    EKG:   No results found for this or any previous visit.    TTE:  No results found for this or any previous visit.  No results found for: "EF"   No results found for this or any previous visit.  WARD:  No results found for this or any previous visit.  Stress Test:  No results found for this or any previous " "visit.     LHC:  No results found for this or any previous visit.     PFT:  No results found for: "FEV1", "FVC", "TWH7AKV", "TLC", "DLCO"     ASSESSMENT/PLAN:        Pre-op Assessment    I have reviewed the Patient Summary Reports.     I have reviewed the Nursing Notes. I have reviewed the NPO Status.   I have reviewed the Medications.     Review of Systems  Anesthesia Hx:  No problems with previous Anesthesia             Denies Family Hx of Anesthesia complications.    Denies Personal Hx of Anesthesia complications.                    Social:  Former Smoker, Vaping, Social Alcohol Use       Hematology/Oncology:  Hematology Normal   Oncology Normal                                   EENT/Dental:  EENT/Dental Normal           Cardiovascular:     Hypertension   CAD           hyperlipidemia                               Pulmonary:   COPD                     Renal/:  Renal/ Normal                 Hepatic/GI:  Hepatic/GI Normal                    Musculoskeletal:  Musculoskeletal Normal                Neurological:        Chronic Pain Syndrome                         Endocrine:  Endocrine Normal            Dermatological:  Skin Normal    Psych:  Psychiatric Normal                       Anesthesia Plan  Type of Anesthesia, risks & benefits discussed:    Anesthesia Type: Gen Natural Airway  Intra-op Monitoring Plan: Standard ASA Monitors  Post Op Pain Control Plan: multimodal analgesia  Induction:  IV  Informed Consent: Informed consent signed with the Patient and all parties understand the risks and agree with anesthesia plan.  All questions answered.   ASA Score: 2  Day of Surgery Review of History & Physical: H&P Update referred to the surgeon/provider.    Ready For Surgery From Anesthesia Perspective.     .           [1]   No current facility-administered medications for this encounter.     Current Outpatient Medications   Medication Sig Dispense Refill    albuterol (VENTOLIN HFA) 90 mcg/actuation inhaler Inhale 2 " puffs into the lungs every 4 (four) hours as needed for Wheezing. Rescue 18 g 11    aspirin 81 MG Chew Take 375 mg by mouth once daily.      atorvastatin (LIPITOR) 80 MG tablet Take 1 tablet (80 mg total) by mouth once daily. 90 tablet 3    DULoxetine (CYMBALTA) 60 MG capsule Take 1 capsule (60 mg total) by mouth once daily. 90 capsule 1    esomeprazole (NEXIUM) 40 MG capsule Take 1 capsule (40 mg total) by mouth before breakfast. 60 capsule 0    eszopiclone (LUNESTA) 2 MG Tab Take 1 tablet (2 mg total) by mouth every evening. 30 tablet 5    fluticasone-umeclidin-vilanter (TRELEGY ELLIPTA) 200-62.5-25 mcg inhaler Inhale 1 puff into the lungs once daily. (Patient not taking: Reported on 6/19/2025) 60 each 11    gabapentin (NEURONTIN) 300 MG capsule Take 1 capsule (300 mg total) by mouth 3 (three) times daily. 90 capsule 11    losartan (COZAAR) 50 MG tablet Take 1 tablet (50 mg total) by mouth once daily. 90 tablet 3    meloxicam (MOBIC) 15 MG tablet TAKE 1 TABLET(15 MG) BY MOUTH DAILY 90 tablet 3    methylPREDNISolone (MEDROL DOSEPACK) 4 mg tablet use as directed 21 tablet 0    multivitamin capsule Take 1 capsule by mouth once daily.      vitamin D (VITAMIN D3) 1000 units Tab Take 1,000 Units by mouth once daily.       Facility-Administered Medications Ordered in Other Encounters   Medication Dose Route Frequency Provider Last Rate Last Admin    midazolam (VERSED) 1 mg/mL injection 2 mg  2 mg Intravenous PRN Gualberto Fuentes MD   2 mg at 09/14/22 1220

## 2025-07-01 NOTE — ANESTHESIA PREPROCEDURE EVALUATION
09/14/2022  Pre-operative evaluation for Procedure(s) (LRB):  CLOSURE, WOUND left ankle (Left)    Hipolito Laws is a 62 y.o. 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     Current Outpatient Medications on File Prior to Encounter   Medication Sig Dispense Refill    atorvastatin (LIPITOR) 40 MG tablet Take 1 tablet (40 mg total) by mouth once daily. 90 tablet 3    DULoxetine (CYMBALTA) 60 MG capsule Take 1 capsule (60 mg total) by mouth once daily. 90 capsule 3    gabapentin (NEURONTIN) 100 MG capsule Take 1 capsule (100 mg total) by mouth 3 (three) times daily. 90 capsule 0    HYDROcodone-acetaminophen (NORCO) 5-325 mg per tablet Take 1 tablet by mouth every 4 to 6 hours as needed for Pain. 42 tablet 0    losartan (COZAAR) 50 MG tablet TAKE 1 TABLET(50 MG) BY MOUTH EVERY DAY 90 tablet 1    methocarbamoL (ROBAXIN) 500 MG Tab Take 1 tablet (500 mg total) by mouth 4 (four) times daily. for 10 days 40 tablet 0    sulfamethoxazole-trimethoprim 800-160mg (BACTRIM DS) 800-160 mg Tab Take 1 tablet by mouth 2 (two) times daily. for 10 days 20 tablet 0    traZODone (DESYREL) 50 MG tablet Take 0.5-1 tablets (25-50 mg total) by mouth nightly as needed for Insomnia. 90 tablet 3       Past Surgical History:   Procedure Laterality Date    ARTHROSCOPIC REPAIR OF ROTATOR CUFF OF SHOULDER  1992    ARTHROSCOPY OF ANKLE WITH  DEBRIDEMENT Left 8/4/2022    Procedure: ARTHROSCOPY, ANKLE, WITH DEBRIDEMENT;  Surgeon: Aleksandr Elias MD;  Location: [facility] OR;  Service: Orthopedics;  Laterality: Left;  Calf tourniquet    EPIDURAL STEROID INJECTION N/A 12/13/2021    Procedure: INJECTION, STEROID, EPIDURAL, CERVICAL DIRECT REF/ NEED CONSENT;  Surgeon: Yunier Lawton MD;  Location: [facility];  Service: Pain Management;  Laterality: N/A;    EPIDURAL STEROID INJECTION N/A 6/2/2022    Procedure: INJECTION, STEROID, EPIDURAL, CERVICAL C7/T1 CONTRAST DIRECT REF;  Surgeon: Yunier Lawton MD;  Location: [facility];  Service: Pain Management;  Laterality: N/A;    EPIDURAL STEROID INJECTION INTO CERVICAL SPINE Right 12/17/2020    Procedure: CERVICAL C5/6 DIGNA TRANSFORAMINAL  DIRECT REFERRAL;  Surgeon: Yunier Lawton MD;  Location: [facility];  Service: Pain Management;  Laterality: Right;  NEEDS CONSENT    SURGICAL REMOVAL OF LESION OF FIBULA Left 8/4/2022    Procedure: EXCISION, LESION, FIBULA Nonunion avulsion fragment distal fibula;  Surgeon: Aleksandr Elias MD;  Location: [facility] OR;  Service: Orthopedics;  Laterality: Left;    TRANSFORAMINAL EPIDURAL INJECTION OF STEROID Right 10/14/2021    Procedure: INJECTION, STEROID, EPIDURAL, TRANSFORAMINAL APPROACH, C5-C6 DIRECT REF/NEED CONSNET;  Surgeon: Yunier Lawton MD;  Location: [facility];  Service: Pain Management;  Laterality: Right;    UMBILICAL HERNIA REPAIR N/A 4/5/2021    Procedure: REPAIR, HERNIA, UMBILICAL, AGE 5 YEARS OR OLDER,;  Surgeon: Tim Reese MD;  Location: [address];  Service: General;  Laterality: N/A;       Social History     Socioeconomic History    Marital status:    Occupational History    Occupation: high [employer]   Tobacco Use    Smoking status: Former     Packs/day: 1.50     Years: 30.00     Pack years: 45.00     Types: Cigarettes     Quit date: 12/3/2011     Years since quitting: 10.7    Smokeless tobacco: Former   Substance and  Sexual Activity    Alcohol use: Yes    Sexual activity: Yes         CBC: No results for input(s): WBC, RBC, HGB, HCT, PLT, MCV, MCH, MCHC in the last 72 hours.    CMP: No results for input(s): NA, K, CL, CO2, BUN, CREATININE, GLU, MG, PHOS, CALCIUM, ALBUMIN, PROT, ALKPHOS, ALT, AST, BILITOT in the last 72 hours.    INR  No results for input(s): PT, INR, PROTIME, APTT in the last 72 hours.        Diagnostic Studies:      EKD Echo:  No results found for this or any previous visit.      Pre-op Assessment    I have reviewed the Patient Summary Reports.     I have reviewed the Nursing Notes. I have reviewed the NPO Status.   I have reviewed the Medications.     Review of Systems  Cardiovascular:   Hypertension        Physical Exam  General: Well nourished and Cooperative    Airway:  Mallampati: II   Mouth Opening: Normal  TM Distance: Normal  Tongue: Normal  Neck ROM: Normal ROM    Chest/Lungs:  Clear to auscultation, Normal Respiratory Rate    Heart:  Rate: Normal  Rhythm: Regular Rhythm  Sounds: Normal        Anesthesia Plan  Type of Anesthesia, risks & benefits discussed:    Anesthesia Type: Gen Natural Airway, Gen Supraglottic Airway  Intra-op Monitoring Plan: Standard ASA Monitors  Post Op Pain Control Plan: multimodal analgesia and IV/PO Opioids PRN  Induction:  IV  Airway Plan: Direct and Video, Post-Induction  Informed Consent: Informed consent signed with the Patient and all parties understand the risks and agree with anesthesia plan.  All questions answered.   ASA Score: 2    Ready For Surgery From Anesthesia Perspective.     .       normal... Well appearing, awake, alert, oriented to person, place, time/situation and in no apparent distress.

## 2025-07-15 ENCOUNTER — CLINICAL SUPPORT (OUTPATIENT)
Dept: REHABILITATION | Facility: HOSPITAL | Age: 65
End: 2025-07-15
Payer: COMMERCIAL

## 2025-07-15 DIAGNOSIS — R29.898 DECREASED RANGE OF MOTION OF NECK: Primary | ICD-10-CM

## 2025-07-15 DIAGNOSIS — R29.3 POOR POSTURE: ICD-10-CM

## 2025-07-15 PROCEDURE — 97112 NEUROMUSCULAR REEDUCATION: CPT

## 2025-07-15 PROCEDURE — 97110 THERAPEUTIC EXERCISES: CPT

## 2025-07-15 NOTE — PROGRESS NOTES
Outpatient Rehab    Physical Therapy Progress Note : Updated Plan of Care      Patient Name: Hipolito Laws  MRN: [id number]  YOB: 1960  Encounter Date: 7/15/2025    Therapy Diagnosis:   Encounter Diagnoses   Name Primary?    Decreased range of motion of neck Yes    Poor posture      Physician: Yunier Lawton MD    Physician Orders: Eval and Treat  Medical Diagnosis: Cervical spondylosis  Stenosis of cervical spine region  Cervical radiculopathy    Visit # / Visits Authorized:  11/20  Insurance Authorization Period: 2/26/2025 to 12/31/2025  Date of Evaluation: 2/26/2025  Plan of Care Certification: 2/27/2025 to 6/30/2025      PT/PTA: PT   Number of PTA visits since last PT visit:0  Time In: 0900   Time Out: 1000  Total Time (in minutes): 60   Total Billable Time (in minutes): 60    FOTO:  Intake Score: 57%  Survey Score 2: 65%  Survey Score 3:  %    Precautions:       Subjective   Pt still with minimal pain, pleased with progress..  Pain reported as 1/10.      Objective        Range of Motion - MOVEMENT LOSS     ROM Loss 6/24/25   Flexion minimal loss WFL   Extension minimal loss Min loss   Side bending Right moderate loss Min loss   Side bending Left minimal loss Min loss   Rotation Right moderate loss Min loss   Rotation Left minimal loss functional   Protraction within functional limits WFL   Retraction  moderate loss Min loss        Isometric Testing on Med X equipment: Testing administered by PT    Test Initial Baseline Midpoint Final   Date 3/27/2025 6/24/2025    ROM  deg  deg    Max Peak Torque 214  323    Min Peak Torque 79  255    Flex/Ext Ratio 2.7:1 1.3:1    % variance  normative data 58% 15%    % change from initial test N/A visit 1 117%          Outcomes:  Intake Score: 57%  Visit 10 Score: 65%  Discharge Score:  Goal Score: 62%     Treatment     Patient received the treatments listed below:      Hipolito received neuromuscular education  to isolate and engage spinal stabilization  "musculature correctly for motor control and coordination to aid in function and posture for 15 minutes on the Medical Medx Machine.  Patient performed MedX dynamic exercise with emphasis on spinal muscular control using pacer throughout  active range of motion. Therapist assisted patient in achieving optimal exertion for neural reeducation and endurance training by using the  José Luis Exertion Rating scale, by instructing the patient to aim for mid range of exertion, performing 15-20 repetitions, slowly, correctly,and safely.          7/15/2025     9:46 AM   [facility]   Visit Number 11   VAS Pain Rating 1   Retraction in Sitting 10   Retraction with Extension 10   Flexion 2   Sidebending 2   Rotation 5   Scapular Retraction 20   Lumbar Stretches - Slouch Overcorrection 10   Cervical Weight 207 lbs   Repetitions 15   Rating of Perceived Exertion 4          Patient participated in therapeutic exercises to develop strength, endurance, ROM, flexibility, posture, and core stabilization for 45 minutes including:    RIS x 10  Cervical retraction + extension with towel support x 10  Cervical rotation stretch 5" x 5  Self upper trap stretch 20" x 2  Self levator scap stretch 20" x 2  Thoracic extension over half roll x 10  SOC x 10  No moneys x15 w/ GTB  Horizontal abductions x15 w/ GTB  diagonal abduction  and diagonals x 10 with RTB  standing open books x 10    Peripheral muscle strengthening which included 1 set of 15-20 repetitions at a slow, controlled 10-13 second per rep pace focused on strengthening supporting musculature for improved body mechanics and functional mobility.  Pt and therapist focused on proper form during treatment to ensure optimal strengthening of each targeted muscle group.  Machines were utilized including torso rotation, leg press, hip abd and hip add, leg ext.  Leg curl, triceps, biceps, chest and row added visit 3    Patient  received manual therapy techniques for 0  minutes. The " following activities were included:        Pt given cold pack for 0 minutes, pt declined    Time Entry(in minutes):       Assessment & Plan   Assessment: Hipolito presents to 11th healthy back visit reporting decreasing pain and improving wellness. Continued with mobility, strengthening and periscapular neuromuscular reeducation to reduce strain on cervical paraspinal musculature. Pt was able to complete peripheral strengthening ex's with appropriate muscular fatigue and without increased pain. MedX strengthening performed for at 207 in-lbs for 15 reps with RPE = 4/10.  He was able to complete full peripheral circut without pain or discomfort.  Evaluation/Treatment Tolerance: Patient tolerated treatment well    Patient will continue to benefit from skilled outpatient physical therapy to address the deficits listed in the problem list box on initial evaluation, provide pt/family education and to maximize pt's level of independence in the home and community environment.     Patient's spiritual, cultural, and educational needs considered and patient agreeable to plan of care and goals.           Plan: Continue present plan of care per [facility] protocol.  Lifting education next visit.    Goals:   Active       long term goals       Pt will demonstratte increased cervical MedX ROM as measured by med ex by 9 degrees from initial test which results in functional ROM of neck for ease with ADLs and driving.   (Met)       Start:  03/27/25    Expected End:  06/30/25    Resolved:  06/24/25         Pt will demonstrate increased MedX average isometric strength value  by 50% from initial test to improve ability to lift and carry, and sustain good posture while performing ADL's. (Met)       Start:  03/27/25    Expected End:  06/30/25    Resolved:  06/24/25         Pt will demonstrate reduced pain and improved functional outcomes as reported on the FOTO by reaching an intake score of >/= 62% functional ability in order to demonstrate  subjective improvement in patient's condition.  (Met)       Start:  03/27/25    Expected End:  06/30/25    Resolved:  06/24/25         Pt will demonstrate independence with reducing or controlling symptoms with ther ex, movement, or position independently, able to reduce pain 2-4 points on pain scale using strategies taught in therapy.  (Met)       Start:  03/27/25    Expected End:  06/30/25    Resolved:  06/24/25         Pt will demonstrate independence with the HEP at discharge.  (Progressing)       Start:  03/27/25    Expected End:  08/05/25               patient goals       Pt will be able to tolerate drive to and from work without increase in pain.(patient goal)  (Progressing)       Start:  03/27/25    Expected End:  08/05/25              Resolved       Physical Therapy       Physical Therapy Goal (Met)       Start:  02/27/25    Resolved:  07/15/25    Short term goals: 6 weeks or 10 visits   - Pt will demonstratte increased cervical MedX ROM as measured by med ex by 6 degrees from initial test which results in improved  ROM of neck for ease with ADLs and driving. Appropriate and Ongoing  - Pt will demonstrate independence with reducing or controlling symptoms with ther ex, movement, or position independently, able to reduce pain 1-2 points on pain scale using strategies taught in therapy.  Appropriate and Ongoing  - Pt will demonstrate increased MedX average isometric strength value by 30% with  when compared to the initial testing resulting in improved ability to perform bending, lifting, and carrying activities safely and confidently. Appropriate and Ongoing    Long term goals: 10 weeks or 20 visits  - Pt will demonstratte increased cervical MedX ROM as measured by med ex by 9 degrees from initial test which results in functional ROM of neck for ease with ADLs and driving.  Appropriate and Ongoing  - Pt will demonstrate increased MedX average isometric strength value  by 50% from initial test to improve  ability to lift and carry, and sustain good posture while performing ADL's. Appropriate and Ongoing  - Pt will demonstrate reduced pain and improved functional outcomes as reported on the FOTO by reaching an intake score of >/= 62% functional ability in order to demonstrate subjective improvement in patient's condition. . Appropriate and Ongoing  - Pt will demonstrate independence with reducing or controlling symptoms with ther ex, movement, or position independently, able to reduce pain 2-4 points on pain scale using strategies taught in therapy. Appropriate and Ongoing  - Pt will demonstrate independence with the HEP at discharge. Appropriate and Ongoing  - Pt will be able to tolerate drive to and from work without increase in pain.(patient goal)  Appropriate and Ongoing              short term goals       Pt will demonstratte increased cervical MedX ROM as measured by med ex by 6 degrees from initial test which results in improved  ROM of neck for ease with ADLs and driving.  (Met)       Start:  03/27/25    Expected End:  06/30/25    Resolved:  06/24/25         Pt will demonstrate independence with reducing or controlling symptoms with ther ex, movement, or position independently, able to reduce pain 1-2 points on pain scale using strategies taught in therapy.   (Met)       Start:  03/27/25    Expected End:  06/30/25    Resolved:  06/24/25         Pt will demonstrate increased MedX average isometric strength value by 30% with  when compared to the initial testing resulting in improved ability to perform bending, lifting, and carrying activities safely and confidently.  (Met)       Start:  03/27/25    Expected End:  06/30/25    Resolved:  06/24/25             Mala Williamson, PT

## 2025-07-17 ENCOUNTER — RESULTS FOLLOW-UP (OUTPATIENT)
Dept: INTERNAL MEDICINE | Facility: CLINIC | Age: 65
End: 2025-07-17
Payer: COMMERCIAL

## 2025-07-17 ENCOUNTER — HOSPITAL ENCOUNTER (OUTPATIENT)
Facility: HOSPITAL | Age: 65
Discharge: HOME | End: 2025-07-17
Attending: INTERNAL MEDICINE | Admitting: INTERNAL MEDICINE
Payer: COMMERCIAL

## 2025-07-17 ENCOUNTER — ANESTHESIA (OUTPATIENT)
Dept: ENDOSCOPY | Facility: HOSPITAL | Age: 65
End: 2025-07-17
Payer: COMMERCIAL

## 2025-07-17 VITALS
RESPIRATION RATE: 16 BRPM | TEMPERATURE: 98 F | DIASTOLIC BLOOD PRESSURE: 79 MMHG | SYSTOLIC BLOOD PRESSURE: 150 MMHG | HEART RATE: 95 BPM | OXYGEN SATURATION: 96 %

## 2025-07-17 DIAGNOSIS — D50.9 IRON DEFICIENCY ANEMIA, UNSPECIFIED IRON DEFICIENCY ANEMIA TYPE: Primary | ICD-10-CM

## 2025-07-17 DIAGNOSIS — D50.9 IRON DEFICIENCY ANEMIA: ICD-10-CM

## 2025-07-17 DIAGNOSIS — D50.0 IRON DEFICIENCY ANEMIA DUE TO CHRONIC BLOOD LOSS: ICD-10-CM

## 2025-07-17 DIAGNOSIS — Z87.11 HISTORY OF BLEEDING PEPTIC ULCER: ICD-10-CM

## 2025-07-17 PROCEDURE — 43239 EGD BIOPSY SINGLE/MULTIPLE: CPT | Performed by: INTERNAL MEDICINE

## 2025-07-17 PROCEDURE — 37000008 HC ANESTHESIA 1ST 15 MINUTES: Performed by: INTERNAL MEDICINE

## 2025-07-17 PROCEDURE — 27201012 HC FORCEPS, HOT/COLD, DISP: Performed by: INTERNAL MEDICINE

## 2025-07-17 PROCEDURE — 94761 N-INVAS EAR/PLS OXIMETRY MLT: CPT

## 2025-07-17 PROCEDURE — 37000009 HC ANESTHESIA EA ADD 15 MINS: Performed by: INTERNAL MEDICINE

## 2025-07-17 PROCEDURE — 43239 EGD BIOPSY SINGLE/MULTIPLE: CPT | Mod: ,,, | Performed by: INTERNAL MEDICINE

## 2025-07-17 PROCEDURE — 99900035 HC TECH TIME PER 15 MIN (STAT)

## 2025-07-17 PROCEDURE — 25000003 PHARM REV CODE 250: Performed by: NURSE ANESTHETIST, CERTIFIED REGISTERED

## 2025-07-17 PROCEDURE — 88305 TISSUE EXAM BY PATHOLOGIST: CPT | Mod: TC | Performed by: INTERNAL MEDICINE

## 2025-07-17 PROCEDURE — 63600175 PHARM REV CODE 636 W HCPCS: Performed by: NURSE ANESTHETIST, CERTIFIED REGISTERED

## 2025-07-17 RX ORDER — PROPOFOL 10 MG/ML
VIAL (ML) INTRAVENOUS
Status: DISCONTINUED | OUTPATIENT
Start: 2025-07-17 | End: 2025-07-17

## 2025-07-17 RX ORDER — LIDOCAINE HYDROCHLORIDE 20 MG/ML
INJECTION INTRAVENOUS
Status: DISCONTINUED | OUTPATIENT
Start: 2025-07-17 | End: 2025-07-17

## 2025-07-17 RX ORDER — SODIUM CHLORIDE 9 MG/ML
INJECTION, SOLUTION INTRAVENOUS CONTINUOUS
Status: DISCONTINUED | OUTPATIENT
Start: 2025-07-17 | End: 2025-07-17 | Stop reason: HOSPADM

## 2025-07-17 RX ORDER — PROPOFOL 10 MG/ML
VIAL (ML) INTRAVENOUS CONTINUOUS PRN
Status: DISCONTINUED | OUTPATIENT
Start: 2025-07-17 | End: 2025-07-17

## 2025-07-17 RX ORDER — FENTANYL CITRATE 50 UG/ML
INJECTION, SOLUTION INTRAMUSCULAR; INTRAVENOUS
Status: DISCONTINUED | OUTPATIENT
Start: 2025-07-17 | End: 2025-07-17

## 2025-07-17 RX ADMIN — FENTANYL CITRATE 50 MCG: 50 INJECTION, SOLUTION INTRAMUSCULAR; INTRAVENOUS at 11:07

## 2025-07-17 RX ADMIN — GLYCOPYRROLATE 0.2 MG: 0.2 INJECTION, SOLUTION INTRAMUSCULAR; INTRAVENOUS at 11:07

## 2025-07-17 RX ADMIN — LIDOCAINE HYDROCHLORIDE 100 MG: 20 INJECTION INTRAVENOUS at 11:07

## 2025-07-17 RX ADMIN — PROPOFOL 200 MCG/KG/MIN: 10 INJECTION, EMULSION INTRAVENOUS at 11:07

## 2025-07-17 RX ADMIN — PROPOFOL 100 MG: 10 INJECTION, EMULSION INTRAVENOUS at 11:07

## 2025-07-17 RX ADMIN — SODIUM CHLORIDE: 0.9 INJECTION, SOLUTION INTRAVENOUS at 10:07

## 2025-07-17 NOTE — ANESTHESIA POSTPROCEDURE EVALUATION
Anesthesia Post Evaluation    Patient: Hipolito Laws    Procedure(s) Performed: Procedure(s) (LRB):  EGD (ESOPHAGOGASTRODUODENOSCOPY) (N/A)    Final Anesthesia Type: general      Patient location during evaluation: GI PACU  Patient participation: Yes- Able to Participate  Level of consciousness: awake and alert  Post-procedure vital signs: reviewed and stable  Pain management: adequate  Airway patency: patent    PONV status at discharge: No PONV  Anesthetic complications: no      Cardiovascular status: blood pressure returned to baseline and hemodynamically stable  Respiratory status: unassisted  Hydration status: euvolemic  Follow-up not needed.          Vitals Value Taken Time   /79 07/17/25 12:01   Temp 36.7 °C (98 °F) 07/17/25 12:00   Pulse 93 07/17/25 12:00   Resp 15 07/17/25 12:00   SpO2 97 % 07/17/25 12:00   Vitals shown include unfiled device data.      Event Time   Out of Recovery 12:01:00         Pain/Elias Score: Elias Score: 10 (7/17/2025 11:59 AM)

## 2025-07-17 NOTE — PLAN OF CARE
Chart reviewed. Preop nursing care completed per orders. Safe surgery checklist complete. Belongings in locker #13. Waiting for consents & h&p prior to surgery. Pt AAOX4, VSS on room air. Bed locked in lowest position. Call light within reach. Pt denies any needs at this time.

## 2025-07-17 NOTE — H&P
Short Stay Endoscopy History and Physical    PCP - Fabio Rosas MD    Procedure - EGD  Sedation: GA  ASA - per anesthesia  Mallampati - per anesthesia  History of Anesthesia problems - no  Family history Anesthesia problems -  no     HPI:  This is a 65 y.o. male here for evaluation of : Iron deficiency anemia, h/o PUD    Reflux - no  Dysphagia - no  Abdominal pain - no  Diarrhea - no    ROS:  Constitutional: No fevers, chills, No weight loss  ENT: No allergies  CV: No chest pain  Pulm: No cough, No shortness of breath  Ophtho: No vision changes  GI: see HPI  Medical History:  has a past medical history of Hypertension.    Surgical History:  has a past surgical history that includes Arthroscopic repair of rotator cuff of shoulder (1992); Epidural steroid injection into cervical spine (Right, 12/17/2020); Umbilical hernia repair (N/A, 4/5/2021); Transforaminal epidural injection of steroid (Right, 10/14/2021); Epidural steroid injection (N/A, 12/13/2021); Epidural steroid injection (N/A, 6/2/2022); Arthroscopy of ankle with debridement (Left, 8/4/2022); Surgical removal of lesion of fibula (Left, 8/4/2022); Closure of wound (Left, 9/14/2022); Epidural steroid injection (N/A, 2/16/2023); Epidural steroid injection (N/A, 6/26/2023); Epidural steroid injection (N/A, 1/22/2024); Epidural steroid injection (N/A, 7/11/2024); and Transforaminal epidural injection of steroid (N/A, 2/3/2025).    Family History: family history includes Coronary artery disease in his brother and mother; Coronary artery disease (age of onset: 47) in his father; Heart attack in his father; Heart failure in his mother.. Otherwise no colon cancer, inflammatory bowel disease, or GI malignancies.    Social History:  reports that he quit smoking about 13 years ago. His smoking use included cigarettes and vaping with nicotine. He started smoking about 43 years ago. He has a 45 pack-year smoking history. He has quit using smokeless tobacco. He  reports current alcohol use of about 4.0 standard drinks of alcohol per week. He reports that he does not currently use drugs.    Review of patient's allergies indicates:  No Known Allergies    Medications:   Prescriptions Prior to Admission[1]    Objective Findings:    Vital Signs: Per nursing notes.    Physical Exam:  General Appearance: Well appearing in no acute distress  Head:   Normocephalic, without obvious abnormality  Eyes:    No scleral icterus  Airway: Open  Neck: No restriction in mobility  Lungs: CTA bilaterally in anterior and posterior fields, no wheezes, no crackles.  Heart:  Regular rate and rhythm, S1, S2 normal, no murmurs heard  Abdomen: Soft, non tender, non distended      Labs:  Lab Results   Component Value Date    WBC 10.11 06/23/2025    HGB 11.9 (L) 06/23/2025    HCT 38.9 (L) 06/23/2025     06/23/2025    CHOL 149 12/19/2024    TRIG 98 12/19/2024    HDL 52 12/19/2024    ALT 23 06/19/2025    AST 23 06/19/2025     06/19/2025    K 4.1 06/19/2025     06/19/2025    CREATININE 0.8 06/19/2025    BUN 19 06/19/2025    CO2 26 06/19/2025    TSH 0.405 03/17/2023    PSA 0.37 12/19/2024    HGBA1C 5.5 12/19/2024         I have explained the risks and benefits of endoscopy procedures to the patient including but not limited to bleeding, perforation, infection, and death.    Thank you so much for allowing me to participate in the care of Hipolito Rocha MD         [1]   Medications Prior to Admission   Medication Sig Dispense Refill Last Dose/Taking    albuterol (VENTOLIN HFA) 90 mcg/actuation inhaler Inhale 2 puffs into the lungs every 4 (four) hours as needed for Wheezing. Rescue 18 g 11     aspirin 81 MG Chew Take 375 mg by mouth once daily.       atorvastatin (LIPITOR) 80 MG tablet Take 1 tablet (80 mg total) by mouth once daily. 90 tablet 3     DULoxetine (CYMBALTA) 60 MG capsule Take 1 capsule (60 mg total) by mouth once daily. 90 capsule 1     esomeprazole  (NEXIUM) 40 MG capsule Take 1 capsule (40 mg total) by mouth before breakfast. 60 capsule 0     eszopiclone (LUNESTA) 2 MG Tab Take 1 tablet (2 mg total) by mouth every evening. 30 tablet 5     fluticasone-umeclidin-vilanter (TRELEGY ELLIPTA) 200-62.5-25 mcg inhaler Inhale 1 puff into the lungs once daily. (Patient not taking: Reported on 6/19/2025) 60 each 11     gabapentin (NEURONTIN) 300 MG capsule Take 1 capsule (300 mg total) by mouth 3 (three) times daily. 90 capsule 11     losartan (COZAAR) 50 MG tablet Take 1 tablet (50 mg total) by mouth once daily. 90 tablet 3     meloxicam (MOBIC) 15 MG tablet TAKE 1 TABLET(15 MG) BY MOUTH DAILY 90 tablet 3     methylPREDNISolone (MEDROL DOSEPACK) 4 mg tablet use as directed 21 tablet 0     multivitamin capsule Take 1 capsule by mouth once daily.       vitamin D (VITAMIN D3) 1000 units Tab Take 1,000 Units by mouth once daily.

## 2025-07-17 NOTE — [employer] PATIENT INSTRUCTIONS
Discharge Summary/Instructions after an Endoscopic Procedure  Patient Name: Hipolito Laws  Patient MRN: [id number]  Patient YOB: 1960 Thursday, July 17, 2025  Tom Rocha MD  Dear patient,  As a result of recent federal legislation (The Federal Cures Act), you may   receive lab or pathology results from your procedure in your [facility]   account before your physician is able to contact you. Your physician or   their representative will relay the results to you with their   recommendations at their soonest availability.  Thank you,  RESTRICTIONS:  During your procedure today, you received medications for sedation.  These   medications may affect your judgment, balance and coordination.  Therefore,   for 24 hours, you have the following restrictions:   - DO NOT drive a car, operate machinery, make legal/financial decisions,   sign important papers or drink alcohol.    ACTIVITY:  Today: no heavy lifting, straining or running due to procedural   sedation/anesthesia.  The following day: return to full activity including work.  DIET:  Eat and drink normally unless instructed otherwise.     TREATMENT FOR COMMON SIDE EFFECTS:  - Mild abdominal pain, nausea, belching, bloating or excessive gas:  rest,   eat lightly and use a heating pad.  - Sore Throat: treat with throat lozenges and/or gargle with warm salt   water.  - Because air was used during the procedure, expelling large amounts of air   from your rectum or belching is normal.  - If a bowel prep was taken, you may not have a bowel movement for 1-3 days.    This is normal.  SYMPTOMS TO WATCH FOR AND REPORT TO YOUR PHYSICIAN:  1. Abdominal pain or bloating, other than gas cramps.  2. Chest pain.  3. Back pain.  4. Signs of infection such as: chills or fever occurring within 24 hours   after the procedure.  5. Rectal bleeding, which would show as bright red, maroon, or black stools.   (A tablespoon of blood from the rectum is not serious, especially  if   hemorrhoids are present.)  6. Vomiting.  7. Weakness or dizziness.  GO DIRECTLY TO THE NEAREST EMERGENCY ROOM IF YOU HAVE ANY OF THE FOLLOWING:      Difficulty breathing              Chills and/or fever over 101 F   Persistent vomiting and/or vomiting blood   Severe abdominal pain   Severe chest pain   Black, tarry stools   Bleeding- more than one tablespoon   Any other symptom or condition that you feel may need urgent attention  Your doctor recommends these additional instructions:  If any biopsies were taken, your doctors clinic will contact you in 1 to 2   weeks with any results.  - Patient has a contact number available for emergencies.  The signs and   symptoms of potential delayed complications were discussed with the   patient.  Return to normal activities tomorrow.  Written discharge   instructions were provided to the patient.   - Discharge patient to home.   - Resume previous diet.   - Continue present medications including Nexium daily.   - Await pathology results.   - Discuss the use of aspirin and other non-steroidal anti-inflammatory drugs   like Meloxicam with your Primary care physician.   - Repeat upper endoscopy in 12 weeks to check healing.   For questions, problems or results please call your physician - Tom Rocha MD at Work:  [phone].  [facility] [place], EMERGENCY ROOM PHONE NUMBER: [phone]  IF A COMPLICATION OR EMERGENCY SITUATION ARISES AND YOU ARE UNABLE TO REACH   YOUR PHYSICIAN - GO DIRECTLY TO THE EMERGENCY ROOM.  Tom Rocha MD  7/17/2025 11:49:29 AM  This report has been verified and signed electronically.  Dear patient,  As a result of recent federal legislation (The Federal Cures Act), you may   receive lab or pathology results from your procedure in your [facility]   account before your physician is able to contact you. Your physician or   their representative will relay the results to you with their   recommendations at their soonest  availability.  Thank you,  [employer]

## 2025-07-17 NOTE — TRANSFER OF CARE
Anesthesia Transfer of Care Note    Patient: Hipolito Laws    Procedure(s) Performed: Procedure(s) (LRB):  EGD (ESOPHAGOGASTRODUODENOSCOPY) (N/A)    Patient location: PACU    Anesthesia Type: general    Transport from OR: Transported from OR on room air with adequate spontaneous ventilation    Post pain: adequate analgesia    Post assessment: no apparent anesthetic complications and tolerated procedure well    Post vital signs: stable    Level of consciousness: awake, alert and oriented    Nausea/Vomiting: no nausea/vomiting    Complications: none    Transfer of care protocol was followed    Last vitals: Visit Vitals  BP (!) 195/87 (BP Location: Left arm, Patient Position: Lying)   Pulse 89   Temp 36.7 °C (98.1 °F) (Temporal)   Resp 20   SpO2 99%

## 2025-07-18 ENCOUNTER — PATIENT MESSAGE (OUTPATIENT)
Dept: INTERNAL MEDICINE | Facility: CLINIC | Age: 65
End: 2025-07-18
Payer: COMMERCIAL

## 2025-07-18 LAB
ESTROGEN SERPL-MCNC: NORMAL PG/ML
INSULIN SERPL-ACNC: NORMAL U[IU]/ML
LAB AP CLINICAL INFORMATION: NORMAL
LAB AP GROSS DESCRIPTION: NORMAL
LAB AP PERFORMING LOCATION(S): NORMAL
LAB AP REPORT FOOTNOTES: NORMAL

## 2025-07-18 NOTE — TELEPHONE ENCOUNTER
LOV with Plost, Fabio MARLEY MD , 6/19/2025    Patient states he is eliminating aspirin, reducing Meloxicam to half a dose daily, and Cymblata to 30 mg. Inquiring about hiatal hernia.  Please let me know if appointment needed

## 2025-07-31 ENCOUNTER — TELEPHONE (OUTPATIENT)
Dept: ENDOSCOPY | Facility: HOSPITAL | Age: 65
End: 2025-07-31
Payer: COMMERCIAL

## 2025-07-31 DIAGNOSIS — K27.9 PUD (PEPTIC ULCER DISEASE): Primary | ICD-10-CM

## 2025-08-01 ENCOUNTER — TELEPHONE (OUTPATIENT)
Dept: GASTROENTEROLOGY | Facility: CLINIC | Age: 65
End: 2025-08-01
Payer: COMMERCIAL

## 2025-08-01 ENCOUNTER — PATIENT MESSAGE (OUTPATIENT)
Dept: GASTROENTEROLOGY | Facility: CLINIC | Age: 65
End: 2025-08-01
Payer: COMMERCIAL

## 2025-08-01 NOTE — TELEPHONE ENCOUNTER
----- Message from Tom Rocha MD sent at 7/31/2025  7:45 AM CDT -----  Please notify patient, the stomach biopsies did not reveal H.pylori infection.  ----- Message -----  From: Lab, Background User  Sent: 7/18/2025  12:17 PM CDT  To: Tom Rocha MD

## 2025-08-06 ENCOUNTER — E-VISIT (OUTPATIENT)
Dept: INTERNAL MEDICINE | Facility: CLINIC | Age: 65
End: 2025-08-06
Payer: COMMERCIAL

## 2025-08-06 ENCOUNTER — PATIENT MESSAGE (OUTPATIENT)
Dept: INTERNAL MEDICINE | Facility: CLINIC | Age: 65
End: 2025-08-06
Payer: COMMERCIAL

## 2025-08-06 DIAGNOSIS — F41.9 ANXIETY AND DEPRESSION: Primary | ICD-10-CM

## 2025-08-06 DIAGNOSIS — F32.A ANXIETY AND DEPRESSION: Primary | ICD-10-CM

## 2025-08-06 RX ORDER — DULOXETIN HYDROCHLORIDE 30 MG/1
30 CAPSULE, DELAYED RELEASE ORAL DAILY
Qty: 90 CAPSULE | Refills: 3 | Status: SHIPPED | OUTPATIENT
Start: 2025-08-06

## 2025-08-06 NOTE — PROGRESS NOTES
Patient ID: Hipolito Laws is a 65 y.o. male.        E-Visit Time Tracking:             Chief Complaint: Medication Management (Entered automatically based on patient selection in [employer].)      The patient initiated a request through [employer] on 8/6/2025 for evaluation and management with a chief complaint of Medication Management (Entered automatically based on patient selection in [employer].)     I evaluated the questionnaire submission on 08/06/2025.    [facility] P[facility] Evisit Medication    8/6/2025  1:37 PM CDT - Filed by Patient   Do you agree to participate in an E-Visit? Yes   If you have any of the following symptoms, please present to your local emergency room or call 911:  I acknowledge   Medication requests for narcotics will not be addressed via an E-Visit.  Please schedule an appointment. I acknowledge   Choose the state of your primary residence [place]   Do you want to address a new or existing medication? (Start a new medication, Address a current medication) Address a current medication   What is the main issue you would like addressed today? Reducing Duloxetine from 60mg to the prior dosage of 30mg   Would you like to change or continue your medication? (Change medication, Continue medication) Continue medication   What is the name of the medication you would like to continue? Duloxetine 30mg   Are you taking your medication as prescribed? Yes   Which option below best describes the reason for your request? (Renew refills, Prior authorization is required) Renew refills    What medical condition is the  medication intended to treat? Anxiety   Has the medication helped your condition? (Yes, No, Not sure) Yes   Have you experienced any side effects from the medication? No   Provide any information you feel is important to your history not asked above    Please attach any relevant images or files    Are you able to take your vitals? No         No diagnosis found.     No orders of the defined types were placed  in this encounter.           No follow-ups on file.

## 2025-08-14 DIAGNOSIS — R10.13 DYSPEPSIA: ICD-10-CM

## 2025-08-15 RX ORDER — ESOMEPRAZOLE MAGNESIUM 40 MG/1
40 CAPSULE, DELAYED RELEASE ORAL
Qty: 60 CAPSULE | Refills: 0 | Status: SHIPPED | OUTPATIENT
Start: 2025-08-15

## 2025-08-19 ENCOUNTER — TELEPHONE (OUTPATIENT)
Dept: DERMATOLOGY | Facility: CLINIC | Age: 65
End: 2025-08-19
Payer: COMMERCIAL

## 2025-08-22 ENCOUNTER — TELEPHONE (OUTPATIENT)
Dept: ENDOSCOPY | Facility: HOSPITAL | Age: 65
End: 2025-08-22
Payer: COMMERCIAL

## 2025-08-27 DIAGNOSIS — G47.00 INSOMNIA, UNSPECIFIED TYPE: ICD-10-CM

## 2025-08-28 RX ORDER — ESZOPICLONE 2 MG/1
2 TABLET, FILM COATED ORAL NIGHTLY
Qty: 30 TABLET | Refills: 5 | Status: SHIPPED | OUTPATIENT
Start: 2025-08-28

## (undated) DEVICE — NDL HYPODERMIC SAF 22G 1.5IN

## (undated) DEVICE — ELECTRODE REM PLYHSV RETURN 9

## (undated) DEVICE — BLADE SURG STAINLESS STEEL #11

## (undated) DEVICE — DRAPE T EXTRM SURG 121X128X90

## (undated) DEVICE — BANDAGE MATRIX HK LOOP 4IN 5YD

## (undated) DEVICE — SEE MEDLINE ITEM 152622

## (undated) DEVICE — DRESSING XEROFORM FOIL PK 1X8

## (undated) DEVICE — GAUZE SPONGE 4X4 12PLY

## (undated) DEVICE — ADHESIVE DERMABOND ADVANCED

## (undated) DEVICE — TRAY MINOR GEN SURG

## (undated) DEVICE — BANDAGE DERMACEA STRETCH 4X1IN

## (undated) DEVICE — BANDAGE ESMARK ELASTIC ST 4X9

## (undated) DEVICE — SPONGE GAUZE 16PLY 4X4

## (undated) DEVICE — BLADE SHAVER MICRO HUB 2.9MM

## (undated) DEVICE — DRESSING LEUKOPLAST FLEX 1X3IN

## (undated) DEVICE — SUT VICRYL PLUS 0 CT1 18IN

## (undated) DEVICE — TRAY MINOR ORTHO OMC

## (undated) DEVICE — SEE MEDLINE ITEM 157148

## (undated) DEVICE — SHOE CAST POST-OP MEN SBUNION

## (undated) DEVICE — KIT ASSISTARM ANKLE CUSTOM

## (undated) DEVICE — GLOVE BIOGEL PI MICRO SZ 7.5

## (undated) DEVICE — KIT PREVENA PLUS

## (undated) DEVICE — TUBE SET INFLOW/OUTFLOW

## (undated) DEVICE — DRAPE TOP 53X102IN

## (undated) DEVICE — UNDERGLOVES BIOGEL PI SIZE 8

## (undated) DEVICE — SEE MEDLINE ITEM 146417

## (undated) DEVICE — APPLICATOR CHLORAPREP ORN 26ML

## (undated) DEVICE — SUT ETHILON 3-0 PS2 18 BLK

## (undated) DEVICE — GOWN SURGICAL X-LARGE

## (undated) DEVICE — KIT COLLECTION E SWAB REGULAR

## (undated) DEVICE — SEE MEDLINE ITEM 157117

## (undated) DEVICE — Device

## (undated) DEVICE — NDL 18GA X1 1/2 REG BEVEL

## (undated) DEVICE — STOCKINETTE TUBULAR 2PL 6 X 4

## (undated) DEVICE — DRAPE THREE-QTR REINF 53X77IN

## (undated) DEVICE — SUT VICRYL 3-0 27 SH

## (undated) DEVICE — SUT MCRYL PLUS 4-0 PS2 27IN

## (undated) DEVICE — NDL 22GA X1 1/2 REG BEVEL

## (undated) DEVICE — DRAPE STERI U-SHAPED 47X51IN

## (undated) DEVICE — SOL IRR NACL .9% 3000ML